# Patient Record
Sex: FEMALE | Race: WHITE | NOT HISPANIC OR LATINO
[De-identification: names, ages, dates, MRNs, and addresses within clinical notes are randomized per-mention and may not be internally consistent; named-entity substitution may affect disease eponyms.]

---

## 2017-04-26 ENCOUNTER — RESULT REVIEW (OUTPATIENT)
Age: 47
End: 2017-04-26

## 2018-05-08 ENCOUNTER — RESULT REVIEW (OUTPATIENT)
Age: 48
End: 2018-05-08

## 2018-05-22 ENCOUNTER — APPOINTMENT (OUTPATIENT)
Dept: MAMMOGRAPHY | Facility: IMAGING CENTER | Age: 48
End: 2018-05-22
Payer: COMMERCIAL

## 2018-05-22 ENCOUNTER — OUTPATIENT (OUTPATIENT)
Dept: OUTPATIENT SERVICES | Facility: HOSPITAL | Age: 48
LOS: 1 days | End: 2018-05-22
Payer: COMMERCIAL

## 2018-05-22 DIAGNOSIS — Z00.00 ENCOUNTER FOR GENERAL ADULT MEDICAL EXAMINATION WITHOUT ABNORMAL FINDINGS: ICD-10-CM

## 2018-05-22 DIAGNOSIS — M67.432 GANGLION, LEFT WRIST: Chronic | ICD-10-CM

## 2018-05-22 PROCEDURE — 77067 SCR MAMMO BI INCL CAD: CPT

## 2018-05-22 PROCEDURE — 77063 BREAST TOMOSYNTHESIS BI: CPT

## 2018-05-22 PROCEDURE — 77067 SCR MAMMO BI INCL CAD: CPT | Mod: 26

## 2018-05-22 PROCEDURE — 77063 BREAST TOMOSYNTHESIS BI: CPT | Mod: 26

## 2018-05-30 ENCOUNTER — OUTPATIENT (OUTPATIENT)
Dept: OUTPATIENT SERVICES | Facility: HOSPITAL | Age: 48
LOS: 1 days | End: 2018-05-30
Payer: COMMERCIAL

## 2018-05-30 ENCOUNTER — APPOINTMENT (OUTPATIENT)
Dept: MAMMOGRAPHY | Facility: IMAGING CENTER | Age: 48
End: 2018-05-30
Payer: COMMERCIAL

## 2018-05-30 ENCOUNTER — APPOINTMENT (OUTPATIENT)
Dept: ULTRASOUND IMAGING | Facility: IMAGING CENTER | Age: 48
End: 2018-05-30
Payer: COMMERCIAL

## 2018-05-30 DIAGNOSIS — M67.432 GANGLION, LEFT WRIST: Chronic | ICD-10-CM

## 2018-05-30 DIAGNOSIS — Z00.8 ENCOUNTER FOR OTHER GENERAL EXAMINATION: ICD-10-CM

## 2018-05-30 PROCEDURE — 77065 DX MAMMO INCL CAD UNI: CPT | Mod: 26,RT

## 2018-05-30 PROCEDURE — 76642 ULTRASOUND BREAST LIMITED: CPT | Mod: 26,RT

## 2018-05-30 PROCEDURE — 76642 ULTRASOUND BREAST LIMITED: CPT

## 2018-05-30 PROCEDURE — 77065 DX MAMMO INCL CAD UNI: CPT

## 2018-06-01 ENCOUNTER — APPOINTMENT (OUTPATIENT)
Dept: ULTRASOUND IMAGING | Facility: CLINIC | Age: 48
End: 2018-06-01
Payer: COMMERCIAL

## 2018-06-01 ENCOUNTER — OUTPATIENT (OUTPATIENT)
Dept: OUTPATIENT SERVICES | Facility: HOSPITAL | Age: 48
LOS: 1 days | End: 2018-06-01
Payer: COMMERCIAL

## 2018-06-01 ENCOUNTER — RESULT REVIEW (OUTPATIENT)
Age: 48
End: 2018-06-01

## 2018-06-01 DIAGNOSIS — Z00.8 ENCOUNTER FOR OTHER GENERAL EXAMINATION: ICD-10-CM

## 2018-06-01 DIAGNOSIS — M67.432 GANGLION, LEFT WRIST: Chronic | ICD-10-CM

## 2018-06-01 PROCEDURE — 77065 DX MAMMO INCL CAD UNI: CPT

## 2018-06-01 PROCEDURE — 19083 BX BREAST 1ST LESION US IMAG: CPT

## 2018-06-01 PROCEDURE — 19083 BX BREAST 1ST LESION US IMAG: CPT | Mod: RT

## 2018-06-01 PROCEDURE — A4648: CPT

## 2018-06-01 PROCEDURE — 77065 DX MAMMO INCL CAD UNI: CPT | Mod: 26,RT

## 2018-06-13 ENCOUNTER — APPOINTMENT (OUTPATIENT)
Dept: SURGICAL ONCOLOGY | Facility: CLINIC | Age: 48
End: 2018-06-13
Payer: COMMERCIAL

## 2018-06-13 VITALS
HEART RATE: 71 BPM | DIASTOLIC BLOOD PRESSURE: 71 MMHG | SYSTOLIC BLOOD PRESSURE: 104 MMHG | HEIGHT: 63 IN | WEIGHT: 147 LBS | OXYGEN SATURATION: 98 % | BODY MASS INDEX: 26.05 KG/M2

## 2018-06-13 DIAGNOSIS — Z80.3 FAMILY HISTORY OF MALIGNANT NEOPLASM OF BREAST: ICD-10-CM

## 2018-06-13 DIAGNOSIS — Z97.3 PRESENCE OF SPECTACLES AND CONTACT LENSES: ICD-10-CM

## 2018-06-13 DIAGNOSIS — C50.911 MALIGNANT NEOPLASM OF UNSPECIFIED SITE OF RIGHT FEMALE BREAST: ICD-10-CM

## 2018-06-13 PROCEDURE — 99244 OFF/OP CNSLTJ NEW/EST MOD 40: CPT

## 2018-06-13 RX ORDER — BUPROPION HYDROCHLORIDE 300 MG/1
300 TABLET, EXTENDED RELEASE ORAL
Refills: 0 | Status: ACTIVE | COMMUNITY

## 2018-06-19 ENCOUNTER — FORM ENCOUNTER (OUTPATIENT)
Age: 48
End: 2018-06-19

## 2018-06-20 ENCOUNTER — OUTPATIENT (OUTPATIENT)
Dept: OUTPATIENT SERVICES | Facility: HOSPITAL | Age: 48
LOS: 1 days | End: 2018-06-20
Payer: COMMERCIAL

## 2018-06-20 ENCOUNTER — APPOINTMENT (OUTPATIENT)
Dept: NUCLEAR MEDICINE | Facility: IMAGING CENTER | Age: 48
End: 2018-06-20
Payer: COMMERCIAL

## 2018-06-20 DIAGNOSIS — M67.432 GANGLION, LEFT WRIST: Chronic | ICD-10-CM

## 2018-06-20 DIAGNOSIS — C50.911 MALIGNANT NEOPLASM OF UNSPECIFIED SITE OF RIGHT FEMALE BREAST: ICD-10-CM

## 2018-06-20 PROCEDURE — 78306 BONE IMAGING WHOLE BODY: CPT

## 2018-06-20 PROCEDURE — 78306 BONE IMAGING WHOLE BODY: CPT | Mod: 26

## 2018-06-20 PROCEDURE — A9561: CPT

## 2018-06-24 ENCOUNTER — FORM ENCOUNTER (OUTPATIENT)
Age: 48
End: 2018-06-24

## 2018-06-25 ENCOUNTER — OUTPATIENT (OUTPATIENT)
Dept: OUTPATIENT SERVICES | Facility: HOSPITAL | Age: 48
LOS: 1 days | End: 2018-06-25
Payer: COMMERCIAL

## 2018-06-25 ENCOUNTER — APPOINTMENT (OUTPATIENT)
Dept: ULTRASOUND IMAGING | Facility: CLINIC | Age: 48
End: 2018-06-25
Payer: COMMERCIAL

## 2018-06-25 ENCOUNTER — APPOINTMENT (OUTPATIENT)
Dept: MRI IMAGING | Facility: CLINIC | Age: 48
End: 2018-06-25
Payer: COMMERCIAL

## 2018-06-25 ENCOUNTER — APPOINTMENT (OUTPATIENT)
Dept: RADIOLOGY | Facility: CLINIC | Age: 48
End: 2018-06-25
Payer: COMMERCIAL

## 2018-06-25 DIAGNOSIS — M67.432 GANGLION, LEFT WRIST: Chronic | ICD-10-CM

## 2018-06-25 DIAGNOSIS — Z00.8 ENCOUNTER FOR OTHER GENERAL EXAMINATION: ICD-10-CM

## 2018-06-25 PROCEDURE — 77059 MRI BREAST BILATERAL: CPT | Mod: 26

## 2018-06-25 PROCEDURE — 76856 US EXAM PELVIC COMPLETE: CPT

## 2018-06-25 PROCEDURE — A9585: CPT

## 2018-06-25 PROCEDURE — 71046 X-RAY EXAM CHEST 2 VIEWS: CPT | Mod: 26

## 2018-06-25 PROCEDURE — 76856 US EXAM PELVIC COMPLETE: CPT | Mod: 26

## 2018-06-25 PROCEDURE — 0159T: CPT | Mod: 26

## 2018-06-25 PROCEDURE — 76830 TRANSVAGINAL US NON-OB: CPT | Mod: 26

## 2018-06-25 PROCEDURE — C8937: CPT

## 2018-06-25 PROCEDURE — C8908: CPT

## 2018-06-25 PROCEDURE — 76830 TRANSVAGINAL US NON-OB: CPT

## 2018-06-25 PROCEDURE — 71046 X-RAY EXAM CHEST 2 VIEWS: CPT

## 2018-07-16 ENCOUNTER — FORM ENCOUNTER (OUTPATIENT)
Age: 48
End: 2018-07-16

## 2018-07-17 ENCOUNTER — APPOINTMENT (OUTPATIENT)
Dept: ULTRASOUND IMAGING | Facility: CLINIC | Age: 48
End: 2018-07-17
Payer: COMMERCIAL

## 2018-07-17 ENCOUNTER — OUTPATIENT (OUTPATIENT)
Dept: OUTPATIENT SERVICES | Facility: HOSPITAL | Age: 48
LOS: 1 days | End: 2018-07-17
Payer: COMMERCIAL

## 2018-07-17 DIAGNOSIS — M67.432 GANGLION, LEFT WRIST: Chronic | ICD-10-CM

## 2018-07-17 DIAGNOSIS — Z00.8 ENCOUNTER FOR OTHER GENERAL EXAMINATION: ICD-10-CM

## 2018-07-17 PROCEDURE — 76700 US EXAM ABDOM COMPLETE: CPT | Mod: 26

## 2018-07-17 PROCEDURE — 76700 US EXAM ABDOM COMPLETE: CPT

## 2018-08-01 ENCOUNTER — OUTPATIENT (OUTPATIENT)
Dept: OUTPATIENT SERVICES | Facility: HOSPITAL | Age: 48
LOS: 1 days | End: 2018-08-01
Payer: COMMERCIAL

## 2018-08-01 VITALS
HEIGHT: 64 IN | TEMPERATURE: 98 F | RESPIRATION RATE: 13 BRPM | DIASTOLIC BLOOD PRESSURE: 73 MMHG | SYSTOLIC BLOOD PRESSURE: 111 MMHG | OXYGEN SATURATION: 98 % | WEIGHT: 143.3 LBS | HEART RATE: 69 BPM

## 2018-08-01 VITALS — HEIGHT: 64 IN | WEIGHT: 143.3 LBS

## 2018-08-01 DIAGNOSIS — Z30.430 ENCOUNTER FOR INSERTION OF INTRAUTERINE CONTRACEPTIVE DEVICE: Chronic | ICD-10-CM

## 2018-08-01 DIAGNOSIS — M67.432 GANGLION, LEFT WRIST: Chronic | ICD-10-CM

## 2018-08-01 DIAGNOSIS — C50.911 MALIGNANT NEOPLASM OF UNSPECIFIED SITE OF RIGHT FEMALE BREAST: ICD-10-CM

## 2018-08-01 DIAGNOSIS — Z01.818 ENCOUNTER FOR OTHER PREPROCEDURAL EXAMINATION: ICD-10-CM

## 2018-08-01 LAB
HCT VFR BLD CALC: 42.5 % — SIGNIFICANT CHANGE UP (ref 34.5–45)
HGB BLD-MCNC: 14.3 G/DL — SIGNIFICANT CHANGE UP (ref 11.5–15.5)
MCHC RBC-ENTMCNC: 31.5 PG — SIGNIFICANT CHANGE UP (ref 27–34)
MCHC RBC-ENTMCNC: 33.8 GM/DL — SIGNIFICANT CHANGE UP (ref 32–36)
MCV RBC AUTO: 93.4 FL — SIGNIFICANT CHANGE UP (ref 80–100)
PLATELET # BLD AUTO: 226 K/UL — SIGNIFICANT CHANGE UP (ref 150–400)
RBC # BLD: 4.55 M/UL — SIGNIFICANT CHANGE UP (ref 3.8–5.2)
RBC # FLD: 11.4 % — SIGNIFICANT CHANGE UP (ref 10.3–14.5)
WBC # BLD: 4.9 K/UL — SIGNIFICANT CHANGE UP (ref 3.8–10.5)
WBC # FLD AUTO: 4.9 K/UL — SIGNIFICANT CHANGE UP (ref 3.8–10.5)

## 2018-08-01 NOTE — H&P PST ADULT - HISTORY OF PRESENT ILLNESS
49 y/o female presents to [facility].  She went for a routine mammogram about 3 months ago and abnormal spots were detected on the right breast.  Further testing with Ultrasound and MRI breast confirmed right breast invasive ductal carcinoma. Scheduled for right breast excision with mammographic localization, right axillary sentinel lymph node biopsy on 8/15/18.

## 2018-08-01 NOTE — H&P PST ADULT - PROBLEM SELECTOR PLAN 1
Scheduled for right breast excision with mammographic localization, right axillary sentinel lymph node biopsy on 8/15/18.    Pre-op labs ordered. Obtain medical clearance. Take Pepcid, Levothyroxine with a sip of water. Bring asthma inhaler.

## 2018-08-01 NOTE — H&P PST ADULT - NSANTHOSAYNRD_GEN_A_CORE
No. LANCE screening performed.  STOP BANG Legend: 0-2 = LOW Risk; 3-4 = INTERMEDIATE Risk; 5-8 = HIGH Risk

## 2018-08-01 NOTE — H&P PST ADULT - ATTENDING COMMENTS
48-year-old lady recently diagnosed with an invasive carcinoma of the upper-outer quadrant of her right breast.  Her extent of disease evaluation is unremarkable.  Management options reviewed, the first breast conservation.    She is scheduled for wire localized excision of the right breast cancer, with sentinel node biopsy.    Diagnosis and plan were reviewed with her  in my office, and again the morning of surgery.    All questions answered, consent on chart

## 2018-08-01 NOTE — H&P PST ADULT - PMH
Asthma  last major attack 2016  Bipolar affective    Depression, unspecified depression type    Hypothyroidism, unspecified type    Malignant neoplasm of right female breast, unspecified estrogen receptor status, unspecified site of breast

## 2018-08-02 ENCOUNTER — RESULT REVIEW (OUTPATIENT)
Age: 48
End: 2018-08-02

## 2018-08-02 LAB — SURGICAL PATHOLOGY STUDY: SIGNIFICANT CHANGE UP

## 2018-08-02 PROCEDURE — 36415 COLL VENOUS BLD VENIPUNCTURE: CPT

## 2018-08-02 PROCEDURE — G0463: CPT

## 2018-08-02 PROCEDURE — 85027 COMPLETE CBC AUTOMATED: CPT

## 2018-08-08 PROBLEM — E03.9 HYPOTHYROIDISM, UNSPECIFIED: Chronic | Status: ACTIVE | Noted: 2018-08-01

## 2018-08-08 PROBLEM — F32.9 MAJOR DEPRESSIVE DISORDER, SINGLE EPISODE, UNSPECIFIED: Chronic | Status: ACTIVE | Noted: 2018-08-01

## 2018-08-14 ENCOUNTER — FORM ENCOUNTER (OUTPATIENT)
Age: 48
End: 2018-08-14

## 2018-08-14 ENCOUNTER — TRANSCRIPTION ENCOUNTER (OUTPATIENT)
Age: 48
End: 2018-08-14

## 2018-08-14 NOTE — ASU DISCHARGE PLAN (ADULT/PEDIATRIC). - NOTIFY
Increased Irritability or Sluggishness/Pain not relieved by Medications/Excessive Diarrhea/Inability to Tolerate Liquids or Foods/Persistent Nausea and Vomiting/Bleeding that does not stop/Unable to Urinate/Numbness, tingling/Swelling that continues/Numbness, color, or temperature change to extremity/Fever greater than 101

## 2018-08-14 NOTE — ASU DISCHARGE PLAN (ADULT/PEDIATRIC). - MEDICATION SUMMARY - MEDICATIONS TO TAKE
I will START or STAY ON the medications listed below when I get home from the hospital:    sertraline 100 mg oral tablet  -- 1  by mouth once a day  -- Indication: For home    Symbicort 160 mcg-4.5 mcg/inh inhalation aerosol  --  inhaled , 2 puffs /day  -- Indication: For home    buPROPion 300 mg/24 hours oral tablet, extended release  -- 1 tab(s) by mouth every 24 hours  -- Indication: For home    levothyroxine 75 mcg (0.075 mg) oral tablet  -- 1 tab(s) by mouth once a day  -- Indication: For home

## 2018-08-14 NOTE — ASU DISCHARGE PLAN (ADULT/PEDIATRIC). - ITEMS TO FOLLOWUP WITH YOUR PHYSICIAN'S
Dr. Heller should call with pathology report by August 24  Office followup will be determined after the conversation

## 2018-08-15 ENCOUNTER — APPOINTMENT (OUTPATIENT)
Dept: SURGICAL ONCOLOGY | Facility: HOSPITAL | Age: 48
End: 2018-08-15

## 2018-08-15 ENCOUNTER — OUTPATIENT (OUTPATIENT)
Dept: OUTPATIENT SERVICES | Facility: HOSPITAL | Age: 48
LOS: 1 days | End: 2018-08-15
Payer: COMMERCIAL

## 2018-08-15 ENCOUNTER — RESULT REVIEW (OUTPATIENT)
Age: 48
End: 2018-08-15

## 2018-08-15 VITALS
RESPIRATION RATE: 16 BRPM | TEMPERATURE: 97 F | SYSTOLIC BLOOD PRESSURE: 102 MMHG | OXYGEN SATURATION: 98 % | DIASTOLIC BLOOD PRESSURE: 70 MMHG | HEART RATE: 70 BPM

## 2018-08-15 VITALS
WEIGHT: 143.3 LBS | DIASTOLIC BLOOD PRESSURE: 73 MMHG | HEIGHT: 64 IN | SYSTOLIC BLOOD PRESSURE: 102 MMHG | TEMPERATURE: 98 F | OXYGEN SATURATION: 99 % | HEART RATE: 71 BPM | RESPIRATION RATE: 16 BRPM

## 2018-08-15 DIAGNOSIS — Z30.430 ENCOUNTER FOR INSERTION OF INTRAUTERINE CONTRACEPTIVE DEVICE: Chronic | ICD-10-CM

## 2018-08-15 DIAGNOSIS — M67.432 GANGLION, LEFT WRIST: Chronic | ICD-10-CM

## 2018-08-15 DIAGNOSIS — C50.911 MALIGNANT NEOPLASM OF UNSPECIFIED SITE OF RIGHT FEMALE BREAST: ICD-10-CM

## 2018-08-15 PROCEDURE — 19281 PERQ DEVICE BREAST 1ST IMAG: CPT | Mod: RT

## 2018-08-15 PROCEDURE — 38900 IO MAP OF SENT LYMPH NODE: CPT

## 2018-08-15 PROCEDURE — 38525 BIOPSY/REMOVAL LYMPH NODES: CPT

## 2018-08-15 PROCEDURE — A9541: CPT

## 2018-08-15 PROCEDURE — 76098 X-RAY EXAM SURGICAL SPECIMEN: CPT

## 2018-08-15 PROCEDURE — 88307 TISSUE EXAM BY PATHOLOGIST: CPT

## 2018-08-15 PROCEDURE — 38790 INJECT FOR LYMPHATIC X-RAY: CPT

## 2018-08-15 PROCEDURE — 38525 BIOPSY/REMOVAL LYMPH NODES: CPT | Mod: RT

## 2018-08-15 PROCEDURE — 19301 PARTIAL MASTECTOMY: CPT | Mod: RT

## 2018-08-15 PROCEDURE — 19281 PERQ DEVICE BREAST 1ST IMAG: CPT

## 2018-08-15 PROCEDURE — C1889: CPT

## 2018-08-15 PROCEDURE — 88307 TISSUE EXAM BY PATHOLOGIST: CPT | Mod: 26

## 2018-08-15 PROCEDURE — 19125 EXCISION BREAST LESION: CPT

## 2018-08-15 RX ORDER — HYDROMORPHONE HYDROCHLORIDE 2 MG/ML
0.5 INJECTION INTRAMUSCULAR; INTRAVENOUS; SUBCUTANEOUS
Qty: 0 | Refills: 0 | Status: DISCONTINUED | OUTPATIENT
Start: 2018-08-15 | End: 2018-08-15

## 2018-08-15 RX ORDER — SODIUM CHLORIDE 9 MG/ML
1000 INJECTION, SOLUTION INTRAVENOUS
Qty: 0 | Refills: 0 | Status: DISCONTINUED | OUTPATIENT
Start: 2018-08-15 | End: 2018-08-15

## 2018-08-15 RX ORDER — HEPARIN SODIUM 5000 [USP'U]/ML
5000 INJECTION INTRAVENOUS; SUBCUTANEOUS ONCE
Qty: 0 | Refills: 0 | Status: COMPLETED | OUTPATIENT
Start: 2018-08-15 | End: 2018-08-15

## 2018-08-15 RX ORDER — OXYCODONE AND ACETAMINOPHEN 5; 325 MG/1; MG/1
1 TABLET ORAL EVERY 4 HOURS
Qty: 0 | Refills: 0 | Status: DISCONTINUED | OUTPATIENT
Start: 2018-08-15 | End: 2018-08-15

## 2018-08-15 RX ORDER — ACETAMINOPHEN 500 MG
650 TABLET ORAL EVERY 6 HOURS
Qty: 0 | Refills: 0 | Status: DISCONTINUED | OUTPATIENT
Start: 2018-08-15 | End: 2018-08-30

## 2018-08-15 RX ORDER — MORPHINE SULFATE 50 MG/1
2 CAPSULE, EXTENDED RELEASE ORAL EVERY 4 HOURS
Qty: 0 | Refills: 0 | Status: DISCONTINUED | OUTPATIENT
Start: 2018-08-15 | End: 2018-08-15

## 2018-08-15 RX ORDER — ONDANSETRON 8 MG/1
4 TABLET, FILM COATED ORAL ONCE
Qty: 0 | Refills: 0 | Status: DISCONTINUED | OUTPATIENT
Start: 2018-08-15 | End: 2018-08-15

## 2018-08-15 RX ORDER — OXYCODONE HYDROCHLORIDE 5 MG/1
5 TABLET ORAL ONCE
Qty: 0 | Refills: 0 | Status: DISCONTINUED | OUTPATIENT
Start: 2018-08-15 | End: 2018-08-15

## 2018-08-15 RX ADMIN — OXYCODONE AND ACETAMINOPHEN 1 TABLET(S): 5; 325 TABLET ORAL at 11:47

## 2018-08-15 RX ADMIN — SODIUM CHLORIDE 50 MILLILITER(S): 9 INJECTION, SOLUTION INTRAVENOUS at 06:39

## 2018-08-15 RX ADMIN — OXYCODONE AND ACETAMINOPHEN 1 TABLET(S): 5; 325 TABLET ORAL at 12:26

## 2018-08-15 RX ADMIN — HEPARIN SODIUM 5000 UNIT(S): 5000 INJECTION INTRAVENOUS; SUBCUTANEOUS at 07:54

## 2018-08-15 NOTE — BRIEF OPERATIVE NOTE - PROCEDURE
<<-----Click on this checkbox to enter Procedure Breast lumpectomy with sentinel node biopsy  08/15/2018  right  Active  MBEG

## 2018-08-15 NOTE — BRIEF OPERATIVE NOTE - SPECIMENS
right axSLN(s), tissue UOQ right breast right axSLN(s): blue: 150, background: 0, tissue UOQ right breast

## 2018-08-20 LAB — SURGICAL PATHOLOGY STUDY: SIGNIFICANT CHANGE UP

## 2018-08-27 ENCOUNTER — OUTPATIENT (OUTPATIENT)
Dept: OUTPATIENT SERVICES | Facility: HOSPITAL | Age: 48
LOS: 1 days | Discharge: HOME | End: 2018-08-27
Payer: COMMERCIAL

## 2018-08-27 DIAGNOSIS — Z30.430 ENCOUNTER FOR INSERTION OF INTRAUTERINE CONTRACEPTIVE DEVICE: Chronic | ICD-10-CM

## 2018-08-27 DIAGNOSIS — M67.432 GANGLION, LEFT WRIST: Chronic | ICD-10-CM

## 2018-08-27 DIAGNOSIS — C50.919 MALIGNANT NEOPLASM OF UNSPECIFIED SITE OF UNSPECIFIED FEMALE BREAST: ICD-10-CM

## 2018-08-28 ENCOUNTER — OUTPATIENT (OUTPATIENT)
Dept: OUTPATIENT SERVICES | Facility: HOSPITAL | Age: 48
LOS: 1 days | Discharge: HOME | End: 2018-08-28
Payer: COMMERCIAL

## 2018-08-28 DIAGNOSIS — M67.432 GANGLION, LEFT WRIST: Chronic | ICD-10-CM

## 2018-08-28 DIAGNOSIS — Z30.430 ENCOUNTER FOR INSERTION OF INTRAUTERINE CONTRACEPTIVE DEVICE: Chronic | ICD-10-CM

## 2018-09-05 ENCOUNTER — APPOINTMENT (OUTPATIENT)
Dept: RADIATION ONCOLOGY | Facility: CLINIC | Age: 48
End: 2018-09-05
Payer: COMMERCIAL

## 2018-09-05 VITALS
BODY MASS INDEX: 26.15 KG/M2 | OXYGEN SATURATION: 100 % | SYSTOLIC BLOOD PRESSURE: 110 MMHG | HEART RATE: 68 BPM | HEIGHT: 63 IN | TEMPERATURE: 98.7 F | DIASTOLIC BLOOD PRESSURE: 74 MMHG | RESPIRATION RATE: 15 BRPM | WEIGHT: 147.6 LBS

## 2018-09-05 PROCEDURE — 99244 OFF/OP CNSLTJ NEW/EST MOD 40: CPT | Mod: 25,GC

## 2018-09-11 ENCOUNTER — APPOINTMENT (OUTPATIENT)
Dept: HEMATOLOGY ONCOLOGY | Facility: CLINIC | Age: 48
End: 2018-09-11
Payer: COMMERCIAL

## 2018-09-11 VITALS
OXYGEN SATURATION: 100 % | RESPIRATION RATE: 16 BRPM | BODY MASS INDEX: 24.57 KG/M2 | SYSTOLIC BLOOD PRESSURE: 124 MMHG | WEIGHT: 149.25 LBS | DIASTOLIC BLOOD PRESSURE: 83 MMHG | HEART RATE: 72 BPM | HEIGHT: 65.39 IN | TEMPERATURE: 98.3 F

## 2018-09-11 DIAGNOSIS — Z80.6 FAMILY HISTORY OF LEUKEMIA: ICD-10-CM

## 2018-09-11 DIAGNOSIS — Z80.3 FAMILY HISTORY OF MALIGNANT NEOPLASM OF BREAST: ICD-10-CM

## 2018-09-11 PROCEDURE — 99205 OFFICE O/P NEW HI 60 MIN: CPT

## 2018-09-21 ENCOUNTER — LABORATORY RESULT (OUTPATIENT)
Age: 48
End: 2018-09-21

## 2018-09-21 ENCOUNTER — RESULT REVIEW (OUTPATIENT)
Age: 48
End: 2018-09-21

## 2018-09-21 ENCOUNTER — APPOINTMENT (OUTPATIENT)
Dept: HEMATOLOGY ONCOLOGY | Facility: CLINIC | Age: 48
End: 2018-09-21
Payer: COMMERCIAL

## 2018-09-21 VITALS
DIASTOLIC BLOOD PRESSURE: 71 MMHG | RESPIRATION RATE: 16 BRPM | SYSTOLIC BLOOD PRESSURE: 122 MMHG | WEIGHT: 147.71 LBS | BODY MASS INDEX: 24.29 KG/M2 | TEMPERATURE: 98.4 F | OXYGEN SATURATION: 97 % | HEART RATE: 77 BPM

## 2018-09-21 LAB
ALBUMIN SERPL ELPH-MCNC: 4.7 G/DL
ALP BLD-CCNC: 109 U/L
ALT SERPL-CCNC: 20 U/L
ANION GAP SERPL CALC-SCNC: 13 MMOL/L
APTT BLD: 32 SEC
AST SERPL-CCNC: 21 U/L
BILIRUB SERPL-MCNC: 0.4 MG/DL
BUN SERPL-MCNC: 12 MG/DL
CALCIUM SERPL-MCNC: 9.9 MG/DL
CHLORIDE SERPL-SCNC: 107 MMOL/L
CO2 SERPL-SCNC: 25 MMOL/L
CREAT SERPL-MCNC: 0.82 MG/DL
GLUCOSE SERPL-MCNC: 106 MG/DL
HCT VFR BLD CALC: 39.7 % — SIGNIFICANT CHANGE UP (ref 34.5–45)
HGB BLD-MCNC: 13.6 G/DL — SIGNIFICANT CHANGE UP (ref 11.5–15.5)
INR PPP: 0.97 RATIO
MCHC RBC-ENTMCNC: 32.2 PG — SIGNIFICANT CHANGE UP (ref 27–34)
MCHC RBC-ENTMCNC: 34.2 G/DL — SIGNIFICANT CHANGE UP (ref 32–36)
MCV RBC AUTO: 93.9 FL — SIGNIFICANT CHANGE UP (ref 80–100)
PLATELET # BLD AUTO: 259 K/UL — SIGNIFICANT CHANGE UP (ref 150–400)
POTASSIUM SERPL-SCNC: 5.1 MMOL/L
PROT SERPL-MCNC: 8 G/DL
PT BLD: 11 SEC
RBC # BLD: 4.22 M/UL — SIGNIFICANT CHANGE UP (ref 3.8–5.2)
RBC # FLD: 11.1 % — SIGNIFICANT CHANGE UP (ref 10.3–14.5)
SODIUM SERPL-SCNC: 145 MMOL/L
WBC # BLD: 4.7 K/UL — SIGNIFICANT CHANGE UP (ref 3.8–10.5)
WBC # FLD AUTO: 4.7 K/UL — SIGNIFICANT CHANGE UP (ref 3.8–10.5)

## 2018-09-21 PROCEDURE — 93010 ELECTROCARDIOGRAM REPORT: CPT

## 2018-09-21 PROCEDURE — 99215 OFFICE O/P EST HI 40 MIN: CPT

## 2018-09-22 LAB
BASOPHILS # BLD AUTO: 0.1 K/UL — SIGNIFICANT CHANGE UP (ref 0–0.2)
BASOPHILS NFR BLD AUTO: 1.1 % — SIGNIFICANT CHANGE UP (ref 0–2)
EOSINOPHIL # BLD AUTO: 0.2 K/UL — SIGNIFICANT CHANGE UP (ref 0–0.5)
EOSINOPHIL NFR BLD AUTO: 4.5 % — SIGNIFICANT CHANGE UP (ref 0–6)
LYMPHOCYTES # BLD AUTO: 1.4 K/UL — SIGNIFICANT CHANGE UP (ref 1–3.3)
LYMPHOCYTES # BLD AUTO: 29.4 % — SIGNIFICANT CHANGE UP (ref 13–44)
MONOCYTES # BLD AUTO: 0.3 K/UL — SIGNIFICANT CHANGE UP (ref 0–0.9)
MONOCYTES NFR BLD AUTO: 6.3 % — SIGNIFICANT CHANGE UP (ref 2–14)
NEUTROPHILS # BLD AUTO: 2.8 K/UL — SIGNIFICANT CHANGE UP (ref 1.8–7.4)
NEUTROPHILS NFR BLD AUTO: 58.7 % — SIGNIFICANT CHANGE UP (ref 43–77)

## 2018-09-24 ENCOUNTER — OUTPATIENT (OUTPATIENT)
Dept: OUTPATIENT SERVICES | Facility: HOSPITAL | Age: 48
LOS: 1 days | Discharge: HOME | End: 2018-09-24

## 2018-09-24 DIAGNOSIS — C50.919 MALIGNANT NEOPLASM OF UNSPECIFIED SITE OF UNSPECIFIED FEMALE BREAST: ICD-10-CM

## 2018-09-24 DIAGNOSIS — M67.432 GANGLION, LEFT WRIST: Chronic | ICD-10-CM

## 2018-09-24 DIAGNOSIS — Z30.430 ENCOUNTER FOR INSERTION OF INTRAUTERINE CONTRACEPTIVE DEVICE: Chronic | ICD-10-CM

## 2018-09-24 LAB
HBV CORE IGG+IGM SER QL: NONREACTIVE
HBV CORE IGM SER QL: NONREACTIVE
HBV SURFACE AB SER QL: NONREACTIVE
HBV SURFACE AG SER QL: NORMAL
HCV AB SER QL: NONREACTIVE
HCV S/CO RATIO: 0.42 S/CO
HEPATITIS A IGG ANTIBODY: REACTIVE

## 2018-09-25 ENCOUNTER — APPOINTMENT (OUTPATIENT)
Dept: HEMATOLOGY ONCOLOGY | Facility: CLINIC | Age: 48
End: 2018-09-25

## 2018-09-25 ENCOUNTER — RESULT REVIEW (OUTPATIENT)
Age: 48
End: 2018-09-25

## 2018-09-25 LAB
HCT VFR BLD CALC: 36.5 % — SIGNIFICANT CHANGE UP (ref 34.5–45)
HGB BLD-MCNC: 12.8 G/DL — SIGNIFICANT CHANGE UP (ref 11.5–15.5)
MCHC RBC-ENTMCNC: 33 PG — SIGNIFICANT CHANGE UP (ref 27–34)
MCHC RBC-ENTMCNC: 35.2 G/DL — SIGNIFICANT CHANGE UP (ref 32–36)
MCV RBC AUTO: 93.7 FL — SIGNIFICANT CHANGE UP (ref 80–100)
PLATELET # BLD AUTO: 276 K/UL — SIGNIFICANT CHANGE UP (ref 150–400)
RBC # BLD: 3.89 M/UL — SIGNIFICANT CHANGE UP (ref 3.8–5.2)
RBC # FLD: 11 % — SIGNIFICANT CHANGE UP (ref 10.3–14.5)
WBC # BLD: 4.4 K/UL — SIGNIFICANT CHANGE UP (ref 3.8–10.5)
WBC # FLD AUTO: 4.4 K/UL — SIGNIFICANT CHANGE UP (ref 3.8–10.5)

## 2018-09-26 ENCOUNTER — LABORATORY RESULT (OUTPATIENT)
Age: 48
End: 2018-09-26

## 2018-09-27 LAB
HBV DNA # SERPL NAA+PROBE: NOT DETECTED IU/ML
HEPB DNA PCR LOG: NOT DETECTED LOGIU/ML

## 2018-09-28 ENCOUNTER — OTHER (OUTPATIENT)
Age: 48
End: 2018-09-28

## 2018-10-03 ENCOUNTER — APPOINTMENT (OUTPATIENT)
Dept: HEMATOLOGY ONCOLOGY | Facility: CLINIC | Age: 48
End: 2018-10-03
Payer: COMMERCIAL

## 2018-10-03 ENCOUNTER — RESULT REVIEW (OUTPATIENT)
Age: 48
End: 2018-10-03

## 2018-10-03 ENCOUNTER — APPOINTMENT (OUTPATIENT)
Dept: INFUSION THERAPY | Facility: HOSPITAL | Age: 48
End: 2018-10-03

## 2018-10-03 ENCOUNTER — LABORATORY RESULT (OUTPATIENT)
Age: 48
End: 2018-10-03

## 2018-10-03 ENCOUNTER — OTHER (OUTPATIENT)
Age: 48
End: 2018-10-03

## 2018-10-03 LAB
BASOPHILS # BLD AUTO: 0 K/UL — SIGNIFICANT CHANGE UP (ref 0–0.2)
BASOPHILS NFR BLD AUTO: 0.2 % — SIGNIFICANT CHANGE UP (ref 0–2)
EOSINOPHIL # BLD AUTO: 0 K/UL — SIGNIFICANT CHANGE UP (ref 0–0.5)
EOSINOPHIL NFR BLD AUTO: 0.6 % — SIGNIFICANT CHANGE UP (ref 0–6)
HCT VFR BLD CALC: 38.4 % — SIGNIFICANT CHANGE UP (ref 34.5–45)
HGB BLD-MCNC: 13.6 G/DL — SIGNIFICANT CHANGE UP (ref 11.5–15.5)
LYMPHOCYTES # BLD AUTO: 1.6 K/UL — SIGNIFICANT CHANGE UP (ref 1–3.3)
LYMPHOCYTES # BLD AUTO: 20.6 % — SIGNIFICANT CHANGE UP (ref 13–44)
MCHC RBC-ENTMCNC: 32.8 PG — SIGNIFICANT CHANGE UP (ref 27–34)
MCHC RBC-ENTMCNC: 35.4 G/DL — SIGNIFICANT CHANGE UP (ref 32–36)
MCV RBC AUTO: 92.7 FL — SIGNIFICANT CHANGE UP (ref 80–100)
MONOCYTES # BLD AUTO: 0.5 K/UL — SIGNIFICANT CHANGE UP (ref 0–0.9)
MONOCYTES NFR BLD AUTO: 6.8 % — SIGNIFICANT CHANGE UP (ref 2–14)
NEUTROPHILS # BLD AUTO: 5.5 K/UL — SIGNIFICANT CHANGE UP (ref 1.8–7.4)
NEUTROPHILS NFR BLD AUTO: 71.7 % — SIGNIFICANT CHANGE UP (ref 43–77)
PLATELET # BLD AUTO: 284 K/UL — SIGNIFICANT CHANGE UP (ref 150–400)
RBC # BLD: 4.14 M/UL — SIGNIFICANT CHANGE UP (ref 3.8–5.2)
RBC # FLD: 11.1 % — SIGNIFICANT CHANGE UP (ref 10.3–14.5)
WBC # BLD: 7.7 K/UL — SIGNIFICANT CHANGE UP (ref 3.8–10.5)
WBC # FLD AUTO: 7.7 K/UL — SIGNIFICANT CHANGE UP (ref 3.8–10.5)

## 2018-10-03 PROCEDURE — 99214 OFFICE O/P EST MOD 30 MIN: CPT

## 2018-10-03 RX ORDER — ENTECAVIR 0.5 MG/1
0.5 TABLET, FILM COATED ORAL DAILY
Qty: 30 | Refills: 4 | Status: DISCONTINUED | COMMUNITY
Start: 2018-09-26 | End: 2018-10-03

## 2018-10-04 DIAGNOSIS — E86.0 DEHYDRATION: ICD-10-CM

## 2018-10-04 DIAGNOSIS — Z51.89 ENCOUNTER FOR OTHER SPECIFIED AFTERCARE: ICD-10-CM

## 2018-10-04 DIAGNOSIS — R11.2 NAUSEA WITH VOMITING, UNSPECIFIED: ICD-10-CM

## 2018-10-04 DIAGNOSIS — Z51.11 ENCOUNTER FOR ANTINEOPLASTIC CHEMOTHERAPY: ICD-10-CM

## 2018-10-17 ENCOUNTER — RESULT REVIEW (OUTPATIENT)
Age: 48
End: 2018-10-17

## 2018-10-17 ENCOUNTER — APPOINTMENT (OUTPATIENT)
Dept: HEMATOLOGY ONCOLOGY | Facility: CLINIC | Age: 48
End: 2018-10-17
Payer: COMMERCIAL

## 2018-10-17 VITALS
SYSTOLIC BLOOD PRESSURE: 120 MMHG | OXYGEN SATURATION: 98 % | RESPIRATION RATE: 16 BRPM | TEMPERATURE: 98 F | BODY MASS INDEX: 24.47 KG/M2 | HEART RATE: 88 BPM | DIASTOLIC BLOOD PRESSURE: 74 MMHG | WEIGHT: 148.81 LBS

## 2018-10-17 LAB
BASOPHILS # BLD AUTO: 0.1 K/UL — SIGNIFICANT CHANGE UP (ref 0–0.2)
BASOPHILS NFR BLD AUTO: 1.1 % — SIGNIFICANT CHANGE UP (ref 0–2)
EOSINOPHIL # BLD AUTO: 0 K/UL — SIGNIFICANT CHANGE UP (ref 0–0.5)
EOSINOPHIL NFR BLD AUTO: 0.3 % — SIGNIFICANT CHANGE UP (ref 0–6)
HCT VFR BLD CALC: 37.8 % — SIGNIFICANT CHANGE UP (ref 34.5–45)
HGB BLD-MCNC: 12.9 G/DL — SIGNIFICANT CHANGE UP (ref 11.5–15.5)
LYMPHOCYTES # BLD AUTO: 1.8 K/UL — SIGNIFICANT CHANGE UP (ref 1–3.3)
LYMPHOCYTES # BLD AUTO: 26.8 % — SIGNIFICANT CHANGE UP (ref 13–44)
MCHC RBC-ENTMCNC: 32.2 PG — SIGNIFICANT CHANGE UP (ref 27–34)
MCHC RBC-ENTMCNC: 34.3 G/DL — SIGNIFICANT CHANGE UP (ref 32–36)
MCV RBC AUTO: 93.9 FL — SIGNIFICANT CHANGE UP (ref 80–100)
MONOCYTES # BLD AUTO: 0.3 K/UL — SIGNIFICANT CHANGE UP (ref 0–0.9)
MONOCYTES NFR BLD AUTO: 4.8 % — SIGNIFICANT CHANGE UP (ref 2–14)
NEUTROPHILS # BLD AUTO: 4.6 K/UL — SIGNIFICANT CHANGE UP (ref 1.8–7.4)
NEUTROPHILS NFR BLD AUTO: 66.9 % — SIGNIFICANT CHANGE UP (ref 43–77)
PLATELET # BLD AUTO: 301 K/UL — SIGNIFICANT CHANGE UP (ref 150–400)
RBC # BLD: 4.02 M/UL — SIGNIFICANT CHANGE UP (ref 3.8–5.2)
RBC # FLD: 11.2 % — SIGNIFICANT CHANGE UP (ref 10.3–14.5)
WBC # BLD: 6.8 K/UL — SIGNIFICANT CHANGE UP (ref 3.8–10.5)
WBC # FLD AUTO: 6.8 K/UL — SIGNIFICANT CHANGE UP (ref 3.8–10.5)

## 2018-10-17 PROCEDURE — 99214 OFFICE O/P EST MOD 30 MIN: CPT

## 2018-10-18 LAB
ALBUMIN SERPL ELPH-MCNC: 4.4 G/DL
ALP BLD-CCNC: 126 U/L
ALT SERPL-CCNC: 30 U/L
ANION GAP SERPL CALC-SCNC: 14 MMOL/L
AST SERPL-CCNC: 21 U/L
BILIRUB SERPL-MCNC: 0.2 MG/DL
BUN SERPL-MCNC: 9 MG/DL
CALCIUM SERPL-MCNC: 9.5 MG/DL
CHLORIDE SERPL-SCNC: 103 MMOL/L
CO2 SERPL-SCNC: 24 MMOL/L
CREAT SERPL-MCNC: 0.68 MG/DL
GLUCOSE SERPL-MCNC: 88 MG/DL
HBV CORE IGM SER QL: NONREACTIVE
HCV AB SER QL: NONREACTIVE
HCV S/CO RATIO: 0.31 S/CO
POTASSIUM SERPL-SCNC: 4.1 MMOL/L
PROT SERPL-MCNC: 7.5 G/DL
SODIUM SERPL-SCNC: 141 MMOL/L

## 2018-10-19 LAB
HBV E AB SER QL: NEGATIVE
HBV E AG SER QL: NEGATIVE
HBV SURFACE AB SER QL: NONREACTIVE
HBV SURFACE AG SER QL: NORMAL

## 2018-10-24 ENCOUNTER — INPATIENT (INPATIENT)
Facility: HOSPITAL | Age: 48
LOS: 1 days | Discharge: HOME | DRG: 640 | End: 2018-10-26
Attending: FAMILY MEDICINE | Admitting: FAMILY MEDICINE
Payer: COMMERCIAL

## 2018-10-24 ENCOUNTER — APPOINTMENT (OUTPATIENT)
Dept: INFUSION THERAPY | Facility: HOSPITAL | Age: 48
End: 2018-10-24

## 2018-10-24 ENCOUNTER — RESULT REVIEW (OUTPATIENT)
Age: 48
End: 2018-10-24

## 2018-10-24 VITALS
TEMPERATURE: 96 F | HEIGHT: 65 IN | OXYGEN SATURATION: 78 % | WEIGHT: 145.06 LBS | SYSTOLIC BLOOD PRESSURE: 127 MMHG | RESPIRATION RATE: 40 BRPM | HEART RATE: 106 BPM | DIASTOLIC BLOOD PRESSURE: 82 MMHG

## 2018-10-24 DIAGNOSIS — Z98.890 OTHER SPECIFIED POSTPROCEDURAL STATES: Chronic | ICD-10-CM

## 2018-10-24 DIAGNOSIS — M67.432 GANGLION, LEFT WRIST: Chronic | ICD-10-CM

## 2018-10-24 DIAGNOSIS — Z30.430 ENCOUNTER FOR INSERTION OF INTRAUTERINE CONTRACEPTIVE DEVICE: Chronic | ICD-10-CM

## 2018-10-24 LAB
ALBUMIN SERPL ELPH-MCNC: 3.1 G/DL — LOW (ref 3.3–5)
ALP SERPL-CCNC: 108 U/L — SIGNIFICANT CHANGE UP (ref 40–120)
ALT FLD-CCNC: 64 U/L — SIGNIFICANT CHANGE UP (ref 12–78)
ANION GAP SERPL CALC-SCNC: 17 MMOL/L — SIGNIFICANT CHANGE UP (ref 5–17)
APTT BLD: 24.4 SEC — LOW (ref 27.5–37.4)
AST SERPL-CCNC: 76 U/L — HIGH (ref 15–37)
BASE EXCESS BLDA CALC-SCNC: -5.1 MMOL/L — LOW (ref -2–2)
BASOPHILS # BLD AUTO: 0 K/UL — SIGNIFICANT CHANGE UP (ref 0–0.2)
BASOPHILS NFR BLD AUTO: 0 % — SIGNIFICANT CHANGE UP (ref 0–2)
BILIRUB SERPL-MCNC: 0.3 MG/DL — SIGNIFICANT CHANGE UP (ref 0.2–1.2)
BLOOD GAS COMMENTS ARTERIAL: SIGNIFICANT CHANGE UP
BUN SERPL-MCNC: 6 MG/DL — LOW (ref 7–23)
CALCIUM SERPL-MCNC: 7.2 MG/DL — LOW (ref 8.5–10.1)
CHLORIDE SERPL-SCNC: 87 MMOL/L — LOW (ref 96–108)
CO2 SERPL-SCNC: 15 MMOL/L — LOW (ref 22–31)
CREAT SERPL-MCNC: 0.85 MG/DL — SIGNIFICANT CHANGE UP (ref 0.5–1.3)
EOSINOPHIL # BLD AUTO: 0 K/UL — SIGNIFICANT CHANGE UP (ref 0–0.5)
EOSINOPHIL NFR BLD AUTO: 0 % — SIGNIFICANT CHANGE UP (ref 0–6)
GLUCOSE SERPL-MCNC: 149 MG/DL — HIGH (ref 70–99)
HCO3 BLDA-SCNC: 21 MMOL/L — LOW (ref 23–27)
HCT VFR BLD CALC: 35.9 % — SIGNIFICANT CHANGE UP (ref 34.5–45)
HCT VFR BLD CALC: 38.5 % — SIGNIFICANT CHANGE UP (ref 34.5–45)
HDV AB SER IA-ACNC: NEGATIVE
HGB BLD-MCNC: 12.5 G/DL — SIGNIFICANT CHANGE UP (ref 11.5–15.5)
HGB BLD-MCNC: 13 G/DL — SIGNIFICANT CHANGE UP (ref 11.5–15.5)
HOROWITZ INDEX BLDA+IHG-RTO: 21 — SIGNIFICANT CHANGE UP
INR BLD: 1.03 RATIO — SIGNIFICANT CHANGE UP (ref 0.88–1.16)
LACTATE SERPL-SCNC: 6.4 MMOL/L — CRITICAL HIGH (ref 0.7–2)
LYMPHOCYTES # BLD AUTO: 1.84 K/UL — SIGNIFICANT CHANGE UP (ref 1–3.3)
LYMPHOCYTES # BLD AUTO: 13 % — SIGNIFICANT CHANGE UP (ref 13–44)
MCHC RBC-ENTMCNC: 31.9 PG — SIGNIFICANT CHANGE UP (ref 27–34)
MCHC RBC-ENTMCNC: 32.1 PG — SIGNIFICANT CHANGE UP (ref 27–34)
MCHC RBC-ENTMCNC: 33.9 G/DL — SIGNIFICANT CHANGE UP (ref 32–36)
MCHC RBC-ENTMCNC: 34.8 GM/DL — SIGNIFICANT CHANGE UP (ref 32–36)
MCV RBC AUTO: 92.1 FL — SIGNIFICANT CHANGE UP (ref 80–100)
MCV RBC AUTO: 94.2 FL — SIGNIFICANT CHANGE UP (ref 80–100)
MONOCYTES # BLD AUTO: 0.14 K/UL — SIGNIFICANT CHANGE UP (ref 0–0.9)
MONOCYTES NFR BLD AUTO: 1 % — LOW (ref 2–14)
NEUTROPHILS # BLD AUTO: 11.19 K/UL — HIGH (ref 1.8–7.4)
NEUTROPHILS NFR BLD AUTO: 77 % — SIGNIFICANT CHANGE UP (ref 43–77)
PCO2 BLDA: 29 MMHG — LOW (ref 32–46)
PH BLDA: 7.42 — SIGNIFICANT CHANGE UP (ref 7.35–7.45)
PLATELET # BLD AUTO: 547 K/UL — HIGH (ref 150–400)
PLATELET # BLD AUTO: 595 K/UL — HIGH (ref 150–400)
PO2 BLDA: 63 MMHG — LOW (ref 74–108)
POTASSIUM SERPL-MCNC: 4.2 MMOL/L — SIGNIFICANT CHANGE UP (ref 3.5–5.3)
POTASSIUM SERPL-SCNC: 4.2 MMOL/L — SIGNIFICANT CHANGE UP (ref 3.5–5.3)
PROT SERPL-MCNC: 7.4 G/DL — SIGNIFICANT CHANGE UP (ref 6–8.3)
PROTHROM AB SERPL-ACNC: 11.2 SEC — SIGNIFICANT CHANGE UP (ref 9.8–12.7)
RBC # BLD: 3.9 M/UL — SIGNIFICANT CHANGE UP (ref 3.8–5.2)
RBC # BLD: 4.09 M/UL — SIGNIFICANT CHANGE UP (ref 3.8–5.2)
RBC # FLD: 11.7 % — SIGNIFICANT CHANGE UP (ref 10.3–14.5)
RBC # FLD: 11.8 % — SIGNIFICANT CHANGE UP (ref 10.3–14.5)
SAO2 % BLDA: 91 % — LOW (ref 92–96)
SODIUM SERPL-SCNC: 119 MMOL/L — CRITICAL LOW (ref 135–145)
WBC # BLD: 14.17 K/UL — HIGH (ref 3.8–10.5)
WBC # BLD: 7.5 K/UL — SIGNIFICANT CHANGE UP (ref 3.8–10.5)
WBC # FLD AUTO: 14.17 K/UL — HIGH (ref 3.8–10.5)
WBC # FLD AUTO: 7.5 K/UL — SIGNIFICANT CHANGE UP (ref 3.8–10.5)

## 2018-10-24 PROCEDURE — 99285 EMERGENCY DEPT VISIT HI MDM: CPT

## 2018-10-24 PROCEDURE — 71045 X-RAY EXAM CHEST 1 VIEW: CPT | Mod: 26

## 2018-10-24 PROCEDURE — 93010 ELECTROCARDIOGRAM REPORT: CPT

## 2018-10-24 RX ORDER — CEFTRIAXONE 500 MG/1
1 INJECTION, POWDER, FOR SOLUTION INTRAMUSCULAR; INTRAVENOUS ONCE
Qty: 0 | Refills: 0 | Status: COMPLETED | OUTPATIENT
Start: 2018-10-24 | End: 2018-10-24

## 2018-10-24 RX ORDER — AZITHROMYCIN 500 MG/1
500 TABLET, FILM COATED ORAL ONCE
Qty: 0 | Refills: 0 | Status: COMPLETED | OUTPATIENT
Start: 2018-10-24 | End: 2018-10-24

## 2018-10-24 RX ORDER — SODIUM CHLORIDE 9 MG/ML
2000 INJECTION INTRAMUSCULAR; INTRAVENOUS; SUBCUTANEOUS ONCE
Qty: 0 | Refills: 0 | Status: COMPLETED | OUTPATIENT
Start: 2018-10-24 | End: 2018-10-24

## 2018-10-24 RX ADMIN — SODIUM CHLORIDE 2000 MILLILITER(S): 9 INJECTION INTRAMUSCULAR; INTRAVENOUS; SUBCUTANEOUS at 23:39

## 2018-10-24 RX ADMIN — CEFTRIAXONE 100 GRAM(S): 500 INJECTION, POWDER, FOR SOLUTION INTRAMUSCULAR; INTRAVENOUS at 23:54

## 2018-10-24 NOTE — H&P ADULT - HISTORY OF PRESENT ILLNESS
SHANNAN CHRISTENSEN is a 49 YO W Female brought to ER because of difficulty in breathing after a seizures episode.  According to the  noninvasive ductal CA of right breast few months ago.  She had lumpectomy.  Patient on RT and chemotherapy.   She had chemotherapy dose today and finished 4 PM.  Patient was advised to drink plenty of water after chemotherapy to flush out the toxins.  She brought in by EMS for new onset seizure.  While at home pt became pale and had what he described as a tonic clonic seizure x 20 sec.  Patient was post ictal in the ED with O2 sat 86%.  Patient was placed on BiPAP.   Patient's initial Na was 117 and initially started on NS 75 ML/hr SHANNAN CHRISTENSEN is a 47 YO W Female brought to ER because of difficulty in breathing after a seizures episode.  According to the  noninvasive ductal CA of right breast few months ago.  She had lumpectomy.  Patient on RT and chemotherapy.   She had chemotherapy dose today and finished 4 PM.  Patient was advised to drink plenty of water after chemotherapy to flush out the toxins.  She brought in by EMS for new onset seizure.  While at home pt became pale and had what he described as a tonic clonic seizure x 20 sec.  Patient was post ictal in the ED with O2 sat 86%.  Patient was placed on BiPAP.   Patient's initial Na was 119 and initially started on NS 75 ML/hr

## 2018-10-24 NOTE — H&P ADULT - NSHPLABSRESULTS_GEN_ALL_CORE
10.6   12.69 )-----------( 416      ( 25 Oct 2018 00:43 )             29.7     25 Oct 2018 05:19    123    |  90     |  6      ----------------------------<  99     3.4     |  21     |  0.46     Ca    6.9        25 Oct 2018 05:19    TPro  6.0    /  Alb  2.6    /  TBili  0.3    /  DBili  x      /  AST  51     /  ALT  50     /  AlkPhos  86     25 Oct 2018 00:43    LIVER FUNCTIONS - ( 25 Oct 2018 00:43 )  Alb: 2.6 g/dL / Pro: 6.0 g/dL / ALK PHOS: 86 U/L / ALT: 50 U/L / AST: 51 U/L / GGT: x           PT/INR - ( 24 Oct 2018 22:42 )   PT: 11.2 sec;   INR: 1.03 ratio         PTT - ( 24 Oct 2018 22:42 )  PTT:24.4 sec  CAPILLARY BLOOD GLUCOSE      POCT Blood Glucose.: 177 mg/dL (24 Oct 2018 22:26) 10.6   12.69 )-----------( 416      ( 25 Oct 2018 00:43 )             29.7     25 Oct 2018 05:19    123    |  90     |  6      ----------------------------<  99     3.4     |  21     |  0.46     Ca    6.9        25 Oct 2018 05:19    TPro  6.0    /  Alb  2.6    /  TBili  0.3    /  DBili  x      /  AST  51     /  ALT  50     /  AlkPhos  86     25 Oct 2018 00:43    LIVER FUNCTIONS - ( 25 Oct 2018 00:43 )  Alb: 2.6 g/dL / Pro: 6.0 g/dL / ALK PHOS: 86 U/L / ALT: 50 U/L / AST: 51 U/L / GGT: x           PT/INR - ( 24 Oct 2018 22:42 )   PT: 11.2 sec;   INR: 1.03 ratio         PTT - ( 24 Oct 2018 22:42 )  PTT:24.4 sec  CAPILLARY BLOOD GLUCOSE    < from: CT Angio Chest w/ IV Cont (10.25.18 @ 02:13) >    IMPRESSION:  CTPA: Limited by motion. No central, segmental or proximal subsegmental   pulmonary embolism suggested.  Nonspecific lower lobe patchy airspace   consolidation and diffuse groundglass opacities likely on an infectious   basis. Clinical correlation is recommended.    CT abdomen/pelvis: No infectious source within the abdomen or pelvis. No   acute intra-abdominal or pelvic finding.    < end of copied text >    < from: CT Angio Chest w/ IV Cont (10.25.18 @ 02:13) >    IMPRESSION:  CTPA: Limited by motion. No central, segmental or proximal subsegmental   pulmonary embolism suggested.  Nonspecific lower lobe patchy airspace   consolidation and diffuse groundglass opacities likely on an infectious   basis. Clinical correlation is recommended.    CT abdomen/pelvis: No infectious source within the abdomen or pelvis. No   acute intra-abdominal or pelvic finding.    < end of copied text >        POCT Blood Glucose.: 177 mg/dL (24 Oct 2018 22:26)

## 2018-10-24 NOTE — H&P ADULT - ENMT
SUBJECTIVE: Patient here with several issues:  1. Hyperlipidemia-tolerating Lipitor  2. Diabetes-she is concerned about her Accu-Cheks lately-last night Accu-Chek was 180 after dinner-she had broccoli/chicken tenders/mashed potatoes-she also eats chocolate every now and then-she states that every now and then she gets carried away because she received a lot of candy for [place]-her last glycohemoglobin was 5.6%-she is tolerating the glipizide-she is up-to-date with eye doctor visit  3. CHF/cardiomyopathy/atrial fibrillation/pulmonary hypertension/mitral regurgitation-last echo August showed ejection fraction 52%-sees cardiology regularly-denies chest pain, shortness of breath or palpitations-doing well on Eliquis/digoxin/Coreg  4. She is noticing left hand tremor that gets worse with movement-brother has Parkinson's-she used to have tremor on the right hand but this has resolved-she denies any balance problems-she saw neurology in 2009 and at that time had symptoms of leg tingling and restless leg syndrome  5. She complains that her right middle finger gets locked-she has to physically move it back into place  6. She complains of urinary incontinence-she is on Lasix-she cannot hold her urine-she has not had any pregnancies-she wears pads on occasion-denies dysuria    Current Outpatient Prescriptions   Medication   • glipiZIDE (GLUCOTROL) 5 MG tablet   • furosemide (LASIX) 40 MG tablet   • apixaban (ELIQUIS) 5 MG Tab   • carvedilol (COREG) 25 MG tablet   • digoxin (DIGOX) 0.125 MG tablet   • ONETOUCH DELICA LANCETS 33G Misc   • ONETOUCH VERIO test strip   • Blood Glucose Monitoring Suppl (ONETOUCH VERIO FLEX SYSTEM) W/DEVICE Kit   • atorvastatin (LIPITOR) 10 MG tablet   • Calcium Carb-Cholecalciferol (CALCIUM 600 + D PO)   • Cholecalciferol (VITAMIN D3) 1000 UNITS capsule   • MULTIVITAMINS PO TABS     No current facility-administered medications for this visit.        OBJECTIVE:   Visit Vitals  /76   Pulse 80    Wt 64 kg   LMP 11/08/1985   BMI 25.79 kg/m²     Body mass index is 25.79 kg/m².    GENERAL:  No distress  HEENT:  Moist mucous membranes  CARDIOVASCULAR:  Regular rate and rhythm with holosystolic murmur  LUNG:  Clear to auscultation  Left hand-tremor visible with movement      ASSESSMENT/PLAN:    1. Hyperlipidemia-continue Lipitor  2. Diabetes-we discussed that mashed potatoes and chicken tenders are carbohydrates that it can increase glucose levels-we also discussed the chocolate in a diabetic can raise glucose levels-check [id number]-date with eye doctor visit-continue glipizide-we will make adjustments if we need to after obtaining glycohemoglobin  3. CHF/cardiomyopathy/pulmonary hypertension/mitral regurgitation-continue care with cardiology-continue Lasix/Eliquis/digoxin/Coreg/Lipitor  4. Left hand tremor-referral to neurology  5. Right middle finger trigger finger-referral to hand surgery  6. Urinary incontinence-referral to urogynecology  7. Elevated total bilirubin level-we had sent her to GI but she forgot to schedule this-order total bilirubin-if continues to elevate would send to GI   negative No oral lesions; no gross abnormalities

## 2018-10-24 NOTE — ED ADULT NURSE NOTE - OBJECTIVE STATEMENT
Pt comes by EMS from home. Per  at bedside, pt had chemo infusion at 9am today which finished around 12pm.  states pt got a "blank stare on her face." Pt pale, oxygen saturation low and pt hyperventilating on arrival. Pt placed on bipap on arrival, improvement in oxygen saturation. Seizure pads placed for precautions.

## 2018-10-24 NOTE — ED PROVIDER NOTE - OBJECTIVE STATEMENT
49 y/o F with noninvasive ductal CA brought in by EMS for new onset seizure.  Pt received chemotherapy yesterday as per her  and felt weak today.  While at home pt became pale and had what he described as a tonic clonic seizure x 20 sec.  Pt was post ictal in the ED with O2 sat 86%.  Pt was placed on BIPAP and is now AAOx3.     PCP: Dr. Elian Olivas  Onc: Dr. Horn

## 2018-10-24 NOTE — CONSULT NOTE ADULT - SUBJECTIVE AND OBJECTIVE BOX
48F with PMHx of asthma, non-invasive ductal carcinoma of breast s/p chemo x2, hypothyroid, bipolar disorder. History obtained from Dr. Martin and  at bedside. Pt had second session of chemo Wednesday morning and later in evening had new onset of generalized tonic-clonic seizure lasting approximately 20 seconds. Pt was advised to drink plenty of fluids pre and post chemo yesterday.  states she had about 8- 20 ounce size water bottles before and after her chemo dose. He denies any other symptoms including CP, N/V/D. ER work up revealed sever hyponatremia Na+ 119, hypoxemic respiratory failure requiring bipap support, and AMS. CXR with ? RLL infiltrate with leukocytosis. Pt treated with NS 2 liter bolus, empiric abx. No further seizure episodes so far. Lactate of 6 (likely due to seizure episode). ICU consulted for futher evaluation and management. Pt seen and examined at bedside, mild distress, lethargic but easily arousable to voice, extremely pale.       PAST MEDICAL & SURGICAL HISTORY:  Malignant neoplasm of right female breast, unspecified estrogen receptor status, unspecified site of breast  Depression, unspecified depression type  Hypothyroidism, unspecified type  Asthma: last major attack 2016  Bipolar affective  S/P lumpectomy, right breast: with nodes  Encounter for IUD insertion: 2016  Ganglion cyst of wrist, left: 2013, 2015      Review of Systems:  unable to obtain due to AMS    T(F): 97.7 (10-24-18 @ 23:25), Max: 97.7 (10-24-18 @ 08:35)  HR: 81 (10-24-18 @ 22:50) (81 - 106)  BP: 126/80 (10-24-18 @ 22:50) (126/80 - 130/74)  RR: 20 (10-24-18 @ 22:50) (16 - 40)  SpO2: 98% (10-24-18 @ 22:50) (78% - 98%)  Wt(kg): --        CAPILLARY BLOOD GLUCOSE      POCT Blood Glucose.: 177 mg/dL (24 Oct 2018 22:26)      I&O's Summary      Physical Exam:     Gen: Very pale, ill appearing female  Neuro: Lethargic, arousable  HEENT: NC/AT  Resp: CTA b/l  CVS: nl S1/S2, RRR  Abd: soft, nt, nd, +bs  Ext: no edema, +pulses  Skin: well perfused, cool to touch LE's, Pallor    Meds:    azithromycin  IVPB IV Intermittent  rocephin  NS bolus                            12.5   14.17 )-----------( 547      ( 24 Oct 2018 22:42 )             35.9     Bands 2.0    10-24    119<LL>  |  87<L>  |  6<L>  ----------------------------<  149<H>  4.2   |  15<L>  |  0.85    Ca    7.2<L>      24 Oct 2018 22:42    TPro  7.4  /  Alb  3.1<L>  /  TBili  0.3  /  DBili  x   /  AST  76<H>  /  ALT  64  /  AlkPhos  108  10-24    Lactate 6.4           10-24 @ 22:48      PT/INR - ( 24 Oct 2018 22:42 )   PT: 11.2 sec;   INR: 1.03 ratio         PTT - ( 24 Oct 2018 22:42 )  PTT:24.4 sec      CXR:  Pending official read. Possible RLL infiltrate. No obvious effusions      GLOBAL ISSUE/BEST PRACTICE:  Analgesia: no  Sedation: no  HOB elevation: yes  Stress ulcer prophylaxis: yes  VTE prophylaxis: yes  Glycemic control: yes  Nutrition: npo      CODE STATUS: Full  GOC discussion: Y  Critical care time spent (mins): 65  (Reviewing data, imaging, discussing with multidisciplinary team, non inclusive of procedures, discussing goals of care with patient/family)

## 2018-10-24 NOTE — H&P ADULT - NEGATIVE CARDIOVASCULAR SYMPTOMS
no peripheral edema/no paroxysmal nocturnal dyspnea/no palpitations/no dyspnea on exertion/no orthopnea/no claudication/no chest pain

## 2018-10-24 NOTE — H&P ADULT - ASSESSMENT
This 49 YO F admitted after a seizure episode and rudy serum sodium was 117 mEq as per  but in ER it was 119 mEq and patient on RT & Chemotherapy for non invasive right breast ductal carcinoma.

## 2018-10-24 NOTE — ED ADULT NURSE NOTE - PSH
Encounter for IUD insertion  2016  Ganglion cyst of wrist, left  2013, 2015  S/P lumpectomy, right breast  with nodes

## 2018-10-24 NOTE — H&P ADULT - RS GEN PE MLT RESP DETAILS PC
diminished breath sounds, R/no chest wall tenderness/airway patent/no subcutaneous emphysema/no wheezes/diminished breath sounds, L

## 2018-10-24 NOTE — ED PROVIDER NOTE - ENMT, MLM
Airway patent, Nasal mucosa clear. dry mucous membranes Throat has no vesicles, no oropharyngeal exudates and uvula is midline.

## 2018-10-24 NOTE — ED ADULT NURSE NOTE - NSIMPLEMENTINTERV_GEN_ALL_ED
Implemented All Fall Risk Interventions:  [place] to call system. Call bell, personal items and telephone within reach. Instruct patient to call for assistance. Room bathroom lighting operational. Non-slip footwear when patient is off stretcher. Physically safe environment: no spills, clutter or unnecessary equipment. Stretcher in lowest position, wheels locked, appropriate side rails in place. Provide visual cue, wrist band, yellow gown, etc. Monitor gait and stability. Monitor for mental status changes and reorient to person, place, and time. Review medications for side effects contributing to fall risk. Reinforce activity limits and safety measures with patient and family.

## 2018-10-24 NOTE — CONSULT NOTE ADULT - ASSESSMENT
49F with PMHx as above, admitted to ICU with new onset seizure likely secondary to hyponatremia (?acute vs chronic), hypoxic respiratory failure, pallor    - 49F with PMHx as above, admitted to ICU with new onset seizure likely secondary to hyponatremia (?acute vs chronic), hypoxic respiratory failure, pallor concern for occult bleed    -Obtain Head CT r/o cva/bleed  -Obtain CT abdomen r/o bleed  -Repeat labs now and q 4  -Slow correction of Na+   -Continue Bipap. Wean FiO2 as tolerated. ABG in am  -Empiric abx. Monitor wbc/temp  -Encephalopathy multifactorial; electrolyte imbalance, post ictal, hypoxic  -NPO. PPI  -DVT ppx  -Supportive care

## 2018-10-24 NOTE — H&P ADULT - NEUROLOGICAL DETAILS
alert and oriented x 3/sensation intact/no spontaneous movement/superficial reflexes intact/deep reflexes intact/cranial nerves intact/strength decreased

## 2018-10-25 DIAGNOSIS — J96.21 ACUTE AND CHRONIC RESPIRATORY FAILURE WITH HYPOXIA: ICD-10-CM

## 2018-10-25 DIAGNOSIS — E87.1 HYPO-OSMOLALITY AND HYPONATREMIA: ICD-10-CM

## 2018-10-25 DIAGNOSIS — B19.10 UNSPECIFIED VIRAL HEPATITIS B WITHOUT HEPATIC COMA: ICD-10-CM

## 2018-10-25 DIAGNOSIS — R56.9 UNSPECIFIED CONVULSIONS: ICD-10-CM

## 2018-10-25 DIAGNOSIS — C50.911 MALIGNANT NEOPLASM OF UNSPECIFIED SITE OF RIGHT FEMALE BREAST: ICD-10-CM

## 2018-10-25 DIAGNOSIS — J69.0 PNEUMONITIS DUE TO INHALATION OF FOOD AND VOMIT: ICD-10-CM

## 2018-10-25 DIAGNOSIS — F31.9 BIPOLAR DISORDER, UNSPECIFIED: ICD-10-CM

## 2018-10-25 DIAGNOSIS — R91.8 OTHER NONSPECIFIC ABNORMAL FINDING OF LUNG FIELD: ICD-10-CM

## 2018-10-25 LAB
ALBUMIN SERPL ELPH-MCNC: 2.6 G/DL — LOW (ref 3.3–5)
ALP SERPL-CCNC: 86 U/L — SIGNIFICANT CHANGE UP (ref 40–120)
ALT FLD-CCNC: 50 U/L — SIGNIFICANT CHANGE UP (ref 12–78)
ANION GAP SERPL CALC-SCNC: 10 MMOL/L — SIGNIFICANT CHANGE UP (ref 5–17)
ANION GAP SERPL CALC-SCNC: 11 MMOL/L — SIGNIFICANT CHANGE UP (ref 5–17)
ANION GAP SERPL CALC-SCNC: 12 MMOL/L — SIGNIFICANT CHANGE UP (ref 5–17)
ANION GAP SERPL CALC-SCNC: 9 MMOL/L — SIGNIFICANT CHANGE UP (ref 5–17)
APPEARANCE UR: CLEAR — SIGNIFICANT CHANGE UP
AST SERPL-CCNC: 51 U/L — HIGH (ref 15–37)
BILIRUB SERPL-MCNC: 0.3 MG/DL — SIGNIFICANT CHANGE UP (ref 0.2–1.2)
BILIRUB UR-MCNC: NEGATIVE — SIGNIFICANT CHANGE UP
BUN SERPL-MCNC: 10 MG/DL — SIGNIFICANT CHANGE UP (ref 7–23)
BUN SERPL-MCNC: 11 MG/DL — SIGNIFICANT CHANGE UP (ref 7–23)
BUN SERPL-MCNC: 6 MG/DL — LOW (ref 7–23)
BUN SERPL-MCNC: 6 MG/DL — LOW (ref 7–23)
BUN SERPL-MCNC: 7 MG/DL — SIGNIFICANT CHANGE UP (ref 7–23)
BUN SERPL-MCNC: 9 MG/DL — SIGNIFICANT CHANGE UP (ref 7–23)
CALCIUM SERPL-MCNC: 6.9 MG/DL — LOW (ref 8.5–10.1)
CALCIUM SERPL-MCNC: 6.9 MG/DL — LOW (ref 8.5–10.1)
CALCIUM SERPL-MCNC: 7.4 MG/DL — LOW (ref 8.5–10.1)
CALCIUM SERPL-MCNC: 7.7 MG/DL — LOW (ref 8.5–10.1)
CALCIUM SERPL-MCNC: 7.7 MG/DL — LOW (ref 8.5–10.1)
CALCIUM SERPL-MCNC: 7.9 MG/DL — LOW (ref 8.5–10.1)
CHLORIDE SERPL-SCNC: 102 MMOL/L — SIGNIFICANT CHANGE UP (ref 96–108)
CHLORIDE SERPL-SCNC: 107 MMOL/L — SIGNIFICANT CHANGE UP (ref 96–108)
CHLORIDE SERPL-SCNC: 108 MMOL/L — SIGNIFICANT CHANGE UP (ref 96–108)
CHLORIDE SERPL-SCNC: 88 MMOL/L — LOW (ref 96–108)
CHLORIDE SERPL-SCNC: 90 MMOL/L — LOW (ref 96–108)
CHLORIDE SERPL-SCNC: 99 MMOL/L — SIGNIFICANT CHANGE UP (ref 96–108)
CO2 SERPL-SCNC: 21 MMOL/L — LOW (ref 22–31)
CO2 SERPL-SCNC: 23 MMOL/L — SIGNIFICANT CHANGE UP (ref 22–31)
CO2 SERPL-SCNC: 23 MMOL/L — SIGNIFICANT CHANGE UP (ref 22–31)
CO2 SERPL-SCNC: 25 MMOL/L — SIGNIFICANT CHANGE UP (ref 22–31)
COLOR SPEC: SIGNIFICANT CHANGE UP
CREAT SERPL-MCNC: 0.46 MG/DL — LOW (ref 0.5–1.3)
CREAT SERPL-MCNC: 0.52 MG/DL — SIGNIFICANT CHANGE UP (ref 0.5–1.3)
CREAT SERPL-MCNC: 0.56 MG/DL — SIGNIFICANT CHANGE UP (ref 0.5–1.3)
CREAT SERPL-MCNC: 0.71 MG/DL — SIGNIFICANT CHANGE UP (ref 0.5–1.3)
CREAT SERPL-MCNC: 0.81 MG/DL — SIGNIFICANT CHANGE UP (ref 0.5–1.3)
CREAT SERPL-MCNC: 0.92 MG/DL — SIGNIFICANT CHANGE UP (ref 0.5–1.3)
DIFF PNL FLD: NEGATIVE — SIGNIFICANT CHANGE UP
GLUCOSE SERPL-MCNC: 107 MG/DL — HIGH (ref 70–99)
GLUCOSE SERPL-MCNC: 112 MG/DL — HIGH (ref 70–99)
GLUCOSE SERPL-MCNC: 116 MG/DL — HIGH (ref 70–99)
GLUCOSE SERPL-MCNC: 140 MG/DL — HIGH (ref 70–99)
GLUCOSE SERPL-MCNC: 97 MG/DL — SIGNIFICANT CHANGE UP (ref 70–99)
GLUCOSE SERPL-MCNC: 99 MG/DL — SIGNIFICANT CHANGE UP (ref 70–99)
GLUCOSE UR QL: NEGATIVE — SIGNIFICANT CHANGE UP
HCG SERPL-ACNC: 3 MIU/ML — SIGNIFICANT CHANGE UP
HCT VFR BLD CALC: 29.7 % — LOW (ref 34.5–45)
HGB BLD-MCNC: 10.6 G/DL — LOW (ref 11.5–15.5)
KETONES UR-MCNC: NEGATIVE — SIGNIFICANT CHANGE UP
LACTATE SERPL-SCNC: 2.8 MMOL/L — HIGH (ref 0.7–2)
LEUKOCYTE ESTERASE UR-ACNC: NEGATIVE — SIGNIFICANT CHANGE UP
MCHC RBC-ENTMCNC: 31.4 PG — SIGNIFICANT CHANGE UP (ref 27–34)
MCHC RBC-ENTMCNC: 35.7 GM/DL — SIGNIFICANT CHANGE UP (ref 32–36)
MCV RBC AUTO: 87.9 FL — SIGNIFICANT CHANGE UP (ref 80–100)
NITRITE UR-MCNC: NEGATIVE — SIGNIFICANT CHANGE UP
NRBC # BLD: 0 /100 WBCS — SIGNIFICANT CHANGE UP (ref 0–0)
OSMOLALITY SERPL: 258 MOS/KG — LOW (ref 275–300)
OSMOLALITY UR: 216 MOS/KG — SIGNIFICANT CHANGE UP (ref 50–1200)
PH UR: 7 — SIGNIFICANT CHANGE UP (ref 5–8)
PLATELET # BLD AUTO: 416 K/UL — HIGH (ref 150–400)
POTASSIUM SERPL-MCNC: 3.4 MMOL/L — LOW (ref 3.5–5.3)
POTASSIUM SERPL-MCNC: 3.5 MMOL/L — SIGNIFICANT CHANGE UP (ref 3.5–5.3)
POTASSIUM SERPL-MCNC: 3.7 MMOL/L — SIGNIFICANT CHANGE UP (ref 3.5–5.3)
POTASSIUM SERPL-MCNC: 3.8 MMOL/L — SIGNIFICANT CHANGE UP (ref 3.5–5.3)
POTASSIUM SERPL-MCNC: 4.1 MMOL/L — SIGNIFICANT CHANGE UP (ref 3.5–5.3)
POTASSIUM SERPL-MCNC: 4.2 MMOL/L — SIGNIFICANT CHANGE UP (ref 3.5–5.3)
POTASSIUM SERPL-SCNC: 3.4 MMOL/L — LOW (ref 3.5–5.3)
POTASSIUM SERPL-SCNC: 3.5 MMOL/L — SIGNIFICANT CHANGE UP (ref 3.5–5.3)
POTASSIUM SERPL-SCNC: 3.7 MMOL/L — SIGNIFICANT CHANGE UP (ref 3.5–5.3)
POTASSIUM SERPL-SCNC: 3.8 MMOL/L — SIGNIFICANT CHANGE UP (ref 3.5–5.3)
POTASSIUM SERPL-SCNC: 4.1 MMOL/L — SIGNIFICANT CHANGE UP (ref 3.5–5.3)
POTASSIUM SERPL-SCNC: 4.2 MMOL/L — SIGNIFICANT CHANGE UP (ref 3.5–5.3)
PROT SERPL-MCNC: 6 G/DL — SIGNIFICANT CHANGE UP (ref 6–8.3)
PROT UR-MCNC: NEGATIVE — SIGNIFICANT CHANGE UP
RBC # BLD: 3.38 M/UL — LOW (ref 3.8–5.2)
RBC # FLD: 11.5 % — SIGNIFICANT CHANGE UP (ref 10.3–14.5)
SODIUM SERPL-SCNC: 122 MMOL/L — LOW (ref 135–145)
SODIUM SERPL-SCNC: 123 MMOL/L — LOW (ref 135–145)
SODIUM SERPL-SCNC: 130 MMOL/L — LOW (ref 135–145)
SODIUM SERPL-SCNC: 136 MMOL/L — SIGNIFICANT CHANGE UP (ref 135–145)
SODIUM SERPL-SCNC: 138 MMOL/L — SIGNIFICANT CHANGE UP (ref 135–145)
SODIUM SERPL-SCNC: 139 MMOL/L — SIGNIFICANT CHANGE UP (ref 135–145)
SODIUM UR-SCNC: 50 MMOL/L — SIGNIFICANT CHANGE UP
SP GR SPEC: 1 — LOW (ref 1.01–1.02)
URATE SERPL-MCNC: 2.6 MG/DL — SIGNIFICANT CHANGE UP (ref 2.5–7)
UROBILINOGEN FLD QL: NEGATIVE — SIGNIFICANT CHANGE UP
WBC # BLD: 12.69 K/UL — HIGH (ref 3.8–10.5)
WBC # FLD AUTO: 12.69 K/UL — HIGH (ref 3.8–10.5)

## 2018-10-25 PROCEDURE — 70450 CT HEAD/BRAIN W/O DYE: CPT | Mod: 26

## 2018-10-25 PROCEDURE — 71275 CT ANGIOGRAPHY CHEST: CPT | Mod: 26

## 2018-10-25 PROCEDURE — 99233 SBSQ HOSP IP/OBS HIGH 50: CPT | Mod: GC

## 2018-10-25 PROCEDURE — 74177 CT ABD & PELVIS W/CONTRAST: CPT | Mod: 26

## 2018-10-25 RX ORDER — LEVOTHYROXINE SODIUM 125 MCG
37.5 TABLET ORAL AT BEDTIME
Qty: 0 | Refills: 0 | Status: DISCONTINUED | OUTPATIENT
Start: 2018-10-25 | End: 2018-10-25

## 2018-10-25 RX ORDER — BUPROPION HYDROCHLORIDE 150 MG/1
150 TABLET, EXTENDED RELEASE ORAL DAILY
Qty: 0 | Refills: 0 | Status: DISCONTINUED | OUTPATIENT
Start: 2018-10-25 | End: 2018-10-26

## 2018-10-25 RX ORDER — SODIUM CHLORIDE 9 MG/ML
1000 INJECTION INTRAMUSCULAR; INTRAVENOUS; SUBCUTANEOUS
Qty: 0 | Refills: 0 | Status: DISCONTINUED | OUTPATIENT
Start: 2018-10-25 | End: 2018-10-25

## 2018-10-25 RX ORDER — SODIUM CHLORIDE 9 MG/ML
1000 INJECTION, SOLUTION INTRAVENOUS
Qty: 0 | Refills: 0 | Status: DISCONTINUED | OUTPATIENT
Start: 2018-10-25 | End: 2018-10-26

## 2018-10-25 RX ORDER — ENOXAPARIN SODIUM 100 MG/ML
40 INJECTION SUBCUTANEOUS DAILY
Qty: 0 | Refills: 0 | Status: DISCONTINUED | OUTPATIENT
Start: 2018-10-25 | End: 2018-10-26

## 2018-10-25 RX ORDER — PANTOPRAZOLE SODIUM 20 MG/1
40 TABLET, DELAYED RELEASE ORAL DAILY
Qty: 0 | Refills: 0 | Status: DISCONTINUED | OUTPATIENT
Start: 2018-10-25 | End: 2018-10-26

## 2018-10-25 RX ORDER — AZITHROMYCIN 500 MG/1
500 TABLET, FILM COATED ORAL EVERY 24 HOURS
Qty: 0 | Refills: 0 | Status: DISCONTINUED | OUTPATIENT
Start: 2018-10-25 | End: 2018-10-25

## 2018-10-25 RX ORDER — LEVOTHYROXINE SODIUM 125 MCG
75 TABLET ORAL DAILY
Qty: 0 | Refills: 0 | Status: DISCONTINUED | OUTPATIENT
Start: 2018-10-25 | End: 2018-10-26

## 2018-10-25 RX ORDER — METOCLOPRAMIDE HCL 10 MG
10 TABLET ORAL EVERY 6 HOURS
Qty: 0 | Refills: 0 | Status: DISCONTINUED | OUTPATIENT
Start: 2018-10-25 | End: 2018-10-26

## 2018-10-25 RX ORDER — IPRATROPIUM/ALBUTEROL SULFATE 18-103MCG
3 AEROSOL WITH ADAPTER (GRAM) INHALATION EVERY 6 HOURS
Qty: 0 | Refills: 0 | Status: DISCONTINUED | OUTPATIENT
Start: 2018-10-25 | End: 2018-10-25

## 2018-10-25 RX ORDER — BUDESONIDE AND FORMOTEROL FUMARATE DIHYDRATE 160; 4.5 UG/1; UG/1
2 AEROSOL RESPIRATORY (INHALATION)
Qty: 0 | Refills: 0 | Status: DISCONTINUED | OUTPATIENT
Start: 2018-10-25 | End: 2018-10-26

## 2018-10-25 RX ORDER — IPRATROPIUM/ALBUTEROL SULFATE 18-103MCG
3 AEROSOL WITH ADAPTER (GRAM) INHALATION EVERY 6 HOURS
Qty: 0 | Refills: 0 | Status: DISCONTINUED | OUTPATIENT
Start: 2018-10-25 | End: 2018-10-26

## 2018-10-25 RX ORDER — CEFTRIAXONE 500 MG/1
1 INJECTION, POWDER, FOR SOLUTION INTRAMUSCULAR; INTRAVENOUS EVERY 24 HOURS
Qty: 0 | Refills: 0 | Status: DISCONTINUED | OUTPATIENT
Start: 2018-10-25 | End: 2018-10-25

## 2018-10-25 RX ADMIN — Medication 75 MICROGRAM(S): at 12:49

## 2018-10-25 RX ADMIN — PANTOPRAZOLE SODIUM 40 MILLIGRAM(S): 20 TABLET, DELAYED RELEASE ORAL at 12:49

## 2018-10-25 RX ADMIN — SODIUM CHLORIDE 75 MILLILITER(S): 9 INJECTION, SOLUTION INTRAVENOUS at 22:39

## 2018-10-25 RX ADMIN — SODIUM CHLORIDE 75 MILLILITER(S): 9 INJECTION INTRAMUSCULAR; INTRAVENOUS; SUBCUTANEOUS at 03:00

## 2018-10-25 RX ADMIN — SODIUM CHLORIDE 25 MILLILITER(S): 9 INJECTION, SOLUTION INTRAVENOUS at 12:49

## 2018-10-25 RX ADMIN — Medication 40 MILLIGRAM(S): at 15:03

## 2018-10-25 RX ADMIN — Medication 3 MILLILITER(S): at 08:11

## 2018-10-25 RX ADMIN — CEFTRIAXONE 1 GRAM(S): 500 INJECTION, POWDER, FOR SOLUTION INTRAMUSCULAR; INTRAVENOUS at 00:50

## 2018-10-25 RX ADMIN — BUDESONIDE AND FORMOTEROL FUMARATE DIHYDRATE 2 PUFF(S): 160; 4.5 AEROSOL RESPIRATORY (INHALATION) at 19:53

## 2018-10-25 RX ADMIN — ENOXAPARIN SODIUM 40 MILLIGRAM(S): 100 INJECTION SUBCUTANEOUS at 12:49

## 2018-10-25 RX ADMIN — AZITHROMYCIN 255 MILLIGRAM(S): 500 TABLET, FILM COATED ORAL at 00:50

## 2018-10-25 RX ADMIN — Medication 40 MILLIGRAM(S): at 21:48

## 2018-10-25 NOTE — PROGRESS NOTE ADULT - ASSESSMENT
49F with PMHx depression, bipolar, hypothyroidism, asthma and newly diagnosed noninvasive ductal CA of R breast chemo x2, admitted to ICU with new onset seizure likely secondary to hyponatremia (?acute vs chronic), hypoxic respiratory failure, pallor concern for occult bleed    Plan:  Neuro:  --Encephalopathy multifactorial; electrolyte imbalance, post ictal, hypoxic  --CT head (10/25): no acute intracranial hemorrhage or mass effect  --lorazepam 1mg IV prn for seizures  --continue to monitor for seizures    Resp:  --Continue Bipap. Wean FiO2 as tolerated. Follow ABG in am.  --CT Chest (10/25): No PE. Lower lobe consolidation and diffuse ground glass opacities suggesting infectious infiltrates.  --Azithromycin 500mg IV for PNA  --Ceftriaxone 1g IV for PNA  --albuterol/ipratropium 3mL nebulizer q6h  --Continue to monitor WBC/temps    GI:  --CT abd/pelvis (10/25): No intra-abdominal or pelvic findings. No infectious source within abd or pelvis.   --diet: NPO except meds    Renal:  --Hyponatremic possibly 2/2 to excessive free water intake or SIADH due to underlying PNA, CA and nausea  --Slow correction of Na+. Trending up 119-->123 within 6 hours. Do NOT correct Na more than 10-12 mEq in 24 hr period.  --NS IV 60cc/hr for hyponatremia  --continue to monitor Na q4h  --Check urine Na, urine osm, serum, osm, uric acid to assess for SIADH    Endo:  --levothyroxine 37.5mcg IV at bedtime for hypothyroidism    PPx:  --protonix 40mg IV qD for GI ppx  --SCD for DVT ppx  --Supportive care 49F with PMHx depression, bipolar, hypothyroidism, asthma and newly diagnosed noninvasive ductal CA of R breast chemo x2, admitted to ICU with new onset seizure likely secondary to hyponatremia due to primary polydipsia, hypoxic respiratory failure, pallor concern for occult bleed.    Plan:  Neuro:  --Encephalopathy multifactorial; electrolyte imbalance, post ictal, hypoxic  --CT head (10/25): no acute intracranial hemorrhage or mass effect  --FU MR head w/wo IV contrast to r/o mass/mets   --lorazepam 1mg IV prn for seizures  --FU Mg level  --neuro consulted rec's appreciated    Resp:  --Hypoxemia improving. Pt now off bipap.   --Supplemental O2 to maintain O2 sat>92%.   --albuterol/ipratropium 3mL nebulizer PRN  --budesonide 80mcg/formoteril 4.5mcg inhaler 2 puffs BID  --methylprednisolone sodium succinate 40mg IV q8h  --pulm consulted rec's appreciated    Cardio:  --Hemodynamically stable    GI:  --CT abd/pelvis (10/25): No intra-abdominal or pelvic findings. No infectious source within abd or pelvis.   --Regular diet. 1000mL fluid restriction.  --reglan 10mg PO PRN for nausea    Renal:  --Hyponatremic 2/2 primary polydipsia  --Slow correction of Na+. Trending up 119-->136  --D/C NS IV 60cc/hr for hyponatremia  --D5 75mL/hr IV  --Continue to monitor Na q4h    ID:  --CT Chest (10/25): No PE. Lower lobe consolidation and diffuse ground glass opacities suggesting possible infectious infiltrates.  --ID consulted: Does not believe CT Chest is significant for PNA, suggested to d/c Abx  --d/c Azithromycin 500mg IV   --d/c Ceftriaxone 1g IV   --unclear etiology for previous prescription of entecavir as a home medication  --FU hepatitis panel    Endo:  --levothyroxine 75mcg PO qd for hypothyroidism    Heme:  --Noninvasive ductal CA of R breast   --heme consulted rec's appreciated    PPx:  --protonix 40mg IV qD for GI ppx  --lovenox  40mg subQ qd and SCD for DVT ppx  --Supportive care    Tubes & lines:  --none 49F with PMHx depression, bipolar, hypothyroidism, asthma and newly diagnosed noninvasive ductal CA of R breast chemo x2, admitted to ICU with new onset seizure likely secondary to hyponatremia due to primary polydipsia, hypoxic respiratory failure, pallor concern for occult bleed.    Plan:  Neuro:  --Encephalopathy multifactorial; electrolyte imbalance, post ictal, hypoxic  --CT head (10/25): no acute intracranial hemorrhage or mass effect  --FU MR head w/wo IV contrast to r/o mass/mets   --lorazepam 1mg IV prn for seizures  --Mg level normal  --neuro consulted rec's appreciated    Resp:  --Hypoxemia improving. Pt now off bipap.   --Supplemental O2 to maintain O2 sat>92%.   --albuterol/ipratropium 3mL nebulizer PRN  --budesonide 80mcg/formoteril 4.5mcg inhaler 2 puffs BID  --methylprednisolone sodium succinate 40mg IV q8h  --pulm consulted rec's appreciated    Cardio:  --Hemodynamically stable    GI:  --CT abd/pelvis (10/25): No intra-abdominal or pelvic findings. No infectious source within abd or pelvis.   --Regular diet. 1000mL fluid restriction.  --reglan 10mg PO PRN for nausea    Renal:  --Hyponatremic 2/2 primary polydipsia  --Slow correction of Na+. Trending up 119-->136  --D/C NS IV 60cc/hr for hyponatremia  --D5 75mL/hr IV  --Continue to monitor Na q4h  --normal serum uric acid  --normal urine Na  --FU urine osm, serum osm    ID:  --CT Chest (10/25): No PE. Lower lobe consolidation and diffuse ground glass opacities suggesting possible infectious infiltrates.  --ID consulted: Does not believe CT Chest is significant for PNA, suggested to d/c Abx  --d/c Azithromycin 500mg IV   --d/c Ceftriaxone 1g IV   --FU urine culture, blood culture,   --unclear etiology for previous prescription of entecavir as a home medication  --FU hepatitis panel    Endo:  --levothyroxine 75mcg PO qd for hypothyroidism    Heme:  --Noninvasive ductal CA of R breast   --heme consulted rec's appreciated    PPx:  --protonix 40mg IV qD for GI ppx  --lovenox  40mg subQ qd and SCD for DVT ppx  --Supportive care    Tubes & lines:  --none

## 2018-10-25 NOTE — CONSULT NOTE ADULT - ASSESSMENT
49 YO W Female brought to ER because of difficulty in breathing after a seizures episode.  According to the  noninvasive ductal CA of right breast few months ago.  She had lumpectomy.  Patient on RT and chemotherapy.   She had chemotherapy dose today and finished 4 PM.  Patient was advised to drink plenty of water after chemotherapy to flush out the toxins.  She brought in by EMS for new onset seizure.  While at home pt became pale and had what he described as a tonic clonic seizure x 20 sec.  Patient was post ictal in the ED with O2 sat 86%.  Patient was placed on BiPAP.   Patient's initial Na was 119 and initially started on NS 75 ML/hr (24 Oct 2018 23:01)    Breast cancer on chemotherapy with taxotere and cytoxan. Was drinking water as part of a treatment to help preserve her hair. Developed hyponatremia complicated by a seizure    Recommendations  1.  continue correction of electrolyte abnormalities  2.  discussed role of hair loss prevention therapy with patient and . most literature does not suggest a benefit. discussed appropriate hydration  3.  to followup at [facility] on discharge  please call if additional evaluation required

## 2018-10-25 NOTE — CONSULT NOTE ADULT - ATTENDING COMMENTS
Pt critically ill at hi risk for deterioration  Reviewed all labs and consults  Prognosis guarded.  Discussed with Dr. Sarabia and PA in detail  Reviewed all labs and xrays.

## 2018-10-25 NOTE — PROGRESS NOTE ADULT - ASSESSMENT
This 47 YO F admitted after a seizure episode and rudy serum sodium was 117 mEq as per  but in ER it was 119 mEq and patient on RT & Chemotherapy for non invasive right breast ductal carcinoma.

## 2018-10-25 NOTE — CONSULT NOTE ADULT - SUBJECTIVE AND OBJECTIVE BOX
Patient is a 48y old  Female who presents with a chief complaint of Seizure and respiratory distress (25 Oct 2018 09:57)      HPI:  SHANNAN CHRISTENSEN is a 49 YO W Female brought to ER because of difficulty in breathing after a seizures episode.  According to the  noninvasive ductal CA of right breast few months ago.  She had lumpectomy.  Patient on RT and chemotherapy.   She had chemotherapy dose today and finished 4 PM.  Patient was advised to drink plenty of water after chemotherapy to flush out the toxins.  She brought in by EMS for new onset seizure.  While at home pt became pale and had what he described as a tonic clonic seizure x 20 sec.  Patient was post ictal in the ED with O2 sat 86%.  Patient was placed on BiPAP.   Patient's initial Na was 119 and initially started on NS 75 ML/hr (24 Oct 2018 23:01)       ROS: diagnosed with breast cancer 8/2018 ER/[facility] + HER2 - receiving adjuvant chemotherapy with taxotere and cytoxan at [facility]  Negative except for:    PAST MEDICAL & SURGICAL HISTORY:  Malignant neoplasm of right female breast, unspecified estrogen receptor status, unspecified site of breast  Depression, unspecified depression type  Hypothyroidism, unspecified type  Asthma: last major attack 2016  Bipolar affective  S/P lumpectomy, right breast: with nodes  Encounter for IUD insertion: 2016  Ganglion cyst of wrist, left: 2013, 2015      SOCIAL HISTORY:    FAMILY HISTORY:  Family history of prostate cancer      MEDICATIONS  (STANDING):  azithromycin  IVPB 500 milliGRAM(s) IV Intermittent every 24 hours  buDESOnide  80 MICROgram(s)/formoterol 4.5 MICROgram(s) Inhaler 2 Puff(s) Inhalation two times a day  cefTRIAXone   IVPB 1 Gram(s) IV Intermittent every 24 hours  dextrose 5%. 1000 milliLiter(s) (25 mL/Hr) IV Continuous <Continuous>  enoxaparin Injectable 40 milliGRAM(s) SubCutaneous daily  levothyroxine 75 MICROGram(s) Oral daily  methylPREDNISolone sodium succinate Injectable 40 milliGRAM(s) IV Push every 8 hours  pantoprazole  Injectable 40 milliGRAM(s) IV Push daily    MEDICATIONS  (PRN):  ALBUTerol/ipratropium for Nebulization 3 milliLiter(s) Nebulizer every 6 hours PRN Shortness of Breath and/or Wheezing  LORazepam   Injectable 1 milliGRAM(s) IV Push every 4 hours PRN seizure  metoclopramide 10 milliGRAM(s) Oral every 6 hours PRN Nausea      Allergies    Sulfur (Hives)    Intolerances        Vital Signs Last 24 Hrs  T(C): 37.5 (25 Oct 2018 11:48), Max: 37.5 (25 Oct 2018 11:48)  T(F): 99.5 (25 Oct 2018 11:48), Max: 99.5 (25 Oct 2018 11:48)  HR: 94 (25 Oct 2018 13:00) (66 - 106)  BP: 85/48 (25 Oct 2018 13:00) (85/48 - 127/82)  BP(mean): 64 (25 Oct 2018 13:00) (64 - 91)  RR: 32 (25 Oct 2018 13:00) (19 - 40)  SpO2: 95% (25 Oct 2018 13:00) (78% - 100%)    PHYSICAL EXAM  General: adult in NAD  HEENT: clear oropharynx, anicteric sclera, pink conjunctivae  Neck: supple  CV: normal S1S2 with no murmur rubs or gallops  Lungs: clear to auscultation, no wheezes, no rhales  Abdomen: soft non-tender non-distended, no hepato/splenomegaly  Ext: no clubbing cyanosis or edema  Skin: no rashes and no petichiae  Neuro: alert and oriented X3 no focal deficits      LABS:    CBC Full  -  ( 25 Oct 2018 00:43 )  WBC Count : 12.69 K/uL  Hemoglobin : 10.6 g/dL  Hematocrit : 29.7 %  Platelet Count - Automated : 416 K/uL  Mean Cell Volume : 87.9 fl  Mean Cell Hemoglobin : 31.4 pg  Mean Cell Hemoglobin Concentration : 35.7 gm/dL  Auto Neutrophil # : x  Auto Lymphocyte # : x  Auto Monocyte # : x  Auto Eosinophil # : x  Auto Basophil # : x  Auto Neutrophil % : x  Auto Lymphocyte % : x  Auto Monocyte % : x  Auto Eosinophil % : x  Auto Basophil % : x    10-25    130<L>  |  99  |  7   ----------------------------<  97  3.8   |  21<L>  |  0.56    Ca    7.4<L>      25 Oct 2018 09:33  Mg     2.0     10-25    TPro  6.0  /  Alb  2.6<L>  /  TBili  0.3  /  DBili  x   /  AST  51<H>  /  ALT  50  /  AlkPhos  86  10-25    PT/INR - ( 24 Oct 2018 22:42 )   PT: 11.2 sec;   INR: 1.03 ratio         PTT - ( 24 Oct 2018 22:42 )  PTT:24.4 sec          BLOOD SMEAR INTERPRETATION:    RADIOLOGY & ADDITIONAL STUDIES:

## 2018-10-25 NOTE — PROGRESS NOTE ADULT - ATTENDING COMMENTS
49 yo F with Hx of asthma and noninvasive ductal cancer of R breast on cyclophosphamide and taxotere (2nd cycle yesterday) admitted with new onset sz in setting of severe hyponatremia from primary polydipsia (drank approximately 8pts of water based on cold caps instructions).  Episode also complicated by hypoxemia and respiratory distress requiring BiPAP.    --sz likely from hyponatremia  no AEDs at this time  check MRI to r/o structural cause  --hemodynamically stable  --hypoxemia improved, off BiPAP on 3L NC  ?asthma exacerbation  continue symbicort, solumedrol, duoneb q6 prn  --hyponatremia likely acute based on clinical Hx, also on 10/17 Na was 141  Na correctly rapidly with NS, changed to D5 this am and rate increased based on serial Na  fluid restriction  --advance diet  --doubt pneumonia at this time based on history, though unclear what cause of pulmonary GGO and infiltrates are, ?effect of chemo  ID eval, d/c Abx  --continue neupogen (pt's own pump), to end today  --plan discussed with pt and , also with renal  --stable for floor 49 yo F with Hx of asthma and noninvasive ductal cancer of R breast on cyclophosphamide and taxotere (2nd cycle yesterday) admitted with new onset sz in setting of severe hyponatremia from primary polydipsia (drank approximately 8pts of water based on cold caps instructions).  Episode also complicated by hypoxemia and respiratory distress requiring BiPAP.    --sz likely from hyponatremia  no AEDs at this time  check MRI to r/o structural cause  --hemodynamically stable  --hypoxemia improved, off BiPAP on 3L NC  ?asthma exacerbation  continue symbicort, solumedrol, duoneb q6 prn  --hyponatremia likely acute based on clinical Hx, also on 10/17 Na was 141  Na correctly rapidly with NS, changed to D5 this am and rate increased based on serial Na  fluid restriction  --advance diet  --doubt pneumonia at this time based on history, though unclear what cause of pulmonary GGO and infiltrates are, ?effect of chemo  ID eval, d/c Abx  --continue neupogen (pt's own pump), to end today  per outpt onc continue steroids for prevention of capillary leak syndrome  --plan discussed with pt and , also with renal  --stable for floor

## 2018-10-25 NOTE — CONSULT NOTE ADULT - ASSESSMENT
Hyponatremia  Seizure  PNA  Breast CA    -Hyponatremia can be due to excessive free water intake that the patient admits to; about 4 liters; however, with underlying PNA, nausea, and Cancer, SIADH is also a likely possibility. The patient also appears euvolemic  -Sodium is correcting with NS thus far and went from 119 to 123 in about 6 hours. Recommend to reduce rate of NS to 60cc/hr  -Check urine sodium, urine osm, serum osm, and uric acid to assess for SIADH  -Do not correct hyponatremia more than 10-12meq in a 24 hour period  -Monitor sodium q 4 hours  -No indication for hypertonic saline, tolvaptan, or salt tabs at this time. Will consider if the sodium decreases again or if SIADH is diagnosed.  -Abx  -Management per MICU    Thank you    D/W MICU

## 2018-10-25 NOTE — CONSULT NOTE ADULT - SUBJECTIVE AND OBJECTIVE BOX
PULMONARY/CRITICAL CARE        Patient is a 48y old  Female who presents with a chief complaint of Seizure and respiratory distress (25 Oct 2018 07:29)    BRIEF HOSPITAL COURSE: ***47 YO W Female brought to ER because of difficulty in breathing after a seizures episode.  According to the  noninvasive ductal CA of right breast few months ago.  She had lumpectomy.  Patient on RT and chemotherapy.   She had chemotherapy dose today and finished 4 PM.  Patient was advised to drink plenty of water after chemotherapy to flush out the toxins.  She brought in by EMS for new onset seizure.  While at home pt became pale and had what he described as a tonic clonic seizure x 20 sec.  Patient was post ictal in the ED with O2 sat 86%.  Patient was placed on BiPAP.   Patient's initial Na was 119 and initially started on NS 75 ML/hr (24 Oct 2018 23:01)   Hx renal stones x1 not aware of type, no other renal  problems; elevated creatinine on admission.     Admits to drinking at least 4L of water in the past 1 day and on chemotherapy days. The patient eats well, but admits to nausea on chemo days, last being one day ago. Found to have PNA on imaging and placed on BiPAP requiring MICU admission. Denies chest pain, dyspnea, nausea, vomiting, diarrhea, and urinary complaints.   Pt less sob now on BIpap. Alert an conversant.       Events last 24 hours: ***    PAST MEDICAL & SURGICAL HISTORY:  Malignant neoplasm of right female breast, unspecified estrogen receptor status, unspecified site of breast  Depression, unspecified depression type  Hypothyroidism, unspecified type  Asthma: last major attack 2016  Bipolar affective  S/P lumpectomy, right breast: with nodes  Encounter for IUD insertion: 2016  Ganglion cyst of wrist, left: ,     Allergies    Sulfur (Hives)    Intolerances      FAMILY HISTORY and SOCIAL: no cigs, etoh.  Family history of prostate cancer      Review of Systems:  CONSTITUTIONAL: No fever, chills, or fatigue  EYES: No eye pain, visual disturbances, or discharge  ENMT:  No difficulty hearing, tinnitus, vertigo; No sinus or throat pain  NECK: No pain or stiffness  RESPIRATORY: had mild cough, wheezing, chills or hemoptysis; had shortness of breath  Asthma worse with dust, mold. Never intubated  CARDIOVASCULAR: No chest pain, palpitations, dizziness, or leg swelling  GASTROINTESTINAL: No abdominal or epigastric pain. No nausea, vomiting, or hematemesis; No diarrhea or constipation. No melena or hematochezia.  GENITOURINARY: No dysuria, frequency, hematuria, or incontinence  NEUROLOGICAL: No headaches, memory loss, loss of strength, numbness, Had seizure  SKIN: No itching, burning, rashes, or lesions   MUSCULOSKELETAL: No joint pain or swelling; No muscle, back, or extremity pain  PSYCHIATRIC: No depression, anxiety, mood swings, or difficulty sleeping      Medications:  azithromycin  IVPB 500 milliGRAM(s) IV Intermittent every 24 hours  cefTRIAXone   IVPB 1 Gram(s) IV Intermittent every 24 hours      ALBUTerol/ipratropium for Nebulization 3 milliLiter(s) Nebulizer every 6 hours  buDESOnide  80 MICROgram(s)/formoterol 4.5 MICROgram(s) Inhaler 2 Puff(s) Inhalation two times a day    LORazepam   Injectable 1 milliGRAM(s) IV Push every 4 hours PRN      enoxaparin Injectable 40 milliGRAM(s) SubCutaneous daily    pantoprazole  Injectable 40 milliGRAM(s) IV Push daily      levothyroxine Injectable 37.5 MICROGram(s) IV Push at bedtime  methylPREDNISolone sodium succinate Injectable 40 milliGRAM(s) IV Push every 8 hours    sodium chloride 0.9%. 1000 milliLiter(s) IV Continuous <Continuous>                ICU Vital Signs Last 24 Hrs  T(C): 36.4 (25 Oct 2018 04:01), Max: 36.5 (24 Oct 2018 08:35)  T(F): 97.5 (25 Oct 2018 04:01), Max: 97.7 (24 Oct 2018 08:35)  HR: 85 (25 Oct 2018 08:15) (66 - 106)  BP: 102/61 (25 Oct 2018 07:00) (102/61 - 130/74)  BP(mean): 76 (25 Oct 2018 07:00) (76 - 91)  ABP: --  ABP(mean): --  RR: 22 (25 Oct 2018 07:00) (16 - 40)  SpO2: 100% (25 Oct 2018 08:15) (78% - 100%)    Vital Signs Last 24 Hrs  T(C): 36.4 (25 Oct 2018 04:01), Max: 36.5 (24 Oct 2018 08:35)  T(F): 97.5 (25 Oct 2018 04:01), Max: 97.7 (24 Oct 2018 08:35)  HR: 85 (25 Oct 2018 08:15) (66 - 106)  BP: 102/61 (25 Oct 2018 07:00) (102/61 - 130/74)  BP(mean): 76 (25 Oct 2018 07:00) (76 - 91)  RR: 22 (25 Oct 2018 07:00) (16 - 40)  SpO2: 100% (25 Oct 2018 08:15) (78% - 100%)    ABG - ( 24 Oct 2018 23:10 )  pH, Arterial: 7.42  pH, Blood: x     /  pCO2: 29    /  pO2: 63    / HCO3: 21    / Base Excess: -5.1  /  SaO2: 91                  I&O's Detail    24 Oct 2018 07:01  -  25 Oct 2018 07:00  --------------------------------------------------------  IN:    sodium chloride 0.9%.: 435 mL  Total IN: 435 mL    OUT:    Voided: 1650 mL  Total OUT: 1650 mL    Total NET: -1215 mL            LABS:                        10.6   12.69 )-----------( 416      ( 25 Oct 2018 00:43 )             29.7     10-25    123<L>  |  90<L>  |  6<L>  ----------------------------<  99  3.4<L>   |  21<L>  |  0.46<L>    Ca    6.9<L>      25 Oct 2018 05:19    TPro  6.0  /  Alb  2.6<L>  /  TBili  0.3  /  DBili  x   /  AST  51<H>  /  ALT  50  /  AlkPhos  86  10-25          CAPILLARY BLOOD GLUCOSE      POCT Blood Glucose.: 177 mg/dL (24 Oct 2018 22:26)    PT/INR - ( 24 Oct 2018 22:42 )   PT: 11.2 sec;   INR: 1.03 ratio         PTT - ( 24 Oct 2018 22:42 )  PTT:24.4 sec  Urinalysis Basic - ( 25 Oct 2018 06:43 )    Color: Pale Yellow / Appearance: Clear / S.005 / pH: x  Gluc: x / Ketone: Negative  / Bili: Negative / Urobili: Negative   Blood: x / Protein: Negative / Nitrite: Negative   Leuk Esterase: Negative / RBC: x / WBC x   Sq Epi: x / Non Sq Epi: x / Bacteria: x      CULTURES:      Physical Examination:    General: mild sob on bipap   Wd female.      HEENT: Pupils equal, reactive to light.  Symmetric.    PULM: few bas crackles, few wheeze, good excursion.    CVS: Regular rate and rhythm, no murmurs, rubs, or gallops    ABD: Soft, nondistended, nontender, normoactive bowel sounds, no masses    EXT: No edema, nontender    SKIN: Warm and well perfused, no rashes noted.    NEURO: Alert, oriented, interactive, nonfocal    RADIOLOGY: ***< from: CT Angio Chest w/ IV Cont (10.25.18 @ 02:13) >  EXAM:  CT ABDOMEN AND PELVIS IC                          EXAM:  CT ANGIO CHEST (W)AW IC                            PROCEDURE DATE:  10/25/2018          INTERPRETATION:  HISTORY: New diagnosis of breast cancer on chemotherapy   presenting with acutehypoxia. Seizure and sepsis status post   chemotherapy. History of asthma. Evaluate for pulmonary embolism or   infectious source.    TECHNIQUE:  CT pulmonary angiography was performed of the chest according   to standard institutional protocol. Thisis followed by a routine CT of   the abdomen and pelvis. Coronal, sagittal and transaxial 3-D MIP   reformatted images are provided from the transaxial source data.   95mL   of Omnipaque 350 was administered without complication and 5 mL was   discarded.      COMPARISON: No similar prior study available for comparison.    FINDINGS:     CTPA:  There is motion artifact present which degrades image quality and limits   evaluation of the peripheral vasculature. However, there is good overall   opacification of pulmonary arterial tree. No central, segmental or   proximal subsegmental defect is present to suggest acute pulmonary   embolus.    The thyroid gland is unremarkable.     Evaluation of lung parenchyma demonstrates patchy areas of airspace   consolidation in the lower lobes with adjacent groundglass attenuation as   well as patchy groundglass opacities throughout the upper lobes. There is   mild diffuse interlobular septal thickening. There is no pneumothorax.    There is no pleural effusion. Trachea and main bronchi are clear.    There is mild asymmetric stranding in the right axillary fat with a few   small lymph nodes, the largest measuring 1 cm in transaxial dimension.   There is no significant supraclavicular, mediastinal or hilar adenopathy.    The heart and pericardium are unremarkable. The thoracic aorta is normal   in caliber without dissection.     There are age-indeterminate, likely chronic, moderate compression   fracture deformities of T7, T11 and T12.    CT abdomen/pelvis:  There is motion artifact present which degrades image quality.    The liver, spleen, pancreas and adrenal glands are unremarkable. The   kidneys enhance symmetrically without hydronephrosis.    The small and large bowel are normal in caliber. There is no free air or   intra-abdominal abscess. The appendix is not discretely visualize,   however, there are no secondary signs of acute appendicitis. There is no   abnormal colonic bowel wall thickening or pericolonic inflammatory   change. A mild to moderate amount of retained fecal material seen   throughout the colon.    The abdominal aorta is normal in caliber. There is no adenopathy within   the upper abdomen, retroperitoneum or pelvis. A retroaortic left renal   vein is incidentally noted.    The urinary bladder, uterus and adnexal structures are unremarkable aside   from the presence of an IUD.    The visualized osseous structures within the abdomen and pelvis are   unremarkable.      IMPRESSION:  CTPA: Limited by motion. No central, segmental or proximal subsegmental   pulmonary embolism suggested.  Nonspecific lower lobe patchy airspace   consolidation and diffuse groundglass opacities likely on an infectious   basis. Clinical correlation is recommended.    CT abdomen/pelvis: No infectious source within the abdomen or pelvis. No   acute intra-abdominal or pelvic finding.                ANITRA ALVAREZ M.D., RADIOLOGIST  This document has been electronically signed. Oct 25 2018  2:43AM        < end of copied text >  CT brain ok    CRITICAL CARE TIME SPENT: ***

## 2018-10-25 NOTE — PROGRESS NOTE ADULT - SUBJECTIVE AND OBJECTIVE BOX
Patient is a 48y old  Female who presents with a chief complaint of Seizure and respiratory distress (24 Oct 2018 23:01)    24 hour events:   --new onset tonic-clonic seizure lasting 20sec. post chemo for R. breast CA yesterday. Pt placed on bipap as she was post ictal in ED with O2 sat of 86%. She is now AAOx3. CT head showed no acute intracranial hemorrhage or mass effect.   --Hyponatremic at 119 on presentation. Received 2L NS bolus. Na increased to 123. Receiving continuous on NS at 60mL/hr.  --Lactate of 6.4. Received empiric abx of azithromycin and ceftriaxone. Repeat lactate of 2.8. CT chest showed bibasilar consolidation indicating possible PNA. Continued on azithromycin and ceftriaxone.     REVIEW OF SYSTEMS  Constitutional: No fever, chills, fatigue  Neuro: No headache, numbness, weakness  Resp: No cough, wheezing, shortness of breath  CVS: No chest pain, palpitations, leg swelling  GI: No abdominal pain, nausea, vomiting, diarrhea   : No dysuria, frequency, incontinence  Skin: No itching, burning, rashes, or lesions   Msk: No joint pain or swelling  Psych: No depression, anxiety, mood swings  Heme: No bleeding    T(F): 97.5 (10-25-18 @ 04:01), Max: 97.7 (10-24-18 @ 08:35)  HR: 79 (10-25-18 @ 07:00) (66 - 106)  BP: 102/61 (10-25-18 @ 07:00) (102/61 - 130/74)  RR: 22 (10-25-18 @ 07:00) (16 - 40)  SpO2: 96% (10-25-18 @ 07:00) (78% - 100%)  Wt(kg): --      CAPILLARY BLOOD GLUCOSE      POCT Blood Glucose.: 177 mg/dL (24 Oct 2018 22:26)    I&O's Summary    10-24 @ 07:01  -  10-25 @ 07:00  --------------------------------------------------------  IN: 435 mL / OUT: 1650 mL / NET: -1215 mL      PHYSICAL EXAM  General:   CNS:   HEENT:   Resp:   CVS:   Abd:   Ext:   Skin:     MEDICATIONS  azithromycin  IVPB IV Intermittent  cefTRIAXone   IVPB IV Intermittent  levothyroxine Injectable IV Push  ALBUTerol/ipratropium for Nebulization Nebulizer  LORazepam   Injectable IV Push PRN  pantoprazole  Injectable IV Push  sodium chloride 0.9%. IV Continuous                  10.6   12.69 )-----------( 416      ( 25 Oct 2018 00:43 )             29.7     Bands 2.0    10-25    123<L>  |  90<L>  |  6<L>  ----------------------------<  99  3.4<L>   |  21<L>  |  0.46<L>    Ca    6.9<L>      25 Oct 2018 05:19    TPro  6.0  /  Alb  2.6<L>  /  TBili  0.3  /  DBili  x   /  AST  51<H>  /  ALT  50  /  AlkPhos  86  10-25    Lactate 2.8           10-25 @ 00:44    Lactate 6.4           10-24 @ 22:48    PT/INR - ( 24 Oct 2018 22:42 )   PT: 11.2 sec;   INR: 1.03 ratio      PTT - ( 24 Oct 2018 22:42 )  PTT:24.4 sec  Urinalysis Basic - ( 25 Oct 2018 06:43 )    Color: Pale Yellow / Appearance: Clear / S.005 / pH: x  Gluc: x / Ketone: Negative  / Bili: Negative / Urobili: Negative   Blood: x / Protein: Negative / Nitrite: Negative   Leuk Esterase: Negative / RBC: x / WBC x   Sq Epi: x / Non Sq Epi: x / Bacteria: x    Radiology:   < from: CT Head No Cont (10.25.18 @ 02:05) >  IMPRESSION:  Mildly motion degraded. No gross acute intracranial hemorrhage or mass   effect from vasogenic edema.    < from: CT Angio Chest w/ IV Cont (10.25.18 @ 02:13) >  IMPRESSION:  CTPA: Limited by motion. No central, segmental or proximal subsegmental   pulmonary embolism suggested.  Nonspecific lower lobe patchy airspace   consolidation and diffuse groundglass opacities likely on an infectious   basis. Clinical correlation is recommended.    CT abdomen/pelvis: No infectious source within the abdomen or pelvis. No   acute intra-abdominal or pelvic finding.    < from: CT Abdomen and Pelvis w/ IV Cont (10.25.18 @ 02:13) >  IMPRESSION:  CTPA: Limited by motion. No central, segmental or proximal subsegmental   pulmonary embolism suggested.  Nonspecific lower lobe patchy airspace   consolidation and diffuse groundglass opacities likely on an infectious   basis. Clinical correlation is recommended.    CT abdomen/pelvis: No infectious source within the abdomen or pelvis. No   acute intra-abdominal or pelvic finding.      Bedside lung ultrasound: ***  Bedside ECHO: ***    CENTRAL LINE: Y/N          DATE INSERTED:              REMOVE: Y/N  OWEN: Y/N                        DATE INSERTED:              REMOVE: Y/N  A-LINE: Y/N                       DATE INSERTED:              REMOVE: Y/N    GLOBAL ISSUE/BEST PRACTICE  Analgesia:   Sedation:   CAM-ICU:   HOB elevation: yes  Stress ulcer prophylaxis:   VTE prophylaxis:   Glycemic control:   Nutrition:     CODE STATUS: ***  [facility] discussion: Y Patient is a 48y old  Female who presents with a chief complaint of Seizure and respiratory distress (24 Oct 2018 23:01)    24 hour events:   --new onset tonic-clonic seizure lasting 20sec. post chemo for R. breast CA yesterday. Pt placed on bipap as she was post ictal in ED with O2 sat of 86%. She is now AAOx3. CT head showed no acute intracranial hemorrhage or mass effect.   --Hyponatremic at 119 on presentation. Received 2L NS bolus. Na increased to 123. Receiving continuous on NS at 60mL/hr. Na increased to 130, d/c NS, started D5 25mL/hr IV. Na increased to 136 increased D5 to 75mL/hr IV. Nephro consulted.  --Lactate of 6.4. Received empiric abx of azithromycin and ceftriaxone. Repeat lactate of 2.8. CT chest showed bibasilar consolidation indicating possible PNA. ID consulted, does not believe to be infectious. Discontinued on azithromycin and ceftriaxone. Increased lactate 2/2 to seizure.     REVIEW OF SYSTEMS  Constitutional: No fever, chills, fatigue  Neuro: No headache, numbness, weakness  Resp: No cough, wheezing, shortness of breath  CVS: No chest pain, palpitations  GI: No abdominal pain, nausea, vomiting, diarrhea   : + urinary retention. No dysuria, frequency, incontinence    T(F): 97.5 (10-25-18 @ 04:01), Max: 97.7 (10-24-18 @ 08:35)  HR: 79 (10-25-18 @ 07:00) (66 - 106)  BP: 102/61 (10-25-18 @ 07:00) (102/61 - 130/74)  RR: 22 (10-25-18 @ 07:00) (16 - 40)  SpO2: 96% (10-25-18 @ 07:00) (78% - 100%)  Wt(kg): --      CAPILLARY BLOOD GLUCOSE      POCT Blood Glucose.: 177 mg/dL (24 Oct 2018 22:26)    I&O's Summary    10-24 @ 07:01  -  10-25 @ 07:00  --------------------------------------------------------  IN: 435 mL / OUT: 1650 mL / NET: -1215 mL      PHYSICAL EXAM  General: In no acute distress.  CNS: NCAT. AAOx3.  Neuro: CN 2-12 intact. Muscle strength in UE and LE 5/5 b/l. Sensation in UE intact b/l. Negative babinski and dyson signs b/l.   Resp: lungs CTA b/l. no w/r/r.  CVS: RRR. S1 and S2 noted. no m/r/g.  Abd: soft. NT. ND. +BS.  Ext: No calf tenderness b/l.     MEDICATIONS  azithromycin  IVPB IV Intermittent  cefTRIAXone   IVPB IV Intermittent  levothyroxine Injectable IV Push  ALBUTerol/ipratropium for Nebulization Nebulizer  LORazepam   Injectable IV Push PRN  pantoprazole  Injectable IV Push  sodium chloride 0.9%. IV Continuous                  10.6   12 )-----------( 416      ( 25 Oct 2018 00:43 )             29.7     Bands 2.0    10-25    123<L>  |  90<L>  |  6<L>  ----------------------------<  99  3.4<L>   |  21<L>  |  0.46<L>    Ca    6.9<L>      25 Oct 2018 05:19    TPro  6.0  /  Alb  2.6<L>  /  TBili  0.3  /  DBili  x   /  AST  51<H>  /  ALT  50  /  AlkPhos  86  10-25    Lactate 2.8           10-25 @ 00:44    Lactate 6.4           10-24 @ 22:48    PT/INR - ( 24 Oct 2018 22:42 )   PT: 11.2 sec;   INR: 1.03 ratio      PTT - ( 24 Oct 2018 22:42 )  PTT:24.4 sec  Urinalysis Basic - ( 25 Oct 2018 06:43 )    Color: Pale Yellow / Appearance: Clear / S.005 / pH: x  Gluc: x / Ketone: Negative  / Bili: Negative / Urobili: Negative   Blood: x / Protein: Negative / Nitrite: Negative   Leuk Esterase: Negative / RBC: x / WBC x   Sq Epi: x / Non Sq Epi: x / Bacteria: x    Radiology:   < from: CT Head No Cont (10.25.18 @ 02:05) >  IMPRESSION:  Mildly motion degraded. No gross acute intracranial hemorrhage or mass   effect from vasogenic edema.    < from: CT Angio Chest w/ IV Cont (10.25.18 @ 02:13) >  IMPRESSION:  CTPA: Limited by motion. No central, segmental or proximal subsegmental   pulmonary embolism suggested.  Nonspecific lower lobe patchy airspace   consolidation and diffuse groundglass opacities likely on an infectious   basis. Clinical correlation is recommended.    CT abdomen/pelvis: No infectious source within the abdomen or pelvis. No   acute intra-abdominal or pelvic finding.    < from: CT Abdomen and Pelvis w/ IV Cont (10.25.18 @ 02:13) >  IMPRESSION:  CTPA: Limited by motion. No central, segmental or proximal subsegmental   pulmonary embolism suggested.  Nonspecific lower lobe patchy airspace   consolidation and diffuse groundglass opacities likely on an infectious   basis. Clinical correlation is recommended.    CT abdomen/pelvis: No infectious source within the abdomen or pelvis. No   acute intra-abdominal or pelvic finding.      Bedside lung ultrasound: N/A  Bedside ECHO: N/A    CENTRAL LINE: N            OWEN: N                          A-LINE: N                          GLOBAL ISSUE/BEST PRACTICE  Analgesia:   Sedation:   CAM-ICU:   HOB elevation: yes  Stress ulcer prophylaxis: yes  VTE prophylaxis: yes  Glycemic control:   Nutrition: Regular diet. 1000mL fluid restriction.    CODE STATUS: ***  [facility] discussion: Y Patient is a 48y old  Female who presents with a chief complaint of Seizure and respiratory distress (24 Oct 2018 23:01)    24 hour events:   --new onset tonic-clonic seizure lasting 20sec. post chemo for R. breast CA yesterday. Pt placed on bipap as she was post ictal in ED with O2 sat of 86%. She is now AAOx3. CT head showed no acute intracranial hemorrhage or mass effect.   --Hyponatremic at 119 on presentation. Received 2L NS bolus. Na increased to 123. Receiving continuous on NS at 60mL/hr. Na increased to 130, d/c NS, started D5 25mL/hr IV. Na increased to 136 increased D5 to 75mL/hr IV. Nephro consulted.  --Lactate of 6.4. Received empiric abx of azithromycin and ceftriaxone. Repeat lactate of 2.8. CT chest showed bibasilar consolidation indicating possible PNA. ID consulted, does not believe to be infectious. Discontinued on azithromycin and ceftriaxone. Increased lactate 2/2 to seizure.     REVIEW OF SYSTEMS  Constitutional: No fever, chills, fatigue  Neuro: No headache, numbness, weakness  Resp: No cough, wheezing, shortness of breath  CVS: No chest pain, palpitations  GI: No abdominal pain, nausea, vomiting, diarrhea   : + urinary retention. No dysuria, frequency, incontinence    T(F): 97.5 (10-25-18 @ 04:01), Max: 97.7 (10-24-18 @ 08:35)  HR: 79 (10-25-18 @ 07:00) (66 - 106)  BP: 102/61 (10-25-18 @ 07:00) (102/61 - 130/74)  RR: 22 (10-25-18 @ 07:00) (16 - 40)  SpO2: 96% (10-25-18 @ 07:00) (78% - 100%)  Wt(kg): --      CAPILLARY BLOOD GLUCOSE      POCT Blood Glucose.: 177 mg/dL (24 Oct 2018 22:26)    I&O's Summary    10-24 @ 07:01  -  10-25 @ 07:00  --------------------------------------------------------  IN: 435 mL / OUT: 1650 mL / NET: -1215 mL      PHYSICAL EXAM  General: In no acute distress.  CNS: NCAT. AAOx3.  Neuro: CN 2-12 intact. Muscle strength in UEs and LEs 5/5 b/l. Sensation in UEs and LEs intact b/l. Reflexes 2+Negative babinski, clonus, and dyson signs b/l.   Resp: lungs CTA b/l. no w/r/r.  CVS: RRR. S1 and S2 noted. no m/r/g.  Abd: soft. NT. ND. +BS.  Ext: No calf tenderness b/l.     MEDICATIONS  azithromycin  IVPB IV Intermittent  cefTRIAXone   IVPB IV Intermittent  levothyroxine Injectable IV Push  ALBUTerol/ipratropium for Nebulization Nebulizer  LORazepam   Injectable IV Push PRN  pantoprazole  Injectable IV Push  sodium chloride 0.9%. IV Continuous                  10.6   12.69 )-----------( 416      ( 25 Oct 2018 00:43 )             29.7     Bands 2.0    10-25    123<L>  |  90<L>  |  6<L>  ----------------------------<  99  3.4<L>   |  21<L>  |  0.46<L>    Ca    6.9<L>      25 Oct 2018 05:19    TPro  6.0  /  Alb  2.6<L>  /  TBili  0.3  /  DBili  x   /  AST  51<H>  /  ALT  50  /  AlkPhos  86  10-25    Lactate 2.8           10-25 @ 00:44    Lactate 6.4           10-24 @ 22:48    PT/INR - ( 24 Oct 2018 22:42 )   PT: 11.2 sec;   INR: 1.03 ratio      PTT - ( 24 Oct 2018 22:42 )  PTT:24.4 sec  Urinalysis Basic - ( 25 Oct 2018 06:43 )    Color: Pale Yellow / Appearance: Clear / S.005 / pH: x  Gluc: x / Ketone: Negative  / Bili: Negative / Urobili: Negative   Blood: x / Protein: Negative / Nitrite: Negative   Leuk Esterase: Negative / RBC: x / WBC x   Sq Epi: x / Non Sq Epi: x / Bacteria: x    Radiology:   < from: CT Head No Cont (10.25.18 @ 02:05) >  IMPRESSION:  Mildly motion degraded. No gross acute intracranial hemorrhage or mass   effect from vasogenic edema.    < from: CT Angio Chest w/ IV Cont (10.25.18 @ 02:13) >  IMPRESSION:  CTPA: Limited by motion. No central, segmental or proximal subsegmental   pulmonary embolism suggested.  Nonspecific lower lobe patchy airspace   consolidation and diffuse groundglass opacities likely on an infectious   basis. Clinical correlation is recommended.    CT abdomen/pelvis: No infectious source within the abdomen or pelvis. No   acute intra-abdominal or pelvic finding.    < from: CT Abdomen and Pelvis w/ IV Cont (10.25.18 @ 02:13) >  IMPRESSION:  CTPA: Limited by motion. No central, segmental or proximal subsegmental   pulmonary embolism suggested.  Nonspecific lower lobe patchy airspace   consolidation and diffuse groundglass opacities likely on an infectious   basis. Clinical correlation is recommended.    CT abdomen/pelvis: No infectious source within the abdomen or pelvis. No   acute intra-abdominal or pelvic finding.      Bedside lung ultrasound: N/A  Bedside ECHO: N/A    CENTRAL LINE: N            OWEN: N                          A-LINE: N                          GLOBAL ISSUE/BEST PRACTICE  Analgesia:   Sedation:   CAM-ICU:   HOB elevation: yes  Stress ulcer prophylaxis: yes  VTE prophylaxis: yes  Glycemic control:   Nutrition: Regular diet. 1000mL fluid restriction.    CODE STATUS: ***  [facility] discussion: ADRI Patient is a 48y old  Female who presents with a chief complaint of Seizure and respiratory distress (24 Oct 2018 23:01)    24 hour events:   --new onset tonic-clonic seizure lasting 20sec. post chemo for R. breast CA yesterday. Pt placed on bipap as she was post ictal in ED with O2 sat of 86%. She is now AAOx3. CT head showed no acute intracranial hemorrhage or mass effect.   --Hyponatremic at 119 on presentation. Received 2L NS bolus. Na increased to 123. Receiving continuous on NS at 60mL/hr. Na increased to 130, d/c NS, started D5 25mL/hr IV. Na increased to 136 increased D5 to 75mL/hr IV. Nephro consulted.  --Lactate of 6.4. Received empiric abx of azithromycin and ceftriaxone. Repeat lactate of 2.8. CT chest showed bibasilar consolidation indicating possible PNA. ID consulted, does not believe to be infectious. Discontinued on azithromycin and ceftriaxone. Increased lactate 2/2 to seizure.     REVIEW OF SYSTEMS  Constitutional: No fever, chills, fatigue  Neuro: No headache, numbness, weakness  Resp: No cough, wheezing, shortness of breath  CVS: No chest pain, palpitations  GI: No abdominal pain, nausea, vomiting, diarrhea   : + urinary retention. No dysuria, frequency, incontinence    T(F): 97.5 (10-25-18 @ 04:01), Max: 97.7 (10-24-18 @ 08:35)  HR: 79 (10-25-18 @ 07:00) (66 - 106)  BP: 102/61 (10-25-18 @ 07:00) (102/61 - 130/74)  RR: 22 (10-25-18 @ 07:00) (16 - 40)  SpO2: 96% (10-25-18 @ 07:00) (78% - 100%)  Wt(kg): --      CAPILLARY BLOOD GLUCOSE      POCT Blood Glucose.: 177 mg/dL (24 Oct 2018 22:26)    I&O's Summary    10-24 @ 07:01  -  10-25 @ 07:00  --------------------------------------------------------  IN: 435 mL / OUT: 1650 mL / NET: -1215 mL      PHYSICAL EXAM  General: In no acute distress.  CNS: NCAT. AAOx3.  Neuro: CN 2-12 intact. Muscle strength in UEs and LEs 5/5 b/l. Sensation in UEs and LEs intact b/l. Reflexes 2+Negative babinski, clonus, and dyson signs b/l.   Resp: lungs CTA b/l. no w/r/r.  CVS: RRR. S1 and S2 noted. no m/r/g.  Breast: no palpable mass or deformity, no expressible drainage, no peau d'orange, no palpable axillary lymphadenopathy  Abd: soft. NT. ND. +BS.  Ext: No calf tenderness b/l.     Secondary: No TTP of bony prominences, NT with AROM/PROM of UEs/LEs, NTTP of Head, C-,T-,L-spine    MEDICATIONS  azithromycin  IVPB IV Intermittent  cefTRIAXone   IVPB IV Intermittent  levothyroxine Injectable IV Push  ALBUTerol/ipratropium for Nebulization Nebulizer  LORazepam   Injectable IV Push PRN  pantoprazole  Injectable IV Push  sodium chloride 0.9%. IV Continuous                  10.6   12.69 )-----------( 416      ( 25 Oct 2018 00:43 )             29.7     Bands 2.0    10-25    123<L>  |  90<L>  |  6<L>  ----------------------------<  99  3.4<L>   |  21<L>  |  0.46<L>    Ca    6.9<L>      25 Oct 2018 05:19    TPro  6.0  /  Alb  2.6<L>  /  TBili  0.3  /  DBili  x   /  AST  51<H>  /  ALT  50  /  AlkPhos  86  10-25    Lactate 2.8           10-25 @ 00:44    Lactate 6.4           10-24 @ 22:48    PT/INR - ( 24 Oct 2018 22:42 )   PT: 11.2 sec;   INR: 1.03 ratio      PTT - ( 24 Oct 2018 22:42 )  PTT:24.4 sec  Urinalysis Basic - ( 25 Oct 2018 06:43 )    Color: Pale Yellow / Appearance: Clear / S.005 / pH: x  Gluc: x / Ketone: Negative  / Bili: Negative / Urobili: Negative   Blood: x / Protein: Negative / Nitrite: Negative   Leuk Esterase: Negative / RBC: x / WBC x   Sq Epi: x / Non Sq Epi: x / Bacteria: x    Radiology:   < from: CT Head No Cont (10.25.18 @ 02:05) >  IMPRESSION:  Mildly motion degraded. No gross acute intracranial hemorrhage or mass   effect from vasogenic edema.    < from: CT Angio Chest w/ IV Cont (10.25.18 @ 02:13) >  IMPRESSION:  CTPA: Limited by motion. No central, segmental or proximal subsegmental   pulmonary embolism suggested.  Nonspecific lower lobe patchy airspace   consolidation and diffuse groundglass opacities likely on an infectious   basis. Clinical correlation is recommended.    CT abdomen/pelvis: No infectious source within the abdomen or pelvis. No   acute intra-abdominal or pelvic finding.    < from: CT Abdomen and Pelvis w/ IV Cont (10.25.18 @ 02:13) >  IMPRESSION:  CTPA: Limited by motion. No central, segmental or proximal subsegmental   pulmonary embolism suggested.  Nonspecific lower lobe patchy airspace   consolidation and diffuse groundglass opacities likely on an infectious   basis. Clinical correlation is recommended.    CT abdomen/pelvis: No infectious source within the abdomen or pelvis. No   acute intra-abdominal or pelvic finding.      Bedside lung ultrasound: N/A  Bedside ECHO: N/A    CENTRAL LINE: N            OWEN: N                          A-LINE: N                          GLOBAL ISSUE/BEST PRACTICE  Analgesia:   Sedation:   CAM-ICU:   HOB elevation: yes  Stress ulcer prophylaxis: yes  VTE prophylaxis: yes  Glycemic control:   Nutrition: Regular diet. 1000mL fluid restriction.    CODE STATUS: ***  [facility] discussion: Y Patient is a 48y old  Female who presents with a chief complaint of Seizure and respiratory distress (24 Oct 2018 23:01)    24 hour events:   --new onset tonic-clonic seizure lasting 20sec. post chemo for R. breast CA yesterday. Pt placed on bipap as she was post ictal in ED with O2 sat of 86%. She is now AAOx3. CT head showed no acute intracranial hemorrhage or mass effect.   --Hyponatremic at 119 on presentation. Received 2L NS bolus. Na increased to 123. Receiving continuous on NS at 60mL/hr. Na increased to 130, d/c NS, started D5 25mL/hr IV. Na increased to 136 increased D5 to 75mL/hr IV. Nephro consulted.  --Lactate of 6.4. Received empiric abx of azithromycin and ceftriaxone. Repeat lactate of 2.8. CT chest showed bibasilar consolidation indicating possible PNA. ID consulted, does not believe to be infectious. Discontinued on azithromycin and ceftriaxone. Increased lactate 2/2 to seizure.     REVIEW OF SYSTEMS  Constitutional: No fever, chills, fatigue  Neuro: No headache, numbness, weakness  Resp: No cough, wheezing, shortness of breath  CVS: No chest pain, palpitations  GI: No abdominal pain, nausea, vomiting, diarrhea   : + urinary retention. No dysuria, frequency, incontinence    T(F): 97.5 (10-25-18 @ 04:01), Max: 97.7 (10-24-18 @ 08:35)  HR: 79 (10-25-18 @ 07:00) (66 - 106)  BP: 102/61 (10-25-18 @ 07:00) (102/61 - 130/74)  RR: 22 (10-25-18 @ 07:00) (16 - 40)  SpO2: 96% (10-25-18 @ 07:00) (78% - 100%)  Wt(kg): --      CAPILLARY BLOOD GLUCOSE      POCT Blood Glucose.: 177 mg/dL (24 Oct 2018 22:26)    I&O's Summary    10-24 @ 07:01  -  10-25 @ 07:00  --------------------------------------------------------  IN: 435 mL / OUT: 1650 mL / NET: -1215 mL      PHYSICAL EXAM  General: In no acute distress.  CNS: NCAT. AAOx3.  Neuro: CN 2-12 intact. Muscle strength in UEs and LEs 5/5 b/l. Sensation in UEs and LEs intact b/l. Reflexes 2+Negative babinski, clonus, and dyson signs b/l.   Resp: lungs CTA b/l. no w/r/r.  CVS: RRR. S1 and S2 noted. no m/r/g.  Breast: no palpable mass or deformity, no expressible drainage, no peau d'orange, no palpable axillary lymphadenopathy  Abd: soft. NT. ND. +BS.  Ext: No calf tenderness b/l.     Secondary: No TTP of bony prominences, NT with AROM/PROM of UEs/LEs, NTTP of Head, C-,T-,L-spine    MEDICATIONS  azithromycin  IVPB IV Intermittent  cefTRIAXone   IVPB IV Intermittent  levothyroxine Injectable IV Push  ALBUTerol/ipratropium for Nebulization Nebulizer  LORazepam   Injectable IV Push PRN  pantoprazole  Injectable IV Push  sodium chloride 0.9%. IV Continuous                  10.6   12.69 )-----------( 416      ( 25 Oct 2018 00:43 )             29.7     Bands 2.0    10-25    123<L>  |  90<L>  |  6<L>  ----------------------------<  99  3.4<L>   |  21<L>  |  0.46<L>    Ca    6.9<L>      25 Oct 2018 05:19    TPro  6.0  /  Alb  2.6<L>  /  TBili  0.3  /  DBili  x   /  AST  51<H>  /  ALT  50  /  AlkPhos  86  10-25    Lactate 2.8           10-25 @ 00:44    Lactate 6.4           10-24 @ 22:48    PT/INR - ( 24 Oct 2018 22:42 )   PT: 11.2 sec;   INR: 1.03 ratio      PTT - ( 24 Oct 2018 22:42 )  PTT:24.4 sec  Urinalysis Basic - ( 25 Oct 2018 06:43 )    Color: Pale Yellow / Appearance: Clear / S.005 / pH: x  Gluc: x / Ketone: Negative  / Bili: Negative / Urobili: Negative   Blood: x / Protein: Negative / Nitrite: Negative   Leuk Esterase: Negative / RBC: x / WBC x   Sq Epi: x / Non Sq Epi: x / Bacteria: x    Radiology:   < from: CT Head No Cont (10.25.18 @ 02:05) >  IMPRESSION:  Mildly motion degraded. No gross acute intracranial hemorrhage or mass   effect from vasogenic edema.    < from: CT Angio Chest w/ IV Cont (10.25.18 @ 02:13) >  IMPRESSION:  CTPA: Limited by motion. No central, segmental or proximal subsegmental   pulmonary embolism suggested.  Nonspecific lower lobe patchy airspace   consolidation and diffuse groundglass opacities likely on an infectious   basis. Clinical correlation is recommended.    CT abdomen/pelvis: No infectious source within the abdomen or pelvis. No   acute intra-abdominal or pelvic finding.    < from: CT Abdomen and Pelvis w/ IV Cont (10.25.18 @ 02:13) >  IMPRESSION:  CTPA: Limited by motion. No central, segmental or proximal subsegmental   pulmonary embolism suggested.  Nonspecific lower lobe patchy airspace   consolidation and diffuse groundglass opacities likely on an infectious   basis. Clinical correlation is recommended.    CT abdomen/pelvis: No infectious source within the abdomen or pelvis. No   acute intra-abdominal or pelvic finding.      Bedside lung ultrasound: N/A  Bedside ECHO: N/A    CENTRAL LINE: N            OWEN: N                          A-LINE: N                          GLOBAL ISSUE/BEST PRACTICE  Analgesia: NA  Sedation: NA  CAM-ICU: NEG  HOB elevation: yes  Stress ulcer prophylaxis: yes  VTE prophylaxis: yes  Glycemic control: NA  Nutrition: Regular diet. 1000mL fluid restriction.    CODE STATUS: FULL

## 2018-10-25 NOTE — CONSULT NOTE ADULT - REASON FOR ADMISSION
Seizure and respiratory distress

## 2018-10-25 NOTE — DIETITIAN INITIAL EVALUATION ADULT. - ORAL INTAKE PTA
Pt reports good appetite/intake PTA. Consumes regular diet with no restrictions. Typical intakes: protein shake for breakfast, with regular lunch, dinner, snacks on cheese. No vitamins/minerals taken PTA./good

## 2018-10-25 NOTE — PROGRESS NOTE ADULT - SUBJECTIVE AND OBJECTIVE BOX
neuro cons dict.  new onset seizure likely due to hyponatremia.  no need for antiseizure meds.  would get brain mri with and without contrast,  dw patient and  at bed side, neuro cons dict.  new onset seizure likely due to hyponatremia.  no need for antiseizure meds.  please magnesium level,  would get brain mri with and without contrast,  dw patient and  at bed side,

## 2018-10-25 NOTE — CONSULT NOTE ADULT - PROBLEM SELECTOR PROBLEM 6
R/O Malignant neoplasm of right female breast, unspecified estrogen receptor status, unspecified site of breast

## 2018-10-25 NOTE — CONSULT NOTE ADULT - PROBLEM SELECTOR RECOMMENDATION 4
Symbicort  Steroids  HHN
per ICU.    Thank you for consulting us and involving us in the management of this most interesting and challenging case.     We will follow along in the care of this patient.
monitor

## 2018-10-25 NOTE — CONSULT NOTE ADULT - PROBLEM SELECTOR PROBLEM 1
Abnormal CT scan, lung
Acute on chronic respiratory failure with hypoxia
Aspiration pneumonia of both lungs due to vomit, unspecified part of lung

## 2018-10-25 NOTE — CONSULT NOTE ADULT - SUBJECTIVE AND OBJECTIVE BOX
Patient is a 48y old  Female who presents with a chief complaint of Seizure and respiratory distress (24 Oct 2018 23:01)   Acute  renal failure.    HPI:  SHANNAN CHRISTENSEN is a 47 YO W Female brought to ER because of difficulty in breathing after a seizures episode.  According to the  noninvasive ductal CA of right breast few months ago.  She had lumpectomy.  Patient on RT and chemotherapy.   She had chemotherapy dose today and finished 4 PM.  Patient was advised to drink plenty of water after chemotherapy to flush out the toxins.  She brought in by EMS for new onset seizure.  While at home pt became pale and had what he described as a tonic clonic seizure x 20 sec.  Patient was post ictal in the ED with O2 sat 86%.  Patient was placed on BiPAP.   Patient's initial Na was 119 and initially started on NS 75 ML/hr (24 Oct 2018 23:01)   Hx renal stones x1 not aware of type, no other renal  problems; elevated creatinine on admission.    Renal consulted for hyponatremia. Admits to drinking at least 4L of water in the past 1 day and on chemotherapy days. The patient eats well, but admits to nausea on chemo days, last being one day ago. Found to have PNA on imaging and placed on BiPAP requiring MICU admission. Denies chest pain, dyspnea, nausea, vomiting, diarrhea, and urinary complaints.     PAST MEDICAL & SURGICAL HISTORY:  Malignant neoplasm of right female breast, unspecified estrogen receptor status, unspecified site of breast  Depression, unspecified depression type  Hypothyroidism, unspecified type  Asthma: last major attack 2016  Bipolar affective  S/P lumpectomy, right breast: with nodes  Encounter for IUD insertion: 2016  Ganglion cyst of wrist, left: ,    DM 2, MO, hypothyroidism, prior DVT and IVC filter; Cholecystectomy    FAMILY HISTORY:  Family history of prostate cancer  NC    Social History:Non smoker    MEDICATIONS  (STANDING):  ALBUTerol/ipratropium for Nebulization 3 milliLiter(s) Nebulizer every 6 hours  azithromycin  IVPB 500 milliGRAM(s) IV Intermittent every 24 hours  cefTRIAXone   IVPB 1 Gram(s) IV Intermittent every 24 hours  levothyroxine Injectable 37.5 MICROGram(s) IV Push at bedtime  pantoprazole  Injectable 40 milliGRAM(s) IV Push daily  sodium chloride 0.9%. 1000 milliLiter(s) (60 mL/Hr) IV Continuous <Continuous>    MEDICATIONS  (PRN):  LORazepam   Injectable 1 milliGRAM(s) IV Push every 4 hours PRN seizure   Meds reviewed    Allergies    Sulfur (Hives)    Intolerances         REVIEW OF SYSTEMS:    CONSTITUTIONAL:  negative  EYES: No eye pain, visual disturbances, or discharge  ENMT:  No difficulty hearing, tinnitus, vertigo; No sinus or throat pain  NECK: No pain or stiffness  BREASTS: No pain, masses, or nipple discharge  RESPIRATORY: sob  CARDIOVASCULAR: No chest pain, palpitations, dizziness,   GASTROINTESTINAL: No abdominal or epigastric pain. + nausea, vomiting, or hematemesis; No diarrhea or constipation. No melena   GENITOURINARY: No dysuria, frequency, hematuria, or incontinence  NEUROLOGICAL: No headaches, memory loss, loss of strength, numbness, or tremors  SKIN: No rashes  LYMPH NODES: No enlarged glands  ENDOCRINE: No heat or cold intolerance; No hair loss  MUSCULOSKELETAL: Neg  PSYCHIATRIC: No depression, anxiety, mood swings, or difficulty sleeping  HEME/LYMPH: No easy bruising, or bleeding gums  ALLERGY AND IMMUNOLOGIC: No hives or eczema    Vital Signs Last 24 Hrs  T(C): 36.4 (25 Oct 2018 04:01), Max: 36.5 (24 Oct 2018 08:35)  T(F): 97.5 (25 Oct 2018 04:01), Max: 97.7 (24 Oct 2018 08:35)  HR: 79 (25 Oct 2018 07:00) (66 - 106)  BP: 102/61 (25 Oct 2018 07:00) (102/61 - 130/74)  BP(mean): 76 (25 Oct 2018 07:00) (76 - 91)  RR: 22 (25 Oct 2018 07:00) (16 - 40)  SpO2: 96% (25 Oct 2018 07:00) (78% - 100%)  Daily Height in cm: 165.1 (24 Oct 2018 22:10)    Daily Weight in k.3 (25 Oct 2018 02:17)    PHYSICAL EXAM:    GENERAL: +BiPAP; speaking full sentences  HEAD:  Atraumatic, Normocephalic  EYES: Conjunctiva and sclera clear  ENMT: Moist mucous membranes, Good dentition, No lesions  NECK: Supple  NERVOUS SYSTEM:  Alert & Oriented X3, Good concentration; Motor Strength wnl upper and lower extremities  CHEST/LUNG: Few scattered rhonchi B/L  HEART: Regular rate and rhythm; No murmurs, rubs, or gallops  ABDOMEN: Soft, Nontender, Nondistended; Bowel sounds present  EXTREMITIES: No edema  LYMPH: No lymphadenopathy noted  SKIN: No rashes or lesions, pale    LABS:                        10.6   12.69 )-----------( 416      ( 25 Oct 2018 00:43 )             29.7     10    123<L>  |  90<L>  |  6<L>  ----------------------------<  99  3.4<L>   |  21<L>  |  0.46<L>    Ca    6.9<L>      25 Oct 2018 05:19    TPro  6.0  /  Alb  2.6<L>  /  TBili  0.3  /  DBili  x   /  AST  51<H>  /  ALT  50  /  AlkPhos  86  10    PT/INR - ( 24 Oct 2018 22:42 )   PT: 11.2 sec;   INR: 1.03 ratio         PTT - ( 24 Oct 2018 22:42 )  PTT:24.4 sec  Urinalysis Basic - ( 25 Oct 2018 06:43 )    Color: Pale Yellow / Appearance: Clear / S.005 / pH: x  Gluc: x / Ketone: Negative  / Bili: Negative / Urobili: Negative   Blood: x / Protein: Negative / Nitrite: Negative   Leuk Esterase: Negative / RBC: x / WBC x   Sq Epi: x / Non Sq Epi: x / Bacteria: x        ABG - ( 24 Oct 2018 23:10 )  pH, Arterial: 7.42  pH, Blood: x     /  pCO2: 29    /  pO2: 63    / HCO3: 21    / Base Excess: -5.1  /  SaO2: 91                  RADIOLOGY & ADDITIONAL TESTS:

## 2018-10-25 NOTE — CONSULT NOTE ADULT - ASSESSMENT
47 YO W Female brought to ER because of difficulty in breathing after a seizures episode which was likely precipitated by polydypsia induced hyponatremia

## 2018-10-25 NOTE — PROGRESS NOTE ADULT - SUBJECTIVE AND OBJECTIVE BOX
Patient is a 48y old  Female who presents with a chief complaint of Seizure and respiratory distress (25 Oct 2018 13:46)      INTERVAL /OVERNIGHT EVENTS: feels much better    MEDICATIONS  (STANDING):  buDESOnide  80 MICROgram(s)/formoterol 4.5 MICROgram(s) Inhaler 2 Puff(s) Inhalation two times a day  buPROPion XL . 150 milliGRAM(s) Oral daily  dextrose 5%. 1000 milliLiter(s) (75 mL/Hr) IV Continuous <Continuous>  enoxaparin Injectable 40 milliGRAM(s) SubCutaneous daily  levothyroxine 75 MICROGram(s) Oral daily  methylPREDNISolone sodium succinate Injectable 40 milliGRAM(s) IV Push every 8 hours  pantoprazole  Injectable 40 milliGRAM(s) IV Push daily    MEDICATIONS  (PRN):  ALBUTerol/ipratropium for Nebulization 3 milliLiter(s) Nebulizer every 6 hours PRN Shortness of Breath and/or Wheezing  LORazepam   Injectable 1 milliGRAM(s) IV Push every 4 hours PRN seizure  metoclopramide 10 milliGRAM(s) Oral every 6 hours PRN Nausea      Allergies    Sulfur (Hives)    Intolerances        REVIEW OF SYSTEMS:  CONSTITUTIONAL: No fever, weight loss, or fatigue  EYES: No eye pain, visual disturbances, or discharge  ENMT:  No difficulty hearing, tinnitus, vertigo; No sinus or throat pain  NECK: No pain or stiffness  RESPIRATORY: No cough, wheezing, chills or hemoptysis; No shortness of breath  CARDIOVASCULAR: No chest pain, palpitations, dizziness, or leg swelling  GASTROINTESTINAL: No abdominal or epigastric pain. No nausea, vomiting, or hematemesis; No diarrhea or constipation. No melena or hematochezia.  GENITOURINARY: No dysuria, frequency, hematuria, or incontinence  NEUROLOGICAL: No headaches, memory loss, loss of strength, numbness, or tremors  SKIN: No itching, burning, rashes, or lesions   LYMPH NODES: No enlarged glands  ENDOCRINE: No heat or cold intolerance; No hair loss; No polydipsia or polyuria  MUSCULOSKELETAL: No joint pain or swelling; No muscle, back, or extremity pain  PSYCHIATRIC: No depression, anxiety, mood swings, or difficulty sleeping  HEME/LYMPH: No easy bruising, or bleeding gums  ALLERGY AND IMMUNOLOGIC: No hives or eczema    Vital Signs Last 24 Hrs  T(C): 36.5 (25 Oct 2018 20:30), Max: 37.5 (25 Oct 2018 11:48)  T(F): 97.7 (25 Oct 2018 20:30), Max: 99.5 (25 Oct 2018 11:48)  HR: 82 (25 Oct 2018 20:00) (66 - 106)  BP: 95/53 (25 Oct 2018 20:00) (85/48 - 127/82)  BP(mean): 76 (25 Oct 2018 19:00) (64 - 91)  RR: 20 (25 Oct 2018 20:00) (19 - 44)  SpO2: 96% (25 Oct 2018 20:00) (78% - 100%)    PHYSICAL EXAM:  GENERAL: NAD, well-groomed, well-developed  HEAD:  Atraumatic, Normocephalic  EYES: EOMI, PERRLA, conjunctiva and sclera clear  ENMT: No tonsillar erythema, exudates, or enlargement; Moist mucous membranes, Good dentition, No lesions  NECK: Supple, No JVD, Normal thyroid  NERVOUS SYSTEM:  Alert & Oriented X3, Good concentration; Motor Strength 5/5 B/L upper and lower extremities; DTRs 2+ intact and symmetric  CHEST/LUNG: Clear to auscultation bilaterally; No rales, rhonchi, wheezing, or rubs  HEART: Regular rate and rhythm; No murmurs, rubs, or gallops  ABDOMEN: Soft, Nontender, Nondistended; Bowel sounds present  EXTREMITIES:  2+ Peripheral Pulses, No clubbing, cyanosis, or edema  LYMPH: No lymphadenopathy noted  SKIN: No rashes or lesions    LABS:                        10.6   12.69 )-----------( 416      ( 25 Oct 2018 00:43 )             29.7     25 Oct 2018 18:06    139    |  107    |  10     ----------------------------<  112    3.5     |  23     |  0.81     Ca    7.7        25 Oct 2018 18:06  Mg     2.0       25 Oct 2018 09:33    TPro  6.0    /  Alb  2.6    /  TBili  0.3    /  DBili  x      /  AST  51     /  ALT  50     /  AlkPhos  86     25 Oct 2018 00:43    PT/INR - ( 24 Oct 2018 22:42 )   PT: 11.2 sec;   INR: 1.03 ratio         PTT - ( 24 Oct 2018 22:42 )  PTT:24.4 sec  Urinalysis Basic - ( 25 Oct 2018 06:43 )    Color: Pale Yellow / Appearance: Clear / S.005 / pH: x  Gluc: x / Ketone: Negative  / Bili: Negative / Urobili: Negative   Blood: x / Protein: Negative / Nitrite: Negative   Leuk Esterase: Negative / RBC: x / WBC x   Sq Epi: x / Non Sq Epi: x / Bacteria: x      CAPILLARY BLOOD GLUCOSE      POCT Blood Glucose.: 177 mg/dL (24 Oct 2018 22:26)      RADIOLOGY & ADDITIONAL TESTS:    Notes Reviewed:  [x ] YES  [ ] NO    Care Discussed with Consultants/Other Providers [x ] YES  [ ] NO

## 2018-10-25 NOTE — CONSULT NOTE ADULT - SUBJECTIVE AND OBJECTIVE BOX
HPI:  SHANNAN CHRISTENSEN is a 47 YO W Female brought to ER because of difficulty in breathing after a seizures episode.  According to the  noninvasive ductal CA of right breast few months ago.  She had lumpectomy.  Patient on RT and chemotherapy.   She had chemotherapy dose today and finished 4 PM.  Patient was advised to drink plenty of water after chemotherapy to flush out the toxins.  She brought in by EMS for new onset seizure.  While at home pt became pale and had what he described as a tonic clonic seizure x 20 sec.  Patient was post ictal in the ED with O2 sat 86%.  Patient was placed on BiPAP.   Patient's initial Na was 119 and initially started on NS 75 ML/hr (24 Oct 2018 23:01)    Patient reports no cough, no pulmonary or other localizing symptoms.      PAST MEDICAL & SURGICAL HISTORY:  Malignant neoplasm of right female breast, unspecified estrogen receptor status, unspecified site of breast  Depression, unspecified depression type  Hypothyroidism, unspecified type  Asthma: last major attack 2016  Bipolar affective  S/P lumpectomy, right breast: with nodes  Encounter for IUD insertion: 2016  Ganglion cyst of wrist, left: ,       Antimicrobials  azithromycin  IVPB 500 milliGRAM(s) IV Intermittent every 24 hours  cefTRIAXone   IVPB 1 Gram(s) IV Intermittent every 24 hours      Immunological      Other  ALBUTerol/ipratropium for Nebulization 3 milliLiter(s) Nebulizer every 6 hours PRN  buDESOnide  80 MICROgram(s)/formoterol 4.5 MICROgram(s) Inhaler 2 Puff(s) Inhalation two times a day  dextrose 5%. 1000 milliLiter(s) IV Continuous <Continuous>  enoxaparin Injectable 40 milliGRAM(s) SubCutaneous daily  levothyroxine 75 MICROGram(s) Oral daily  LORazepam   Injectable 1 milliGRAM(s) IV Push every 4 hours PRN  methylPREDNISolone sodium succinate Injectable 40 milliGRAM(s) IV Push every 8 hours  metoclopramide 10 milliGRAM(s) Oral every 6 hours PRN  pantoprazole  Injectable 40 milliGRAM(s) IV Push daily      Allergies    Sulfur (Hives)    Intolerances    SOCIAL HISTORY: no toxic habits    FAMILY HISTORY:  Family history of prostate cancer      ROS:    EYES:  Negative  blurry vision or double vision  GASTROINTESTINAL:  Negative for nausea, vomiting, diarrhea  -otherwise negative except for subjective    Vital Signs Last 24 Hrs  T(C): 37.5 (25 Oct 2018 11:48), Max: 37.5 (25 Oct 2018 11:48)  T(F): 99.5 (25 Oct 2018 11:48), Max: 99.5 (25 Oct 2018 11:48)  HR: 94 (25 Oct 2018 13:00) (66 - 106)  BP: 85/48 (25 Oct 2018 13:00) (85/48 - 127/82)  BP(mean): 64 (25 Oct 2018 13:00) (64 - 91)  RR: 32 (25 Oct 2018 13:00) (19 - 40)  SpO2: 95% (25 Oct 2018 13:00) (78% - 100%)    PE:  WDWN in no distress  HEENT:  NC, PERRL, sclerae anicteric, conjunctivae clear, EOMI.  Sinuses nontender, no nasal exudate.  No buccal or pharyngeal lesions, erythema or exudate  Neck:  Supple, no adenopathy  Lungs:  No accessory muscle use, bilaterally clear to auscultation, some decrease in bases  Cor:  RRR, S1, S2, no murmur appreciated  Abd:  Symmetric, normoactive BS.  Soft, nontender, no masses, guarding or rebound.  Liver and spleen not enlarged  Extrem:  No cyanosis or edema  Skin:  No rashes.  Neuro: grossly intact  Musc: moving all limbs freely, no focal deficits    LABS:                        10.6   12.69 )-----------( 416      ( 25 Oct 2018 00:43 )             29.7       WBC Count: 12.69 K/uL (10-25-18 @ 00:43)  WBC Count: 14.17 K/uL (10-24-18 @ 22:42)  WBC Count: 7.5 K/uL (10-24-18 @ 08:32)      10-25    130<L>  |  99  |  7   ----------------------------<  97  3.8   |  21<L>  |  0.56    Ca    7.4<L>      25 Oct 2018 09:33  Mg     2.0     10-25    TPro  6.0  /  Alb  2.6<L>  /  TBili  0.3  /  DBili  x   /  AST  51<H>  /  ALT  50  /  AlkPhos  86  10-25      Creatinine, Serum: 0.56 mg/dL (10-25-18 @ 09:33)  Creatinine, Serum: 0.46 mg/dL (10-25-18 @ 05:19)  Creatinine, Serum: 0.52 mg/dL (10-25-18 @ 00:43)  Creatinine, Serum: 0.85 mg/dL (10-24-18 @ 22:42)      Urinalysis Basic - ( 25 Oct 2018 06:43 )    Color: Pale Yellow / Appearance: Clear / S.005 / pH: x  Gluc: x / Ketone: Negative  / Bili: Negative / Urobili: Negative   Blood: x / Protein: Negative / Nitrite: Negative   Leuk Esterase: Negative / RBC: x / WBC x   Sq Epi: x / Non Sq Epi: x / Bacteria: x      MICROBIOLOGY:      RADIOLOGY & ADDITIONAL STUDIES:    --< from: CT Angio Chest w/ IV Cont (10.25.18 @ 02:13) >    EXAM:  CT ABDOMEN AND PELVIS IC                          EXAM:  CT ANGIO CHEST (W)AW IC                            PROCEDURE DATE:  10/25/2018          INTERPRETATION:  HISTORY: New diagnosis of breast cancer on chemotherapy   presenting with acutehypoxia. Seizure and sepsis status post   chemotherapy. History of asthma. Evaluate for pulmonary embolism or   infectious source.    TECHNIQUE:  CT pulmonary angiography was performed of the chest according   to standard institutional protocol. Thisis followed by a routine CT of   the abdomen and pelvis. Coronal, sagittal and transaxial 3-D MIP   reformatted images are provided from the transaxial source data.   95mL   of Omnipaque 350 was administered without complication and 5 mL was   discarded.      COMPARISON: No similar prior study available for comparison.    FINDINGS:     CTPA:  There is motion artifact present which degrades image quality and limits   evaluation of the peripheral vasculature. However, there is good overall   opacification of pulmonary arterial tree. No central, segmental or   proximal subsegmental defect is present to suggest acute pulmonary   embolus.    The thyroid gland is unremarkable.     Evaluation of lung parenchyma demonstrates patchy areas of airspace   consolidation in the lower lobes with adjacent groundglass attenuation as   well as patchy groundglass opacities throughout the upper lobes. There is   mild diffuse interlobular septal thickening. There is no pneumothorax.    There is no pleural effusion. Trachea and main bronchi are clear.    There is mild asymmetric stranding in the right axillary fat with a few   small lymph nodes, the largest measuring 1 cm in transaxial dimension.   There is no significant supraclavicular, mediastinal or hilar adenopathy.    The heart and pericardium are unremarkable. The thoracic aorta is normal   in caliber without dissection.     There are age-indeterminate, likely chronic, moderate compression   fracture deformities of T7, T11 and T12.    CT abdomen/pelvis:  There is motion artifact present which degrades image quality.    The liver, spleen, pancreas and adrenal glands are unremarkable. The   kidneys enhance symmetrically without hydronephrosis.    The small and large bowel are normal in caliber. There is no free air or   intra-abdominal abscess. The appendix is not discretely visualize,   however, there are no secondary signs of acute appendicitis. There is no   abnormal colonic bowel wall thickening or pericolonic inflammatory   change. A mild to moderate amount of retained fecal material seen   throughout the colon.    The abdominal aorta is normal in caliber. There is no adenopathy within   the upper abdomen, retroperitoneum or pelvis. A retroaortic left renal   vein is incidentally noted.    The urinary bladder, uterus and adnexal structures are unremarkable aside   from the presence of an IUD.    The visualized osseous structures within the abdomen and pelvis are   unremarkable.      IMPRESSION:  CTPA: Limited by motion. No central, segmental or proximal subsegmental   pulmonary embolism suggested.  Nonspecific lower lobe patchy airspace   consolidation and diffuse groundglass opacities likely on an infectious   basis. Clinical correlation is recommended.    CT abdomen/pelvis: No infectious source within the abdomen or pelvis. No   acute intra-abdominal or pelvic finding.

## 2018-10-25 NOTE — CONSULT NOTE ADULT - PROBLEM SELECTOR RECOMMENDATION 3
Slow correction
could clarify with hepatitis Ag, core ab,Surface Ab and repeat of viral DNA. Not clear that entecavir is required.
slow correction salin infusion  monitor lytes

## 2018-10-25 NOTE — DIETITIAN INITIAL EVALUATION ADULT. - OTHER INFO
Pt seen for nutrition assessment on ICU. Per chart, pt is 49 Y/O F with PMH of breast cancer (on radiation treatment and chemotherapy, last chemo session Wednesday 10/24). Pt reports that PTA she was drinking 1 pint of water per hour from 8:30 am to 5:00pm on days with chemotherapy to prevent hair loss (cooling cap treatment); admitted with hyponatremia (Na 119) which is improving (Na 136 WNL 10/25). Pt reports good appetite/intake in house, consuming >75% of lunch tray once diet was advanced to regular with fluid restriction. Pt reports UBW of 145 pounds with no recent change in wt x 2 years. Denied N/V/diarrhea/constipation, last BM x2 days ago. Denied difficulties chewing/swallowing. NKFA.

## 2018-10-25 NOTE — DIETITIAN INITIAL EVALUATION ADULT. - ENERGY NEEDS
Wt: 145 pounds (stated, usual), Ht: 65 inches, BMI: 24.1 kg/2m, IBW: 125 pounds +/-10%, %IBW: 116%  No edema or pressure injuries noted.

## 2018-10-25 NOTE — CONSULT NOTE ADULT - ASSESSMENT
This 49 YO F admitted after a seizure episode and serum sodium was 117 mEq as per  but in ER it was 119 mEq and patient on RT & Chemotherapy for non invasive right breast ductal carcinoma.  Acute respiratory failure, bilateral pneumonia, possible aspiration. Improved with bipap.  Asthma which has been stable.

## 2018-10-25 NOTE — DIETITIAN INITIAL EVALUATION ADULT. - NS AS NUTRI INTERV MEALS SNACK
Continue current diet regular with 1000 ml fluid restriction. Pt encouraged to consume nutrient dense meals and snacks, especially on days of chemotherapy treatment. Pt also provided with verbal nutrition education regarding changes in taste and weight maintenance during cancer treatment.

## 2018-10-25 NOTE — CONSULT NOTE ADULT - PROBLEM SELECTOR RECOMMENDATION 9
Not clear to me that there is an acute infectious pulmonary process and will discuss with ICU stopping abx
Wean off bipap if improves
pulmonary toilet  aspiration precautions  CTA preliminary negative for PE

## 2018-10-25 NOTE — CONSULT NOTE ADULT - PROBLEM SELECTOR PROBLEM 2
Malignant neoplasm of right female breast, unspecified estrogen receptor status, unspecified site of breast
R/O Aspiration pneumonia of both lungs due to vomit, unspecified part of lung
Acute on chronic respiratory failure with hypoxia

## 2018-10-25 NOTE — ED ADULT NURSE REASSESSMENT NOTE - NS ED NURSE REASSESS COMMENT FT1
On arrival  reported today at chemo, infusion was administered through IV placed in right arm at infusion center. Looking at pt's chart, noted that pt had right lumpectomy with nodes, per  nodes were also removed from right side. IV in right arm removed and new IV established in left arm. Limb restriction band applied to right arm. MD Barbosa aware.

## 2018-10-26 ENCOUNTER — TRANSCRIPTION ENCOUNTER (OUTPATIENT)
Age: 48
End: 2018-10-26

## 2018-10-26 VITALS — SYSTOLIC BLOOD PRESSURE: 98 MMHG | TEMPERATURE: 98 F | HEART RATE: 85 BPM | DIASTOLIC BLOOD PRESSURE: 60 MMHG

## 2018-10-26 DIAGNOSIS — D72.829 ELEVATED WHITE BLOOD CELL COUNT, UNSPECIFIED: ICD-10-CM

## 2018-10-26 LAB
ALBUMIN SERPL ELPH-MCNC: 2.9 G/DL — LOW (ref 3.3–5)
ALP SERPL-CCNC: 101 U/L — SIGNIFICANT CHANGE UP (ref 40–120)
ALT FLD-CCNC: 46 U/L — SIGNIFICANT CHANGE UP (ref 12–78)
ANION GAP SERPL CALC-SCNC: 8 MMOL/L — SIGNIFICANT CHANGE UP (ref 5–17)
ANION GAP SERPL CALC-SCNC: 9 MMOL/L — SIGNIFICANT CHANGE UP (ref 5–17)
AST SERPL-CCNC: 35 U/L — SIGNIFICANT CHANGE UP (ref 15–37)
BASOPHILS # BLD AUTO: 0.05 K/UL — SIGNIFICANT CHANGE UP (ref 0–0.2)
BASOPHILS NFR BLD AUTO: 0.2 % — SIGNIFICANT CHANGE UP (ref 0–2)
BILIRUB SERPL-MCNC: 0.6 MG/DL — SIGNIFICANT CHANGE UP (ref 0.2–1.2)
BUN SERPL-MCNC: 9 MG/DL — SIGNIFICANT CHANGE UP (ref 7–23)
BUN SERPL-MCNC: 9 MG/DL — SIGNIFICANT CHANGE UP (ref 7–23)
CALCIUM SERPL-MCNC: 7.9 MG/DL — LOW (ref 8.5–10.1)
CALCIUM SERPL-MCNC: 8.4 MG/DL — LOW (ref 8.5–10.1)
CHLORIDE SERPL-SCNC: 106 MMOL/L — SIGNIFICANT CHANGE UP (ref 96–108)
CHLORIDE SERPL-SCNC: 107 MMOL/L — SIGNIFICANT CHANGE UP (ref 96–108)
CO2 SERPL-SCNC: 24 MMOL/L — SIGNIFICANT CHANGE UP (ref 22–31)
CO2 SERPL-SCNC: 26 MMOL/L — SIGNIFICANT CHANGE UP (ref 22–31)
CREAT SERPL-MCNC: 0.59 MG/DL — SIGNIFICANT CHANGE UP (ref 0.5–1.3)
CREAT SERPL-MCNC: 0.72 MG/DL — SIGNIFICANT CHANGE UP (ref 0.5–1.3)
CULTURE RESULTS: NO GROWTH — SIGNIFICANT CHANGE UP
EOSINOPHIL # BLD AUTO: 0 K/UL — SIGNIFICANT CHANGE UP (ref 0–0.5)
EOSINOPHIL NFR BLD AUTO: 0 % — SIGNIFICANT CHANGE UP (ref 0–6)
GLUCOSE SERPL-MCNC: 125 MG/DL — HIGH (ref 70–99)
GLUCOSE SERPL-MCNC: 99 MG/DL — SIGNIFICANT CHANGE UP (ref 70–99)
HCT VFR BLD CALC: 33.9 % — LOW (ref 34.5–45)
HGB BLD-MCNC: 11.6 G/DL — SIGNIFICANT CHANGE UP (ref 11.5–15.5)
IMM GRANULOCYTES NFR BLD AUTO: 6.2 % — HIGH (ref 0–1.5)
LACTATE SERPL-SCNC: 2.5 MMOL/L — HIGH (ref 0.7–2)
LYMPHOCYTES # BLD AUTO: 0.37 K/UL — LOW (ref 1–3.3)
LYMPHOCYTES # BLD AUTO: 1.3 % — LOW (ref 13–44)
MAGNESIUM SERPL-MCNC: 2.3 MG/DL — SIGNIFICANT CHANGE UP (ref 1.6–2.6)
MCHC RBC-ENTMCNC: 31.8 PG — SIGNIFICANT CHANGE UP (ref 27–34)
MCHC RBC-ENTMCNC: 34.2 GM/DL — SIGNIFICANT CHANGE UP (ref 32–36)
MCV RBC AUTO: 92.9 FL — SIGNIFICANT CHANGE UP (ref 80–100)
MONOCYTES # BLD AUTO: 0.44 K/UL — SIGNIFICANT CHANGE UP (ref 0–0.9)
MONOCYTES NFR BLD AUTO: 1.6 % — LOW (ref 2–14)
NEUTROPHILS # BLD AUTO: 24.87 K/UL — HIGH (ref 1.8–7.4)
NEUTROPHILS NFR BLD AUTO: 90.7 % — HIGH (ref 43–77)
PLATELET # BLD AUTO: 440 K/UL — HIGH (ref 150–400)
POTASSIUM SERPL-MCNC: 3.7 MMOL/L — SIGNIFICANT CHANGE UP (ref 3.5–5.3)
POTASSIUM SERPL-MCNC: 4 MMOL/L — SIGNIFICANT CHANGE UP (ref 3.5–5.3)
POTASSIUM SERPL-SCNC: 3.7 MMOL/L — SIGNIFICANT CHANGE UP (ref 3.5–5.3)
POTASSIUM SERPL-SCNC: 4 MMOL/L — SIGNIFICANT CHANGE UP (ref 3.5–5.3)
PROT SERPL-MCNC: 7 G/DL — SIGNIFICANT CHANGE UP (ref 6–8.3)
RBC # BLD: 3.65 M/UL — LOW (ref 3.8–5.2)
RBC # FLD: 12.2 % — SIGNIFICANT CHANGE UP (ref 10.3–14.5)
SODIUM SERPL-SCNC: 140 MMOL/L — SIGNIFICANT CHANGE UP (ref 135–145)
SODIUM SERPL-SCNC: 140 MMOL/L — SIGNIFICANT CHANGE UP (ref 135–145)
SPECIMEN SOURCE: SIGNIFICANT CHANGE UP
TSH SERPL-MCNC: 2.29 UIU/ML — SIGNIFICANT CHANGE UP (ref 0.36–3.74)
WBC # BLD: 27.42 K/UL — HIGH (ref 3.8–10.5)
WBC # FLD AUTO: 27.42 K/UL — HIGH (ref 3.8–10.5)

## 2018-10-26 PROCEDURE — 94660 CPAP INITIATION&MGMT: CPT

## 2018-10-26 PROCEDURE — 71046 X-RAY EXAM CHEST 2 VIEWS: CPT | Mod: 26

## 2018-10-26 PROCEDURE — 36415 COLL VENOUS BLD VENIPUNCTURE: CPT

## 2018-10-26 PROCEDURE — 96365 THER/PROPH/DIAG IV INF INIT: CPT

## 2018-10-26 PROCEDURE — 85610 PROTHROMBIN TIME: CPT

## 2018-10-26 PROCEDURE — 80048 BASIC METABOLIC PNL TOTAL CA: CPT

## 2018-10-26 PROCEDURE — 84702 CHORIONIC GONADOTROPIN TEST: CPT

## 2018-10-26 PROCEDURE — 83935 ASSAY OF URINE OSMOLALITY: CPT

## 2018-10-26 PROCEDURE — 83735 ASSAY OF MAGNESIUM: CPT

## 2018-10-26 PROCEDURE — 82803 BLOOD GASES ANY COMBINATION: CPT

## 2018-10-26 PROCEDURE — 83605 ASSAY OF LACTIC ACID: CPT

## 2018-10-26 PROCEDURE — 85027 COMPLETE CBC AUTOMATED: CPT

## 2018-10-26 PROCEDURE — 94760 N-INVAS EAR/PLS OXIMETRY 1: CPT

## 2018-10-26 PROCEDURE — 70553 MRI BRAIN STEM W/O & W/DYE: CPT | Mod: 26

## 2018-10-26 PROCEDURE — 84300 ASSAY OF URINE SODIUM: CPT

## 2018-10-26 PROCEDURE — 83930 ASSAY OF BLOOD OSMOLALITY: CPT

## 2018-10-26 PROCEDURE — 70450 CT HEAD/BRAIN W/O DYE: CPT

## 2018-10-26 PROCEDURE — 71045 X-RAY EXAM CHEST 1 VIEW: CPT

## 2018-10-26 PROCEDURE — 82962 GLUCOSE BLOOD TEST: CPT

## 2018-10-26 PROCEDURE — 93005 ELECTROCARDIOGRAM TRACING: CPT

## 2018-10-26 PROCEDURE — 70553 MRI BRAIN STEM W/O & W/DYE: CPT

## 2018-10-26 PROCEDURE — 80053 COMPREHEN METABOLIC PANEL: CPT

## 2018-10-26 PROCEDURE — 71275 CT ANGIOGRAPHY CHEST: CPT

## 2018-10-26 PROCEDURE — 71046 X-RAY EXAM CHEST 2 VIEWS: CPT

## 2018-10-26 PROCEDURE — 99285 EMERGENCY DEPT VISIT HI MDM: CPT | Mod: 25

## 2018-10-26 PROCEDURE — 87086 URINE CULTURE/COLONY COUNT: CPT

## 2018-10-26 PROCEDURE — 80074 ACUTE HEPATITIS PANEL: CPT

## 2018-10-26 PROCEDURE — 84443 ASSAY THYROID STIM HORMONE: CPT

## 2018-10-26 PROCEDURE — 84550 ASSAY OF BLOOD/URIC ACID: CPT

## 2018-10-26 PROCEDURE — 94640 AIRWAY INHALATION TREATMENT: CPT

## 2018-10-26 PROCEDURE — 81003 URINALYSIS AUTO W/O SCOPE: CPT

## 2018-10-26 PROCEDURE — A9579: CPT

## 2018-10-26 PROCEDURE — 85730 THROMBOPLASTIN TIME PARTIAL: CPT

## 2018-10-26 PROCEDURE — 74177 CT ABD & PELVIS W/CONTRAST: CPT

## 2018-10-26 PROCEDURE — 87040 BLOOD CULTURE FOR BACTERIA: CPT

## 2018-10-26 RX ORDER — ENTECAVIR 0.5 MG/1
0.5 TABLET ORAL DAILY
Qty: 0 | Refills: 0 | Status: DISCONTINUED | OUTPATIENT
Start: 2018-10-26 | End: 2018-10-26

## 2018-10-26 RX ORDER — PANTOPRAZOLE SODIUM 20 MG/1
40 TABLET, DELAYED RELEASE ORAL
Qty: 0 | Refills: 0 | Status: DISCONTINUED | OUTPATIENT
Start: 2018-10-26 | End: 2018-10-26

## 2018-10-26 RX ORDER — PANTOPRAZOLE SODIUM 20 MG/1
1 TABLET, DELAYED RELEASE ORAL
Qty: 30 | Refills: 0
Start: 2018-10-26 | End: 2018-11-24

## 2018-10-26 RX ORDER — SODIUM CHLORIDE 9 MG/ML
250 INJECTION INTRAMUSCULAR; INTRAVENOUS; SUBCUTANEOUS ONCE
Qty: 0 | Refills: 0 | Status: COMPLETED | OUTPATIENT
Start: 2018-10-26 | End: 2018-10-26

## 2018-10-26 RX ADMIN — Medication 75 MICROGRAM(S): at 05:27

## 2018-10-26 RX ADMIN — BUDESONIDE AND FORMOTEROL FUMARATE DIHYDRATE 2 PUFF(S): 160; 4.5 AEROSOL RESPIRATORY (INHALATION) at 06:39

## 2018-10-26 RX ADMIN — SODIUM CHLORIDE 250 MILLILITER(S): 9 INJECTION INTRAMUSCULAR; INTRAVENOUS; SUBCUTANEOUS at 12:27

## 2018-10-26 RX ADMIN — Medication 40 MILLIGRAM(S): at 05:27

## 2018-10-26 RX ADMIN — BUPROPION HYDROCHLORIDE 150 MILLIGRAM(S): 150 TABLET, EXTENDED RELEASE ORAL at 12:28

## 2018-10-26 NOTE — PROGRESS NOTE ADULT - ATTENDING COMMENTS
Call if ID input needed over the weekend, Dr. Sherlyn Woodall is on call.    Carlos Eduardo Triplett MD  [phone]

## 2018-10-26 NOTE — DISCHARGE NOTE ADULT - PATIENT PORTAL LINK FT
You can access the [facility] Patient Portal, offered by [facility], by registering with the following website: [email or web address]

## 2018-10-26 NOTE — PROGRESS NOTE ADULT - ASSESSMENT
This 49 YO F admitted after a seizure episode and serum sodium was 117 mEq as per  but in ER it was 119 mEq and patient on RT & Chemotherapy for non invasive right breast ductal carcinoma.  Acute respiratory failure, bilateral pneumonia, possible aspiration. Improved with bipap.  Asthma which has been stable.  Pt recovering well, respiratory status much improved.

## 2018-10-26 NOTE — PROGRESS NOTE ADULT - ASSESSMENT
49 YO W Female brought to ER because of difficulty in breathing after a seizures episode which was likely precipitated by polydypsia induced hyponatremia  No clear infection  WBC elevated  No other concern of infection on the basis of history, exam, labs.  S/P steroids  Possible GCSF at oncology with chemo

## 2018-10-26 NOTE — PROGRESS NOTE ADULT - SUBJECTIVE AND OBJECTIVE BOX
Neurology Follow up note    SHANNAN [id number]    HPI:  SHANNAN CHRISTENSEN is a 49 YO W Female brought to ER because of difficulty in breathing after a seizures episode.  According to the  noninvasive ductal CA of right breast few months ago.  She had lumpectomy.  Patient on RT and chemotherapy.   She had chemotherapy dose today and finished 4 PM.  Patient was advised to drink plenty of water after chemotherapy to flush out the toxins.  She brought in by EMS for new onset seizure.  While at home pt became pale and had what he described as a tonic clonic seizure x 20 sec.  Patient was post ictal in the ED with O2 sat 86%.  Patient was placed on BiPAP.   Patient's initial Na was 119 and initially started on NS 75 ML/hr (24 Oct 2018 23:01)      Interval History - doing better.    Patient is seen, chart was reviewed and case was discussed with the treatment team.  Pt is not in any distress.   Lying on bed comfortably.   No events reported overnight.   No clinical seizure was reported.  Sitting on chair bed comfortably.    is at bedside.    Vital Signs Last 24 Hrs  T(C): 36.5 (25 Oct 2018 20:30), Max: 36.5 (25 Oct 2018 20:30)  T(F): 97.7 (25 Oct 2018 20:30), Max: 97.7 (25 Oct 2018 20:30)  HR: 84 (25 Oct 2018 21:00) (80 - 94)  BP: 98/60 (26 Oct 2018 12:32) (85/48 - 110/58)  BP(mean): 66 (25 Oct 2018 21:00) (64 - 76)  RR: 34 (25 Oct 2018 21:00) (20 - 44)  SpO2: 94% (25 Oct 2018 21:00) (94% - 98%)        REVIEW OF SYSTEMS:    Constitutional: No fever, weight loss or fatigue  Eyes: No eye pain, visual disturbances, or discharge  ENT:  No difficulty hearing, tinnitus, vertigo; No sinus or throat pain  Neck: No pain or stiffness  Respiratory: No cough, wheezing, chills or hemoptysis  Cardiovascular: No chest pain, palpitations, shortness of breath, dizziness or leg swelling  Gastrointestinal: No abdominal or epigastric pain. No nausea, vomiting or hematemesis; No diarrhea or constipation. No melena or hematochezia.  Genitourinary: No dysuria, frequency, hematuria or incontinence  Neurological: No headaches, memory loss, loss of strength, numbness or tremors  Psychiatric: No depression, anxiety, mood swings or difficulty sleeping  Musculoskeletal: No joint pain or swelling; No muscle, back or extremity pain  Skin: No itching, burning, rashes or lesions   Lymph Nodes: No enlarged glands  Endocrine: No heat or cold intolerance; No hair loss, No h/o diabetes or thyroid dysfunction  Allergy and Immunologic: No hives or eczema    On Neurological Examination:    Mental Status - Pt is alert, awake, oriented x3. Follows commands well and able to answer questions appropriately  .Mood and affect  normal    Speech -  Normal.     Cranial Nerves - Pupils 3 mm equal and reactive to light, extraocular eye movements intact.  Pt has no facial asymmetry. Facial sensation is intact.Tongue - is in midline.    Muscle tone - is normal     Motor Exam - 5/5 .    Sensory Exam -  Pt withdraws all extremities equally on stimulation. No asymmetry seen. No complaints of tingling, numbness.    .    coordination:    Finger to nose: normal      Deep tendon Reflexes - 2 plus all over.        Romberg - Negative.    Neck Supple -  Yes.     MEDICATIONS    ALBUTerol/ipratropium for Nebulization 3 milliLiter(s) Nebulizer every 6 hours PRN  buDESOnide  80 MICROgram(s)/formoterol 4.5 MICROgram(s) Inhaler 2 Puff(s) Inhalation two times a day  buPROPion XL . 150 milliGRAM(s) Oral daily  enoxaparin Injectable 40 milliGRAM(s) SubCutaneous daily  entecavir 0.5 milliGRAM(s) Oral daily  levothyroxine 75 MICROGram(s) Oral daily  LORazepam   Injectable 1 milliGRAM(s) IV Push every 4 hours PRN  metoclopramide 10 milliGRAM(s) Oral every 6 hours PRN  pantoprazole    Tablet 40 milliGRAM(s) Oral before breakfast  predniSONE   Tablet 20 milliGRAM(s) Oral daily      Allergies    Sulfur (Hives)    Intolerances        LABS:  CBC Full  -  ( 26 Oct 2018 09:14 )  WBC Count : 27.42 K/uL  Hemoglobin : 11.6 g/dL  Hematocrit : 33.9 %  Platelet Count - Automated : 440 K/uL  Mean Cell Volume : 92.9 fl  Mean Cell Hemoglobin : 31.8 pg  Mean Cell Hemoglobin Concentration : 34.2 gm/dL  Auto Neutrophil # : 24.87 K/uL  Auto Lymphocyte # : 0.37 K/uL  Auto Monocyte # : 0.44 K/uL  Auto Eosinophil # : 0.00 K/uL  Auto Basophil # : 0.05 K/uL  Auto Neutrophil % : 90.7 %  Auto Lymphocyte % : 1.3 %  Auto Monocyte % : 1.6 %  Auto Eosinophil % : 0.0 %  Auto Basophil % : 0.2 %    Urinalysis Basic - ( 25 Oct 2018 06:43 )    Color: Pale Yellow / Appearance: Clear / S.005 / pH: x  Gluc: x / Ketone: Negative  / Bili: Negative / Urobili: Negative   Blood: x / Protein: Negative / Nitrite: Negative   Leuk Esterase: Negative / RBC: x / WBC x   Sq Epi: x / Non Sq Epi: x / Bacteria: x      10-26    140  |  107  |  9   ----------------------------<  125<H>  3.7   |  24  |  0.72    Ca    7.9<L>      26 Oct 2018 10:43  Mg     2.3     10-26    TPro  7.0  /  Alb  2.9<L>  /  TBili  0.6  /  DBili  x   /  AST  35  /  ALT  46  /  AlkPhos  101  10-26    Hemoglobin A1C:     Vitamin B12     RADIOLOGY  < from: MR Head w/wo IV Cont (10.26.18 @ 09:27) >    EXAM:  MR BRAIN WAW IC                            PROCEDURE DATE:  10/26/2018          INTERPRETATION:  History: Breast carcinoma, status post chemotherapy,   seizure.    MR brain multisequence without and with IV contrast.    6.5 cc gadolinium injected intravenously.  Images are of diagnostic quality.  Brain morphology, parenchymal volume and intrinsic signal normal for   patient age. There is no hemorrhage edema infarct or mass effect. No   unusual parenchymal or leptomeningeal enhancement. No evidence of a   seizure focus. Parasellar region posterior fossa structures unremarkable.   Mastoids and paranasal sinuses are unremarkable. No calvarial destructive   lesions.    Impression:    Unremarkable head CT. No acute or enhancing pathology.Specifically, no   evidence of intracranial metastases.              EL ZELAYA M.D., ATTENDING RADIOLOGIST  This document has been electronically signed. Oct 26 2018  9:58AM             ASSESSMENT AND PLAN:      new onset generalized tonic clonic seizure most likely due hyponatremia.  brain mri ;no mets.    No need for antiseizure meds as seizure is secondary symptomatic(hyponatremia)  no further neuro work up.  jolene julio,patient and  at bed side.

## 2018-10-26 NOTE — PROGRESS NOTE ADULT - REASON FOR ADMISSION
Seizure and respiratory distress

## 2018-10-26 NOTE — PROGRESS NOTE ADULT - SUBJECTIVE AND OBJECTIVE BOX
NEPHROLOGY INTERVAL HPI/OVERNIGHT EVENTS:  HPI:  SHANNAN CHRISTENSEN is a 47 YO W Female brought to ER because of difficulty in breathing after a seizures episode.  According to the  noninvasive ductal CA of right breast few months ago.  She had lumpectomy.  Patient on RT and chemotherapy.   She had chemotherapy dose today and finished 4 PM.  Patient was advised to drink plenty of water after chemotherapy to flush out the toxins.  She brought in by EMS for new onset seizure.  While at home pt became pale and had what he described as a tonic clonic seizure x 20 sec.  Patient was post ictal in the ED with O2 sat 86%.  Patient was placed on BiPAP.   Patient's initial Na was 119 and initially started on NS 75 ML/hr (24 Oct 2018 23:01)    Renal consulted for hyponatremia. Admits to drinking at least 4L of water in the past 1 day and on chemotherapy days. The patient eats well, but admits to nausea on chemo days, last being one day ago. Found to have PNA on imaging and placed on BiPAP requiring MICU admission. Denies chest pain, dyspnea, nausea, vomiting, diarrhea, and urinary complaints.     Follow up hyponatremia  No c/o   Seen on 1 [place]     PAST MEDICAL & SURGICAL HISTORY:  Malignant neoplasm of right female breast, unspecified estrogen receptor status, unspecified site of breast  Depression, unspecified depression type  Hypothyroidism, unspecified type  Asthma: last major attack 2016  Bipolar affective  S/P lumpectomy, right breast: with nodes  Encounter for IUD insertion: 2016  Ganglion cyst of wrist, left: ,       MEDICATIONS  (STANDING):  buDESOnide  80 MICROgram(s)/formoterol 4.5 MICROgram(s) Inhaler 2 Puff(s) Inhalation two times a day  buPROPion XL . 150 milliGRAM(s) Oral daily  enoxaparin Injectable 40 milliGRAM(s) SubCutaneous daily  entecavir 0.5 milliGRAM(s) Oral daily  levothyroxine 75 MICROGram(s) Oral daily  pantoprazole    Tablet 40 milliGRAM(s) Oral before breakfast  predniSONE   Tablet 20 milliGRAM(s) Oral daily    MEDICATIONS  (PRN):  ALBUTerol/ipratropium for Nebulization 3 milliLiter(s) Nebulizer every 6 hours PRN Shortness of Breath and/or Wheezing  LORazepam   Injectable 1 milliGRAM(s) IV Push every 4 hours PRN seizure  metoclopramide 10 milliGRAM(s) Oral every 6 hours PRN Nausea      Allergies    Sulfur (Hives)    Intolerances        Vital Signs Last 24 Hrs  T(C): 36.9 (26 Oct 2018 12:32), Max: 36.9 (26 Oct 2018 12:32)  T(F): 98.5 (26 Oct 2018 12:32), Max: 98.5 (26 Oct 2018 12:32)  HR: 85 (26 Oct 2018 12:32) (80 - 92)  BP: 98/60 (26 Oct 2018 12:32) (90/54 - 110/58)  BP(mean): 66 (25 Oct 2018 21:00) (66 - 76)  RR: 34 (25 Oct 2018 21:00) (20 - 44)  SpO2: 94% (25 Oct 2018 21:00) (94% - 98%)  Daily     Daily     PHYSICAL EXAM:  GENERAL: NAD  HEAD:  Atraumatic, Normocephalic  EYES: Conjunctiva and sclera clear  ENMT: Moist mucous membranes, Good dentition, No lesions  NECK: Supple  NERVOUS SYSTEM:  Alert & Oriented X3, Good concentration; Motor Strength wnl upper and lower extremities  CHEST/LUNG: Few scattered rhonchi B/L  HEART: Regular rate and rhythm; No murmurs, rubs, or gallops  ABDOMEN: Soft, Nontender, Nondistended; Bowel sounds present  EXTREMITIES: No edema  LYMPH: No lymphadenopathy noted  SKIN: No rashes or lesions, pale  LABS:                        11.6   27.42 )-----------( 440      ( 26 Oct 2018 09:14 )             33.9     10-26    140  |  107  |  9   ----------------------------<  125<H>  3.7   |  24  |  0.72    Ca    7.9<L>      26 Oct 2018 10:43  Mg     2.3     10-26    TPro  7.0  /  Alb  2.9<L>  /  TBili  0.6  /  DBili  x   /  AST  35  /  ALT  46  /  AlkPhos  101  10-    PT/INR - ( 24 Oct 2018 22:42 )   PT: 11.2 sec;   INR: 1.03 ratio         PTT - ( 24 Oct 2018 22:42 )  PTT:24.4 sec  Urinalysis Basic - ( 25 Oct 2018 06:43 )    Color: Pale Yellow / Appearance: Clear / S.005 / pH: x  Gluc: x / Ketone: Negative  / Bili: Negative / Urobili: Negative   Blood: x / Protein: Negative / Nitrite: Negative   Leuk Esterase: Negative / RBC: x / WBC x   Sq Epi: x / Non Sq Epi: x / Bacteria: x      Magnesium, Serum: 2.3 mg/dL (10-26 @ 09:14)    ABG - ( 24 Oct 2018 23:10 )  pH, Arterial: 7.42  pH, Blood: x     /  pCO2: 29    /  pO2: 63    / HCO3: 21    / Base Excess: -5.1  /  SaO2: 91                    RADIOLOGY & ADDITIONAL TESTS:

## 2018-10-26 NOTE — DISCHARGE NOTE ADULT - CARE PLAN
Principal Discharge DX:	Seizure  Goal:	free from seizures  Assessment and plan of treatment:	follow up with neuro Dr. CORONA  Secondary Diagnosis:	Asthma  Secondary Diagnosis:	Depression, unspecified depression type  Secondary Diagnosis:	Hyponatremia  Secondary Diagnosis:	Hypothyroidism, unspecified type

## 2018-10-26 NOTE — PROGRESS NOTE ADULT - SUBJECTIVE AND OBJECTIVE BOX
[facility], Division of Infectious Diseases  MADHAV [place] A. [place]  [phone]    Name: SHANNAN CHRISTENSEN  Age: 48y  Gender: Female  MRN: [id number]    Interval History--  Notes reviewed. Feels about back to baseline. Did not like the lunch so  brought her [place] food which she tolerated without problems. No fevers, chills, or rigors. No pain. No other complaints.    Past Medical History--  Malignant neoplasm of right female breast, unspecified estrogen receptor status, unspecified site of breast  Depression, unspecified depression type  Hypothyroidism, unspecified type  Asthma  Bipolar affective  S/P lumpectomy, right breast  Encounter for IUD insertion  Ganglion cyst of wrist, left      For details regarding the patient's social history, family history, and other miscellaneous elements, please refer the initial infectious diseases consultation and/or the admitting history and physical examination for this admission.    Allergies    Sulfur (Hives)    Intolerances        Medications--  Antibiotics:  entecavir 0.5 milliGRAM(s) Oral daily    Immunologic:    Other:  ALBUTerol/ipratropium for Nebulization PRN  buDESOnide  80 MICROgram(s)/formoterol 4.5 MICROgram(s) Inhaler  buPROPion XL .  enoxaparin Injectable  levothyroxine  LORazepam   Injectable PRN  metoclopramide PRN  pantoprazole    Tablet  predniSONE   Tablet  sodium chloride 0.9% Bolus      Review of Systems--  A 10-point review of systems was obtained.   Review of systems otherwise negative except as previously noted.    Physical Examination--  Vital Signs: T(F): 97.7 (10-25-18 @ 20:30), Max: 99.5 (10-25-18 @ 11:48)  HR: 84 (10-25-18 @ 21:00)  BP: 95/48 (10-25-18 @ 21:00)  RR: 34 (10-25-18 @ 21:00)  SpO2: 94% (10-25-18 @ 21:00)  Wt(kg): --  General: Nontoxic-appearing Female in no acute distress.  HEENT: AT/NC.  Anicteric. Conjunctiva pink and moist. Oropharynx clear.  Neck: Not rigid. No sense of mass.  Nodes: None palpable.  Lungs: Clear bilaterally without rales, wheezing or rhonchi  Heart: Regular rate and rhythm. No Murmur. No rub. No gallop. No palpable thrill.  Abdomen: Bowel sounds present and normoactive. Soft. Nondistended. Nontender. No sense of mass. No organomegaly.  Extremities: No cyanosis or clubbing. No edema.   Skin: Warm. Dry. Good turgor. No rash. No vasculitic stigmata.  Psychiatric: Appropriate affect and mood for situation.         Laboratory Studies--  CBC                        11.6   27.42 )-----------( 440      ( 26 Oct 2018 09:14 )             33.9     WBC Count: 7.5 K/uL (10.24.18 @ 08:32) (OPD)        Chemistries  10-26    140  |  107  |  9   ----------------------------<  125<H>  3.7   |  24  |  0.72    Ca    7.9<L>      26 Oct 2018 10:43  Mg     2.3     10-26    TPro  7.0  /  Alb  2.9<L>  /  TBili  0.6  /  DBili  x   /  AST  35  /  ALT  46  /  AlkPhos  101  10-26    CXR on my review (no report yet)- no infiltrates    MRI no acute brain pathology    Culture Data    Culture - Urine (collected 25 Oct 2018 11:56)  Source: .Urine Clean Catch (Midstream)  Final Report (26 Oct 2018 08:15):    No growth    Culture - Blood (collected 25 Oct 2018 09:25)  Source: .Blood Blood-Peripheral  Preliminary Report (26 Oct 2018 10:01):    No growth to date.    Culture - Blood (collected 25 Oct 2018 09:25)  Source: .Blood Blood-Peripheral  Preliminary Report (26 Oct 2018 10:01):    No growth to date.

## 2018-10-26 NOTE — DISCHARGE NOTE ADULT - HOSPITAL COURSE
admitted for seizure  found to have hyponatremia  respiratory distress - multifactorial  BIPAP per pulm  stop zoloft  DC after neuro and pulm clearance

## 2018-10-26 NOTE — PROGRESS NOTE ADULT - ASSESSMENT
Hyponatremia  Seizure  PNA  Breast CA    -Hyponatremia can be due to excessive free water intake that the patient admits to; about 4 liters; however, with underlying PNA, nausea, and Cancer, SIADH is also a likely possibility. The patient also appears euvolemic  -Sodium is correcting within range   -No indication for hypertonic saline, tolvaptan, or salt tabs at this time. Will consider if the sodium decreases again or if SIADH is diagnosed.    No renal objection for d/c planning     Thank you

## 2018-10-26 NOTE — DISCHARGE NOTE ADULT - MEDICATION SUMMARY - MEDICATIONS TO STOP TAKING
I will STOP taking the medications listed below when I get home from the hospital:    sertraline 100 mg oral tablet  -- 1  by mouth once a day

## 2018-10-26 NOTE — DISCHARGE NOTE ADULT - CARE PROVIDERS DIRECT ADDRESSES
[email or web address],[email or web address]_Unknown,DirectAddress_Unknown

## 2018-10-26 NOTE — PROGRESS NOTE ADULT - SUBJECTIVE AND OBJECTIVE BOX
PULMONARY/CRITICAL CARE      INTERVAL HPI/OVERNIGHT EVENTS: Markedly improved. Less sob. No cough. Low grade temp. No seizure. Ambulated.    48y FemaleHPI:  SHANNAN CHRISTENSEN is a 49 YO W Female brought to ER because of difficulty in breathing after a seizures episode.  According to the  noninvasive ductal CA of right breast few months ago.  She had lumpectomy.  Patient on RT and chemotherapy.   She had chemotherapy dose today and finished 4 PM.  Patient was advised to drink plenty of water after chemotherapy to flush out the toxins.  She brought in by EMS for new onset seizure.  While at home pt became pale and had what he described as a tonic clonic seizure x 20 sec.  Patient was post ictal in the ED with O2 sat 86%.  Patient was placed on BiPAP.   Patient's initial Na was 119 and initially started on NS 75 ML/hr (24 Oct 2018 23:01)        PAST MEDICAL & SURGICAL HISTORY:  Malignant neoplasm of right female breast, unspecified estrogen receptor status, unspecified site of breast  Depression, unspecified depression type  Hypothyroidism, unspecified type  Asthma: last major attack 2016  Bipolar affective  S/P lumpectomy, right breast: with nodes  Encounter for IUD insertion: 2016  Ganglion cyst of wrist, left: 2013,         ICU Vital Signs Last 24 Hrs  T(C): 36.5 (25 Oct 2018 20:30), Max: 37.5 (25 Oct 2018 11:48)  T(F): 97.7 (25 Oct 2018 20:30), Max: 99.5 (25 Oct 2018 11:48)  HR: 84 (25 Oct 2018 21:00) (74 - 94)  BP: 95/48 (25 Oct 2018 21:00) (85/48 - 110/58)  BP(mean): 66 (25 Oct 2018 21:00) (64 - 76)  ABP: --  ABP(mean): --  RR: 34 (25 Oct 2018 21:00) (19 - 44)  SpO2: 94% (25 Oct 2018 21:00) (91% - 100%)    Qtts:     I&O's Summary    25 Oct 2018 07:01  -  26 Oct 2018 07:00  --------------------------------------------------------  IN: 1090 mL / OUT: 3900 mL / NET: -2810 mL            REVIEW OF SYSTEMS:    CONSTITUTIONAL: No fever, weight loss, or fatigue  EYES: No eye pain, visual disturbances, or discharge  ENMT:  No difficulty hearing, tinnitus, vertigo; No sinus or throat pain  NECK: No pain or stiffness  BREASTS: No pain, masses, or nipple discharge  RESPIRATORY: No cough, wheezing, chills or hemoptysis minima,l shortness of breath  CARDIOVASCULAR: No chest pain, palpitations, dizziness, or leg swelling  GASTROINTESTINAL: No abdominal or epigastric pain. No nausea, vomiting, or hematemesis; No diarrhea or constipation. No melena or hematochezia.  GENITOURINARY: No dysuria, frequency, hematuria, or incontinence  NEUROLOGICAL: No headaches, memory loss, loss of strength, numbness, or tremors  SKIN: No itching, burning, rashes, or lesions   LYMPH NODES: No enlarged glands  ENDOCRINE: No heat or cold intolerance; No hair loss  MUSCULOSKELETAL: No joint pain or swelling; No muscle, back, or extremity pain, no calf tenderness  PSYCHIATRIC: No depression, anxiety, mood swings, or difficulty sleeping  HEME/LYMPH: No easy bruising, or bleeding gums  ALLERGY AND IMMUNOLOGIC: No hives or eczema      PHYSICAL EXAM:    GENERAL: NAD, well-groomed, well-developed, NAD  HEAD:  Atraumatic, Normocephalic  EYES: EOMI, PERRLA, conjunctiva and sclera clear  ENMT: No tonsillar erythema, exudates, or enlargement; Moist mucous membranes, Good dentition, No lesions  NECK: Supple, No JVD, Normal thyroid  NERVOUS SYSTEM:  Alert & Oriented X3, Good concentration; Motor Strength 5/5 B/L upper and lower extremities  CHEST/LUNG: bibas crackles, good excursion,, no wheeze  HEART: Regular rate and rhythm; No murmurs, rubs, or gallops  ABDOMEN: Soft, Nontender, Nondistended; Bowel sounds present  EXTREMITIES:  2+ Peripheral Pulses, No clubbing, cyanosis, or edema  LYMPH: No lymphadenopathy noted  SKIN: No rashes or lesions        LABS:                        10.6   12.69 )-----------( 416      ( 25 Oct 2018 00:43 )             29.7     10-    138  |  108  |  11  ----------------------------<  116<H>  4.1   |  21<L>  |  0.92    Ca    7.7<L>      25 Oct 2018 21:18  Mg     2.0     10-25    TPro  6.0  /  Alb  2.6<L>  /  TBili  0.3  /  DBili  x   /  AST  51<H>  /  ALT  50  /  AlkPhos  86  10    PT/INR - ( 24 Oct 2018 22:42 )   PT: 11.2 sec;   INR: 1.03 ratio         PTT - ( 24 Oct 2018 22:42 )  PTT:24.4 sec  Urinalysis Basic - ( 25 Oct 2018 06:43 )    Color: Pale Yellow / Appearance: Clear / S.005 / pH: x  Gluc: x / Ketone: Negative  / Bili: Negative / Urobili: Negative   Blood: x / Protein: Negative / Nitrite: Negative   Leuk Esterase: Negative / RBC: x / WBC x   Sq Epi: x / Non Sq Epi: x / Bacteria: x      ABG - ( 24 Oct 2018 23:10 )  pH, Arterial: 7.42  pH, Blood: x     /  pCO2: 29    /  pO2: 63    / HCO3: 21    / Base Excess: -5.1  /  SaO2: 91                vanco through     RADIOLOGY & ADDITIONAL STUDIES:  < from: CT Angio Chest w/ IV Cont (10.25.18 @ 02:13) >  EXAM:  CT ABDOMEN AND PELVIS IC                          EXAM:  CT ANGIO CHEST (W)AW IC                            PROCEDURE DATE:  10/25/2018          INTERPRETATION:  HISTORY: New diagnosis of breast cancer on chemotherapy   presenting with acutehypoxia. Seizure and sepsis status post   chemotherapy. History of asthma. Evaluate for pulmonary embolism or   infectious source.    TECHNIQUE:  CT pulmonary angiography was performed of the chest according   to standard institutional protocol. Thisis followed by a routine CT of   the abdomen and pelvis. Coronal, sagittal and transaxial 3-D MIP   reformatted images are provided from the transaxial source data.   95mL   of Omnipaque 350 was administered without complication and 5 mL was   discarded.      COMPARISON: No similar prior study available for comparison.    FINDINGS:     CTPA:  There is motion artifact present which degrades image quality and limits   evaluation of the peripheral vasculature. However, there is good overall   opacification of pulmonary arterial tree. No central, segmental or   proximal subsegmental defect is present to suggest acute pulmonary   embolus.    The thyroid gland is unremarkable.     Evaluation of lung parenchyma demonstrates patchy areas of airspace   consolidation in the lower lobes with adjacent groundglass attenuation as   well as patchy groundglass opacities throughout the upper lobes. There is   mild diffuse interlobular septal thickening. There is no pneumothorax.    There is no pleural effusion. Trachea and main bronchi are clear.    There is mild asymmetric stranding in the right axillary fat with a few   small lymph nodes, the largest measuring 1 cm in transaxial dimension.   There is no significant supraclavicular, mediastinal or hilar adenopathy.    The heart and pericardium are unremarkable. The thoracic aorta is normal   in caliber without dissection.     There are age-indeterminate, likely chronic, moderate compression   fracture deformities of T7, T11 and T12.    CT abdomen/pelvis:  There is motion artifact present which degrades image quality.    The liver, spleen, pancreas and adrenal glands are unremarkable. The   kidneys enhance symmetrically without hydronephrosis.    The small and large bowel are normal in caliber. There is no free air or   intra-abdominal abscess. The appendix is not discretely visualize,   however, there are no secondary signs of acute appendicitis. There is no   abnormal colonic bowel wall thickening or pericolonic inflammatory   change. A mild to moderate amount of retained fecal material seen   throughout the colon.    The abdominal aorta is normal in caliber. There is no adenopathy within   the upper abdomen, retroperitoneum or pelvis. A retroaortic left renal   vein is incidentally noted.    The urinary bladder, uterus and adnexal structures are unremarkable aside   from the presence of an IUD.    The visualized osseous structures within the abdomen and pelvis are   unremarkable.      IMPRESSION:  CTPA: Limited by motion. No central, segmental or proximal subsegmental   pulmonary embolism suggested.  Nonspecific lower lobe patchy airspace   consolidation and diffuse groundglass opacities likely on an infectious   basis. Clinical correlation is recommended.    CT abdomen/pelvis: No infectious source within the abdomen or pelvis. No   acute intra-abdominal or pelvic finding.                ANITRA ALVAREZ M.D., RADIOLOGIST  This document has been electronically signed. Oct 25 2018  2:43AM        < end of copied text >      CRITICAL CARE TIME SPENT:

## 2018-10-26 NOTE — PROGRESS NOTE ADULT - PROBLEM SELECTOR PROBLEM 2
Acute on chronic respiratory failure with hypoxia
Hyponatremia
Malignant neoplasm of right female breast, unspecified estrogen receptor status, unspecified site of breast
R/O Aspiration pneumonia of both lungs due to vomit, unspecified part of lung

## 2018-10-26 NOTE — DISCHARGE NOTE ADULT - CARE PROVIDER_API CALL
Elian Giang (), Family Medicine  [address]  [address]  [place] [address]  Phone: [phone]  Fax: [phone]    Patt Nielsen), Neurology  [address] [place] [address]  Phone: [phone]  Fax: [phone]    Ian Reveles), Nephrology  [address] [place] [address]  [address] [place] [address]  Phone: [phone]  Fax: [phone]

## 2018-10-27 LAB
HAV IGM SER-ACNC: SIGNIFICANT CHANGE UP
HBV CORE IGM SER-ACNC: SIGNIFICANT CHANGE UP
HBV SURFACE AG SER-ACNC: SIGNIFICANT CHANGE UP
HCV AB S/CO SERPL IA: 0.24 S/CO — SIGNIFICANT CHANGE UP
HCV AB SERPL-IMP: SIGNIFICANT CHANGE UP

## 2018-10-28 LAB — HEPATITIS D VIRUS IGM AB: NORMAL

## 2018-10-29 ENCOUNTER — APPOINTMENT (OUTPATIENT)
Dept: HEMATOLOGY ONCOLOGY | Facility: CLINIC | Age: 48
End: 2018-10-29
Payer: COMMERCIAL

## 2018-10-29 ENCOUNTER — RESULT REVIEW (OUTPATIENT)
Age: 48
End: 2018-10-29

## 2018-10-29 ENCOUNTER — LABORATORY RESULT (OUTPATIENT)
Age: 48
End: 2018-10-29

## 2018-10-29 ENCOUNTER — OUTPATIENT (OUTPATIENT)
Dept: OUTPATIENT SERVICES | Facility: HOSPITAL | Age: 48
LOS: 1 days | Discharge: HOME | End: 2018-10-29

## 2018-10-29 VITALS
RESPIRATION RATE: 16 BRPM | OXYGEN SATURATION: 98 % | WEIGHT: 146.59 LBS | BODY MASS INDEX: 24.1 KG/M2 | SYSTOLIC BLOOD PRESSURE: 109 MMHG | DIASTOLIC BLOOD PRESSURE: 71 MMHG | TEMPERATURE: 99 F | HEART RATE: 104 BPM

## 2018-10-29 DIAGNOSIS — M67.432 GANGLION, LEFT WRIST: Chronic | ICD-10-CM

## 2018-10-29 DIAGNOSIS — C50.919 MALIGNANT NEOPLASM OF UNSPECIFIED SITE OF UNSPECIFIED FEMALE BREAST: ICD-10-CM

## 2018-10-29 DIAGNOSIS — Z30.430 ENCOUNTER FOR INSERTION OF INTRAUTERINE CONTRACEPTIVE DEVICE: Chronic | ICD-10-CM

## 2018-10-29 DIAGNOSIS — Z98.890 OTHER SPECIFIED POSTPROCEDURAL STATES: Chronic | ICD-10-CM

## 2018-10-29 DIAGNOSIS — M43.9 DEFORMING DORSOPATHY, UNSPECIFIED: ICD-10-CM

## 2018-10-29 LAB
HCT VFR BLD CALC: 37.3 % — SIGNIFICANT CHANGE UP (ref 34.5–45)
HGB BLD-MCNC: 12.8 G/DL — SIGNIFICANT CHANGE UP (ref 11.5–15.5)
MCHC RBC-ENTMCNC: 32.1 PG — SIGNIFICANT CHANGE UP (ref 27–34)
MCHC RBC-ENTMCNC: 34.2 G/DL — SIGNIFICANT CHANGE UP (ref 32–36)
MCV RBC AUTO: 93.8 FL — SIGNIFICANT CHANGE UP (ref 80–100)
PLATELET # BLD AUTO: 253 K/UL — SIGNIFICANT CHANGE UP (ref 150–400)
RBC # BLD: 3.97 M/UL — SIGNIFICANT CHANGE UP (ref 3.8–5.2)
RBC # FLD: 11.3 % — SIGNIFICANT CHANGE UP (ref 10.3–14.5)
WBC # BLD: 5.2 K/UL — SIGNIFICANT CHANGE UP (ref 3.8–10.5)
WBC # FLD AUTO: 5.2 K/UL — SIGNIFICANT CHANGE UP (ref 3.8–10.5)

## 2018-10-29 PROCEDURE — 99215 OFFICE O/P EST HI 40 MIN: CPT

## 2018-10-30 ENCOUNTER — LABORATORY RESULT (OUTPATIENT)
Age: 48
End: 2018-10-30

## 2018-10-30 ENCOUNTER — TRANSCRIPTION ENCOUNTER (OUTPATIENT)
Age: 48
End: 2018-10-30

## 2018-10-30 LAB
ALBUMIN SERPL ELPH-MCNC: 4.1 G/DL
ALP BLD-CCNC: 123 U/L
ALT SERPL-CCNC: 108 U/L
ANION GAP SERPL CALC-SCNC: 16 MMOL/L
AST SERPL-CCNC: 135 U/L
BASOPHILS # BLD AUTO: 0 K/UL — SIGNIFICANT CHANGE UP (ref 0–0.2)
BASOPHILS NFR BLD AUTO: 0.8 % — SIGNIFICANT CHANGE UP (ref 0–2)
BILIRUB SERPL-MCNC: 0.4 MG/DL
BUN SERPL-MCNC: 7 MG/DL
CALCIUM SERPL-MCNC: 10.2 MG/DL
CHLORIDE SERPL-SCNC: 102 MMOL/L
CO2 SERPL-SCNC: 26 MMOL/L
CREAT SERPL-MCNC: 0.67 MG/DL
CULTURE RESULTS: SIGNIFICANT CHANGE UP
CULTURE RESULTS: SIGNIFICANT CHANGE UP
EOSINOPHIL # BLD AUTO: 0.3 K/UL — SIGNIFICANT CHANGE UP (ref 0–0.5)
EOSINOPHIL NFR BLD AUTO: 6.1 % — HIGH (ref 0–6)
GLUCOSE SERPL-MCNC: 130 MG/DL
LYMPHOCYTES # BLD AUTO: 1.1 K/UL — SIGNIFICANT CHANGE UP (ref 1–3.3)
LYMPHOCYTES # BLD AUTO: 21.2 % — SIGNIFICANT CHANGE UP (ref 13–44)
MAGNESIUM SERPL-MCNC: 2 MG/DL
MONOCYTES # BLD AUTO: 0.3 K/UL — SIGNIFICANT CHANGE UP (ref 0–0.9)
MONOCYTES NFR BLD AUTO: 6.4 % — SIGNIFICANT CHANGE UP (ref 2–14)
NEUTROPHILS # BLD AUTO: 3.4 K/UL — SIGNIFICANT CHANGE UP (ref 1.8–7.4)
NEUTROPHILS NFR BLD AUTO: 65.4 % — SIGNIFICANT CHANGE UP (ref 43–77)
PHOSPHATE SERPL-MCNC: 4.8 MG/DL
POTASSIUM SERPL-SCNC: 5.4 MMOL/L
PROT SERPL-MCNC: 7.3 G/DL
SODIUM SERPL-SCNC: 144 MMOL/L
SPECIMEN SOURCE: SIGNIFICANT CHANGE UP
SPECIMEN SOURCE: SIGNIFICANT CHANGE UP
TSH SERPL-ACNC: 4.65 UIU/ML

## 2018-10-31 ENCOUNTER — LABORATORY RESULT (OUTPATIENT)
Age: 48
End: 2018-10-31

## 2018-11-01 LAB
ALBUMIN SERPL ELPH-MCNC: 4.1 G/DL
ALP BLD-CCNC: 123 U/L
ALT SERPL-CCNC: 104 U/L
ANION GAP SERPL CALC-SCNC: 14 MMOL/L
AST SERPL-CCNC: 99 U/L
BASOPHILS # BLD AUTO: 0.09 K/UL
BASOPHILS NFR BLD AUTO: 1.7 %
BILIRUB SERPL-MCNC: 0.2 MG/DL
BUN SERPL-MCNC: 6 MG/DL
CALCIUM SERPL-MCNC: 9.6 MG/DL
CHLORIDE SERPL-SCNC: 104 MMOL/L
CK SERPL-CCNC: 1856 U/L
CO2 SERPL-SCNC: 25 MMOL/L
CREAT SERPL-MCNC: 0.69 MG/DL
EOSINOPHIL # BLD AUTO: 0.28 K/UL
EOSINOPHIL NFR BLD AUTO: 5.2 %
GLUCOSE SERPL-MCNC: 148 MG/DL
HBV DNA # SERPL NAA+PROBE: NOT DETECTED IU/ML
HCT VFR BLD CALC: 36.6 %
HEPB DNA PCR LOG: NOT DETECTED LOGIU/ML
HGB BLD-MCNC: 12.6 G/DL
LYMPHOCYTES # BLD AUTO: 1.37 K/UL
LYMPHOCYTES NFR BLD AUTO: 25.2 %
MAGNESIUM SERPL-MCNC: 2 MG/DL
MAN DIFF?: NORMAL
MCHC RBC-ENTMCNC: 32.1 PG
MCHC RBC-ENTMCNC: 34.4 GM/DL
MCV RBC AUTO: 93.4 FL
MONOCYTES # BLD AUTO: 0.9 K/UL
MONOCYTES NFR BLD AUTO: 16.5 %
NEUTROPHILS # BLD AUTO: 2.66 K/UL
NEUTROPHILS NFR BLD AUTO: 48.8 %
PHOSPHATE SERPL-MCNC: 3.5 MG/DL
PLATELET # BLD AUTO: 256 K/UL
POTASSIUM SERPL-SCNC: 5.2 MMOL/L
PROT SERPL-MCNC: 7.5 G/DL
RBC # BLD: 3.92 M/UL
RBC # FLD: 12.7 %
SODIUM SERPL-SCNC: 143 MMOL/L
WBC # FLD AUTO: 5.45 K/UL

## 2018-11-02 ENCOUNTER — APPOINTMENT (OUTPATIENT)
Dept: HEPATOLOGY | Facility: CLINIC | Age: 48
End: 2018-11-02
Payer: COMMERCIAL

## 2018-11-02 VITALS
SYSTOLIC BLOOD PRESSURE: 121 MMHG | WEIGHT: 143 LBS | HEART RATE: 90 BPM | DIASTOLIC BLOOD PRESSURE: 86 MMHG | BODY MASS INDEX: 25.34 KG/M2 | RESPIRATION RATE: 16 BRPM | TEMPERATURE: 98.2 F | HEIGHT: 63 IN

## 2018-11-02 DIAGNOSIS — Z81.1 FAMILY HISTORY OF ALCOHOL ABUSE AND DEPENDENCE: ICD-10-CM

## 2018-11-02 DIAGNOSIS — Z87.898 PERSONAL HISTORY OF OTHER SPECIFIED CONDITIONS: ICD-10-CM

## 2018-11-02 DIAGNOSIS — Z87.09 PERSONAL HISTORY OF OTHER DISEASES OF THE RESPIRATORY SYSTEM: ICD-10-CM

## 2018-11-02 PROCEDURE — 99204 OFFICE O/P NEW MOD 45 MIN: CPT

## 2018-11-07 ENCOUNTER — APPOINTMENT (OUTPATIENT)
Dept: HEMATOLOGY ONCOLOGY | Facility: CLINIC | Age: 48
End: 2018-11-07
Payer: COMMERCIAL

## 2018-11-07 ENCOUNTER — RESULT REVIEW (OUTPATIENT)
Age: 48
End: 2018-11-07

## 2018-11-07 VITALS
RESPIRATION RATE: 16 BRPM | BODY MASS INDEX: 26.67 KG/M2 | DIASTOLIC BLOOD PRESSURE: 75 MMHG | HEART RATE: 78 BPM | OXYGEN SATURATION: 100 % | SYSTOLIC BLOOD PRESSURE: 115 MMHG | TEMPERATURE: 98.1 F | WEIGHT: 150.57 LBS

## 2018-11-07 LAB
ALBUMIN SERPL ELPH-MCNC: 4.3 G/DL
ALBUMIN SERPL ELPH-MCNC: 4.3 G/DL
ALP BLD-CCNC: 131 U/L
ALP BLD-CCNC: 131 U/L
ALT SERPL-CCNC: 43 U/L
ALT SERPL-CCNC: 44 U/L
ANION GAP SERPL CALC-SCNC: 12 MMOL/L
ANION GAP SERPL CALC-SCNC: 12 MMOL/L
AST SERPL-CCNC: 31 U/L
AST SERPL-CCNC: 32 U/L
BASOPHILS # BLD AUTO: 0.1 K/UL — SIGNIFICANT CHANGE UP (ref 0–0.2)
BASOPHILS NFR BLD AUTO: 1 % — SIGNIFICANT CHANGE UP (ref 0–2)
BILIRUB SERPL-MCNC: 0.3 MG/DL
BILIRUB SERPL-MCNC: 0.3 MG/DL
BUN SERPL-MCNC: 10 MG/DL
BUN SERPL-MCNC: 9 MG/DL
CALCIUM SERPL-MCNC: 9.4 MG/DL
CALCIUM SERPL-MCNC: 9.5 MG/DL
CHLORIDE SERPL-SCNC: 105 MMOL/L
CHLORIDE SERPL-SCNC: 105 MMOL/L
CK SERPL-CCNC: 88 U/L
CO2 SERPL-SCNC: 25 MMOL/L
CO2 SERPL-SCNC: 26 MMOL/L
CREAT SERPL-MCNC: 0.78 MG/DL
CREAT SERPL-MCNC: 0.81 MG/DL
EOSINOPHIL # BLD AUTO: 0.1 K/UL — SIGNIFICANT CHANGE UP (ref 0–0.5)
EOSINOPHIL NFR BLD AUTO: 1.3 % — SIGNIFICANT CHANGE UP (ref 0–6)
GLUCOSE SERPL-MCNC: 95 MG/DL
GLUCOSE SERPL-MCNC: 96 MG/DL
HCT VFR BLD CALC: 38 % — SIGNIFICANT CHANGE UP (ref 34.5–45)
HGB BLD-MCNC: 12.9 G/DL — SIGNIFICANT CHANGE UP (ref 11.5–15.5)
LYMPHOCYTES # BLD AUTO: 1.3 K/UL — SIGNIFICANT CHANGE UP (ref 1–3.3)
LYMPHOCYTES # BLD AUTO: 25.7 % — SIGNIFICANT CHANGE UP (ref 13–44)
MAGNESIUM SERPL-MCNC: 2.1 MG/DL
MAGNESIUM SERPL-MCNC: 2.2 MG/DL
MCHC RBC-ENTMCNC: 31.6 PG — SIGNIFICANT CHANGE UP (ref 27–34)
MCHC RBC-ENTMCNC: 33.9 G/DL — SIGNIFICANT CHANGE UP (ref 32–36)
MCV RBC AUTO: 93.3 FL — SIGNIFICANT CHANGE UP (ref 80–100)
MONOCYTES # BLD AUTO: 0.4 K/UL — SIGNIFICANT CHANGE UP (ref 0–0.9)
MONOCYTES NFR BLD AUTO: 7.6 % — SIGNIFICANT CHANGE UP (ref 2–14)
NEUTROPHILS # BLD AUTO: 3.3 K/UL — SIGNIFICANT CHANGE UP (ref 1.8–7.4)
NEUTROPHILS NFR BLD AUTO: 64.4 % — SIGNIFICANT CHANGE UP (ref 43–77)
PHOSPHATE SERPL-MCNC: 3.7 MG/DL
PHOSPHATE SERPL-MCNC: 3.7 MG/DL
PLATELET # BLD AUTO: 168 K/UL — SIGNIFICANT CHANGE UP (ref 150–400)
POTASSIUM SERPL-SCNC: 4.8 MMOL/L
POTASSIUM SERPL-SCNC: 5.1 MMOL/L
PROT SERPL-MCNC: 7.1 G/DL
PROT SERPL-MCNC: 7.1 G/DL
RBC # BLD: 4.08 M/UL — SIGNIFICANT CHANGE UP (ref 3.8–5.2)
RBC # FLD: 12.3 % — SIGNIFICANT CHANGE UP (ref 10.3–14.5)
SODIUM SERPL-SCNC: 142 MMOL/L
SODIUM SERPL-SCNC: 142 MMOL/L
WBC # BLD: 5.1 K/UL — SIGNIFICANT CHANGE UP (ref 3.8–10.5)
WBC # FLD AUTO: 5.1 K/UL — SIGNIFICANT CHANGE UP (ref 3.8–10.5)

## 2018-11-07 PROCEDURE — 99214 OFFICE O/P EST MOD 30 MIN: CPT

## 2018-11-07 NOTE — RESULTS/DATA
[FreeTextEntry1] : 0/26/2018  \par \par \par \par INTERPRETATION:  History: Breast carcinoma, status post chemotherapy, \par seizure.\par \par MR brain multisequence without and with IV contrast.\par \par 6.5 cc gadolinium injected intravenously.\par Images are of diagnostic quality.\par Brain morphology, parenchymal volume and intrinsic signal normal for \par patient age. There is no hemorrhage edema infarct or mass effect. No \par unusual parenchymal or leptomeningeal enhancement. No evidence of a \par seizure focus. Parasellar region posterior fossa structures unremarkable. \par Mastoids and paranasal sinuses are unremarkable. No calvarial destructive \par lesions.\par \par Impression:\par \par Unremarkable head CT. No acute or enhancing pathology. Specifically, no \par evidence of intracranial metastases.\par \par \par \par \par EXAM:  XR CHEST PA LAT 2V                      \par \par \par PROCEDURE DATE:  10/26/2018  \par \par \par \par INTERPRETATION:  Pneumonia follow-up.\par \par PA lateral. Prior 10/24/2018.\par \par Normal heart mediastinum. Interval resolution of bilateral infiltrates. \par Mild fibrotic atelectatic change left base. No pleural effusion or other \par new abnormality.\par \par Impression: Interval resolution of bilateral infiltrates.\par \par \par \par EXAM:  CT ABDOMEN AND PELVIS IC                      \par \par EXAM:  CT ANGIO CHEST (W)AW IC                      \par \par \par PROCEDURE DATE:  10/25/2018  \par \par \par \par INTERPRETATION:  HISTORY: New diagnosis of breast cancer on chemotherapy \par presenting with acute hypoxia. Seizure and sepsis status post \par chemotherapy. History of asthma. Evaluate for pulmonary embolism or \par infectious source.\par \par TECHNIQUE:  CT pulmonary angiography was performed of the chest according \par to standard institutional protocol. This is followed by a routine CT of \par the abdomen and pelvis. Coronal, sagittal and transaxial 3-D MIP \par reformatted images are provided from the transaxial source data.   95mL \par of Omnipaque 350 was administered without complication and 5 mL was \par discarded.  \par \par COMPARISON: No similar prior study available for comparison.\par \par FINDINGS: \par \par CTPA:\par There is motion artifact present which degrades image quality and limits \par evaluation of the peripheral vasculature. However, there is good overall \par opacification of pulmonary arterial tree. No central, segmental or \par proximal subsegmental defect is present to suggest acute pulmonary \par embolus.\par \par The thyroid gland is unremarkable. \par \par Evaluation of lung parenchyma demonstrates patchy areas of airspace \par consolidation in the lower lobes with adjacent groundglass attenuation as \par well as patchy groundglass opacities throughout the upper lobes. There is \par mild diffuse interlobular septal thickening. There is no pneumothorax.  \par There is no pleural effusion. Trachea and main bronchi are clear.\par \par There is mild asymmetric stranding in the right axillary fat with a few \par small lymph nodes, the largest measuring 1 cm in transaxial dimension. \par There is no significant supraclavicular, mediastinal or hilar adenopathy.\par \par The heart and pericardium are unremarkable. The thoracic aorta is normal \par in caliber without dissection. \par \par There are age-indeterminate, likely chronic, moderate compression \par fracture deformities of T7, T11 and T12.\par \par CT abdomen/pelvis:\par There is motion artifact present which degrades image quality.\par \par The liver, spleen, pancreas and adrenal glands are unremarkable. The \par kidneys enhance symmetrically without hydronephrosis.\par \par The small and large bowel are normal in caliber. There is no free air or \par intra-abdominal abscess. The appendix is not discretely visualize, \par however, there are no secondary signs of acute appendicitis. There is no \par abnormal colonic bowel wall thickening or pericolonic inflammatory \par change. A mild to moderate amount of retained fecal material seen \par throughout the colon.\par \par The abdominal aorta is normal in caliber. There is no adenopathy within \par the upper abdomen, retroperitoneum or pelvis. A retroaortic left renal \par vein is incidentally noted.\par \par The urinary bladder, uterus and adnexal structures are unremarkable aside \par from the presence of an IUD.\par \par The visualized osseous structures within the abdomen and pelvis are \par unremarkable.\par \par \par IMPRESSION:\par CTPA: Limited by motion. No central, segmental or proximal subsegmental \par pulmonary embolism suggested.  Nonspecific lower lobe patchy airspace \par consolidation and diffuse groundglass opacities likely on an infectious \par basis. Clinical correlation is recommended.\par \par CT abdomen/pelvis: No infectious source within the abdomen or pelvis. No \par acute intra-abdominal or pelvic finding.\par \par \par EXAM:  CT BRAIN                      \par \par \par PROCEDURE DATE:  10/25/2018  \par \par \par \par INTERPRETATION:  CLINICAL HISTORY:  New onset seizure.\par \par TECHNIQUE:  CT of the head without contrast.\par Contiguous transaxial images of the head were acquired from the skull \par base to the vertex without the administration of iodinated contrast. \par Coronal and sagittal reformatted images are provided. \par \par COMPARISON:  None available.\par \par FINDINGS:\par There is mild motion artifact present which degrades image quality.\par \par There is no acute intracranial hemorrhage, mass effect from vasogenic \par edema or evidence of acute territorial infarct.  \par \par The ventricles, sulci and cisternal spaces are unremarkable in size and \par configuration.  There is no midline shift or abnormal extra-axial fluid \par collection.\par \par The calvarium is intact.  There are no osteoblastic or lytic calvarial or \par skull base lesions.  The paranasal sinuses and mastoid air cells are \par clear.\par \par IMPRESSION:\par Mildly motion degraded. No gross acute intracranial hemorrhage or mass \par effect from vasogenic edema.\par \par

## 2018-11-07 NOTE — ASSESSMENT
[FreeTextEntry1] : 48-year-old woman with history of asthma, hypothyroidism, depression/anxiety with recently diagnosed R invasive breast cancer s/p lumpectomy and SLNB with 1.1cm IDC, ER+ 95% strong, DE+ 3-5% strong, HER2 IHC 2+ and negative by CISH. Lyn[place] grade 7-8/9, with DCIS present and close surgical margins and 0/2 SLN involved. Oncotype score 29. She presents today for follow up after Cycle 2 of  “TC” chemotherapy (Taxotere 75mg/m2 with cytoxan 600mg/m3 every 3 weeks x 4 cycles). On workup also noted to have evidence of chronic HBV infection (Hep B surface Ag reactive, HBV viral load undetectable, HBV core Ab negative), now on entecavir during therapy. Admitted 10/24-10/26 after grand mal seizure in the setting of severe hyponatremia (euvolemic, polydipsia) and aspiration event - now improving, sodium corrected, sertraline held, and without symptoms.\par \par -CBC and CMP today with lytes - follow up with me in 1 week 11/7 visit and labs\par -C3 TC x 4 via PIV due 11/13, will plan to proceed with this - plan to check stat BMP pre treatment, immediately post, 2 hours post, and 5 hours post-treatment completion for close monitoring of serum sodium. I also recommended admission to [facility] after treatment for 24h observation with serial BMP/CMP and neuro exam monitoring. Patient indicated she would like to consider this v. close outpatient lab monitoring and will call me in the next 2 days to discuss further\par -dexamethasone 8mg PO BID day before, day of, and day after chemotherapy each cycle to continue with next/all cycles, patient reiterated understanding of this\par -continue entecavir daily; Hepatology recommendations appreciated, labs ordered today, plan for Fibroscan and OP follow up with Dr. Beltran\johnathan -Reglan at home PRN for nausea (has not needed)\par -Follow up Dr. Heller as scheduled, radiation oncology follow up with Cycle 4\par -Follow up with her psychiatrist as planned soon, given discontinuation of sertraline (and consider resuming after next cycle of therapy)\par -patient instructed to call with any new symptoms concerns\par -plan was for DEXA, GNRH agonist/AI post-chemotherapy; given compression deformity T7, T11, T12 seen on CTA (reviewed extensively by phone with radiologist by me - no concerning signs or findings, no evidence of metastatic disease or actual fracture,  nuclear bone scan 6/2018 re reviewed and without any significant findings). Will obtain DEXA and reassess, consider MRI with contrast thoracic spine - patient prefers to defer this for now until treatment completion\par

## 2018-11-07 NOTE — PHYSICAL EXAM
[Fully active, able to carry on all pre-disease performance without restriction] : Status 0 - Fully active, able to carry on all pre-disease performance without restriction [Normal] : affect appropriate [de-identified] : R breast lumpectomy and SLNB scars well healing, no rashes, no masses or adenopathy palpated b/l [de-identified] : some varicose veins on LEs, no edema

## 2018-11-07 NOTE — HISTORY OF PRESENT ILLNESS
[Disease: _____________________] : Disease: [unfilled] [T: ___] : T[unfilled] [N: ___] : N[unfilled] [M: ___] : M[unfilled] [AJCC Stage: ____] : AJCC Stage: [unfilled] [de-identified] : 48-year-old lady with history of asthma, hypothyroidism, depression/anxiety with recently diagnosed invasive breast cancer s/p lumpectomy and SLNB here for initial medical oncology consultation.\par \par Patient had normal annual routine mammograms since age 42, last 8/11/16. 5/22/18 mammo with 1.5cm R breast mass; diagnosis mammo and US confirmed this mass RUOQ 1.6cm at 10:00, hypoechoic. US guided core biopsy of the mass was performed 6/1/18 which revealed 0.5 cm invasive ductal carcinoma, [place] score 7/9, with nuclear grade 2 cribriform type DCIS with central necrosis and calcifications, estrogen receptor 95% strong, progesterone receptor 3-5% strong, HER-2 IHC 2+ negative by CIS. My Risk panel on 6/13/2018 was negative. MRI of the bilateral breasts on 06/25/2018 showed a 1.7 X 1.6 X 1.8 cm focal area of non-mass enhancement with washout kinetics and biopsy marker at the upper outer posterior 10:00 position of the right breast. There were no other suspicious lesions in the right breast and no suspicious lesions in the left breast. There were no axillary or internal mammary lymphadenopathy. She saw Dr. Heller who performed CXR, nuclear bone scan, pelvic and abdominal sonograms which were negative. On 8/15/18 she underwent a lumpectomy with SLNB - pathology with 1.1 cm invasive ductal carcinoma, [place] 7-8/9 and no LVI. 0/2LN involved. There was also a 1.5 cm nuclear grade 2 - 3 ductal carcinoma in situ, cribriform and solid type with comedonecrosis and calcifications. Regarding the margins, the invasive tumor was 0.15 mm from the deep margin. Ductal carcinoma in situ was noted within microns of the deep margin and less than 0.1 mm from the anterior and superior margins. Two sentinel nodes were examined and negative. She saw radiation oncology in consultation and was recommended for adjuvant radiation pending medical oncology recommendations. Oncotype score 29 (20% risk of distant recurrence with tamoxifen alone). TC x 4 recommended. Patient has elected to utilize cold cap.\par \par HBV surface antigen reactive, HBV DNA not detected, c/w chronic hepatitis B infection (discussed with hepatology). She was started on entecavir daily ~1 week prior to chemotherapy initiation and continues this daily. US Abdomen completed prior by Dr. Heller prior to surgery - noted normal. She saw hepatology Dr. Beltran in consultation 11/2; repeat serologies and Fibroscan planned. Plan to continue entecavir until at least 6 months post-chemotherapy, with hepatology follow up also planned.\par \par 10/24-10/26 admitted to [facility].\par ~9pm just after driving home from getting food,  witness LOC with seizure activity x 20 seconds followed by lethargy - EMS called.\par She was taken to [facility] ER and admitted to ICU; sodium 119 with evidence of aspiration on CXR/CTA chest, O2 sat 86% briefly requiring BIPAP support quickly weaned. Neurology, Pulmonary, Renal consults following. Sodium corrected over the next 24h with normal saline infusion, antibiotics discontinued as no further evidence of infection. Neulasta onpro administered as scheduled 10/25 afternoon. She had a CTH and MRI Brain w/wo contrast which showed no acute findings. Zoloft was held, and she was discharged home. Incidental note of possible compression deformity of thoracic spine on imaging while inpatient. She admits to drinking ~4L water on day of chemotherapy as advised by cold cap literature. Her  recalls in retrospect that she seemed overly sleepy and slightly confused (forgot she had chinese food for lunch, eyes closing while sitting up) on the afternoon/evening after treatment prior to seizure. CK and LFTs elevated post discharge, continued to downtrend over the next few days.\par \par Hypothyroidism stable on synthroid. Depression/anxiety stable since 2006 on zoloft and wellbutrin, she sees Dr. Manuel Ga for this. She is UTD on RHM. She lives on  with her  Eric. Their 17 year old son just started college in [employer] and their 13 year old daughter lives at home with them. Patient works part time at [employer]. FHx significant for father with prostate cancer in 60s, paternal cousin with leukemia age 13 and breast cancer age 45. [de-identified] : T1cN0 [de-identified] : ER+SC-HER2- [de-identified] : Oncotype 29 [Treatment Protocol] : Treatment Protocol [Therapy: ___] : Therapy: [unfilled] [FreeTextEntry1] : x 4 cycles, with Penguin cold cap [de-identified] : Patient presents after cycle 2 of of TC chemotherapy (planned for 4 cycles) today. She is utilizing scalp cooling with Penguin cold cap during therapy. She is doing well and without complaints - she has gone back to work. She is drinking "normal" amounts of water/fluids, gatorade. She continues to hold sertraline as told. She is taking entecavir daily with no issues, and plans to follow up with Fibroscan and Hepatology. Denies HA, dizziness, f/c/s, n/v, CP, SOB, cough, abd pain, diarrhea, edema, rashes, bleeding, muscle aches or pains, new neuro symptoms or confusion.

## 2018-11-08 LAB
HBV CORE IGG+IGM SER QL: NONREACTIVE
HBV CORE IGM SER QL: NONREACTIVE
HBV E AB SER QL: NEGATIVE
HBV E AG SER QL: NEGATIVE
HBV SURFACE AB SER QL: NONREACTIVE
HBV SURFACE AG SER QL: NORMAL

## 2018-11-13 ENCOUNTER — RESULT REVIEW (OUTPATIENT)
Age: 48
End: 2018-11-13

## 2018-11-13 ENCOUNTER — INPATIENT (INPATIENT)
Facility: HOSPITAL | Age: 48
LOS: 0 days | Discharge: HOME | End: 2018-11-14
Attending: HOSPITALIST | Admitting: HOSPITALIST
Payer: COMMERCIAL

## 2018-11-13 ENCOUNTER — APPOINTMENT (OUTPATIENT)
Dept: INFUSION THERAPY | Facility: HOSPITAL | Age: 48
End: 2018-11-13

## 2018-11-13 ENCOUNTER — LABORATORY RESULT (OUTPATIENT)
Age: 48
End: 2018-11-13

## 2018-11-13 VITALS
TEMPERATURE: 98 F | DIASTOLIC BLOOD PRESSURE: 79 MMHG | HEART RATE: 71 BPM | SYSTOLIC BLOOD PRESSURE: 125 MMHG | RESPIRATION RATE: 18 BRPM | OXYGEN SATURATION: 98 %

## 2018-11-13 DIAGNOSIS — J45.909 UNSPECIFIED ASTHMA, UNCOMPLICATED: ICD-10-CM

## 2018-11-13 DIAGNOSIS — R56.9 UNSPECIFIED CONVULSIONS: ICD-10-CM

## 2018-11-13 DIAGNOSIS — Z30.430 ENCOUNTER FOR INSERTION OF INTRAUTERINE CONTRACEPTIVE DEVICE: Chronic | ICD-10-CM

## 2018-11-13 DIAGNOSIS — F31.9 BIPOLAR DISORDER, UNSPECIFIED: ICD-10-CM

## 2018-11-13 DIAGNOSIS — E03.9 HYPOTHYROIDISM, UNSPECIFIED: ICD-10-CM

## 2018-11-13 DIAGNOSIS — F32.9 MAJOR DEPRESSIVE DISORDER, SINGLE EPISODE, UNSPECIFIED: ICD-10-CM

## 2018-11-13 DIAGNOSIS — Z98.890 OTHER SPECIFIED POSTPROCEDURAL STATES: Chronic | ICD-10-CM

## 2018-11-13 DIAGNOSIS — M67.432 GANGLION, LEFT WRIST: Chronic | ICD-10-CM

## 2018-11-13 DIAGNOSIS — Z29.9 ENCOUNTER FOR PROPHYLACTIC MEASURES, UNSPECIFIED: ICD-10-CM

## 2018-11-13 DIAGNOSIS — C50.911 MALIGNANT NEOPLASM OF UNSPECIFIED SITE OF RIGHT FEMALE BREAST: ICD-10-CM

## 2018-11-13 LAB
BASOPHILS # BLD AUTO: 0 K/UL — SIGNIFICANT CHANGE UP (ref 0–0.2)
BASOPHILS NFR BLD AUTO: 0.5 % — SIGNIFICANT CHANGE UP (ref 0–2)
BUN SERPL-MCNC: 10 MG/DL — SIGNIFICANT CHANGE UP (ref 7–23)
BUN SERPL-MCNC: 10 MG/DL — SIGNIFICANT CHANGE UP (ref 7–23)
BUN SERPL-MCNC: 12 MG/DL — SIGNIFICANT CHANGE UP (ref 7–23)
CA-I BLDA-SCNC: 1.16 MMOL/L — SIGNIFICANT CHANGE UP (ref 1.12–1.3)
CA-I BLDA-SCNC: 1.32 MMOL/L — HIGH (ref 1.12–1.3)
CALCIUM SERPL-MCNC: 8.7 MG/DL — SIGNIFICANT CHANGE UP (ref 8.4–10.5)
CHLORIDE SERPL-SCNC: 102 MMOL/L — SIGNIFICANT CHANGE UP (ref 96–108)
CHLORIDE SERPL-SCNC: 105 MMOL/L — SIGNIFICANT CHANGE UP (ref 98–107)
CHLORIDE SERPL-SCNC: 106 MMOL/L — SIGNIFICANT CHANGE UP (ref 96–108)
CO2 SERPL-SCNC: 22 MMOL/L — SIGNIFICANT CHANGE UP (ref 22–31)
CO2 SERPL-SCNC: 24 MMOL/L — SIGNIFICANT CHANGE UP (ref 22–31)
CO2 SERPL-SCNC: 25 MMOL/L — SIGNIFICANT CHANGE UP (ref 22–31)
CREAT SERPL-MCNC: 0.5 MG/DL — SIGNIFICANT CHANGE UP (ref 0.5–1.3)
CREAT SERPL-MCNC: 0.6 MG/DL — SIGNIFICANT CHANGE UP (ref 0.5–1.3)
CREAT SERPL-MCNC: 0.72 MG/DL — SIGNIFICANT CHANGE UP (ref 0.5–1.3)
EOSINOPHIL # BLD AUTO: 0.2 K/UL — SIGNIFICANT CHANGE UP (ref 0–0.5)
EOSINOPHIL NFR BLD AUTO: 2 % — SIGNIFICANT CHANGE UP (ref 0–6)
GLUCOSE SERPL-MCNC: 125 MG/DL — HIGH (ref 70–99)
GLUCOSE SERPL-MCNC: 138 MG/DL — HIGH (ref 70–99)
GLUCOSE SERPL-MCNC: 161 MG/DL — HIGH (ref 70–99)
HCT VFR BLD CALC: 35.9 % — SIGNIFICANT CHANGE UP (ref 34.5–45)
HGB BLD-MCNC: 12.3 G/DL — SIGNIFICANT CHANGE UP (ref 11.5–15.5)
LYMPHOCYTES # BLD AUTO: 1 K/UL — SIGNIFICANT CHANGE UP (ref 1–3.3)
LYMPHOCYTES # BLD AUTO: 13.7 % — SIGNIFICANT CHANGE UP (ref 13–44)
MAGNESIUM SERPL-MCNC: 1.9 MG/DL — SIGNIFICANT CHANGE UP (ref 1.6–2.6)
MCHC RBC-ENTMCNC: 32.1 PG — SIGNIFICANT CHANGE UP (ref 27–34)
MCHC RBC-ENTMCNC: 34.3 G/DL — SIGNIFICANT CHANGE UP (ref 32–36)
MCV RBC AUTO: 93.4 FL — SIGNIFICANT CHANGE UP (ref 80–100)
MONOCYTES # BLD AUTO: 0.4 K/UL — SIGNIFICANT CHANGE UP (ref 0–0.9)
MONOCYTES NFR BLD AUTO: 5.4 % — SIGNIFICANT CHANGE UP (ref 2–14)
NEUTROPHILS # BLD AUTO: 6 K/UL — SIGNIFICANT CHANGE UP (ref 1.8–7.4)
NEUTROPHILS NFR BLD AUTO: 78.3 % — HIGH (ref 43–77)
PHOSPHATE SERPL-MCNC: 3 MG/DL — SIGNIFICANT CHANGE UP (ref 2.5–4.5)
PLATELET # BLD AUTO: 311 K/UL — SIGNIFICANT CHANGE UP (ref 150–400)
POTASSIUM SERPL-MCNC: 4 MMOL/L — SIGNIFICANT CHANGE UP (ref 3.5–5.3)
POTASSIUM SERPL-MCNC: 4.1 MMOL/L — SIGNIFICANT CHANGE UP (ref 3.5–5.3)
POTASSIUM SERPL-MCNC: 5.3 MMOL/L — SIGNIFICANT CHANGE UP (ref 3.5–5.3)
POTASSIUM SERPL-SCNC: 4 MMOL/L — SIGNIFICANT CHANGE UP (ref 3.5–5.3)
POTASSIUM SERPL-SCNC: 4.1 MMOL/L — SIGNIFICANT CHANGE UP (ref 3.5–5.3)
POTASSIUM SERPL-SCNC: 5.3 MMOL/L — SIGNIFICANT CHANGE UP (ref 3.5–5.3)
RBC # BLD: 3.85 M/UL — SIGNIFICANT CHANGE UP (ref 3.8–5.2)
RBC # FLD: 13 % — SIGNIFICANT CHANGE UP (ref 10.3–14.5)
SODIUM SERPL-SCNC: 137 MMOL/L — SIGNIFICANT CHANGE UP (ref 135–145)
SODIUM SERPL-SCNC: 137 MMOL/L — SIGNIFICANT CHANGE UP (ref 135–145)
SODIUM SERPL-SCNC: 144 MMOL/L — SIGNIFICANT CHANGE UP (ref 135–145)
WBC # BLD: 7.7 K/UL — SIGNIFICANT CHANGE UP (ref 3.8–10.5)
WBC # FLD AUTO: 7.7 K/UL — SIGNIFICANT CHANGE UP (ref 3.8–10.5)

## 2018-11-13 PROCEDURE — 99223 1ST HOSP IP/OBS HIGH 75: CPT | Mod: AI

## 2018-11-13 PROCEDURE — 99223 1ST HOSP IP/OBS HIGH 75: CPT | Mod: GC

## 2018-11-13 RX ORDER — DOCUSATE SODIUM 100 MG
100 CAPSULE ORAL THREE TIMES A DAY
Qty: 0 | Refills: 0 | Status: DISCONTINUED | OUTPATIENT
Start: 2018-11-13 | End: 2018-11-14

## 2018-11-13 RX ORDER — ACETAMINOPHEN 500 MG
650 TABLET ORAL EVERY 6 HOURS
Qty: 0 | Refills: 0 | Status: DISCONTINUED | OUTPATIENT
Start: 2018-11-13 | End: 2018-11-14

## 2018-11-13 RX ORDER — LEVOTHYROXINE SODIUM 125 MCG
75 TABLET ORAL DAILY
Qty: 0 | Refills: 0 | Status: DISCONTINUED | OUTPATIENT
Start: 2018-11-13 | End: 2018-11-14

## 2018-11-13 RX ORDER — ALPRAZOLAM 0.25 MG
0.25 TABLET ORAL
Qty: 0 | Refills: 0 | Status: DISCONTINUED | OUTPATIENT
Start: 2018-11-13 | End: 2018-11-14

## 2018-11-13 RX ORDER — METOCLOPRAMIDE HCL 10 MG
10 TABLET ORAL
Qty: 0 | Refills: 0 | Status: DISCONTINUED | OUTPATIENT
Start: 2018-11-13 | End: 2018-11-14

## 2018-11-13 RX ORDER — ENTECAVIR 0.5 MG/1
0.5 TABLET ORAL DAILY
Qty: 0 | Refills: 0 | Status: DISCONTINUED | OUTPATIENT
Start: 2018-11-13 | End: 2018-11-14

## 2018-11-13 RX ORDER — SENNA PLUS 8.6 MG/1
2 TABLET ORAL AT BEDTIME
Qty: 0 | Refills: 0 | Status: DISCONTINUED | OUTPATIENT
Start: 2018-11-13 | End: 2018-11-14

## 2018-11-13 RX ORDER — BUDESONIDE AND FORMOTEROL FUMARATE DIHYDRATE 160; 4.5 UG/1; UG/1
2 AEROSOL RESPIRATORY (INHALATION)
Qty: 0 | Refills: 0 | Status: DISCONTINUED | OUTPATIENT
Start: 2018-11-13 | End: 2018-11-14

## 2018-11-13 RX ORDER — DEXAMETHASONE 0.5 MG/5ML
8 ELIXIR ORAL
Qty: 0 | Refills: 0 | Status: DISCONTINUED | OUTPATIENT
Start: 2018-11-13 | End: 2018-11-14

## 2018-11-13 RX ORDER — BUPROPION HYDROCHLORIDE 150 MG/1
300 TABLET, EXTENDED RELEASE ORAL DAILY
Qty: 0 | Refills: 0 | Status: DISCONTINUED | OUTPATIENT
Start: 2018-11-13 | End: 2018-11-14

## 2018-11-13 RX ORDER — ONDANSETRON 8 MG/1
4 TABLET, FILM COATED ORAL EVERY 6 HOURS
Qty: 0 | Refills: 0 | Status: DISCONTINUED | OUTPATIENT
Start: 2018-11-13 | End: 2018-11-14

## 2018-11-13 RX ORDER — PANTOPRAZOLE SODIUM 20 MG/1
40 TABLET, DELAYED RELEASE ORAL
Qty: 0 | Refills: 0 | Status: DISCONTINUED | OUTPATIENT
Start: 2018-11-13 | End: 2018-11-14

## 2018-11-13 RX ORDER — ENOXAPARIN SODIUM 100 MG/ML
40 INJECTION SUBCUTANEOUS EVERY 24 HOURS
Qty: 0 | Refills: 0 | Status: DISCONTINUED | OUTPATIENT
Start: 2018-11-13 | End: 2018-11-14

## 2018-11-13 RX ADMIN — Medication 0.25 MILLIGRAM(S): at 20:01

## 2018-11-13 RX ADMIN — Medication 8 MILLIGRAM(S): at 20:01

## 2018-11-13 NOTE — H&P ADULT - PROBLEM SELECTOR PLAN 1
S/P Chemo.  -Monitor BMP Q6H  -Free water restriction 800cc/day  -Continue Dexamethasone 8mg 2X/day X 3 more doses.  -F/U with Onc consult S/P Chemo, Cytoxan and Taxotere.  -Monitor BMP Q6H  -Free water restriction 800cc/day  -Continue Dexamethasone 8mg 2X/day X 3 more doses.  -F/U with Onc consult

## 2018-11-13 NOTE — CONSULT NOTE ADULT - ATTENDING COMMENTS
Pt seen and examined. 47 yo female w/ pmhx asthma, hypothyroidism, chronic Hep B, bipolar depression/anxiety, T1CN0 invasive breast cancer ER 95%, [place] 3-5%, HER2 negative (s/p lumpectomy and SLNB) with high oncotype DX score risk of recurrence on adjuvant chemotherapy with Taxotere and Cytoxan who presents from [facility] for close monitoring after chemotherapy given recent course given prior seizure episode after last cycle in the setting of unexplained hyponatremia. Continue monitoring. A/w above

## 2018-11-13 NOTE — H&P ADULT - NSHPPHYSICALEXAM_GEN_ALL_CORE
Vital Signs Last 24 Hrs  T(C): 36.8 (13 Nov 2018 17:01), Max: 36.8 (13 Nov 2018 17:01)  T(F): 98.3 (13 Nov 2018 17:01), Max: 98.3 (13 Nov 2018 17:01)  HR: 71 (13 Nov 2018 17:01) (62 - 71)  BP: 125/79 (13 Nov 2018 17:01) (113/73 - 125/79)  BP(mean): --  RR: 18 (13 Nov 2018 17:01) (16 - 18)  SpO2: 98% (13 Nov 2018 17:01) (98% - 98%)

## 2018-11-13 NOTE — CONSULT NOTE ADULT - SUBJECTIVE AND OBJECTIVE BOX
HPI:   49 yo female w/ pmhx asthma, hypothyroidism, chronic Hep B, bipolar depression/anxiety, T1CN0 invasive breast cancer ER 95%, [place] 3-5%, HER2 negative (s/p lumpectomy and SLNB) with high Oncotype DX score risk of recurrence on adjuvant chemotherapy with Taxotere and Cytoxan who presents from [facility] for close monitoring after chemotherapy. After prior chemotherapy cycle, pt had a generalized tonic clonic seizure witnessed by her  with prolonged unresponsiveness. She presented to [facility] and found to have Na of 119. CT and MRI brain without findings. She had no further episodes. Of note, she drank >4L of water on day of chemotherapy. Profound hyponatremia is thought to be multifactorial (SIADH due to Zoloft as well as polydipsia)  as well as possible SIADH from cytoxan. At present, pt notes tolerating cycle 3 well today without any complications. Has no complaints at the moment.     PAST MEDICAL & SURGICAL HISTORY:  Malignant neoplasm of right female breast, unspecified estrogen receptor status, unspecified site of breast  Depression, unspecified depression type  Hypothyroidism, unspecified type  Asthma: last major attack 2016  Bipolar affective  S/P lumpectomy, right breast: with nodes  Encounter for IUD insertion: 2016  Ganglion cyst of wrist, left: 2013, 2015      Allergies    Sulfur (Hives)    Intolerances        MEDICATIONS  (STANDING):    MEDICATIONS  (PRN):      FAMILY HISTORY:  Family history of prostate cancer (Father)      SOCIAL HISTORY: No EtOH, no tobacco    Height (cm): 160.02 (11-13 @ 17:18)  Weight (kg): 70.5 (11-13 @ 17:18)  BMI (kg/m2): 27.5 (11-13 @ 17:18)  BSA (m2): 1.74 (11-13 @ 17:18)    VITALS:   T(F): 98.3 (11-13-18 @ 17:01), Max: 98.3 (11-13-18 @ 17:01)  HR: 71 (11-13-18 @ 17:01)  BP: 125/79 (11-13-18 @ 17:01)  RR: 18 (11-13-18 @ 17:01)  SpO2: 98% (11-13-18 @ 17:01)  Wt(kg): --    PHYSICAL EXAM    GENERAL: NAD, well-developed  HEAD:  Atraumatic, Normocephalic  EYES: EOMI, PERRLA, conjunctiva and sclera clear  NECK: Supple, No JVD  CHEST/LUNG: Clear to auscultation bilaterally; No wheeze  HEART: Regular rate and rhythm; No murmurs, rubs, or gallops  ABDOMEN: Soft, Nontender, Nondistended; Bowel sounds present  EXTREMITIES:  2+ Peripheral Pulses, No clubbing, cyanosis, or edema  NEUROLOGY: non-focal  SKIN: No rashes or lesions    LABS:                         12.3   7.7   )-----------( 311      ( 13 Nov 2018 08:11 )             35.9     11-13    137  |  102  |  10  ----------------------------<  161<H>  4.1   |  24  |  0.50            .Urine Clean Catch (Midstream)  10-25 @ 11:56   No growth  --  --      .Blood Blood-Peripheral  10-25 @ 09:25   No growth at 5 days.  --  --          IMAGING:

## 2018-11-13 NOTE — CONSULT NOTE ADULT - ASSESSMENT
49 yo female w/ pmhx asthma, hypothyroidism, chronic Hep B, bipolar depression/anxiety, T1CN0 invasive breast cancer ER 95%, [place] 3-5%, HER2 negative (s/p lumpectomy and SLNB) with high Oncotype DX score risk of recurrence on adjuvant chemotherapy with Taxotere and Cytoxan who presents from [facility] for close monitoring after chemotherapy given recent course.    1) Breast Ca- s/p third cycle of cytoxan and taxotere 11/13  - onpro to be deployed 11/14  - will need dexamethasone 8 mg tonight and tomorrow for supportive care with chemo   - given profound hyponatremia and subsequent seizure, will monitor BMP q4h  - frequent neuro checks  - possibly SIADH from cytoxan so will need water restriction to <1 L; salt tabs if needed  - continue to hold zoloft   - potential renal consult pending trend of electrolytes    Discussed with attending,  Len Garcia, PGY-4  Hematology-Oncology Fellow  [phone] ([place]) [phone] ([facility])

## 2018-11-13 NOTE — H&P ADULT - HISTORY OF PRESENT ILLNESS
49 yo female w/ pmhx asthma, hypothyroidism, chronic Hep B, bipolar depression/anxiety, T1CN0 invasive breast cancer ER 95%, [place] 3-5%, HER2 negative (s/p lumpectomy and SLNB) with high Oncotype DX score risk of recurrence on adjuvant chemotherapy with Taxotere and Cytoxan who presents from [facility] for close monitoring after chemotherapy. After prior chemotherapy cycle, pt had a generalized tonic clonic seizure witnessed by her  with prolonged unresponsiveness. She presented to [facility] and found to have Na of 119. CT and MRI brain without findings. She had no further episodes. Of note, she drank >4L of water on day of chemotherapy. Profound hyponatremia is thought to be multifactorial (SIADH due to Zoloft as well as polydipsia)  as well as possible SIADH from cytoxan. At present, pt notes tolerating cycle 3 well today without any complications. Has no complaints at the moment.

## 2018-11-13 NOTE — H&P ADULT - GASTROINTESTINAL DETAILS
no rigidity/no organomegaly/no guarding/soft/nontender/no distention/no masses palpable/bowel sounds normal

## 2018-11-13 NOTE — H&P ADULT - RS GEN PE MLT RESP DETAILS PC
airway patent/no rales/breath sounds equal/good air movement/no wheezes/respirations non-labored/clear to auscultation bilaterally/no rhonchi

## 2018-11-14 ENCOUNTER — TRANSCRIPTION ENCOUNTER (OUTPATIENT)
Age: 48
End: 2018-11-14

## 2018-11-14 VITALS
HEART RATE: 78 BPM | DIASTOLIC BLOOD PRESSURE: 80 MMHG | TEMPERATURE: 98 F | SYSTOLIC BLOOD PRESSURE: 143 MMHG | RESPIRATION RATE: 20 BRPM | OXYGEN SATURATION: 99 %

## 2018-11-14 DIAGNOSIS — Z51.89 ENCOUNTER FOR OTHER SPECIFIED AFTERCARE: ICD-10-CM

## 2018-11-14 DIAGNOSIS — R11.2 NAUSEA WITH VOMITING, UNSPECIFIED: ICD-10-CM

## 2018-11-14 DIAGNOSIS — Z51.11 ENCOUNTER FOR ANTINEOPLASTIC CHEMOTHERAPY: ICD-10-CM

## 2018-11-14 LAB
ALBUMIN SERPL ELPH-MCNC: 4.2 G/DL — SIGNIFICANT CHANGE UP (ref 3.3–5)
ALP SERPL-CCNC: 92 U/L — SIGNIFICANT CHANGE UP (ref 40–120)
ALT FLD-CCNC: 37 U/L — HIGH (ref 4–33)
AST SERPL-CCNC: 26 U/L — SIGNIFICANT CHANGE UP (ref 4–32)
BASOPHILS # BLD AUTO: 0 K/UL — SIGNIFICANT CHANGE UP (ref 0–0.2)
BASOPHILS NFR BLD AUTO: 0 % — SIGNIFICANT CHANGE UP (ref 0–2)
BASOPHILS NFR SPEC: 0 % — SIGNIFICANT CHANGE UP (ref 0–2)
BILIRUB SERPL-MCNC: 0.3 MG/DL — SIGNIFICANT CHANGE UP (ref 0.2–1.2)
BLASTS # FLD: 0 % — SIGNIFICANT CHANGE UP (ref 0–0)
BUN SERPL-MCNC: 11 MG/DL — SIGNIFICANT CHANGE UP (ref 7–23)
BUN SERPL-MCNC: 13 MG/DL — SIGNIFICANT CHANGE UP (ref 7–23)
CALCIUM SERPL-MCNC: 8.5 MG/DL — SIGNIFICANT CHANGE UP (ref 8.4–10.5)
CALCIUM SERPL-MCNC: 9.1 MG/DL — SIGNIFICANT CHANGE UP (ref 8.4–10.5)
CHLORIDE SERPL-SCNC: 107 MMOL/L — SIGNIFICANT CHANGE UP (ref 98–107)
CHLORIDE SERPL-SCNC: 110 MMOL/L — HIGH (ref 98–107)
CK SERPL-CCNC: 57 U/L — SIGNIFICANT CHANGE UP (ref 25–170)
CO2 SERPL-SCNC: 20 MMOL/L — LOW (ref 22–31)
CO2 SERPL-SCNC: 22 MMOL/L — SIGNIFICANT CHANGE UP (ref 22–31)
CREAT SERPL-MCNC: 0.56 MG/DL — SIGNIFICANT CHANGE UP (ref 0.5–1.3)
CREAT SERPL-MCNC: 0.63 MG/DL — SIGNIFICANT CHANGE UP (ref 0.5–1.3)
EOSINOPHIL # BLD AUTO: 0 K/UL — SIGNIFICANT CHANGE UP (ref 0–0.5)
EOSINOPHIL NFR BLD AUTO: 0 % — SIGNIFICANT CHANGE UP (ref 0–6)
EOSINOPHIL NFR FLD: 0 % — SIGNIFICANT CHANGE UP (ref 0–6)
GLUCOSE SERPL-MCNC: 111 MG/DL — HIGH (ref 70–99)
GLUCOSE SERPL-MCNC: 125 MG/DL — HIGH (ref 70–99)
HCT VFR BLD CALC: 34 % — LOW (ref 34.5–45)
HGB BLD-MCNC: 11.4 G/DL — LOW (ref 11.5–15.5)
IMM GRANULOCYTES # BLD AUTO: 0.04 # — SIGNIFICANT CHANGE UP
IMM GRANULOCYTES NFR BLD AUTO: 1 % — SIGNIFICANT CHANGE UP (ref 0–1.5)
LYMPHOCYTES # BLD AUTO: 0.37 K/UL — LOW (ref 1–3.3)
LYMPHOCYTES # BLD AUTO: 9.2 % — LOW (ref 13–44)
LYMPHOCYTES NFR SPEC AUTO: 4.4 % — LOW (ref 13–44)
MAGNESIUM SERPL-MCNC: 2.1 MG/DL — SIGNIFICANT CHANGE UP (ref 1.6–2.6)
MCHC RBC-ENTMCNC: 31.6 PG — SIGNIFICANT CHANGE UP (ref 27–34)
MCHC RBC-ENTMCNC: 33.5 % — SIGNIFICANT CHANGE UP (ref 32–36)
MCV RBC AUTO: 94.2 FL — SIGNIFICANT CHANGE UP (ref 80–100)
METAMYELOCYTES # FLD: 0 % — SIGNIFICANT CHANGE UP (ref 0–1)
MONOCYTES # BLD AUTO: 0.12 K/UL — SIGNIFICANT CHANGE UP (ref 0–0.9)
MONOCYTES NFR BLD AUTO: 3 % — SIGNIFICANT CHANGE UP (ref 2–14)
MONOCYTES NFR BLD: 2.7 % — SIGNIFICANT CHANGE UP (ref 2–9)
MYELOCYTES NFR BLD: 0 % — SIGNIFICANT CHANGE UP (ref 0–0)
NEUTROPHIL AB SER-ACNC: 85.8 % — HIGH (ref 43–77)
NEUTROPHILS # BLD AUTO: 3.5 K/UL — SIGNIFICANT CHANGE UP (ref 1.8–7.4)
NEUTROPHILS NFR BLD AUTO: 86.8 % — HIGH (ref 43–77)
NEUTS BAND # BLD: 1.8 % — SIGNIFICANT CHANGE UP (ref 0–6)
NRBC # FLD: 0 — SIGNIFICANT CHANGE UP
OTHER - HEMATOLOGY %: 0 — SIGNIFICANT CHANGE UP
PHOSPHATE SERPL-MCNC: 2.8 MG/DL — SIGNIFICANT CHANGE UP (ref 2.5–4.5)
PLATELET # BLD AUTO: 282 K/UL — SIGNIFICANT CHANGE UP (ref 150–400)
PLATELET COUNT - ESTIMATE: NORMAL — SIGNIFICANT CHANGE UP
PMV BLD: 9.7 FL — SIGNIFICANT CHANGE UP (ref 7–13)
POTASSIUM SERPL-MCNC: 4.1 MMOL/L — SIGNIFICANT CHANGE UP (ref 3.5–5.3)
POTASSIUM SERPL-MCNC: 4.4 MMOL/L — SIGNIFICANT CHANGE UP (ref 3.5–5.3)
POTASSIUM SERPL-SCNC: 4.1 MMOL/L — SIGNIFICANT CHANGE UP (ref 3.5–5.3)
POTASSIUM SERPL-SCNC: 4.4 MMOL/L — SIGNIFICANT CHANGE UP (ref 3.5–5.3)
PROMYELOCYTES # FLD: 0 % — SIGNIFICANT CHANGE UP (ref 0–0)
PROT SERPL-MCNC: 7.2 G/DL — SIGNIFICANT CHANGE UP (ref 6–8.3)
RBC # BLD: 3.61 M/UL — LOW (ref 3.8–5.2)
RBC # FLD: 13.4 % — SIGNIFICANT CHANGE UP (ref 10.3–14.5)
SODIUM SERPL-SCNC: 139 MMOL/L — SIGNIFICANT CHANGE UP (ref 135–145)
SODIUM SERPL-SCNC: 144 MMOL/L — SIGNIFICANT CHANGE UP (ref 135–145)
VARIANT LYMPHS # BLD: 5.3 % — SIGNIFICANT CHANGE UP
WBC # BLD: 4.03 K/UL — SIGNIFICANT CHANGE UP (ref 3.8–10.5)
WBC # FLD AUTO: 4.03 K/UL — SIGNIFICANT CHANGE UP (ref 3.8–10.5)

## 2018-11-14 PROCEDURE — 99239 HOSP IP/OBS DSCHRG MGMT >30: CPT

## 2018-11-14 RX ORDER — SENNA PLUS 8.6 MG/1
2 TABLET ORAL
Qty: 0 | Refills: 0 | DISCHARGE
Start: 2018-11-14

## 2018-11-14 RX ORDER — DEXAMETHASONE 0.5 MG/5ML
2 ELIXIR ORAL
Qty: 0 | Refills: 0 | COMMUNITY

## 2018-11-14 RX ORDER — DOCUSATE SODIUM 100 MG
1 CAPSULE ORAL
Qty: 0 | Refills: 0 | DISCHARGE
Start: 2018-11-14

## 2018-11-14 RX ADMIN — BUPROPION HYDROCHLORIDE 300 MILLIGRAM(S): 150 TABLET, EXTENDED RELEASE ORAL at 12:25

## 2018-11-14 RX ADMIN — Medication 75 MICROGRAM(S): at 05:51

## 2018-11-14 RX ADMIN — Medication 8 MILLIGRAM(S): at 05:51

## 2018-11-14 RX ADMIN — ENTECAVIR 0.5 MILLIGRAM(S): 0.5 TABLET ORAL at 12:25

## 2018-11-14 NOTE — DISCHARGE NOTE ADULT - CARE PROVIDER_API CALL
Yeny Horn), Internal Medicine  [address] [place] [address]  Phone: [phone]  Fax: [phone]

## 2018-11-14 NOTE — DISCHARGE NOTE ADULT - PATIENT PORTAL LINK FT
You can access the [facility] Patient Portal, offered by [facility], by registering with the following website: [email or web address]

## 2018-11-14 NOTE — DISCHARGE NOTE ADULT - MEDICATION SUMMARY - MEDICATIONS TO TAKE
I will START or STAY ON the medications listed below when I get home from the hospital:    dexamethasone 4 mg oral tablet  -- 2 tab(s) by mouth 2 times a day, As Needed -1 through day 2    last dose PM of 11/14  -- Indication: For Malignant neoplasm of right female breast, unspecified estrogen receptor status, unspecified site of breast    metoclopramide 10 mg oral tablet  -- 1 tab(s) by mouth 4 times a day (before meals and at bedtime), As Needed  -- Indication: For Nausea/Vomitting    entecavir 0.5 mg oral tablet  -- 1 tab(s) by mouth once a day  -- Indication: For Anti-viral    Xanax 0.25 mg oral tablet  -- 1 tab(s) by mouth 4 times a day, As Needed  -- Indication: For Anxiety    Symbicort 160 mcg-4.5 mcg/inh inhalation aerosol  --  inhaled , 2 puffs /day  -- Indication: For Asthma    senna oral tablet  -- 2 tab(s) by mouth once a day (at bedtime), As needed, Constipation  -- Indication: For Constipation     docusate sodium 100 mg oral capsule  -- 1 cap(s) by mouth 3 times a day  -- Indication: For Constipation    pantoprazole 40 mg oral delayed release tablet  -- 1 tab(s) by mouth once a day (before a meal)  -- Indication: For GERD    buPROPion 300 mg/24 hours oral tablet, extended release  -- 1 tab(s) by mouth every 24 hours  -- Indication: For Depression, unspecified depression type    levothyroxine 75 mcg (0.075 mg) oral tablet  -- 1 tab(s) by mouth once a day  -- Indication: For Hypothyroidism, unspecified type

## 2018-11-14 NOTE — DISCHARGE NOTE ADULT - HOSPITAL COURSE
49 yo female w/ pmhx asthma, hypothyroidism, chronic Hep B, bipolar depression/anxiety, T1CN0 invasive breast cancer ER 95%, [place] 3-5%, HER2 negative (s/p lumpectomy and SLNB) with high Oncotype DX score risk of recurrence on adjuvant chemotherapy with Taxotere and Cytoxan who presents from [facility] for close monitoring after chemotherapy. After prior chemotherapy cycle, pt had a generalized tonic clonic seizure witnessed by her  with prolonged unresponsiveness. She presented to [facility] and found to have Na of 119. CT and MRI brain without findings. She had no further episodes. Of note, she drank >4L of water on day of chemotherapy. Profound hyponatremia is thought to be multifactorial (SIADH due to Zoloft as well as polydipsia)  as well as possible SIADH from cytoxan. At present, pt notes tolerating cycle 3 well today without any complications. Has no complaints at the moment.   HOSPITAL COURSE: 47 yo female w/ pmhx asthma, hypothyroidism, chronic Hep B, bipolar depression/anxiety, T1CN0 invasive breast cancer ER 95%, [place] 3-5%, HER2 negative (s/p lumpectomy and SLNB) with high Oncotype DX score risk of recurrence on adjuvant chemotherapy with Taxotere and Cytoxan who presents from [facility] for close monitoring after chemotherapy. After prior chemotherapy cycle, pt had a generalized tonic clonic seizure witnessed by her  with prolonged unresponsiveness. She presented to [facility] and found to have Na of 119. CT and MRI brain without findings. She had no further episodes. Of note, she drank >4L of water on day of chemotherapy. Profound hyponatremia is thought to be multifactorial (SIADH due to Zoloft as well as polydipsia)  as well as possible SIADH from cytoxan. At present, pt notes tolerating cycle 3 well today without any complications. Has no complaints at the moment.   HOSPITAL COURSE:  Patient was monitored for hyponatremia with getting cytoxan chemotherapy.  She was consulted on by Oncology.  Her Sodium was normal on 11/14/18 at. She received decadron 8 mg q12 x 1 more day and was d/c to home to follow up with Dr Horn at [facility].

## 2018-11-14 NOTE — PROGRESS NOTE ADULT - ATTENDING COMMENTS
Patient seen by Oncology- noted did well with chemo and optimized for HOME  Decadon 8 mg bid x 1 day - today  f/u Dr Andrade at [facility]    42 min to coordinate d/c

## 2018-11-14 NOTE — PROGRESS NOTE ADULT - SUBJECTIVE AND OBJECTIVE BOX
Patient is a 48y old  Female who presents with a chief complaint of     SUBJECTIVE / OVERNIGHT EVENTS:    MEDICATIONS  (STANDING):  buDESOnide 160 MICROgram(s)/formoterol 4.5 MICROgram(s) Inhaler 2 Puff(s) Inhalation two times a day  buPROPion XL . 300 milliGRAM(s) Oral daily  dexamethasone     Tablet 8 milliGRAM(s) Oral two times a day  docusate sodium 100 milliGRAM(s) Oral three times a day  enoxaparin Injectable 40 milliGRAM(s) SubCutaneous every 24 hours  entecavir 0.5 milliGRAM(s) Oral daily  levothyroxine 75 MICROGram(s) Oral daily  pantoprazole    Tablet 40 milliGRAM(s) Oral before breakfast    MEDICATIONS  (PRN):  acetaminophen   Tablet .. 650 milliGRAM(s) Oral every 6 hours PRN Temp greater or equal to 38C (100.4F), Mild Pain (1 - 3)  ALPRAZolam 0.25 milliGRAM(s) Oral four times a day PRN anxiety  metoclopramide 10 milliGRAM(s) Oral Before meals and at bedtime PRN Nausea, vomiting  ondansetron Injectable 4 milliGRAM(s) IV Push every 6 hours PRN Nausea  senna 2 Tablet(s) Oral at bedtime PRN Constipation          PHYSICAL EXAM:  Vital Signs Last 24 Hrs  T(C): 36.4 (14 Nov 2018 05:37), Max: 36.8 (13 Nov 2018 17:01)  T(F): 97.5 (14 Nov 2018 05:37), Max: 98.3 (13 Nov 2018 17:01)  HR: 67 (14 Nov 2018 05:37) (66 - 71)  BP: 114/74 (14 Nov 2018 05:37) (114/74 - 125/79)  BP(mean): --  RR: 18 (14 Nov 2018 05:37) (18 - 18)  SpO2: 99% (14 Nov 2018 05:37) (97% - 99%)  GENERAL: NAD, well-developed  HEAD:  Atraumatic, Normocephalic  EYES: EOMI, PERRLA, conjunctiva and sclera clear  NECK: Supple, No JVD  CHEST/LUNG: Clear to auscultation bilaterally; No wheeze  HEART: Regular rate and rhythm; No murmurs, rubs, or gallops  ABDOMEN: Soft, Nontender, Nondistended; Bowel sounds present  EXTREMITIES:  2+ Peripheral Pulses, No clubbing, cyanosis, or edema  PSYCH: AAOx3  NEUROLOGY: non-focal  SKIN: No rashes or lesions    LABS:                        11.4   4.03  )-----------( 282      ( 14 Nov 2018 09:15 )             34.0     11-14    144  |  110<H>  |  11  ----------------------------<  111<H>  4.1   |  20<L>  |  0.56    Ca    9.1      14 Nov 2018 09:15  Phos  2.8     11-14  Mg     2.1     11-14    TPro  7.2  /  Alb  4.2  /  TBili  0.3  /  DBili  x   /  AST  26  /  ALT  37<H>  /  AlkPhos  92  11-14      CARDIAC MARKERS ( 14 Nov 2018 09:15 )  x     / x     / 57 u/L / x     / x              RADIOLOGY & ADDITIONAL TESTS:    Imaging Personally Reviewed:    Consultant(s) Notes Reviewed:      Care Discussed with Consultants/Other Providers:

## 2018-11-14 NOTE — DISCHARGE NOTE ADULT - PLAN OF CARE
Please c/w Decadon 8 mg po 2x/day x 24 hrs c/w Synthroid C/w Wellbutrin Stable -You received cytoxan while in patient and your sodium levels were monitored. Continue to monitor your labs as OP with your oncologist.   Please c/w Decadron 8 mg po 2x/day x 24 hrs (last dose 11/14 PM); re-start Decadron per oncology recs Continue your thyroid medications as recommended and follow-up with your outpatient provider for continual thyroid function testing and further medication management.   c/w Synthroid Continue your medications as directed and follow up with your PCP and psychiatrist for further evaluation and medical management. If you are ever in need of immediate psychiatric assistance you may reach out to the [facility] [phone].   C/w Wellbutrin

## 2018-11-14 NOTE — DISCHARGE NOTE ADULT - CARE PLAN
Principal Discharge DX:	Malignant neoplasm of right female breast, unspecified estrogen receptor status, unspecified site of breast  Assessment and plan of treatment:	Please c/w Decadon 8 mg po 2x/day x 24 hrs  Secondary Diagnosis:	Hypothyroidism, unspecified type  Assessment and plan of treatment:	c/w Synthroid Principal Discharge DX:	Malignant neoplasm of right female breast, unspecified estrogen receptor status, unspecified site of breast  Assessment and plan of treatment:	Please c/w Decadon 8 mg po 2x/day x 24 hrs  Secondary Diagnosis:	Hypothyroidism, unspecified type  Assessment and plan of treatment:	c/w Synthroid  Secondary Diagnosis:	Depression, unspecified depression type  Assessment and plan of treatment:	C/w Wellbutrin Principal Discharge DX:	Malignant neoplasm of right female breast, unspecified estrogen receptor status, unspecified site of breast  Goal:	Stable  Assessment and plan of treatment:	-You received cytoxan while in patient and your sodium levels were monitored. Continue to monitor your labs as OP with your oncologist.   Please c/w Decadron 8 mg po 2x/day x 24 hrs (last dose 11/14 PM); re-start Decadron per oncology recs  Secondary Diagnosis:	Hypothyroidism, unspecified type  Assessment and plan of treatment:	Continue your thyroid medications as recommended and follow-up with your outpatient provider for continual thyroid function testing and further medication management.   c/w Synthroid  Secondary Diagnosis:	Depression, unspecified depression type  Assessment and plan of treatment:	Continue your medications as directed and follow up with your PCP and psychiatrist for further evaluation and medical management. If you are ever in need of immediate psychiatric assistance you may reach out to the [facility] [phone].   C/w Wellbutrin

## 2018-11-14 NOTE — PROGRESS NOTE ADULT - PROBLEM SELECTOR PLAN 1
S/P Chemo, Cytoxan and Taxotere.  -Monitor BMP Q6H  -Free water restriction 800cc/day  -Continue Dexamethasone 8mg 2X/day X 3 more doses.  -F/U with Onc consult S/P Chemo, Cytoxan and Taxotere.  1) Breast Ca- s/p third cycle of cytoxan and taxotere 11/13  - onpro to be deployed 11/14  - will need dexamethasone 8 mg tonight and tomorrow for supportive care with chemo   -Free water restriction 800cc/day  -Continue Dexamethasone 8mg 2X/day X 2 more doses  -F/U with Onc consult

## 2018-11-16 LAB
ALBUMIN SERPL ELPH-MCNC: 4 G/DL
ALP BLD-CCNC: 98 U/L
ALT SERPL-CCNC: 30 U/L
ANION GAP SERPL CALC-SCNC: 11 MMOL/L
AST SERPL-CCNC: 18 U/L
BILIRUB SERPL-MCNC: 0.4 MG/DL
BUN SERPL-MCNC: 12 MG/DL
CALCIUM SERPL-MCNC: 9.7 MG/DL
CHLORIDE SERPL-SCNC: 100 MMOL/L
CK SERPL-CCNC: 39 U/L
CO2 SERPL-SCNC: 28 MMOL/L
CREAT SERPL-MCNC: 0.69 MG/DL
GLUCOSE SERPL-MCNC: 84 MG/DL
POTASSIUM SERPL-SCNC: 4.5 MMOL/L
PROT SERPL-MCNC: 7 G/DL
SODIUM SERPL-SCNC: 139 MMOL/L

## 2018-11-23 ENCOUNTER — APPOINTMENT (OUTPATIENT)
Dept: HEMATOLOGY ONCOLOGY | Facility: CLINIC | Age: 48
End: 2018-11-23

## 2018-11-26 ENCOUNTER — OUTPATIENT (OUTPATIENT)
Dept: OUTPATIENT SERVICES | Facility: HOSPITAL | Age: 48
LOS: 1 days | Discharge: HOME | End: 2018-11-26

## 2018-11-26 DIAGNOSIS — M67.432 GANGLION, LEFT WRIST: Chronic | ICD-10-CM

## 2018-11-26 DIAGNOSIS — C50.919 MALIGNANT NEOPLASM OF UNSPECIFIED SITE OF UNSPECIFIED FEMALE BREAST: ICD-10-CM

## 2018-11-26 DIAGNOSIS — Z30.430 ENCOUNTER FOR INSERTION OF INTRAUTERINE CONTRACEPTIVE DEVICE: Chronic | ICD-10-CM

## 2018-11-26 DIAGNOSIS — Z98.890 OTHER SPECIFIED POSTPROCEDURAL STATES: Chronic | ICD-10-CM

## 2018-11-27 ENCOUNTER — APPOINTMENT (OUTPATIENT)
Dept: HEPATOLOGY | Facility: CLINIC | Age: 48
End: 2018-11-27

## 2018-12-03 ENCOUNTER — APPOINTMENT (OUTPATIENT)
Dept: HEMATOLOGY ONCOLOGY | Facility: CLINIC | Age: 48
End: 2018-12-03
Payer: COMMERCIAL

## 2018-12-03 ENCOUNTER — RESULT REVIEW (OUTPATIENT)
Age: 48
End: 2018-12-03

## 2018-12-03 VITALS
DIASTOLIC BLOOD PRESSURE: 71 MMHG | WEIGHT: 152.78 LBS | HEART RATE: 83 BPM | BODY MASS INDEX: 27.06 KG/M2 | TEMPERATURE: 98.3 F | SYSTOLIC BLOOD PRESSURE: 116 MMHG | OXYGEN SATURATION: 97 % | RESPIRATION RATE: 16 BRPM

## 2018-12-03 LAB
BASOPHILS # BLD AUTO: 0.1 K/UL — SIGNIFICANT CHANGE UP (ref 0–0.2)
BASOPHILS NFR BLD AUTO: 1.1 % — SIGNIFICANT CHANGE UP (ref 0–2)
EOSINOPHIL # BLD AUTO: 0.1 K/UL — SIGNIFICANT CHANGE UP (ref 0–0.5)
EOSINOPHIL NFR BLD AUTO: 2.8 % — SIGNIFICANT CHANGE UP (ref 0–6)
HCT VFR BLD CALC: 36.6 % — SIGNIFICANT CHANGE UP (ref 34.5–45)
HGB BLD-MCNC: 12.4 G/DL — SIGNIFICANT CHANGE UP (ref 11.5–15.5)
LYMPHOCYTES # BLD AUTO: 1.1 K/UL — SIGNIFICANT CHANGE UP (ref 1–3.3)
LYMPHOCYTES # BLD AUTO: 22.7 % — SIGNIFICANT CHANGE UP (ref 13–44)
MCHC RBC-ENTMCNC: 31.7 PG — SIGNIFICANT CHANGE UP (ref 27–34)
MCHC RBC-ENTMCNC: 33.9 G/DL — SIGNIFICANT CHANGE UP (ref 32–36)
MCV RBC AUTO: 93.6 FL — SIGNIFICANT CHANGE UP (ref 80–100)
MONOCYTES # BLD AUTO: 0.5 K/UL — SIGNIFICANT CHANGE UP (ref 0–0.9)
MONOCYTES NFR BLD AUTO: 11 % — SIGNIFICANT CHANGE UP (ref 2–14)
NEUTROPHILS # BLD AUTO: 3.1 K/UL — SIGNIFICANT CHANGE UP (ref 1.8–7.4)
NEUTROPHILS NFR BLD AUTO: 62.4 % — SIGNIFICANT CHANGE UP (ref 43–77)
PLATELET # BLD AUTO: 306 K/UL — SIGNIFICANT CHANGE UP (ref 150–400)
RBC # BLD: 3.9 M/UL — SIGNIFICANT CHANGE UP (ref 3.8–5.2)
RBC # FLD: 14 % — SIGNIFICANT CHANGE UP (ref 10.3–14.5)
WBC # BLD: 5 K/UL — SIGNIFICANT CHANGE UP (ref 3.8–10.5)
WBC # FLD AUTO: 5 K/UL — SIGNIFICANT CHANGE UP (ref 3.8–10.5)

## 2018-12-03 PROCEDURE — 99214 OFFICE O/P EST MOD 30 MIN: CPT

## 2018-12-03 NOTE — HISTORY OF PRESENT ILLNESS
[Disease: _____________________] : Disease: [unfilled] [T: ___] : T[unfilled] [N: ___] : N[unfilled] [M: ___] : M[unfilled] [AJCC Stage: ____] : AJCC Stage: [unfilled] [Treatment Protocol] : Treatment Protocol [Therapy: ___] : Therapy: [unfilled] [de-identified] : 48-year-old lady with history of asthma, hypothyroidism, depression/anxiety with recently diagnosed invasive breast cancer s/p lumpectomy and SLNB here for initial medical oncology consultation.\par \par Patient had normal annual routine mammograms since age 42, last 8/11/16. 5/22/18 mammo with 1.5cm R breast mass; diagnosis mammo and US confirmed this mass RUOQ 1.6cm at 10:00, hypoechoic. US guided core biopsy of the mass was performed 6/1/18 which revealed 0.5 cm invasive ductal carcinoma, [place] score 7/9, with nuclear grade 2 cribriform type DCIS with central necrosis and calcifications, estrogen receptor 95% strong, progesterone receptor 3-5% strong, HER-2 IHC 2+ negative by CIS. My Risk panel on 6/13/2018 was negative. MRI of the bilateral breasts on 06/25/2018 showed a 1.7 X 1.6 X 1.8 cm focal area of non-mass enhancement with washout kinetics and biopsy marker at the upper outer posterior 10:00 position of the right breast. There were no other suspicious lesions in the right breast and no suspicious lesions in the left breast. There were no axillary or internal mammary lymphadenopathy. She saw Dr. Heller who performed CXR, nuclear bone scan, pelvic and abdominal sonograms which were negative. On 8/15/18 she underwent a lumpectomy with SLNB - pathology with 1.1 cm invasive ductal carcinoma, [place] 7-8/9 and no LVI. 0/2LN involved. There was also a 1.5 cm nuclear grade 2 - 3 ductal carcinoma in situ, cribriform and solid type with comedonecrosis and calcifications. Regarding the margins, the invasive tumor was 0.15 mm from the deep margin. Ductal carcinoma in situ was noted within microns of the deep margin and less than 0.1 mm from the anterior and superior margins. Two sentinel nodes were examined and negative. She saw radiation oncology in consultation and was recommended for adjuvant radiation pending medical oncology recommendations. Oncotype score 29 (20% risk of distant recurrence with tamoxifen alone). TC x 4 recommended. Patient has elected to utilize cold cap.\par \par HBV surface antigen reactive, HBV DNA not detected, c/w chronic hepatitis B infection (discussed with hepatology). She was started on entecavir daily ~1 week prior to chemotherapy initiation and continues this daily. US Abdomen completed prior by Dr. Heller prior to surgery - noted normal. She saw hepatology Dr. Beltran in consultation 11/2; repeat serologies and Fibroscan planned. Plan to continue entecavir until at least 6 months post-chemotherapy, with hepatology follow up also planned.\par \par 10/24-10/26 admitted to [facility].\par ~9pm just after driving home from getting food,  witness LOC with seizure activity x 20 seconds followed by lethargy - EMS called.\par She was taken to [facility] ER and admitted to ICU; sodium 119 with evidence of aspiration on CXR/CTA chest, O2 sat 86% briefly requiring BIPAP support quickly weaned. Neurology, Pulmonary, Renal consults following. Sodium corrected over the next 24h with normal saline infusion, antibiotics discontinued as no further evidence of infection. Neulasta onpro administered as scheduled 10/25 afternoon. She had a CTH and MRI Brain w/wo contrast which showed no acute findings. Zoloft was held, and she was discharged home. Incidental note of possible compression deformity of thoracic spine on imaging while inpatient. She admits to drinking ~4L water on day of chemotherapy as advised by cold cap literature. Her  recalls in retrospect that she seemed overly sleepy and slightly confused (forgot she had chinese food for lunch, eyes closing while sitting up) on the afternoon/evening after treatment prior to seizure. CK and LFTs elevated post discharge, continued to downtrend over the next few days to normal.\par \par She underwent cycle 3 of TC followed by 24 hours of observation at [facility] inpatient with neuro checks and q4h BMP monitoring. Her sodium remained normal and she was asymptomatic.\par \par Hypothyroidism stable on synthroid. Depression/anxiety stable since 2006 on zoloft and wellbutrin, she sees Dr. Manuel Ga for this. She is UTD on RHM. She lives on  with her  Eric. Their 17 year old son just started college in [place] [facility] and their 13 year old daughter lives at home with them. Patient works part time at [employer]. FHx significant for father with prostate cancer in 60s, paternal cousin with leukemia age 13 and breast cancer age 45. [de-identified] : T1cN0 [de-identified] : ER+ID-HER2- [de-identified] : Oncotype 29 [FreeTextEntry1] : x 4 cycles, with Penguin cold cap [de-identified] : Patient presents prior to cycle 4 of of TC chemotherapy (planned for 4 cycles) today. She is utilizing scalp cooling with Penguin cold cap during therapy. She is doing well and without complaints. She is drinking "normal" amounts of water/fluids, gatorade. She continues to hold sertraline as told - mood is good overall and unchanged. She is taking entecavir daily with no issues, and plans to follow up with Fibroscan and Hepatology. B/l axillary rash improved - no longer using electric razor. Denies HA, dizziness, f/c/s, n/v, CP, SOB, cough, abd pain, diarrhea, edema, rashes, bleeding, muscle aches or pains, new neuro symptoms or confusion.

## 2018-12-03 NOTE — PHYSICAL EXAM
[Fully active, able to carry on all pre-disease performance without restriction] : Status 0 - Fully active, able to carry on all pre-disease performance without restriction [Normal] : normal spine exam without palpable tenderness, no kyphosis or scoliosis [de-identified] : R breast lumpectomy and SLNB scars well healing, no rashes, no masses or adenopathy palpated b/l [de-identified] : some varicose veins on LEs, no edema

## 2018-12-03 NOTE — ASSESSMENT
[FreeTextEntry1] : 48-year-old woman with history of asthma, hypothyroidism, depression/anxiety with recently diagnosed R invasive breast cancer s/p lumpectomy and SLNB with 1.1cm IDC, ER+ 95% strong, [place]+ 3-5% strong, HER2 IHC 2+ and negative by CISH. [place] grade 7-8/9, with DCIS present and close surgical margins and 0/2 SLN involved. Oncotype score 29. She presents today for follow up after Cycle 2 of  “TC” chemotherapy (Taxotere 75mg/m2 with cytoxan 600mg/m3 every 3 weeks x 4 cycles). On workup also noted to have evidence of chronic HBV infection (Hep B surface Ag reactive, HBV viral load undetectable, HBV core Ab negative), now on entecavir during therapy. Admitted 10/24-10/26 after grand mal seizure in the setting of severe hyponatremia (euvolemic, polydipsia) and aspiration event - now improving, sodium corrected, sertraline held, and without symptoms. Thought due to polydipsia - sertraline held indefinitely, C3 completed without issue.\par \par -CBC and CMP today with lytes check\par -C4 TC x 4 via PIV due 12/5, will plan to proceed with this - plan to check stat BMP pre treatment and ~1h post treatment (will ask for longer room time to allow for this in conjuction with cold cap)\par -dexamethasone 8mg PO BID day before, day of, and day after chemotherapy each cycle to continue with next, patient again reiterated understanding of this\par -continue entecavir daily; Hepatology recommendations appreciated, plan for Fibroscan and OP follow up with Dr. Beltran, to continue for ~6 months post chemotherapy completion\par -Reglan at home PRN for nausea (has not needed)\par -Follow up Dr. Heller as scheduled, radiation oncology follow up advised now with Dr. Escalante\par -Follow up with her psychiatrist as planned soon, given discontinuation of sertraline (and consider resuming after next/final cycle of therapy)\par -patient instructed to call with any new symptoms concerns\par -plan was for DEXA, GNRH agonist/AI post-chemotherapy; given compression deformity T7, T11, T12 seen on CTA (reviewed extensively by phone with radiologist by me - no concerning signs or findings, no evidence of metastatic disease or actual fracture,  nuclear bone scan 6/2018 re reviewed and without any significant findings). Will obtain DEXA and reassess, consider MRI with contrast thoracic spine - patient prefers to defer this for now until treatment completion - address/order scans at next visit\par

## 2018-12-04 NOTE — PROGRESS NOTE ADULT - ASSESSMENT
48 y.o. F with h/o hypothyroidism, asthma, Chronic hep B, breast cancer on Chemo,  SIADH, admitted for observation for SIADH after Cytoxan today. hx, exam, labs, ct of abd/pelvs Pt is stable for transfer

## 2018-12-05 ENCOUNTER — RESULT REVIEW (OUTPATIENT)
Age: 48
End: 2018-12-05

## 2018-12-05 ENCOUNTER — LABORATORY RESULT (OUTPATIENT)
Age: 48
End: 2018-12-05

## 2018-12-05 ENCOUNTER — APPOINTMENT (OUTPATIENT)
Dept: INFUSION THERAPY | Facility: HOSPITAL | Age: 48
End: 2018-12-05

## 2018-12-05 LAB
ALBUMIN SERPL ELPH-MCNC: 4.2 G/DL
ALP BLD-CCNC: 95 U/L
ALT SERPL-CCNC: 29 U/L
ANION GAP SERPL CALC-SCNC: 8 MMOL/L
AST SERPL-CCNC: 23 U/L
BASOPHILS # BLD AUTO: 0 K/UL — SIGNIFICANT CHANGE UP (ref 0–0.2)
BASOPHILS NFR BLD AUTO: 0.4 % — SIGNIFICANT CHANGE UP (ref 0–2)
BILIRUB SERPL-MCNC: 0.3 MG/DL
BUN SERPL-MCNC: 10 MG/DL — SIGNIFICANT CHANGE UP (ref 7–23)
BUN SERPL-MCNC: 11 MG/DL — SIGNIFICANT CHANGE UP (ref 7–23)
BUN SERPL-MCNC: 9 MG/DL
CA-I BLDA-SCNC: 1.11 MMOL/L — LOW (ref 1.12–1.3)
CA-I BLDA-SCNC: 1.21 MMOL/L — SIGNIFICANT CHANGE UP (ref 1.12–1.3)
CALCIUM SERPL-MCNC: 9.2 MG/DL
CHLORIDE SERPL-SCNC: 104 MMOL/L — SIGNIFICANT CHANGE UP (ref 96–108)
CHLORIDE SERPL-SCNC: 106 MMOL/L — SIGNIFICANT CHANGE UP (ref 96–108)
CHLORIDE SERPL-SCNC: 108 MMOL/L
CO2 SERPL-SCNC: 21 MMOL/L — LOW (ref 22–31)
CO2 SERPL-SCNC: 23 MMOL/L — SIGNIFICANT CHANGE UP (ref 22–31)
CO2 SERPL-SCNC: 25 MMOL/L
CREAT SERPL-MCNC: 0.6 MG/DL — SIGNIFICANT CHANGE UP (ref 0.5–1.3)
CREAT SERPL-MCNC: 0.7 MG/DL — SIGNIFICANT CHANGE UP (ref 0.5–1.3)
CREAT SERPL-MCNC: 0.72 MG/DL
EOSINOPHIL # BLD AUTO: 0.1 K/UL — SIGNIFICANT CHANGE UP (ref 0–0.5)
EOSINOPHIL NFR BLD AUTO: 1.6 % — SIGNIFICANT CHANGE UP (ref 0–6)
GLUCOSE SERPL-MCNC: 103 MG/DL
GLUCOSE SERPL-MCNC: 130 MG/DL — HIGH (ref 70–99)
GLUCOSE SERPL-MCNC: 95 MG/DL — SIGNIFICANT CHANGE UP (ref 70–99)
HCT VFR BLD CALC: 34.5 % — SIGNIFICANT CHANGE UP (ref 34.5–45)
HGB BLD-MCNC: 11.7 G/DL — SIGNIFICANT CHANGE UP (ref 11.5–15.5)
LYMPHOCYTES # BLD AUTO: 1.1 K/UL — SIGNIFICANT CHANGE UP (ref 1–3.3)
LYMPHOCYTES # BLD AUTO: 17.2 % — SIGNIFICANT CHANGE UP (ref 13–44)
MAGNESIUM SERPL-MCNC: 2 MG/DL
MCHC RBC-ENTMCNC: 31.4 PG — SIGNIFICANT CHANGE UP (ref 27–34)
MCHC RBC-ENTMCNC: 34 G/DL — SIGNIFICANT CHANGE UP (ref 32–36)
MCV RBC AUTO: 92.5 FL — SIGNIFICANT CHANGE UP (ref 80–100)
MONOCYTES # BLD AUTO: 0.5 K/UL — SIGNIFICANT CHANGE UP (ref 0–0.9)
MONOCYTES NFR BLD AUTO: 8.1 % — SIGNIFICANT CHANGE UP (ref 2–14)
NEUTROPHILS # BLD AUTO: 4.8 K/UL — SIGNIFICANT CHANGE UP (ref 1.8–7.4)
NEUTROPHILS NFR BLD AUTO: 72.8 % — SIGNIFICANT CHANGE UP (ref 43–77)
PHOSPHATE SERPL-MCNC: 3.9 MG/DL
PLATELET # BLD AUTO: 325 K/UL — SIGNIFICANT CHANGE UP (ref 150–400)
POTASSIUM SERPL-MCNC: 3.8 MMOL/L — SIGNIFICANT CHANGE UP (ref 3.5–5.3)
POTASSIUM SERPL-MCNC: 4.1 MMOL/L — SIGNIFICANT CHANGE UP (ref 3.5–5.3)
POTASSIUM SERPL-SCNC: 3.8 MMOL/L — SIGNIFICANT CHANGE UP (ref 3.5–5.3)
POTASSIUM SERPL-SCNC: 4.1 MMOL/L — SIGNIFICANT CHANGE UP (ref 3.5–5.3)
POTASSIUM SERPL-SCNC: 5.4 MMOL/L
PROT SERPL-MCNC: 7 G/DL
RBC # BLD: 3.73 M/UL — LOW (ref 3.8–5.2)
RBC # FLD: 14 % — SIGNIFICANT CHANGE UP (ref 10.3–14.5)
SODIUM SERPL-SCNC: 141 MMOL/L
SODIUM SERPL-SCNC: 141 MMOL/L — SIGNIFICANT CHANGE UP (ref 135–145)
SODIUM SERPL-SCNC: 143 MMOL/L — SIGNIFICANT CHANGE UP (ref 135–145)
WBC # BLD: 6.5 K/UL — SIGNIFICANT CHANGE UP (ref 3.8–10.5)
WBC # FLD AUTO: 6.5 K/UL — SIGNIFICANT CHANGE UP (ref 3.8–10.5)

## 2018-12-06 DIAGNOSIS — Z51.11 ENCOUNTER FOR ANTINEOPLASTIC CHEMOTHERAPY: ICD-10-CM

## 2018-12-06 DIAGNOSIS — R11.2 NAUSEA WITH VOMITING, UNSPECIFIED: ICD-10-CM

## 2018-12-06 DIAGNOSIS — Z51.89 ENCOUNTER FOR OTHER SPECIFIED AFTERCARE: ICD-10-CM

## 2018-12-11 ENCOUNTER — APPOINTMENT (OUTPATIENT)
Dept: HEPATOLOGY | Facility: CLINIC | Age: 48
End: 2018-12-11
Payer: COMMERCIAL

## 2018-12-11 VITALS
SYSTOLIC BLOOD PRESSURE: 90 MMHG | DIASTOLIC BLOOD PRESSURE: 70 MMHG | TEMPERATURE: 98.4 F | RESPIRATION RATE: 14 BRPM | BODY MASS INDEX: 26.4 KG/M2 | HEIGHT: 63 IN | WEIGHT: 149 LBS | HEART RATE: 86 BPM

## 2018-12-11 DIAGNOSIS — K90.0 CELIAC DISEASE: ICD-10-CM

## 2018-12-11 DIAGNOSIS — E87.1 HYPO-OSMOLALITY AND HYPONATREMIA: ICD-10-CM

## 2018-12-11 PROCEDURE — ZZZZZ: CPT

## 2018-12-11 PROCEDURE — 99214 OFFICE O/P EST MOD 30 MIN: CPT | Mod: 25

## 2018-12-11 PROCEDURE — 91200 LIVER ELASTOGRAPHY: CPT

## 2018-12-12 ENCOUNTER — APPOINTMENT (OUTPATIENT)
Dept: HEMATOLOGY ONCOLOGY | Facility: CLINIC | Age: 48
End: 2018-12-12
Payer: COMMERCIAL

## 2018-12-12 ENCOUNTER — RESULT REVIEW (OUTPATIENT)
Age: 48
End: 2018-12-12

## 2018-12-12 ENCOUNTER — LABORATORY RESULT (OUTPATIENT)
Age: 48
End: 2018-12-12

## 2018-12-12 VITALS
OXYGEN SATURATION: 98 % | WEIGHT: 151.02 LBS | TEMPERATURE: 98.2 F | HEART RATE: 101 BPM | DIASTOLIC BLOOD PRESSURE: 67 MMHG | BODY MASS INDEX: 26.75 KG/M2 | RESPIRATION RATE: 14 BRPM | SYSTOLIC BLOOD PRESSURE: 103 MMHG

## 2018-12-12 LAB
BASOPHILS # BLD AUTO: 0 K/UL — SIGNIFICANT CHANGE UP (ref 0–0.2)
EOSINOPHIL # BLD AUTO: 0.2 K/UL — SIGNIFICANT CHANGE UP (ref 0–0.5)
EOSINOPHIL NFR BLD AUTO: 1 % — SIGNIFICANT CHANGE UP (ref 0–6)
HCT VFR BLD CALC: 35.6 % — SIGNIFICANT CHANGE UP (ref 34.5–45)
HGB BLD-MCNC: 11.6 G/DL — SIGNIFICANT CHANGE UP (ref 11.5–15.5)
LYMPHOCYTES # BLD AUTO: 1.1 K/UL — SIGNIFICANT CHANGE UP (ref 1–3.3)
LYMPHOCYTES # BLD AUTO: 31 % — SIGNIFICANT CHANGE UP (ref 13–44)
MCHC RBC-ENTMCNC: 30.5 PG — SIGNIFICANT CHANGE UP (ref 27–34)
MCHC RBC-ENTMCNC: 32.7 G/DL — SIGNIFICANT CHANGE UP (ref 32–36)
MCV RBC AUTO: 93.2 FL — SIGNIFICANT CHANGE UP (ref 80–100)
MONOCYTES # BLD AUTO: 1 K/UL — HIGH (ref 0–0.9)
MONOCYTES NFR BLD AUTO: 23 % — HIGH (ref 2–14)
NEUTROPHILS # BLD AUTO: 3.5 K/UL — SIGNIFICANT CHANGE UP (ref 1.8–7.4)
NEUTROPHILS NFR BLD AUTO: 41 % — LOW (ref 43–77)
NEUTS BAND # BLD: 4 % — SIGNIFICANT CHANGE UP (ref 0–8)
NRBC # BLD: 1 /100 — HIGH (ref 0–0)
PLAT MORPH BLD: NORMAL — SIGNIFICANT CHANGE UP
PLATELET # BLD AUTO: 144 K/UL — LOW (ref 150–400)
RBC # BLD: 3.82 M/UL — SIGNIFICANT CHANGE UP (ref 3.8–5.2)
RBC # FLD: 13.6 % — SIGNIFICANT CHANGE UP (ref 10.3–14.5)
RBC BLD AUTO: SIGNIFICANT CHANGE UP
WBC # BLD: 5.9 K/UL — SIGNIFICANT CHANGE UP (ref 3.8–10.5)
WBC # FLD AUTO: 5.9 K/UL — SIGNIFICANT CHANGE UP (ref 3.8–10.5)

## 2018-12-12 PROCEDURE — 99214 OFFICE O/P EST MOD 30 MIN: CPT

## 2018-12-12 RX ORDER — METOCLOPRAMIDE 10 MG/1
10 TABLET ORAL EVERY 8 HOURS
Qty: 30 | Refills: 0 | Status: DISCONTINUED | COMMUNITY
Start: 2018-09-21 | End: 2018-12-12

## 2018-12-12 RX ORDER — DEXAMETHASONE 4 MG/1
4 TABLET ORAL TWICE DAILY
Qty: 60 | Refills: 0 | Status: DISCONTINUED | COMMUNITY
Start: 2018-09-28 | End: 2018-12-12

## 2018-12-12 NOTE — ASSESSMENT
[FreeTextEntry1] : 48-year-old woman with history of asthma, hypothyroidism, depression/anxiety with R invasive breast cancer s/p lumpectomy and SLNB August 2018 with 1.1cm IDC, ER+ 95% strong, [place]+ 3-5% strong, HER2 IHC 2+ and negative by CISH. [place] grade 7-8/9, with DCIS present and close surgical margins and 0/2 SLN involved. Oncotype score 29. She presents today for follow up after Cycle 4 of  “TC” chemotherapy (Taxotere 75mg/m2 with cytoxan 600mg/m3 every 3 weeks x 4 cycles). On workup also noted to have evidence of chronic HBV infection (Hep B surface Ag reactive, HBV viral load undetectable, HBV core Ab negative), now on entecavir during therapy. Admitted 10/24-10/26 after grand mal seizure in the setting of severe hyponatremia (euvolemic, polydipsia) and aspiration event - now improving, sodium corrected, sertraline held, and without symptoms. Thought due to polydipsia - sertraline held indefinitely, C3 and 4 completed without issue.\par \par -CBC and CMP today with lytes check\par -continue entecavir daily; Hepatology recommendations appreciated from 12/11, plan for continued follow up and HCC screening with Dr. Beltran, to continue for ~6 months post chemotherapy completion. Check hepatitis delta assay today as per Dr. Beltran's recommendations\par -Follow up Dr. Heller as scheduled, radiation oncology follow up advised now with Dr. Escalante\par -Follow up with her psychiatrist as planned soon, given discontinuation of sertraline (and consider resuming after next/final cycle of therapy)\par -patient instructed to call with any new symptoms concerns\par -plan for DEXA, GNRH agonist/AI post-chemotherapy; given compression deformity T7, T11, T12 seen on CTA (reviewed extensively by phone with radiologist by me - no concerning signs or findings, no evidence of metastatic disease or actual fracture,  nuclear bone scan 6/2018 re reviewed and without any significant findings). Will obtain DEXA and reassess, consider MRI with contrast thoracic spine - discussed today again. will order scans at next visit 1/2/19\par

## 2018-12-12 NOTE — PHYSICAL EXAM
[Fully active, able to carry on all pre-disease performance without restriction] : Status 0 - Fully active, able to carry on all pre-disease performance without restriction [Normal] : affect appropriate [de-identified] : RUOQ breast lumpectomy and SLNB scars well healing, no rashes, no masses or adenopathy palpated b/l [de-identified] : some varicose veins on LEs, no edema

## 2018-12-12 NOTE — HISTORY OF PRESENT ILLNESS
[Disease: _____________________] : Disease: [unfilled] [T: ___] : T[unfilled] [N: ___] : N[unfilled] [M: ___] : M[unfilled] [AJCC Stage: ____] : AJCC Stage: [unfilled] [de-identified] : 48-year-old lady with history of asthma, hypothyroidism, depression/anxiety with recently diagnosed invasive breast cancer s/p lumpectomy and SLNB here for initial medical oncology consultation.\par \par Patient had normal annual routine mammograms since age 42, last 8/11/16. 5/22/18 mammo with 1.5cm R breast mass; diagnosis mammo and US confirmed this mass RUOQ 1.6cm at 10:00, hypoechoic. US guided core biopsy of the mass was performed 6/1/18 which revealed 0.5 cm invasive ductal carcinoma, [place] score 7/9, with nuclear grade 2 cribriform type DCIS with central necrosis and calcifications, estrogen receptor 95% strong, progesterone receptor 3-5% strong, HER-2 IHC 2+ negative by CIS. My Risk panel on 6/13/2018 was negative. MRI of the bilateral breasts on 06/25/2018 showed a 1.7 X 1.6 X 1.8 cm focal area of non-mass enhancement with washout kinetics and biopsy marker at the upper outer posterior 10:00 position of the right breast. There were no other suspicious lesions in the right breast and no suspicious lesions in the left breast. There were no axillary or internal mammary lymphadenopathy. She saw Dr. Heller who performed CXR, nuclear bone scan, pelvic and abdominal sonograms which were negative. On 8/15/18 she underwent a lumpectomy with SLNB - pathology with 1.1 cm invasive ductal carcinoma, [place] 7-8/9 and no LVI. 0/2LN involved. There was also a 1.5 cm nuclear grade 2 - 3 ductal carcinoma in situ, cribriform and solid type with comedonecrosis and calcifications. Regarding the margins, the invasive tumor was 0.15 mm from the deep margin. Ductal carcinoma in situ was noted within microns of the deep margin and less than 0.1 mm from the anterior and superior margins. Two sentinel nodes were examined and negative. She saw radiation oncology in consultation and was recommended for adjuvant radiation pending medical oncology recommendations. Oncotype score 29 (20% risk of distant recurrence with tamoxifen alone). TC x 4 recommended. Patient has elected to utilize cold cap.\par \par HBV surface antigen reactive, HBV DNA not detected, c/w chronic hepatitis B infection (discussed with hepatology). She was started on entecavir daily ~1 week prior to chemotherapy initiation and continues this daily. US Abdomen completed prior by Dr. Heller prior to surgery - noted normal. She saw hepatology Dr. Beltran in consultation 11/2; repeat serologies stable and Fibroscan relatively normal. Plan to continue entecavir until at least 6 months post-chemotherapy (June 2019), with hepatology follow up also planned and [id number] HCC screening with RUQUS lifelong.\par \par 10/24-10/26 admitted to [facility].\par ~9pm just after driving home from getting food,  witness LOC with seizure activity x 20 seconds followed by lethargy - EMS called.\par She was taken to [facility] ER and admitted to ICU; sodium 119 with evidence of aspiration on CXR/CTA chest, O2 sat 86% briefly requiring BIPAP support quickly weaned. Neurology, Pulmonary, Renal consults following. Sodium corrected over the next 24h with normal saline infusion, antibiotics discontinued as no further evidence of infection. Neulasta onpro administered as scheduled 10/25 afternoon. She had a CTH and MRI Brain w/wo contrast which showed no acute findings. Zoloft was held, and she was discharged home. Incidental note of possible compression deformity of thoracic spine on imaging while inpatient. She admits to drinking ~4L water on day of chemotherapy as advised by cold cap literature. Her  recalls in retrospect that she seemed overly sleepy and slightly confused (forgot she had chinese food for lunch, eyes closing while sitting up) on the afternoon/evening after treatment prior to seizure. CK and LFTs elevated post discharge, continued to downtrend over the next few days to normal.\par \par She underwent cycle 3 of TC followed by 24 hours of observation at [facility] inpatient with neuro checks and q4h BMP monitoring. Her sodium remained normal and she was asymptomatic.\par \par She completed her final cycle of TC on 12/5/18 without event.\par Hypothyroidism stable on synthroid. Depression/anxiety stable since 2006 on zoloft and wellbutrin, she sees Dr. Manuel Ga for this. She is UTD on RHM. She lives on  with her  Eric. Their 17 year old son just started college in [employer] and their 13 year old daughter lives at home with them. Patient works part time at [employer]. FHx significant for father with prostate cancer in 60s, paternal cousin with leukemia age 13 and breast cancer age 45. [de-identified] : T1cN0 [de-identified] : ER+ID-HER2- [de-identified] : Oncotype 29 [Treatment Protocol] : Treatment Protocol [Therapy: ___] : Therapy: [unfilled] [FreeTextEntry1] : x 4 cycles, with Penguin cold cap [de-identified] : Patient presents 1 week after cycle 4 of of TC chemotherapy today. She has utilized scalp cooling with Penguin cold cap during therapy and has had only minimal hair thinning during this time. She felt a little tearful and emotional over the weekend without depression, but this has improved. She is going to see her psychiatrist soon and resume sertaline now that therapy is complete, in addition to wellbutrin which she continues. She is otherwise doing well and without complaints. Denies HA, dizziness, f/c/s, n/v, CP, SOB, cough, abd pain, diarrhea, edema, rashes, bleeding, muscle aches or pains, new neuro symptoms or confusion.

## 2018-12-14 ENCOUNTER — LABORATORY RESULT (OUTPATIENT)
Age: 48
End: 2018-12-14

## 2018-12-15 LAB
ALBUMIN SERPL ELPH-MCNC: 4.1 G/DL
ALP BLD-CCNC: 173 U/L
ALT SERPL-CCNC: 53 U/L
ANION GAP SERPL CALC-SCNC: 14 MMOL/L
AST SERPL-CCNC: 23 U/L
BASOPHILS # BLD AUTO: 0 K/UL
BASOPHILS NFR BLD AUTO: 0 %
BILIRUB SERPL-MCNC: 0.2 MG/DL
BUN SERPL-MCNC: 10 MG/DL
CALCIUM SERPL-MCNC: 8.8 MG/DL
CHLORIDE SERPL-SCNC: 106 MMOL/L
CO2 SERPL-SCNC: 22 MMOL/L
CREAT SERPL-MCNC: 0.78 MG/DL
EOSINOPHIL # BLD AUTO: 0.27 K/UL
EOSINOPHIL NFR BLD AUTO: 1.7 %
GLUCOSE SERPL-MCNC: 117 MG/DL
HCT VFR BLD CALC: 34.9 %
HGB BLD-MCNC: 11.1 G/DL
LYMPHOCYTES # BLD AUTO: 0.81 K/UL
LYMPHOCYTES NFR BLD AUTO: 5.1 %
MAN DIFF?: NORMAL
MCHC RBC-ENTMCNC: 30.5 PG
MCHC RBC-ENTMCNC: 31.8 GM/DL
MCV RBC AUTO: 95.9 FL
MONOCYTES # BLD AUTO: 1.35 K/UL
MONOCYTES NFR BLD AUTO: 8.5 %
NEUTROPHILS # BLD AUTO: 13.31 K/UL
NEUTROPHILS NFR BLD AUTO: 81.4 %
PLATELET # BLD AUTO: 205 K/UL
POTASSIUM SERPL-SCNC: 4.5 MMOL/L
PROT SERPL-MCNC: 6.7 G/DL
RBC # BLD: 3.64 M/UL
RBC # FLD: 15.3 %
SODIUM SERPL-SCNC: 142 MMOL/L
WBC # FLD AUTO: 15.86 K/UL

## 2018-12-18 ENCOUNTER — OUTPATIENT (OUTPATIENT)
Dept: OUTPATIENT SERVICES | Facility: HOSPITAL | Age: 48
LOS: 1 days | Discharge: HOME | End: 2018-12-18

## 2018-12-18 DIAGNOSIS — Z98.890 OTHER SPECIFIED POSTPROCEDURAL STATES: Chronic | ICD-10-CM

## 2018-12-18 DIAGNOSIS — M67.432 GANGLION, LEFT WRIST: Chronic | ICD-10-CM

## 2018-12-18 DIAGNOSIS — C50.919 MALIGNANT NEOPLASM OF UNSPECIFIED SITE OF UNSPECIFIED FEMALE BREAST: ICD-10-CM

## 2018-12-18 DIAGNOSIS — Z30.430 ENCOUNTER FOR INSERTION OF INTRAUTERINE CONTRACEPTIVE DEVICE: Chronic | ICD-10-CM

## 2018-12-19 LAB — HDV AB SER IA-ACNC: NEGATIVE

## 2018-12-28 ENCOUNTER — MEDICATION RENEWAL (OUTPATIENT)
Age: 48
End: 2018-12-28

## 2018-12-28 ENCOUNTER — TRANSCRIPTION ENCOUNTER (OUTPATIENT)
Age: 48
End: 2018-12-28

## 2018-12-31 ENCOUNTER — TRANSCRIPTION ENCOUNTER (OUTPATIENT)
Age: 48
End: 2018-12-31

## 2018-12-31 RX ORDER — BUDESONIDE AND FORMOTEROL FUMARATE DIHYDRATE 160; 4.5 UG/1; UG/1
160-4.5 AEROSOL RESPIRATORY (INHALATION) TWICE DAILY
Qty: 1 | Refills: 0 | Status: ACTIVE | COMMUNITY
Start: 2018-10-29 | End: 1900-01-01

## 2019-01-02 ENCOUNTER — RESULT REVIEW (OUTPATIENT)
Age: 49
End: 2019-01-02

## 2019-01-02 ENCOUNTER — APPOINTMENT (OUTPATIENT)
Dept: HEMATOLOGY ONCOLOGY | Facility: CLINIC | Age: 49
End: 2019-01-02
Payer: COMMERCIAL

## 2019-01-02 VITALS
DIASTOLIC BLOOD PRESSURE: 72 MMHG | OXYGEN SATURATION: 96 % | BODY MASS INDEX: 26.75 KG/M2 | HEART RATE: 92 BPM | TEMPERATURE: 98.6 F | RESPIRATION RATE: 16 BRPM | SYSTOLIC BLOOD PRESSURE: 105 MMHG | WEIGHT: 150.99 LBS

## 2019-01-02 LAB
ALBUMIN SERPL ELPH-MCNC: 4 G/DL
ALP BLD-CCNC: 91 U/L
ALT SERPL-CCNC: 33 U/L
ANION GAP SERPL CALC-SCNC: 10 MMOL/L
AST SERPL-CCNC: 30 U/L
BASOPHILS # BLD AUTO: 0 K/UL — SIGNIFICANT CHANGE UP (ref 0–0.2)
BASOPHILS NFR BLD AUTO: 0.3 % — SIGNIFICANT CHANGE UP (ref 0–2)
BILIRUB SERPL-MCNC: 0.3 MG/DL
BUN SERPL-MCNC: 9 MG/DL
CALCIUM SERPL-MCNC: 9 MG/DL
CHLORIDE SERPL-SCNC: 108 MMOL/L
CO2 SERPL-SCNC: 26 MMOL/L
CREAT SERPL-MCNC: 0.66 MG/DL
EOSINOPHIL # BLD AUTO: 0.7 K/UL — HIGH (ref 0–0.5)
EOSINOPHIL NFR BLD AUTO: 16.7 % — HIGH (ref 0–6)
GLUCOSE SERPL-MCNC: 106 MG/DL
HCT VFR BLD CALC: 34.9 % — SIGNIFICANT CHANGE UP (ref 34.5–45)
HGB BLD-MCNC: 12.3 G/DL — SIGNIFICANT CHANGE UP (ref 11.5–15.5)
LYMPHOCYTES # BLD AUTO: 0.8 K/UL — LOW (ref 1–3.3)
LYMPHOCYTES # BLD AUTO: 19.5 % — SIGNIFICANT CHANGE UP (ref 13–44)
MCHC RBC-ENTMCNC: 32.3 PG — SIGNIFICANT CHANGE UP (ref 27–34)
MCHC RBC-ENTMCNC: 35.3 G/DL — SIGNIFICANT CHANGE UP (ref 32–36)
MCV RBC AUTO: 91.5 FL — SIGNIFICANT CHANGE UP (ref 80–100)
MONOCYTES # BLD AUTO: 0.4 K/UL — SIGNIFICANT CHANGE UP (ref 0–0.9)
MONOCYTES NFR BLD AUTO: 10.5 % — SIGNIFICANT CHANGE UP (ref 2–14)
NEUTROPHILS # BLD AUTO: 2.2 K/UL — SIGNIFICANT CHANGE UP (ref 1.8–7.4)
NEUTROPHILS NFR BLD AUTO: 53 % — SIGNIFICANT CHANGE UP (ref 43–77)
PLATELET # BLD AUTO: 273 K/UL — SIGNIFICANT CHANGE UP (ref 150–400)
POTASSIUM SERPL-SCNC: 4.9 MMOL/L
PROT SERPL-MCNC: 6.6 G/DL
RBC # BLD: 3.81 M/UL — SIGNIFICANT CHANGE UP (ref 3.8–5.2)
RBC # FLD: 14.5 % — SIGNIFICANT CHANGE UP (ref 10.3–14.5)
SODIUM SERPL-SCNC: 144 MMOL/L
WBC # BLD: 4.2 K/UL — SIGNIFICANT CHANGE UP (ref 3.8–10.5)
WBC # FLD AUTO: 4.2 K/UL — SIGNIFICANT CHANGE UP (ref 3.8–10.5)

## 2019-01-02 PROCEDURE — 99214 OFFICE O/P EST MOD 30 MIN: CPT

## 2019-01-03 LAB — 25(OH)D3 SERPL-MCNC: 24.5 NG/ML

## 2019-01-07 ENCOUNTER — APPOINTMENT (OUTPATIENT)
Dept: SURGICAL ONCOLOGY | Facility: CLINIC | Age: 49
End: 2019-01-07
Payer: COMMERCIAL

## 2019-01-07 VITALS
WEIGHT: 150 LBS | SYSTOLIC BLOOD PRESSURE: 105 MMHG | HEART RATE: 81 BPM | HEIGHT: 63 IN | BODY MASS INDEX: 26.58 KG/M2 | OXYGEN SATURATION: 97 % | DIASTOLIC BLOOD PRESSURE: 69 MMHG

## 2019-01-07 PROCEDURE — 99214 OFFICE O/P EST MOD 30 MIN: CPT

## 2019-01-16 ENCOUNTER — FORM ENCOUNTER (OUTPATIENT)
Age: 49
End: 2019-01-16

## 2019-01-17 ENCOUNTER — OUTPATIENT (OUTPATIENT)
Dept: OUTPATIENT SERVICES | Facility: HOSPITAL | Age: 49
LOS: 1 days | End: 2019-01-17
Payer: COMMERCIAL

## 2019-01-17 ENCOUNTER — APPOINTMENT (OUTPATIENT)
Dept: MRI IMAGING | Facility: CLINIC | Age: 49
End: 2019-01-17
Payer: COMMERCIAL

## 2019-01-17 ENCOUNTER — APPOINTMENT (OUTPATIENT)
Dept: RADIOLOGY | Facility: CLINIC | Age: 49
End: 2019-01-17
Payer: COMMERCIAL

## 2019-01-17 DIAGNOSIS — M67.432 GANGLION, LEFT WRIST: Chronic | ICD-10-CM

## 2019-01-17 DIAGNOSIS — Z98.890 OTHER SPECIFIED POSTPROCEDURAL STATES: Chronic | ICD-10-CM

## 2019-01-17 DIAGNOSIS — Z30.430 ENCOUNTER FOR INSERTION OF INTRAUTERINE CONTRACEPTIVE DEVICE: Chronic | ICD-10-CM

## 2019-01-17 DIAGNOSIS — C50.911 MALIGNANT NEOPLASM OF UNSPECIFIED SITE OF RIGHT FEMALE BREAST: ICD-10-CM

## 2019-01-17 DIAGNOSIS — M43.9 DEFORMING DORSOPATHY, UNSPECIFIED: ICD-10-CM

## 2019-01-17 PROCEDURE — 77080 DXA BONE DENSITY AXIAL: CPT | Mod: 26

## 2019-01-17 PROCEDURE — 77080 DXA BONE DENSITY AXIAL: CPT

## 2019-01-17 PROCEDURE — 72157 MRI CHEST SPINE W/O & W/DYE: CPT | Mod: 26

## 2019-01-17 PROCEDURE — 72157 MRI CHEST SPINE W/O & W/DYE: CPT

## 2019-01-17 PROCEDURE — A9585: CPT

## 2019-01-22 ENCOUNTER — APPOINTMENT (OUTPATIENT)
Dept: RADIATION ONCOLOGY | Facility: CLINIC | Age: 49
End: 2019-01-22
Payer: COMMERCIAL

## 2019-01-22 VITALS
DIASTOLIC BLOOD PRESSURE: 74 MMHG | WEIGHT: 151.79 LBS | BODY MASS INDEX: 26.89 KG/M2 | TEMPERATURE: 98.24 F | RESPIRATION RATE: 16 BRPM | HEART RATE: 80 BPM | OXYGEN SATURATION: 98 % | HEIGHT: 63 IN | SYSTOLIC BLOOD PRESSURE: 113 MMHG

## 2019-01-22 PROCEDURE — 99214 OFFICE O/P EST MOD 30 MIN: CPT | Mod: 25

## 2019-01-22 PROCEDURE — 77262 THER RADIOLOGY TX PLNG INTRM: CPT

## 2019-01-24 PROCEDURE — 77333 RADIATION TREATMENT AID(S): CPT | Mod: 26

## 2019-01-24 PROCEDURE — 77290 THER RAD SIMULAJ FIELD CPLX: CPT | Mod: 26

## 2019-01-24 NOTE — PHYSICAL EXAM
[Sclera] : the sclera and conjunctiva were normal [Outer Ear] : the ears and nose were normal in appearance [Oropharynx] : The oropharynx was normal [Heart Rate And Rhythm] : heart rate and rhythm were normal [Heart Sounds] : normal S1 and S2 [Breast Appearance] : normal in appearance [Abdomen Soft] : soft [Cervical Lymph Nodes Enlarged Posterior Bilaterally] : posterior cervical [Cervical Lymph Nodes Enlarged Anterior Bilaterally] : anterior cervical [Supraclavicular Lymph Nodes Enlarged Bilaterally] : supraclavicular [Axillary Lymph Nodes Enlarged Bilaterally] : axillary [Motor Tone] : muscle strength and tone were normal [Oriented To Time, Place, And Person] : oriented to person, place, and time [Normal] : supple with no thyromegaly or masses appreciated [Auscultation Breath Sounds / Voice Sounds] : lungs were clear to auscultation bilaterally [Murmurs] : no murmurs present [Breast Palpation Mass] : no palpable masses [Breast Abnormal Lactation (Galactorrhea)] : no nipple discharge [Abdomen Tenderness] : non-tender [] : no hepato-splenomegaly [No Focal Deficits] : no focal deficits [de-identified] : healed lumpectomy scar in right breast and right axilla SLN biopsy scar [de-identified] : healed surgical incisions right upper outer breast and axilla

## 2019-01-24 NOTE — HISTORY OF PRESENT ILLNESS
[FreeTextEntry1] : Mrs. Castaneda is a 48-year-old woman with stage IA pT1cN0 M0 intermediate to poorly differentiated invasive ductal carcinoma of the right upper outer quadrant breast, ER/ID positive, HER-2 negative by AZAR.  She is s/p right breast lumpectomy and SLNB 8/15/2108. Her .She was initially seen in consultation on 9/12/2018 with a recommendation for whole breast radiation therapy. She was recommended systemic therapy after medical oncology evaluation.  Oncotype DX recurrence score was 29.\par She received adjuvant chemotherapy under the supervision of Dr. Horn which was complicated by an episode of loss of consciousness and seizure in October 2018 due to hyponatremia after her second cycle of Taxotere/Cytotaxan.  MRI head 10/26/18 revealed no  evidence of intracranial metastases. Cycles 3 and 4 were completed successfully.  Notably, she was found to have a compression deformity T7, T11, T12 seen on CTA 10/25/18. DEXA on 1/17/19 showed osteoporosis and MRI thoracic spine 1/17/19  showed no evidence of osseous metastatic disease or compression fracture. She is under treatment for chronic Hepatitis B infection on workup and was started on entecavir.\par \par She returns today to further discuss radiation treatment.  SHe is recovering well from her chemotherapy.\par \par 1/22/19-Follow up\par Today she states that she feels well and is ready to discuss next steps to start radiation. Denies breast or chest mass, axillary mass, or chest pain.\par Recently she saw Dr. Heller as well.\par

## 2019-01-28 ENCOUNTER — OUTPATIENT (OUTPATIENT)
Dept: OUTPATIENT SERVICES | Facility: HOSPITAL | Age: 49
LOS: 1 days | Discharge: HOME | End: 2019-01-28

## 2019-01-28 DIAGNOSIS — Z30.430 ENCOUNTER FOR INSERTION OF INTRAUTERINE CONTRACEPTIVE DEVICE: Chronic | ICD-10-CM

## 2019-01-28 DIAGNOSIS — Z98.890 OTHER SPECIFIED POSTPROCEDURAL STATES: Chronic | ICD-10-CM

## 2019-01-28 DIAGNOSIS — C50.919 MALIGNANT NEOPLASM OF UNSPECIFIED SITE OF UNSPECIFIED FEMALE BREAST: ICD-10-CM

## 2019-01-28 DIAGNOSIS — M67.432 GANGLION, LEFT WRIST: Chronic | ICD-10-CM

## 2019-01-30 PROCEDURE — 77295 3-D RADIOTHERAPY PLAN: CPT | Mod: 26

## 2019-01-30 PROCEDURE — 77300 RADIATION THERAPY DOSE PLAN: CPT | Mod: 26

## 2019-01-30 PROCEDURE — 77334 RADIATION TREATMENT AID(S): CPT | Mod: 26

## 2019-02-01 PROCEDURE — 77280 THER RAD SIMULAJ FIELD SMPL: CPT | Mod: 26

## 2019-02-06 ENCOUNTER — RESULT REVIEW (OUTPATIENT)
Age: 49
End: 2019-02-06

## 2019-02-06 ENCOUNTER — APPOINTMENT (OUTPATIENT)
Dept: HEMATOLOGY ONCOLOGY | Facility: CLINIC | Age: 49
End: 2019-02-06
Payer: COMMERCIAL

## 2019-02-06 VITALS
WEIGHT: 148.81 LBS | SYSTOLIC BLOOD PRESSURE: 120 MMHG | BODY MASS INDEX: 26.36 KG/M2 | DIASTOLIC BLOOD PRESSURE: 70 MMHG | OXYGEN SATURATION: 98 % | TEMPERATURE: 208.4 F | HEART RATE: 93 BPM | RESPIRATION RATE: 16 BRPM

## 2019-02-06 LAB
BASOPHILS # BLD AUTO: 0.1 K/UL — SIGNIFICANT CHANGE UP (ref 0–0.2)
BASOPHILS NFR BLD AUTO: 1 % — SIGNIFICANT CHANGE UP (ref 0–2)
EOSINOPHIL # BLD AUTO: 0.5 K/UL — SIGNIFICANT CHANGE UP (ref 0–0.5)
EOSINOPHIL NFR BLD AUTO: 7.7 % — HIGH (ref 0–6)
HCT VFR BLD CALC: 37.5 % — SIGNIFICANT CHANGE UP (ref 34.5–45)
HGB BLD-MCNC: 12.5 G/DL — SIGNIFICANT CHANGE UP (ref 11.5–15.5)
LYMPHOCYTES # BLD AUTO: 0.9 K/UL — LOW (ref 1–3.3)
LYMPHOCYTES # BLD AUTO: 14.8 % — SIGNIFICANT CHANGE UP (ref 13–44)
MCHC RBC-ENTMCNC: 29.5 PG — SIGNIFICANT CHANGE UP (ref 27–34)
MCHC RBC-ENTMCNC: 33.3 G/DL — SIGNIFICANT CHANGE UP (ref 32–36)
MCV RBC AUTO: 88.6 FL — SIGNIFICANT CHANGE UP (ref 80–100)
MONOCYTES # BLD AUTO: 0.4 K/UL — SIGNIFICANT CHANGE UP (ref 0–0.9)
MONOCYTES NFR BLD AUTO: 6.4 % — SIGNIFICANT CHANGE UP (ref 2–14)
NEUTROPHILS # BLD AUTO: 4.4 K/UL — SIGNIFICANT CHANGE UP (ref 1.8–7.4)
NEUTROPHILS NFR BLD AUTO: 70 % — SIGNIFICANT CHANGE UP (ref 43–77)
PLATELET # BLD AUTO: 243 K/UL — SIGNIFICANT CHANGE UP (ref 150–400)
RBC # BLD: 4.23 M/UL — SIGNIFICANT CHANGE UP (ref 3.8–5.2)
RBC # FLD: 12.6 % — SIGNIFICANT CHANGE UP (ref 10.3–14.5)
WBC # BLD: 6.2 K/UL — SIGNIFICANT CHANGE UP (ref 3.8–10.5)
WBC # FLD AUTO: 6.2 K/UL — SIGNIFICANT CHANGE UP (ref 3.8–10.5)

## 2019-02-06 PROCEDURE — 99215 OFFICE O/P EST HI 40 MIN: CPT

## 2019-02-06 RX ORDER — SERTRALINE HYDROCHLORIDE 50 MG/1
50 TABLET, FILM COATED ORAL
Refills: 0 | Status: ACTIVE | COMMUNITY

## 2019-02-06 NOTE — PHYSICAL EXAM
[Fully active, able to carry on all pre-disease performance without restriction] : Status 0 - Fully active, able to carry on all pre-disease performance without restriction [Normal] : affect appropriate [de-identified] : RUOQ breast lumpectomy and SLNB scars well healing, minimal scar tissue palpated at surgical site, no rashes, no masses or adenopathy palpated b/l [de-identified] : some varicose veins on LEs, no edema

## 2019-02-06 NOTE — RESULTS/DATA
[FreeTextEntry1] : 10/26/2018  \par \par \par \par INTERPRETATION:  History: Breast carcinoma, status post chemotherapy, \par seizure.\par \par MR brain multisequence without and with IV contrast.\par \par 6.5 cc gadolinium injected intravenously.\par Images are of diagnostic quality.\par Brain morphology, parenchymal volume and intrinsic signal normal for \par patient age. There is no hemorrhage edema infarct or mass effect. No \par unusual parenchymal or leptomeningeal enhancement. No evidence of a \par seizure focus. Parasellar region posterior fossa structures unremarkable. \par Mastoids and paranasal sinuses are unremarkable. No calvarial destructive \par lesions.\par \par Impression:\par \par Unremarkable head CT. No acute or enhancing pathology. Specifically, no \par evidence of intracranial metastases.\par \par \par \par \par EXAM:  XR CHEST PA LAT 2V                      \par \par \par PROCEDURE DATE:  10/26/2018  \par \par \par \par INTERPRETATION:  Pneumonia follow-up.\par \par PA lateral. Prior 10/24/2018.\par \par Normal heart mediastinum. Interval resolution of bilateral infiltrates. \par Mild fibrotic atelectatic change left base. No pleural effusion or other \par new abnormality.\par \par Impression: Interval resolution of bilateral infiltrates.\par \par \par \par EXAM:  CT ABDOMEN AND PELVIS IC                      \par \par EXAM:  CT ANGIO CHEST (W)AW IC                      \par \par \par PROCEDURE DATE:  10/25/2018  \par \par \par \par INTERPRETATION:  HISTORY: New diagnosis of breast cancer on chemotherapy \par presenting with acute hypoxia. Seizure and sepsis status post \par chemotherapy. History of asthma. Evaluate for pulmonary embolism or \par infectious source.\par \par TECHNIQUE:  CT pulmonary angiography was performed of the chest according \par to standard institutional protocol. This is followed by a routine CT of \par the abdomen and pelvis. Coronal, sagittal and transaxial 3-D MIP \par reformatted images are provided from the transaxial source data.   95mL \par of Omnipaque 350 was administered without complication and 5 mL was \par discarded.  \par \par COMPARISON: No similar prior study available for comparison.\par \par FINDINGS: \par \par CTPA:\par There is motion artifact present which degrades image quality and limits \par evaluation of the peripheral vasculature. However, there is good overall \par opacification of pulmonary arterial tree. No central, segmental or \par proximal subsegmental defect is present to suggest acute pulmonary \par embolus.\par \par The thyroid gland is unremarkable. \par \par Evaluation of lung parenchyma demonstrates patchy areas of airspace \par consolidation in the lower lobes with adjacent groundglass attenuation as \par well as patchy groundglass opacities throughout the upper lobes. There is \par mild diffuse interlobular septal thickening. There is no pneumothorax.  \par There is no pleural effusion. Trachea and main bronchi are clear.\par \par There is mild asymmetric stranding in the right axillary fat with a few \par small lymph nodes, the largest measuring 1 cm in transaxial dimension. \par There is no significant supraclavicular, mediastinal or hilar adenopathy.\par \par The heart and pericardium are unremarkable. The thoracic aorta is normal \par in caliber without dissection. \par \par There are age-indeterminate, likely chronic, moderate compression \par fracture deformities of T7, T11 and T12.\par \par CT abdomen/pelvis:\par There is motion artifact present which degrades image quality.\par \par The liver, spleen, pancreas and adrenal glands are unremarkable. The \par kidneys enhance symmetrically without hydronephrosis.\par \par The small and large bowel are normal in caliber. There is no free air or \par intra-abdominal abscess. The appendix is not discretely visualize, \par however, there are no secondary signs of acute appendicitis. There is no \par abnormal colonic bowel wall thickening or pericolonic inflammatory \par change. A mild to moderate amount of retained fecal material seen \par throughout the colon.\par \par The abdominal aorta is normal in caliber. There is no adenopathy within \par the upper abdomen, retroperitoneum or pelvis. A retroaortic left renal \par vein is incidentally noted.\par \par The urinary bladder, uterus and adnexal structures are unremarkable aside \par from the presence of an IUD.\par \par The visualized osseous structures within the abdomen and pelvis are \par unremarkable.\par \par \par IMPRESSION:\par CTPA: Limited by motion. No central, segmental or proximal subsegmental \par pulmonary embolism suggested.  Nonspecific lower lobe patchy airspace \par consolidation and diffuse groundglass opacities likely on an infectious \par basis. Clinical correlation is recommended.\par \par CT abdomen/pelvis: No infectious source within the abdomen or pelvis. No \par acute intra-abdominal or pelvic finding.\par \par \par EXAM:  CT BRAIN                      \par \par \par PROCEDURE DATE:  10/25/2018  \par \par \par \par INTERPRETATION:  CLINICAL HISTORY:  New onset seizure.\par \par TECHNIQUE:  CT of the head without contrast.\par Contiguous transaxial images of the head were acquired from the skull \par base to the vertex without the administration of iodinated contrast. \par Coronal and sagittal reformatted images are provided. \par \par COMPARISON:  None available.\par \par FINDINGS:\par There is mild motion artifact present which degrades image quality.\par \par There is no acute intracranial hemorrhage, mass effect from vasogenic \par edema or evidence of acute territorial infarct.  \par \par The ventricles, sulci and cisternal spaces are unremarkable in size and \par configuration.  There is no midline shift or abnormal extra-axial fluid \par collection.\par \par The calvarium is intact.  There are no osteoblastic or lytic calvarial or \par skull base lesions.  The paranasal sinuses and mastoid air cells are \par clear.\par \par IMPRESSION:\par Mildly motion degraded. No gross acute intracranial hemorrhage or mass \par effect from vasogenic edema.\par \par

## 2019-02-06 NOTE — HISTORY OF PRESENT ILLNESS
[Disease: _____________________] : Disease: [unfilled] [T: ___] : T[unfilled] [N: ___] : N[unfilled] [M: ___] : M[unfilled] [AJCC Stage: ____] : AJCC Stage: [unfilled] [Treatment Protocol] : Treatment Protocol [Therapy: ___] : Therapy: [unfilled] [de-identified] : 48-year-old lady with history of asthma, hypothyroidism, depression/anxiety with recently diagnosed invasive breast cancer s/p lumpectomy and SLNB here for initial medical oncology consultation.\par \par Patient had normal annual routine mammograms since age 42, last 8/11/16. 5/22/18 mammo with 1.5cm R breast mass; diagnosis mammo and US confirmed this mass RUOQ 1.6cm at 10:00, hypoechoic. US guided core biopsy of the mass was performed 6/1/18 which revealed 0.5 cm invasive ductal carcinoma, [place] score 7/9, with nuclear grade 2 cribriform type DCIS with central necrosis and calcifications, estrogen receptor 95% strong, progesterone receptor 3-5% strong, HER-2 IHC 2+ negative by CIS. My Risk panel on 6/13/2018 was negative. MRI of the bilateral breasts on 06/25/2018 showed a 1.7 X 1.6 X 1.8 cm focal area of non-mass enhancement with washout kinetics and biopsy marker at the upper outer posterior 10:00 position of the right breast. There were no other suspicious lesions in the right breast and no suspicious lesions in the left breast. There were no axillary or internal mammary lymphadenopathy. She saw Dr. Heller who performed CXR, nuclear bone scan, pelvic and abdominal sonograms which were negative. On 8/15/18 she underwent a lumpectomy with SLNB - pathology with 1.1 cm invasive ductal carcinoma, [place] 7-8/9 and no LVI. 0/2LN involved. There was also a 1.5 cm nuclear grade 2 - 3 ductal carcinoma in situ, cribriform and solid type with comedonecrosis and calcifications. Regarding the margins, the invasive tumor was 0.15 mm from the deep margin. Ductal carcinoma in situ was noted within microns of the deep margin and less than 0.1 mm from the anterior and superior margins. Two sentinel nodes were examined and negative. She saw radiation oncology in consultation and was recommended for adjuvant radiation pending medical oncology recommendations. Oncotype score 29 (20% risk of distant recurrence with tamoxifen alone). TC x 4 recommended. Patient has elected to utilize cold cap.\par \par HBV surface antigen reactive, HBV DNA not detected, c/w chronic hepatitis B infection (discussed with hepatology). She was started on entecavir daily ~1 week prior to chemotherapy initiation and continues this daily. US Abdomen completed prior by Dr. Heller prior to surgery - noted normal. She saw hepatology Dr. Beltran in consultation 11/2; repeat serologies stable and Fibroscan relatively normal. Plan to continue entecavir until at least 6 months post-chemotherapy (June 2019), with hepatology follow up also planned and [id number] HCC screening with RUQUS lifelong.\par \par 10/24-10/26 admitted to [facility].\par ~9pm just after driving home from getting food,  witness LOC with seizure activity x 20 seconds followed by lethargy - EMS called.\par She was taken to [facility] ER and admitted to ICU; sodium 119 with evidence of aspiration on CXR/CTA chest, O2 sat 86% briefly requiring BIPAP support quickly weaned. Neurology, Pulmonary, Renal consults following. Sodium corrected over the next 24h with normal saline infusion, antibiotics discontinued as no further evidence of infection. Neulasta onpro administered as scheduled 10/25 afternoon. She had a CTH and MRI Brain w/wo contrast which showed no acute findings. Zoloft was held, and she was discharged home. Incidental note of possible compression deformity of thoracic spine on imaging while inpatient. She admits to drinking ~4L water on day of chemotherapy as advised by cold cap literature. Her  recalls in retrospect that she seemed overly sleepy and slightly confused (forgot she had chinese food for lunch, eyes closing while sitting up) on the afternoon/evening after treatment prior to seizure. CK and LFTs elevated post discharge, continued to downtrend over the next few days to normal.\par \par She underwent cycle 3 of TC followed by 24 hours of observation at [facility] inpatient with neuro checks and q4h BMP monitoring. Her sodium remained normal and she was asymptomatic. She completed her final cycle of TC on 12/5/18 without event.\par \par Adjuvant radiation initiated 2/4/2019.\par \par Pertinent History:\par Hypothyroidism stable on synthroid. Depression/anxiety stable since 2006 on zoloft and wellbutrin, she sees Dr. Manuel Ga for this. She is UTD on RHM. She lives on  with her  Eric. Their 17 year old son just started college in [facility] and their 13 year old daughter lives at home with them. Patient works part time at [employer]. FHx significant for father with prostate cancer in 60s, paternal cousin with leukemia age 13 and breast cancer age 45. [de-identified] : T1cN0 [de-identified] : ER+UT-HER2- [de-identified] : Oncotype 29 [FreeTextEntry1] : x 4 cycles, with Penguin cold cap [de-identified] : Patient presents today for follow up. She started adjuvant radiation early this week; plan for 4 weeks of daily treatment M-F. She is otherwise doing well and without complaints, working full time. Denies HA, dizziness, f/c/s, n/v, CP, SOB, cough, abd pain, back pain, diarrhea, edema, rashes, bleeding, muscle aches or pains, new neuro symptoms or confusion. She saw her psychiatrist recently and has resumed sertraline without event - her mood has been good. She saw her endocrinologist and notes her thyroid function was "off" and her synthroid is to be adjusted.

## 2019-02-06 NOTE — ASSESSMENT
[FreeTextEntry1] : 48-year-old woman with history of asthma, hypothyroidism, depression/anxiety, chronic hep B (surface Ag reactive, viral load negative, on entecavir during treatment, follows with hepatology) with R invasive breast cancer s/p lumpectomy and SLNB August 2018 with 1.1cm IDC, ER+ 95% strong, VA+ 3-5% strong, HER2 IHC 2+ and negative by CISH. [place] grade 7-8/9, with DCIS present and close surgical margins and 0/2 SLN involved. Oncotype score 29. She completed TC x 4 adjuvant chemotherapy, with C2 c/b seizure in the setting of severe hyponatremia (euvolemic, polydipsia) and aspiration event thought due to polydipsia - sertraline held on treatment C3 and 4 completed without issue. She continues with adjuvant radiation at this time.\par \par We discussed the use of both GNRH agonist injections (every 28 days, intramuscular) in combination with and aromatase inhibitor (AI) PO daily for those high-risk pre- and janet-menopausal patients who were shown to have decreased risk of recurrence with this therapy per SOFT/TEXT studies. We reviewed side effects of combined GNRH agonist/arimidex treatment and induction of total endocrine deprivation, including but not limited to fatigue, hot flashes, vaginal dryness or discharge, loss of libido, bone loss (which may lead to worsening osteoporosis and fracture - patient is high risk for this given current diagnosis of osteoporosis pre-treatment), potential cardiovascular risks, and joint pains. She will undergo DEXA monitoring periodically while on treatment. She will begin calcium (1200mg PO daily) and vitamin D supplementation (1000mg PO daily) for bone strength. She was counseled on the importance of exercise 30 minutes per day, 5x per week. Treatment is recommended to continue for at least 5 years, as long as side effects remain tolerable. All of the patient's questions were answered and she is in agreement with this plan. We will begin Lupron injections this month, and I will check blood work to confirm menopausal status. Thereafter I will e-prescribe and she will begin taking arimidex. We alternatively discussed treatment with laparoscopic oophorectomy and single agent AI therapy thereafter. \par \par Breast Cancer: s/p adjuvant TC x 4, currently on adjuvant radiation\par -check labs today\par -Follow up Dr. Beg as scheduled (within the next month for 6 month follow up), \par -radiation oncology follow up with Dr. Escalante\par -Start Lupron towards middle of radiation course 2/20; follow up with me and 2nd injection 28 days thereafter, sooner if issues\par -will plan to introduce AI  thereafter when ovarian suppression achieved\par \par Hep B surface antigen reactive: c/w chronic hep B, no risk factors, viral load undetectable on repeat checks\par -continue entecavir daily; Hepatology recommendations appreciated from 12/11, plan for continued follow up and HCC screening with Dr. Beltran, to continue for ~6 months post chemotherapy completion\par \par Depression/Anxiety: symptoms well managed >12 years on current regimen, sertraline resumed after pause on treatment due to prior hyponatremia as above\par -Follow up with her psychiatrist as planned \par -continue wellbutrin, sertraline cautiously\par -patient instructed to call with any new symptoms or concerns\par \par Osteoporosis: DEXA 1/2019 with osteoporosis of spine only -2.6 (MRI spine without fracture or deformity)\par -GNRH agonist/AI post-chemotherapy; \par -reviewed options/recommendations for adjuvant bisphosphonate/prolia with OS/AI, patient to discuss with her endocrinologist in coming weeks as scheduled\par -advised dental evaluation in preparation as well\par \par

## 2019-02-07 LAB
ALBUMIN SERPL ELPH-MCNC: 4.6 G/DL
ALP BLD-CCNC: 90 U/L
ALT SERPL-CCNC: 24 U/L
ANION GAP SERPL CALC-SCNC: 13 MMOL/L
AST SERPL-CCNC: 20 U/L
BILIRUB SERPL-MCNC: 0.3 MG/DL
BUN SERPL-MCNC: 14 MG/DL
CALCIUM SERPL-MCNC: 9.4 MG/DL
CHLORIDE SERPL-SCNC: 105 MMOL/L
CHOLEST SERPL-MCNC: 228 MG/DL
CHOLEST/HDLC SERPL: 2.7 RATIO
CO2 SERPL-SCNC: 23 MMOL/L
CREAT SERPL-MCNC: 0.79 MG/DL
GLUCOSE SERPL-MCNC: 90 MG/DL
HDLC SERPL-MCNC: 85 MG/DL
LDLC SERPL CALC-MCNC: 130 MG/DL
POTASSIUM SERPL-SCNC: 4.1 MMOL/L
PROT SERPL-MCNC: 7.3 G/DL
SODIUM SERPL-SCNC: 141 MMOL/L
TRIGL SERPL-MCNC: 66 MG/DL

## 2019-02-08 PROCEDURE — 77427 RADIATION TX MANAGEMENT X5: CPT

## 2019-02-09 NOTE — REVIEW OF SYSTEMS
[Joint Pain] : joint pain [Negative] : Heme/Lymph [Skin Induration: Grade 0] : Skin Induration: Grade 0 [Dermatitis Radiation: Grade 0] : Dermatitis Radiation: Grade 0 [FreeTextEntry9] : intermittent back pain

## 2019-02-09 NOTE — HISTORY OF PRESENT ILLNESS
[FreeTextEntry1] : Mrs. Castaneda is a 48-year-old woman with stage IA pT1cN0 M0 intermediate to poorly differentiated invasive ductal carcinoma of the right upper outer quadrant breast, ER/IA positive, HER-2 negative by AZAR.  She is s/p right breast lumpectomy and SLNB 8/15/2108.\par She is s/p chemotherapy under the guidance of Dr. Horn.\par \par 2/4/19-OTV - Mrs. Castaneda has completed 265/5240 cGy to the right breast. Today she feels well. She denies pain to the breast. Tolerated her first treatment very well. She was educated on the use of aquaphor nightly.\par \par

## 2019-02-09 NOTE — PHYSICAL EXAM
[Breast Palpation Mass] : no palpable masses [Breast Abnormal Lactation (Galactorrhea)] : no nipple discharge [General Appearance - Alert] : alert [General Appearance - In No Acute Distress] : in no acute distress [de-identified] : healed lumpectomy scar in right breast and right axilla SLN biopsy scar

## 2019-02-09 NOTE — DISEASE MANAGEMENT
[Pathological] : TNM Stage: p [IA] : IA [TTNM] : 1cSLN [NTNM] : 0 [MTNM] : 0 [de-identified] : 345 [de-identified] : 7383 [de-identified] : right breast

## 2019-02-11 NOTE — VITALS
[Least Pain Intensity: 0/10] : 0/10 [90: Able to carry normal activity; minor signs or symptoms of disease.] : 90: Able to carry normal activity; minor signs or symptoms of disease.  [ECOG Performance Status: 1 - Restricted in physically strenuous activity but ambulatory and able to carry out work of a light or sedentary nature] : Performance Status: 1 - Restricted in physically strenuous activity but ambulatory and able to carry out work of a light or sedentary nature, e.g., light house work, office work [Maximal Pain Intensity: 2/10] : 2/10 [Pain Location: ___] : Pain Location: [unfilled]

## 2019-02-11 NOTE — REVIEW OF SYSTEMS
[Skin Induration: Grade 0] : Skin Induration: Grade 0 [Joint Pain] : joint pain [Negative] : Heme/Lymph [FreeTextEntry9] : intermittent back pain [Fatigue: Grade 1 - Fatigue relieved by rest] : Fatigue: Grade 1 - Fatigue relieved by rest [Breast Pain: Grade 1 - Mild pain] : Breast Pain: Grade 1 - Mild pain [Cognitive Disturbance: Grade 0] : Cognitive Disturbance: Grade 0 [Skin Hyperpigmentation: Grade 0] : Skin Hyperpigmentation: Grade 0 [Dermatitis Radiation: Grade 1 - Faint erythema or dry desquamation] : Dermatitis Radiation: Grade 1 - Faint erythema or dry desquamation

## 2019-02-12 NOTE — HISTORY OF PRESENT ILLNESS
[FreeTextEntry1] : Mrs. Castaneda is a 48-year-old woman with stage IA pT1cN0 M0 intermediate to poorly differentiated invasive ductal carcinoma of the right upper outer quadrant breast, ER/[place] positive, HER-2 negative by AZAR.  She is s/p right breast lumpectomy and SLNB 8/15/2108.\par She is s/p chemotherapy under the guidance of Dr. Horn.\par \par 2/11/19-OTV - Mrs. Castaneda has completed 1590/5240 cGy to the right breast.\par Today she notes fatigue relieved with rest and slight ache 2/10 right breast for which advil helps. Using aquaphor.\par \par \par 2/4/19-OTV - Mrs. Castaneda has completed 265/5240 cGy to the right breast. Today she feels well. She denies pain to the breast. Tolerated her first treatment very well. She was educated on the use of aquaphor nightly.\par \par

## 2019-02-12 NOTE — PHYSICAL EXAM
[General Appearance - Alert] : alert [General Appearance - In No Acute Distress] : in no acute distress [Breast Palpation Mass] : no palpable masses [Breast Abnormal Lactation (Galactorrhea)] : no nipple discharge [Normal] : well developed, well nourished, in no acute distress [] : no respiratory distress [Oriented To Time, Place, And Person] : oriented to person, place, and time [de-identified] : healed lumpectomy scar in right breast and right axilla SLN biopsy scar [de-identified] : slight erythema right breast

## 2019-02-12 NOTE — DISEASE MANAGEMENT
[Pathological] : TNM Stage: p [IA] : IA [TTNM] : 1cSLN [NTNM] : 0 [MTNM] : 0 [de-identified] : 1475 [de-identified] : 9907 [de-identified] : right breast

## 2019-02-15 PROCEDURE — 77427 RADIATION TX MANAGEMENT X5: CPT

## 2019-02-20 ENCOUNTER — APPOINTMENT (OUTPATIENT)
Dept: INFUSION THERAPY | Facility: HOSPITAL | Age: 49
End: 2019-02-20

## 2019-02-25 PROCEDURE — 77427 RADIATION TX MANAGEMENT X5: CPT

## 2019-02-25 PROCEDURE — 77280 THER RAD SIMULAJ FIELD SMPL: CPT | Mod: 26

## 2019-02-25 NOTE — VITALS
[Least Pain Intensity: 0/10] : 0/10 [90: Able to carry normal activity; minor signs or symptoms of disease.] : 90: Able to carry normal activity; minor signs or symptoms of disease.  [ECOG Performance Status: 1 - Restricted in physically strenuous activity but ambulatory and able to carry out work of a light or sedentary nature] : Performance Status: 1 - Restricted in physically strenuous activity but ambulatory and able to carry out work of a light or sedentary nature, e.g., light house work, office work [Maximal Pain Intensity: 3/10] : 3/10 [Pain Location: ___] : Pain Location: [unfilled]

## 2019-02-25 NOTE — PHYSICAL EXAM
[Normal] : well developed, well nourished, in no acute distress [] : no respiratory distress [Breast Palpation Mass] : no palpable masses [Breast Abnormal Lactation (Galactorrhea)] : no nipple discharge [Oriented To Time, Place, And Person] : oriented to person, place, and time [de-identified] : healed lumpectomy scar in right breast and right axilla SLN biopsy scar [de-identified] : slight erythema right breast

## 2019-02-25 NOTE — HISTORY OF PRESENT ILLNESS
[FreeTextEntry1] : Mrs. Castaneda is a 48-year-old woman with stage IA pT1cN0 M0 intermediate to poorly differentiated invasive ductal carcinoma of the right upper outer quadrant breast, ER/[place] positive, HER-2 negative by AZAR.  She is s/p right breast lumpectomy and SLNB 8/15/2108.\par She is s/p chemotherapy under the guidance of Dr. Horn.\par \par 2/19/19- OTV 2915 /5240 cGy to the right breast.\par Today she states that she feels generally well with good energy level. Notes occasional fatigue which is relieved with rest. Denies breast or chest pain.Continues to use aquaphor.\par \par \par \par \par 2/11/19-OTV - Mrs. Castaneda has completed 1590/5240 cGy to the right breast.\par Today she notes fatigue relieved with rest and slight ache 2/10 right breast for which advil helps. Using aquaphor.\par \par \par 2/4/19-OTV - Mrs. Castaneda has completed 265/5240 cGy to the right breast. Today she feels well. She denies pain to the breast. Tolerated her first treatment very well. She was educated on the use of aquaphor nightly.\par \par

## 2019-02-25 NOTE — DISEASE MANAGEMENT
[Pathological] : TNM Stage: p [IA] : IA [TTNM] : 1cSLN [NTNM] : 0 [MTNM] : 0 [de-identified] : 4643 [de-identified] : 0834 [de-identified] : right breast

## 2019-02-27 PROCEDURE — 77334 RADIATION TREATMENT AID(S): CPT | Mod: 26

## 2019-02-27 PROCEDURE — 77321 SPECIAL TELETX PORT PLAN: CPT | Mod: 26

## 2019-03-11 ENCOUNTER — OUTPATIENT (OUTPATIENT)
Dept: OUTPATIENT SERVICES | Facility: HOSPITAL | Age: 49
LOS: 1 days | Discharge: HOME | End: 2019-03-11

## 2019-03-11 DIAGNOSIS — C50.919 MALIGNANT NEOPLASM OF UNSPECIFIED SITE OF UNSPECIFIED FEMALE BREAST: ICD-10-CM

## 2019-03-11 DIAGNOSIS — M67.432 GANGLION, LEFT WRIST: Chronic | ICD-10-CM

## 2019-03-11 DIAGNOSIS — Z98.890 OTHER SPECIFIED POSTPROCEDURAL STATES: Chronic | ICD-10-CM

## 2019-03-11 DIAGNOSIS — Z30.430 ENCOUNTER FOR INSERTION OF INTRAUTERINE CONTRACEPTIVE DEVICE: Chronic | ICD-10-CM

## 2019-03-11 NOTE — DISEASE MANAGEMENT
[Pathological] : TNM Stage: p [IA] : IA [TTNM] : 1cSLN [NTNM] : 0 [MTNM] : 0 [de-identified] : right breast

## 2019-03-11 NOTE — HISTORY OF PRESENT ILLNESS
[FreeTextEntry1] : Mrs. Castaneda is a 48-year-old woman with stage IA pT1cN0 M0 intermediate to poorly differentiated invasive ductal carcinoma of the right upper outer quadrant breast, ER/DE positive, HER-2 negative by AZAR.  She is s/p right breast lumpectomy and SLNB 8/15/2108.\par She is s/p chemotherapy under the guidance of Dr. Horn.\par \par 2/25/19-TV 2915 /5240 cGy \par Today she notes fatigue which is relieved with rest. Notes right breast pain/sensitivity, pinkness and itchiness . Using aquaphor to treated area OTC hydrocortisone with minimal relief.\par \par 2/19/19- OTV 2915 /5240 cGy to the right breast.\par Today she states that she feels generally well with good energy level. Notes occasional fatigue which is relieved with rest. Denies breast or chest pain.Continues to use aquaphor\par \par \par \par \par 2/11/19-OTV - Mrs. Castaneda has completed 1590/5240 cGy to the right breast.\par Today she notes fatigue relieved with rest and slight ache 2/10 right breast for which advil helps. Using aquaphor.\par \par \par 2/4/19-OTV - Mrs. Castaneda has completed 265/5240 cGy to the right breast. Today she feels well. She denies pain to the breast. Tolerated her first treatment very well. She was educated on the use of aquaphor nightly.\par \par

## 2019-03-11 NOTE — PHYSICAL EXAM
[Normal] : well developed, well nourished, in no acute distress [Oriented To Time, Place, And Person] : oriented to person, place, and time [Breast Palpation Mass] : no palpable masses [de-identified] : mild erythema and swelling to right breast treated area

## 2019-03-12 ENCOUNTER — TRANSCRIPTION ENCOUNTER (OUTPATIENT)
Age: 49
End: 2019-03-12

## 2019-03-20 ENCOUNTER — LABORATORY RESULT (OUTPATIENT)
Age: 49
End: 2019-03-20

## 2019-03-20 ENCOUNTER — APPOINTMENT (OUTPATIENT)
Dept: HEMATOLOGY ONCOLOGY | Facility: CLINIC | Age: 49
End: 2019-03-20
Payer: COMMERCIAL

## 2019-03-20 ENCOUNTER — APPOINTMENT (OUTPATIENT)
Dept: INFUSION THERAPY | Facility: HOSPITAL | Age: 49
End: 2019-03-20

## 2019-03-20 ENCOUNTER — RESULT REVIEW (OUTPATIENT)
Age: 49
End: 2019-03-20

## 2019-03-20 VITALS
TEMPERATURE: 97.7 F | OXYGEN SATURATION: 100 % | WEIGHT: 148.37 LBS | BODY MASS INDEX: 26.28 KG/M2 | SYSTOLIC BLOOD PRESSURE: 112 MMHG | RESPIRATION RATE: 16 BRPM | HEART RATE: 68 BPM | DIASTOLIC BLOOD PRESSURE: 74 MMHG

## 2019-03-20 LAB
BASOPHILS # BLD AUTO: 0 K/UL — SIGNIFICANT CHANGE UP (ref 0–0.2)
BASOPHILS NFR BLD AUTO: 0.7 % — SIGNIFICANT CHANGE UP (ref 0–2)
EOSINOPHIL # BLD AUTO: 0.3 K/UL — SIGNIFICANT CHANGE UP (ref 0–0.5)
EOSINOPHIL NFR BLD AUTO: 7.5 % — HIGH (ref 0–6)
HCT VFR BLD CALC: 38.4 % — SIGNIFICANT CHANGE UP (ref 34.5–45)
HGB BLD-MCNC: 13.1 G/DL — SIGNIFICANT CHANGE UP (ref 11.5–15.5)
LYMPHOCYTES # BLD AUTO: 0.9 K/UL — LOW (ref 1–3.3)
LYMPHOCYTES # BLD AUTO: 23.9 % — SIGNIFICANT CHANGE UP (ref 13–44)
MCHC RBC-ENTMCNC: 29.7 PG — SIGNIFICANT CHANGE UP (ref 27–34)
MCHC RBC-ENTMCNC: 34 G/DL — SIGNIFICANT CHANGE UP (ref 32–36)
MCV RBC AUTO: 87.3 FL — SIGNIFICANT CHANGE UP (ref 80–100)
MONOCYTES # BLD AUTO: 0.4 K/UL — SIGNIFICANT CHANGE UP (ref 0–0.9)
MONOCYTES NFR BLD AUTO: 9.5 % — SIGNIFICANT CHANGE UP (ref 2–14)
NEUTROPHILS # BLD AUTO: 2.3 K/UL — SIGNIFICANT CHANGE UP (ref 1.8–7.4)
NEUTROPHILS NFR BLD AUTO: 58.4 % — SIGNIFICANT CHANGE UP (ref 43–77)
PLATELET # BLD AUTO: 210 K/UL — SIGNIFICANT CHANGE UP (ref 150–400)
RBC # BLD: 4.39 M/UL — SIGNIFICANT CHANGE UP (ref 3.8–5.2)
RBC # FLD: 13 % — SIGNIFICANT CHANGE UP (ref 10.3–14.5)
WBC # BLD: 4 K/UL — SIGNIFICANT CHANGE UP (ref 3.8–10.5)
WBC # FLD AUTO: 4 K/UL — SIGNIFICANT CHANGE UP (ref 3.8–10.5)

## 2019-03-20 PROCEDURE — 99215 OFFICE O/P EST HI 40 MIN: CPT

## 2019-03-21 LAB
25(OH)D3 SERPL-MCNC: 36.3 NG/ML
ALBUMIN SERPL ELPH-MCNC: 4.5 G/DL
ALP BLD-CCNC: 96 U/L
ALT SERPL-CCNC: 19 U/L
ANION GAP SERPL CALC-SCNC: 12 MMOL/L
AST SERPL-CCNC: 20 U/L
BILIRUB SERPL-MCNC: 0.2 MG/DL
BUN SERPL-MCNC: 13 MG/DL
CALCIUM SERPL-MCNC: 9.7 MG/DL
CHLORIDE SERPL-SCNC: 105 MMOL/L
CO2 SERPL-SCNC: 25 MMOL/L
CREAT SERPL-MCNC: 0.73 MG/DL
ESTRADIOL SERPL-MCNC: <5 PG/ML
FSH SERPL-MCNC: 5.5 IU/L
GLUCOSE SERPL-MCNC: 91 MG/DL
LH SERPL-ACNC: 4.9 IU/L
POTASSIUM SERPL-SCNC: 4.4 MMOL/L
PROT SERPL-MCNC: 7.7 G/DL
SODIUM SERPL-SCNC: 142 MMOL/L

## 2019-03-21 NOTE — ASSESSMENT
[FreeTextEntry1] : 48-year-old woman with history of asthma, hypothyroidism, depression/anxiety, chronic hep B (surface Ag reactive, viral load negative, on entecavir during treatment, follows with hepatology) with R invasive breast cancer s/p lumpectomy and SLNB August 2018 with 1.1cm IDC, ER+ 95% strong, AK+ 3-5% strong, HER2 IHC 2+ and negative by CISH. [place] grade 7-8/9, with DCIS present and close surgical margins and 0/2 SLN involved. Oncotype score 29. She completed TC x 4 adjuvant chemotherapy, with C2 c/b seizure in the setting of severe hyponatremia (euvolemic, polydipsia) and aspiration event thought due to polydipsia - sertraline held on treatment C3 and 4 completed without issue. She completed adjuvant radiation and started Lupron for ovarian suppression 2/2019 as well as alendronate PO for osteoporosis on DEXA.\par \par We again discussed the use of both GNRH agonist injections (every 28 days, intramuscular) in combination with and aromatase inhibitor (AI) PO daily for those high-risk pre- and janet-menopausal patients who were shown to have decreased risk of recurrence with this therapy per SOFT/TEXT studies. We reviewed side effects of combined GNRH agonist/arimidex treatment and induction of total endocrine deprivation, including but not limited to fatigue, hot flashes, vaginal dryness or discharge, loss of libido, bone loss (which may lead to worsening osteoporosis and fracture - patient is high risk for this given current diagnosis of osteoporosis pre-treatment), potential cardiovascular risks, and joint pains. She will undergo DEXA monitoring periodically while on treatment. She will continue calcium (1200mg PO daily) and vitamin D supplementation (1000mg PO daily) for bone strength. She was counseled on the importance of exercise 30 minutes per day, 5x per week. Treatment is recommended to continue for at least 5 years, as long as side effects remain tolerable. All of the patient's questions were answered and she is in agreement with this plan. We will continue Lupron injections this month, and I will check blood work to confirm menopausal status today. I will e-prescribe and she will begin taking arimidex now. We alternatively discussed treatment with laparoscopic oophorectomy and single agent AI therapy thereafter. All of the patient and her 's questions were answered.\par \par Breast Cancer: s/p adjuvant TC x 4, adjuvant radiation\par -check labs today as above\par -Follow up Dr. Heller as scheduled May 2019 with breast imaging\par -radiation oncology follow up with Dr. Escalante\par -Continue Lupron q28 days\par -Arimidex ordered through mail order pharmacy as requested\par \par Hep B surface antigen reactive: c/w chronic hep B, no risk factors, viral load undetectable on repeat checks\par -continue entecavir daily; Hepatology recommendations appreciated from 12/11, plan for continued follow up and HCC screening with Dr. Beltran, to continue for ~6 months post chemotherapy completion, follow up May 2019 scheduled\par \par Depression/Anxiety: symptoms well managed >12 years on current regimen, sertraline resumed after pause on treatment due to prior hyponatremia as above\par -Follow up with her psychiatrist as planned \par -continue wellbutrin, sertraline cautiously\par -patient instructed to call with any new symptoms or concerns\par \par Osteoporosis: DEXA 1/2019 with osteoporosis of spine only -2.6 (MRI spine without fracture or deformity)\par -GNRH agonist/AI post-chemotherapy given high risk OncotypeDX score, premenopausal, adjuvant chemotherapy use\par -continue alendronate, tolerating\par -follow up endocrinology\par -advised dental evaluation as well

## 2019-03-21 NOTE — PHYSICAL EXAM
[Fully active, able to carry on all pre-disease performance without restriction] : Status 0 - Fully active, able to carry on all pre-disease performance without restriction [Normal] : affect appropriate [de-identified] : RUOQ breast lumpectomy and SLNB scars well healing, minimal scar tissue palpated at surgical site, no rashes, no masses or adenopathy palpated b/l. faint erythema over R breast confluent c/w radiation changes, minimal desquamation [de-identified] : some varicose veins on LEs, no edema

## 2019-03-21 NOTE — HISTORY OF PRESENT ILLNESS
[Disease: _____________________] : Disease: [unfilled] [T: ___] : T[unfilled] [N: ___] : N[unfilled] [M: ___] : M[unfilled] [AJCC Stage: ____] : AJCC Stage: [unfilled] [de-identified] : 48-year-old lady with history of asthma, hypothyroidism, depression/anxiety with recently diagnosed invasive breast cancer s/p lumpectomy and SLNB here for initial medical oncology consultation.\par \par Patient had normal annual routine mammograms since age 42, last 8/11/16. 5/22/18 mammo with 1.5cm R breast mass; diagnosis mammo and US confirmed this mass RUOQ 1.6cm at 10:00, hypoechoic. US guided core biopsy of the mass was performed 6/1/18 which revealed 0.5 cm invasive ductal carcinoma, Page score 7/9, with nuclear grade 2 cribriform type DCIS with central necrosis and calcifications, estrogen receptor 95% strong, progesterone receptor 3-5% strong, HER-2 IHC 2+ negative by CIS. My Risk panel on 6/13/2018 was negative. MRI of the bilateral breasts on 06/25/2018 showed a 1.7 X 1.6 X 1.8 cm focal area of non-mass enhancement with washout kinetics and biopsy marker at the upper outer posterior 10:00 position of the right breast. There were no other suspicious lesions in the right breast and no suspicious lesions in the left breast. There were no axillary or internal mammary lymphadenopathy. She saw Dr. Heller who performed CXR, nuclear bone scan, pelvic and abdominal sonograms which were negative. On 8/15/18 she underwent a lumpectomy with SLNB - pathology with 1.1 cm invasive ductal carcinoma, Page 7-8/9 and no LVI. 0/2LN involved. There was also a 1.5 cm nuclear grade 2 - 3 ductal carcinoma in situ, cribriform and solid type with comedonecrosis and calcifications. Regarding the margins, the invasive tumor was 0.15 mm from the deep margin. Ductal carcinoma in situ was noted within microns of the deep margin and less than 0.1 mm from the anterior and superior margins. Two sentinel nodes were examined and negative. She saw radiation oncology in consultation and was recommended for adjuvant radiation pending medical oncology recommendations. Oncotype score 29 (20% risk of distant recurrence with tamoxifen alone). TC x 4 recommended. Patient has elected to utilize cold cap.\par \par HBV surface antigen reactive, HBV DNA not detected, c/w chronic hepatitis B infection (discussed with hepatology). She was started on entecavir daily ~1 week prior to chemotherapy initiation and continues this daily. US Abdomen completed prior by Dr. Heller prior to surgery - noted normal. She saw hepatology Dr. Beltran in consultation 11/2; repeat serologies stable and Fibroscan relatively normal. Plan to continue entecavir until at least 6 months post-chemotherapy (June 2019), with hepatology follow up also planned and [id number] HCC screening with RUQUS lifelong.\par \par 10/24-10/26 admitted to [facility].\par ~9pm just after driving home from getting food,  witness LOC with seizure activity x 20 seconds followed by lethargy - EMS called.\par She was taken to [facility] ER and admitted to ICU; sodium 119 with evidence of aspiration on CXR/CTA chest, O2 sat 86% briefly requiring BIPAP support quickly weaned. Neurology, Pulmonary, Renal consults following. Sodium corrected over the next 24h with normal saline infusion, antibiotics discontinued as no further evidence of infection. Neulasta onpro administered as scheduled 10/25 afternoon. She had a CTH and MRI Brain w/wo contrast which showed no acute findings. Zoloft was held, and she was discharged home. Incidental note of possible compression deformity of thoracic spine on imaging while inpatient. She admits to drinking ~4L water on day of chemotherapy as advised by cold cap literature. Her  recalls in retrospect that she seemed overly sleepy and slightly confused (forgot she had chinese food for lunch, eyes closing while sitting up) on the afternoon/evening after treatment prior to seizure. CK and LFTs elevated post discharge, continued to downtrend over the next few days to normal.\par \par She underwent cycle 3 of TC followed by 24 hours of observation at [facility] inpatient with neuro checks and q4h BMP monitoring. Her sodium remained normal and she was asymptomatic. She completed her final cycle of TC on 12/5/18 without event.\par \par Adjuvant radiation initiated 2/4/2019 and completed 3/2/19.\par Lupron for ovarian suppression initiated 2/2019.\par \par Pertinent History:\par Hypothyroidism stable on synthroid. Depression/anxiety stable since 2006 on zoloft and wellbutrin, she sees Dr. Manuel Ga for this. She is UTD on RHM. She lives on  with her  Eric. Their 17 year old son just started college in [facility] [facility] and their 13 year old daughter lives at home with them. Patient works part time at [employer]. FHx significant for father with prostate cancer in 60s, paternal cousin with leukemia age 13 and breast cancer age 45. [de-identified] : T1cN0 [de-identified] : ER+CO-HER2- [de-identified] : Oncotype 29 [Treatment Protocol] : Treatment Protocol [Therapy: ___] : Therapy: [unfilled] [FreeTextEntry1] : x 4 cycles, with Penguin cold cap [de-identified] : Patient presents today for follow up. She completed adjuvant radiation with Dr. Escalante 2 weeks ago. She notes some hot flashes at night that are tolerable and do not interfere with sleep (not changed since beginning Lupron) and occasional back aches which are chronic. She is otherwise doing well and without complaints, working full time. She saw her endocrinologist who noted thyroid labs were stabilized and no changes in dosing necessary. He prescribed alendronate weekly for osteoporosis, she is tolerating this without new symptoms. Denies HA, dizziness, f/c/s, n/v, CP, SOB, cough, abd pain, back pain, diarrhea, edema, rashes, bleeding, muscle aches or pains, new neuro symptoms or confusion.

## 2019-03-22 LAB — HDV AB SER IA-ACNC: NEGATIVE

## 2019-04-09 ENCOUNTER — OUTPATIENT (OUTPATIENT)
Dept: OUTPATIENT SERVICES | Facility: HOSPITAL | Age: 49
LOS: 1 days | Discharge: HOME | End: 2019-04-09

## 2019-04-09 DIAGNOSIS — C50.919 MALIGNANT NEOPLASM OF UNSPECIFIED SITE OF UNSPECIFIED FEMALE BREAST: ICD-10-CM

## 2019-04-09 DIAGNOSIS — M67.432 GANGLION, LEFT WRIST: Chronic | ICD-10-CM

## 2019-04-09 DIAGNOSIS — Z30.430 ENCOUNTER FOR INSERTION OF INTRAUTERINE CONTRACEPTIVE DEVICE: Chronic | ICD-10-CM

## 2019-04-09 DIAGNOSIS — Z98.890 OTHER SPECIFIED POSTPROCEDURAL STATES: Chronic | ICD-10-CM

## 2019-04-17 ENCOUNTER — APPOINTMENT (OUTPATIENT)
Dept: INFUSION THERAPY | Facility: HOSPITAL | Age: 49
End: 2019-04-17

## 2019-04-17 ENCOUNTER — RESULT REVIEW (OUTPATIENT)
Age: 49
End: 2019-04-17

## 2019-04-17 ENCOUNTER — APPOINTMENT (OUTPATIENT)
Dept: HEMATOLOGY ONCOLOGY | Facility: CLINIC | Age: 49
End: 2019-04-17
Payer: COMMERCIAL

## 2019-04-17 VITALS
OXYGEN SATURATION: 98 % | SYSTOLIC BLOOD PRESSURE: 108 MMHG | WEIGHT: 144.4 LBS | RESPIRATION RATE: 14 BRPM | DIASTOLIC BLOOD PRESSURE: 72 MMHG | TEMPERATURE: 98.5 F | HEART RATE: 76 BPM | BODY MASS INDEX: 25.58 KG/M2

## 2019-04-17 LAB
BASOPHILS # BLD AUTO: 0 K/UL — SIGNIFICANT CHANGE UP (ref 0–0.2)
BASOPHILS NFR BLD AUTO: 0.7 % — SIGNIFICANT CHANGE UP (ref 0–2)
EOSINOPHIL # BLD AUTO: 0.4 K/UL — SIGNIFICANT CHANGE UP (ref 0–0.5)
EOSINOPHIL NFR BLD AUTO: 9.3 % — HIGH (ref 0–6)
HCT VFR BLD CALC: 39.2 % — SIGNIFICANT CHANGE UP (ref 34.5–45)
HGB BLD-MCNC: 13.5 G/DL — SIGNIFICANT CHANGE UP (ref 11.5–15.5)
LYMPHOCYTES # BLD AUTO: 1.1 K/UL — SIGNIFICANT CHANGE UP (ref 1–3.3)
LYMPHOCYTES # BLD AUTO: 26.3 % — SIGNIFICANT CHANGE UP (ref 13–44)
MCHC RBC-ENTMCNC: 30.7 PG — SIGNIFICANT CHANGE UP (ref 27–34)
MCHC RBC-ENTMCNC: 34.4 G/DL — SIGNIFICANT CHANGE UP (ref 32–36)
MCV RBC AUTO: 89.2 FL — SIGNIFICANT CHANGE UP (ref 80–100)
MONOCYTES # BLD AUTO: 0.3 K/UL — SIGNIFICANT CHANGE UP (ref 0–0.9)
MONOCYTES NFR BLD AUTO: 8.1 % — SIGNIFICANT CHANGE UP (ref 2–14)
NEUTROPHILS # BLD AUTO: 2.4 K/UL — SIGNIFICANT CHANGE UP (ref 1.8–7.4)
NEUTROPHILS NFR BLD AUTO: 55.5 % — SIGNIFICANT CHANGE UP (ref 43–77)
PLATELET # BLD AUTO: 209 K/UL — SIGNIFICANT CHANGE UP (ref 150–400)
RBC # BLD: 4.4 M/UL — SIGNIFICANT CHANGE UP (ref 3.8–5.2)
RBC # FLD: 13.9 % — SIGNIFICANT CHANGE UP (ref 10.3–14.5)
WBC # BLD: 4.3 K/UL — SIGNIFICANT CHANGE UP (ref 3.8–10.5)
WBC # FLD AUTO: 4.3 K/UL — SIGNIFICANT CHANGE UP (ref 3.8–10.5)

## 2019-04-17 PROCEDURE — 99214 OFFICE O/P EST MOD 30 MIN: CPT

## 2019-04-18 LAB
ALBUMIN SERPL ELPH-MCNC: 4.4 G/DL
ALP BLD-CCNC: 92 U/L
ALT SERPL-CCNC: 16 U/L
ANION GAP SERPL CALC-SCNC: 14 MMOL/L
AST SERPL-CCNC: 20 U/L
BILIRUB SERPL-MCNC: 0.3 MG/DL
BUN SERPL-MCNC: 13 MG/DL
CALCIUM SERPL-MCNC: 9.7 MG/DL
CHLORIDE SERPL-SCNC: 106 MMOL/L
CO2 SERPL-SCNC: 24 MMOL/L
CREAT SERPL-MCNC: 0.82 MG/DL
ESTRADIOL SERPL-MCNC: <5 PG/ML
FSH SERPL-MCNC: 7 IU/L
GLUCOSE SERPL-MCNC: 76 MG/DL
HBV DNA # SERPL NAA+PROBE: NOT DETECTED
HEPB DNA PCR LOG: NOT DETECTED LOGIU/ML
LH SERPL-ACNC: 0.4 IU/L
POTASSIUM SERPL-SCNC: 4.7 MMOL/L
PROT SERPL-MCNC: 7.6 G/DL
SODIUM SERPL-SCNC: 144 MMOL/L

## 2019-04-19 NOTE — ASSESSMENT
[FreeTextEntry1] : 48-year-old woman with history of asthma, hypothyroidism, depression/anxiety, chronic hep B (surface Ag reactive, viral load negative, on entecavir during treatment, follows with hepatology) with R invasive breast cancer s/p lumpectomy and SLNB August 2018 with 1.1cm IDC, ER+ 95% strong, KS+ 3-5% strong, HER2 IHC 2+ and negative by CISH. [place] grade 7-8/9, with DCIS present and close surgical margins and 0/2 SLN involved. Oncotype score 29. She completed TC x 4 adjuvant chemotherapy, with C2 c/b seizure in the setting of severe hyponatremia (euvolemic, polydipsia) and aspiration event thought due to polydipsia - sertraline held on treatment C3 and 4 completed without issue. She completed adjuvant radiation and started Lupron for ovarian suppression 2/2019 as well as alendronate PO for osteoporosis on DEXA.\par \par Breast Cancer: s/p adjuvant TC x 4, adjuvant radiation\par -check labs today as above, LH, FSH, estradiol\par -Follow up Dr. Heller as scheduled May 2019 with breast imaging\par -radiation oncology follow up with Dr. Escalante\par -Continue Lupron q28 days\par -Arimidex ordered through mail order pharmacy as requested; reordered as not received by pharmacy per patient, advised to begin today\par -address IUD\par -follow up 1 month\par \par Hep B surface antigen reactive: c/w chronic hep B, no risk factors, viral load undetectable on repeat checks\par -continue entecavir daily; Hepatology recommendations appreciated from 12/11, plan for continued follow up and HCC screening with Dr. Beltran, to continue for ~6 months post chemotherapy completion, follow up May 2019 scheduled\par \par Depression/Anxiety: symptoms well managed >12 years on current regimen, sertraline resumed after pause on treatment due to prior hyponatremia as above\par -Follow up with her psychiatrist as planned \par -continue wellbutrin, sertraline cautiously\par -patient instructed to call with any new symptoms or concerns\par \par Osteoporosis: DEXA 1/2019 with osteoporosis of spine only -2.6 (MRI spine without fracture or deformity)\par -GNRH agonist/AI post-chemotherapy given high risk OncotypeDX score, premenopausal, adjuvant chemotherapy use\par -continue alendronate, tolerating\par -follow up endocrinology\par -advised dental evaluation as well

## 2019-04-19 NOTE — PHYSICAL EXAM
[Fully active, able to carry on all pre-disease performance without restriction] : Status 0 - Fully active, able to carry on all pre-disease performance without restriction [Normal] : affect appropriate [de-identified] : RUOQ breast lumpectomy and SLNB scars well healed, minimal scar tissue palpated at surgical site, no rashes, no masses or adenopathy palpated b/l. faint erythema over R breast confluent c/w radiation changes - improved [de-identified] : some varicose veins on LEs, no edema

## 2019-04-19 NOTE — HISTORY OF PRESENT ILLNESS
[Disease: _____________________] : Disease: [unfilled] [N: ___] : N[unfilled] [T: ___] : T[unfilled] [M: ___] : M[unfilled] [AJCC Stage: ____] : AJCC Stage: [unfilled] [Treatment Protocol] : Treatment Protocol [Therapy: ___] : Therapy: [unfilled] [de-identified] : 48-year-old lady with history of asthma, hypothyroidism, depression/anxiety with recently diagnosed invasive breast cancer s/p lumpectomy and SLNB here for initial medical oncology consultation.\par \par Patient had normal annual routine mammograms since age 42, last 8/11/16. 5/22/18 mammo with 1.5cm R breast mass; diagnosis mammo and US confirmed this mass RUOQ 1.6cm at 10:00, hypoechoic. US guided core biopsy of the mass was performed 6/1/18 which revealed 0.5 cm invasive ductal carcinoma, [place] score 7/9, with nuclear grade 2 cribriform type DCIS with central necrosis and calcifications, estrogen receptor 95% strong, progesterone receptor 3-5% strong, HER-2 IHC 2+ negative by CIS. My Risk panel on 6/13/2018 was negative. MRI of the bilateral breasts on 06/25/2018 showed a 1.7 X 1.6 X 1.8 cm focal area of non-mass enhancement with washout kinetics and biopsy marker at the upper outer posterior 10:00 position of the right breast. There were no other suspicious lesions in the right breast and no suspicious lesions in the left breast. There were no axillary or internal mammary lymphadenopathy. She saw Dr. Heller who performed CXR, nuclear bone scan, pelvic and abdominal sonograms which were negative. On 8/15/18 she underwent a lumpectomy with SLNB - pathology with 1.1 cm invasive ductal carcinoma, [place] 7-8/9 and no LVI. 0/2LN involved. There was also a 1.5 cm nuclear grade 2 - 3 ductal carcinoma in situ, cribriform and solid type with comedonecrosis and calcifications. Regarding the margins, the invasive tumor was 0.15 mm from the deep margin. Ductal carcinoma in situ was noted within microns of the deep margin and less than 0.1 mm from the anterior and superior margins. Two sentinel nodes were examined and negative. She saw radiation oncology in consultation and was recommended for adjuvant radiation pending medical oncology recommendations. Oncotype score 29 (20% risk of distant recurrence with tamoxifen alone). TC x 4 recommended. Patient has elected to utilize cold cap.\par \par HBV surface antigen reactive, HBV DNA not detected, c/w chronic hepatitis B infection (discussed with hepatology). She was started on entecavir daily ~1 week prior to chemotherapy initiation and continues this daily. US Abdomen completed prior by Dr. Heller prior to surgery - noted normal. She saw hepatology Dr. Beltran in consultation 11/2; repeat serologies stable and Fibroscan relatively normal. Plan to continue entecavir until at least 6 months post-chemotherapy (June 2019), with hepatology follow up also planned and [id number] HCC screening with RUQUS lifelong.\par \par 10/24-10/26 admitted to [facility].\par ~9pm just after driving home from getting food,  witness LOC with seizure activity x 20 seconds followed by lethargy - EMS called.\par She was taken to [facility] ER and admitted to ICU; sodium 119 with evidence of aspiration on CXR/CTA chest, O2 sat 86% briefly requiring BIPAP support quickly weaned. Neurology, Pulmonary, Renal consults following. Sodium corrected over the next 24h with normal saline infusion, antibiotics discontinued as no further evidence of infection. Neulasta onpro administered as scheduled 10/25 afternoon. She had a CTH and MRI Brain w/wo contrast which showed no acute findings. Zoloft was held, and she was discharged home. Incidental note of possible compression deformity of thoracic spine on imaging while inpatient. She admits to drinking ~4L water on day of chemotherapy as advised by cold cap literature. Her  recalls in retrospect that she seemed overly sleepy and slightly confused (forgot she had chinese food for lunch, eyes closing while sitting up) on the afternoon/evening after treatment prior to seizure. CK and LFTs elevated post discharge, continued to downtrend over the next few days to normal.\par \par She underwent cycle 3 of TC followed by 24 hours of observation at [facility] inpatient with neuro checks and q4h BMP monitoring. Her sodium remained normal and she was asymptomatic. She completed her final cycle of TC on 12/5/18 without event.\par \par Adjuvant radiation initiated 2/4/2019 and completed 3/2/19.\par Lupron for ovarian suppression initiated 2/2019.\par \par Pertinent History:\par Hypothyroidism stable on synthroid. Depression/anxiety stable since 2006 on zoloft and wellbutrin, she sees Dr. Manuel Ga for this. She is UTD on RHM. She lives on  with her  Eric. Their 17 year old son just started college in [employer] and their 13 year old daughter lives at home with them. Patient works part time at [employer]. FHx significant for father with prostate cancer in 60s, paternal cousin with leukemia age 13 and breast cancer age 45. [de-identified] : T1cN0 [de-identified] : ER+WI-HER2- [FreeTextEntry1] : x 4 cycles, with Penguin cold cap [de-identified] : Oncotype 29 [de-identified] : Patient presents today for follow up. She notes some hot flashes at night that are tolerable and do not interfere with sleep (not changed since beginning Lupron) and occasional back aches which are chronic. She is otherwise doing well and without complaints. Denies HA, dizziness, f/c/s, n/v, CP, SOB, cough, abd pain, back pain, diarrhea, edema, rashes, bleeding, muscle aches or pains, new neuro symptoms or confusion.

## 2019-04-25 NOTE — H&P ADULT - EYES
"Requested Prescriptions   Pending Prescriptions Disp Refills     VENTOLIN  (90 Base) MCG/ACT inhaler [Pharmacy Med Name: VENTOLIN HFA 108MCG/ACT AERS] 18 g 9     Sig: INHALE ONE TO TWO PUFFS INTO THE LUNGS EVERY 4 HOURS AS NEEDED FOR SHORTNESS OF BREATH / DYSPNEA   Last Written Prescription Date:  1/9/18  Last Fill Quantity: 18g,  # refills: 9   Last office visit: 3/13/2019 with prescribing provider:     Future Office Visit:        Asthma Maintenance Inhalers - Anticholinergics Passed - 4/24/2019  2:40 PM        Passed - Patient is age 12 years or older        Passed - Recent (12 mo) or future (30 days) visit within the authorizing provider's specialty     Patient had office visit in the last 12 months or has a visit in the next 30 days with authorizing provider or within the authorizing provider's specialty.  See \"Patient Info\" tab in inbasket, or \"Choose Columns\" in Meds & Orders section of the refill encounter.              Passed - Medication is active on med list      Prescription approved per [facility] Refill Protocol.  Olimpia Oakley RN      " EOMI; PERRL; no drainage or redness

## 2019-05-07 ENCOUNTER — APPOINTMENT (OUTPATIENT)
Dept: RADIATION ONCOLOGY | Facility: CLINIC | Age: 49
End: 2019-05-07
Payer: COMMERCIAL

## 2019-05-07 VITALS
WEIGHT: 143.08 LBS | BODY MASS INDEX: 25.35 KG/M2 | HEART RATE: 75 BPM | DIASTOLIC BLOOD PRESSURE: 77 MMHG | HEIGHT: 63 IN | TEMPERATURE: 98.2 F | SYSTOLIC BLOOD PRESSURE: 112 MMHG | RESPIRATION RATE: 15 BRPM | OXYGEN SATURATION: 98 %

## 2019-05-07 PROCEDURE — 99024 POSTOP FOLLOW-UP VISIT: CPT

## 2019-05-07 RX ORDER — ANASTROZOLE TABLETS 1 MG/1
1 TABLET ORAL DAILY
Qty: 90 | Refills: 2 | Status: DISCONTINUED | COMMUNITY
Start: 2019-03-20 | End: 2019-05-07

## 2019-05-07 NOTE — PHYSICAL EXAM
[Normal] : no respiratory distress, lungs were clear to auscultation bilaterally [Heart Rate And Rhythm] : heart rate and rhythm were normal [Heart Sounds] : normal S1 and S2 [Breast Appearance] : normal in appearance [Breast Palpation Mass] : no palpable masses [No UE Edema] : there is no upper extremity edema [Breast Abnormal Lactation (Galactorrhea)] : no nipple discharge [Bowel Sounds] : normal bowel sounds [Abdomen Soft] : soft [Cervical Lymph Nodes Enlarged Posterior Bilaterally] : posterior cervical [Cervical Lymph Nodes Enlarged Anterior Bilaterally] : anterior cervical [Supraclavicular Lymph Nodes Enlarged Bilaterally] : supraclavicular [Axillary Lymph Nodes Enlarged Bilaterally] : axillary [Skin Color & Pigmentation] : normal skin color and pigmentation [Oriented To Time, Place, And Person] : oriented to person, place, and time

## 2019-05-07 NOTE — REVIEW OF SYSTEMS
[Constipation: Grade 0] : Constipation: Grade 0 [Dyspepsia: Grade 0] : Dyspepsia: Grade 0 [Dysphagia: Grade 0] : Dysphagia: Grade 0 [Fatigue: Grade 1 - Fatigue relieved by rest] : Fatigue: Grade 1 - Fatigue relieved by rest [Breast Pain: Grade 0] : Breast Pain: Grade 0 [Skin Hyperpigmentation: Grade 0] : Skin Hyperpigmentation: Grade 0 [Skin Induration: Grade 0] : Skin Induration: Grade 0 [Dermatitis Radiation: Grade 0] : Dermatitis Radiation: Grade 0

## 2019-05-08 ENCOUNTER — TRANSCRIPTION ENCOUNTER (OUTPATIENT)
Age: 49
End: 2019-05-08

## 2019-05-13 NOTE — HISTORY OF PRESENT ILLNESS
[FreeTextEntry1] : Ms. Castaneda is a 49-year-old female with ER+ PgR+ Her2- Stage IA pT1c pN0 M0 Malignant neoplasm of upper-outer quadrant of right female breast.  She was treated to a dose of 5,240 cGy to the right breast from  2/4/2019 - 3/2/2019. She is s/p adjuvant TC x 4  and is currently on lupron treatments under the guidance of Dr. Horn.\par She received She  tolerated her radiation well without significant acute side effects.\par \par She saw Dr. Horn on 4/17/19 and was started on anastrazole.\par \par Today she notes fatigue which is relieved with rest. Denies breast pain. She is taking anastrazole daily. She has an appointment to see Dr. Heller in July.\par \par \par \par \par \par \par

## 2019-05-21 ENCOUNTER — OUTPATIENT (OUTPATIENT)
Dept: OUTPATIENT SERVICES | Facility: HOSPITAL | Age: 49
LOS: 1 days | Discharge: HOME | End: 2019-05-21

## 2019-05-21 DIAGNOSIS — Z30.430 ENCOUNTER FOR INSERTION OF INTRAUTERINE CONTRACEPTIVE DEVICE: Chronic | ICD-10-CM

## 2019-05-21 DIAGNOSIS — M67.432 GANGLION, LEFT WRIST: Chronic | ICD-10-CM

## 2019-05-21 DIAGNOSIS — Z98.890 OTHER SPECIFIED POSTPROCEDURAL STATES: Chronic | ICD-10-CM

## 2019-05-21 DIAGNOSIS — C50.919 MALIGNANT NEOPLASM OF UNSPECIFIED SITE OF UNSPECIFIED FEMALE BREAST: ICD-10-CM

## 2019-05-22 ENCOUNTER — RESULT REVIEW (OUTPATIENT)
Age: 49
End: 2019-05-22

## 2019-05-22 ENCOUNTER — FORM ENCOUNTER (OUTPATIENT)
Age: 49
End: 2019-05-22

## 2019-05-22 ENCOUNTER — APPOINTMENT (OUTPATIENT)
Dept: HEMATOLOGY ONCOLOGY | Facility: CLINIC | Age: 49
End: 2019-05-22
Payer: COMMERCIAL

## 2019-05-22 ENCOUNTER — APPOINTMENT (OUTPATIENT)
Dept: INFUSION THERAPY | Facility: HOSPITAL | Age: 49
End: 2019-05-22

## 2019-05-22 VITALS
HEART RATE: 77 BPM | RESPIRATION RATE: 16 BRPM | DIASTOLIC BLOOD PRESSURE: 78 MMHG | SYSTOLIC BLOOD PRESSURE: 114 MMHG | WEIGHT: 144.4 LBS | TEMPERATURE: 97.7 F | BODY MASS INDEX: 25.58 KG/M2

## 2019-05-22 LAB
BASOPHILS # BLD AUTO: 0 K/UL — SIGNIFICANT CHANGE UP (ref 0–0.2)
BASOPHILS NFR BLD AUTO: 0.4 % — SIGNIFICANT CHANGE UP (ref 0–2)
EOSINOPHIL # BLD AUTO: 0.3 K/UL — SIGNIFICANT CHANGE UP (ref 0–0.5)
EOSINOPHIL NFR BLD AUTO: 7.8 % — HIGH (ref 0–6)
HCT VFR BLD CALC: 36.1 % — SIGNIFICANT CHANGE UP (ref 34.5–45)
HGB BLD-MCNC: 12.8 G/DL — SIGNIFICANT CHANGE UP (ref 11.5–15.5)
LYMPHOCYTES # BLD AUTO: 0.8 K/UL — LOW (ref 1–3.3)
LYMPHOCYTES # BLD AUTO: 20.3 % — SIGNIFICANT CHANGE UP (ref 13–44)
MCHC RBC-ENTMCNC: 31.5 PG — SIGNIFICANT CHANGE UP (ref 27–34)
MCHC RBC-ENTMCNC: 35.4 G/DL — SIGNIFICANT CHANGE UP (ref 32–36)
MCV RBC AUTO: 88.9 FL — SIGNIFICANT CHANGE UP (ref 80–100)
MONOCYTES # BLD AUTO: 0.3 K/UL — SIGNIFICANT CHANGE UP (ref 0–0.9)
MONOCYTES NFR BLD AUTO: 8.3 % — SIGNIFICANT CHANGE UP (ref 2–14)
NEUTROPHILS # BLD AUTO: 2.5 K/UL — SIGNIFICANT CHANGE UP (ref 1.8–7.4)
NEUTROPHILS NFR BLD AUTO: 63.1 % — SIGNIFICANT CHANGE UP (ref 43–77)
PLATELET # BLD AUTO: 219 K/UL — SIGNIFICANT CHANGE UP (ref 150–400)
RBC # BLD: 4.06 M/UL — SIGNIFICANT CHANGE UP (ref 3.8–5.2)
RBC # FLD: 13.7 % — SIGNIFICANT CHANGE UP (ref 10.3–14.5)
WBC # BLD: 4 K/UL — SIGNIFICANT CHANGE UP (ref 3.8–10.5)
WBC # FLD AUTO: 4 K/UL — SIGNIFICANT CHANGE UP (ref 3.8–10.5)

## 2019-05-22 PROCEDURE — 99215 OFFICE O/P EST HI 40 MIN: CPT

## 2019-05-23 ENCOUNTER — APPOINTMENT (OUTPATIENT)
Dept: ULTRASOUND IMAGING | Facility: CLINIC | Age: 49
End: 2019-05-23
Payer: COMMERCIAL

## 2019-05-23 ENCOUNTER — OUTPATIENT (OUTPATIENT)
Dept: OUTPATIENT SERVICES | Facility: HOSPITAL | Age: 49
LOS: 1 days | End: 2019-05-23
Payer: COMMERCIAL

## 2019-05-23 ENCOUNTER — APPOINTMENT (OUTPATIENT)
Dept: MAMMOGRAPHY | Facility: CLINIC | Age: 49
End: 2019-05-23
Payer: COMMERCIAL

## 2019-05-23 DIAGNOSIS — Z30.430 ENCOUNTER FOR INSERTION OF INTRAUTERINE CONTRACEPTIVE DEVICE: Chronic | ICD-10-CM

## 2019-05-23 DIAGNOSIS — M67.432 GANGLION, LEFT WRIST: Chronic | ICD-10-CM

## 2019-05-23 DIAGNOSIS — Z98.890 OTHER SPECIFIED POSTPROCEDURAL STATES: Chronic | ICD-10-CM

## 2019-05-23 DIAGNOSIS — Z00.8 ENCOUNTER FOR OTHER GENERAL EXAMINATION: ICD-10-CM

## 2019-05-23 LAB
ALBUMIN SERPL ELPH-MCNC: 4.5 G/DL
ALP BLD-CCNC: 76 U/L
ALT SERPL-CCNC: 15 U/L
ANION GAP SERPL CALC-SCNC: 11 MMOL/L
AST SERPL-CCNC: 17 U/L
BILIRUB SERPL-MCNC: 0.3 MG/DL
BUN SERPL-MCNC: 15 MG/DL
CALCIUM SERPL-MCNC: 9.8 MG/DL
CHLORIDE SERPL-SCNC: 103 MMOL/L
CO2 SERPL-SCNC: 26 MMOL/L
CREAT SERPL-MCNC: 0.8 MG/DL
ESTRADIOL SERPL-MCNC: <5 PG/ML
FSH SERPL-MCNC: 9 IU/L
GLUCOSE SERPL-MCNC: 86 MG/DL
LH SERPL-ACNC: <0.3 IU/L
POTASSIUM SERPL-SCNC: 5 MMOL/L
PROT SERPL-MCNC: 7.3 G/DL
SODIUM SERPL-SCNC: 140 MMOL/L

## 2019-05-23 PROCEDURE — 77066 DX MAMMO INCL CAD BI: CPT | Mod: 26

## 2019-05-23 PROCEDURE — 76641 ULTRASOUND BREAST COMPLETE: CPT | Mod: 26,50

## 2019-05-23 PROCEDURE — 76700 US EXAM ABDOM COMPLETE: CPT

## 2019-05-23 PROCEDURE — G0279: CPT | Mod: 26

## 2019-05-23 PROCEDURE — 76700 US EXAM ABDOM COMPLETE: CPT | Mod: 26

## 2019-05-23 PROCEDURE — 77066 DX MAMMO INCL CAD BI: CPT

## 2019-05-23 PROCEDURE — G0279: CPT

## 2019-05-23 PROCEDURE — 76641 ULTRASOUND BREAST COMPLETE: CPT

## 2019-05-23 NOTE — HISTORY OF PRESENT ILLNESS
[Disease: _____________________] : Disease: [unfilled] [T: ___] : T[unfilled] [N: ___] : N[unfilled] [M: ___] : M[unfilled] [AJCC Stage: ____] : AJCC Stage: [unfilled] [de-identified] : 49-year-old lady with history of asthma, hypothyroidism, depression/anxiety with recently diagnosed invasive breast cancer s/p lumpectomy and SLNB here for initial medical oncology consultation.\par \par Patient had normal annual routine mammograms since age 42, last 8/11/16. 5/22/18 mammo with 1.5cm R breast mass; diagnosis mammo and US confirmed this mass RUOQ 1.6cm at 10:00, hypoechoic. US guided core biopsy of the mass was performed 6/1/18 which revealed 0.5 cm invasive ductal carcinoma, Walker score 7/9, with nuclear grade 2 cribriform type DCIS with central necrosis and calcifications, estrogen receptor 95% strong, progesterone receptor 3-5% strong, HER-2 IHC 2+ negative by CIS. My Risk panel on 6/13/2018 was negative. MRI of the bilateral breasts on 06/25/2018 showed a 1.7 X 1.6 X 1.8 cm focal area of non-mass enhancement with washout kinetics and biopsy marker at the upper outer posterior 10:00 position of the right breast. There were no other suspicious lesions in the right breast and no suspicious lesions in the left breast. There were no axillary or internal mammary lymphadenopathy. She saw Dr. Heller who performed CXR, nuclear bone scan, pelvic and abdominal sonograms which were negative. On 8/15/18 she underwent a lumpectomy with SLNB - pathology with 1.1 cm invasive ductal carcinoma, Walker 7-8/9 and no LVI. 0/2LN involved. There was also a 1.5 cm nuclear grade 2 - 3 ductal carcinoma in situ, cribriform and solid type with comedonecrosis and calcifications. Regarding the margins, the invasive tumor was 0.15 mm from the deep margin. Ductal carcinoma in situ was noted within microns of the deep margin and less than 0.1 mm from the anterior and superior margins. Two sentinel nodes were examined and negative. She saw radiation oncology in consultation and was recommended for adjuvant radiation pending medical oncology recommendations. Oncotype score 29 (20% risk of distant recurrence with tamoxifen alone). TC x 4 recommended. Patient has elected to utilize cold cap.\par \par HBV surface antigen reactive, HBV DNA not detected, c/w chronic hepatitis B infection (discussed with hepatology). She was started on entecavir daily ~1 week prior to chemotherapy initiation and continues this daily. US Abdomen completed prior by Dr. Heller prior to surgery - noted normal. She saw hepatology Dr. Beltran in consultation 11/2; repeat serologies stable and Fibroscan relatively normal. Plan to continue entecavir until at least 6 months post-chemotherapy (June 2019), with hepatology follow up also planned and [id number] HCC screening with RUQUS lifelong.\par \par 10/24-10/26 admitted to [facility].\par ~9pm just after driving home from getting food,  witness LOC with seizure activity x 20 seconds followed by lethargy - EMS called.\par She was taken to [facility] ER and admitted to ICU; sodium 119 with evidence of aspiration on CXR/CTA chest, O2 sat 86% briefly requiring BIPAP support quickly weaned. Neurology, Pulmonary, Renal consults following. Sodium corrected over the next 24h with normal saline infusion, antibiotics discontinued as no further evidence of infection. Neulasta onpro administered as scheduled 10/25 afternoon. She had a CTH and MRI Brain w/wo contrast which showed no acute findings. Zoloft was held, and she was discharged home. Incidental note of possible compression deformity of thoracic spine on imaging while inpatient. She admits to drinking ~4L water on day of chemotherapy as advised by cold cap literature. Her  recalls in retrospect that she seemed overly sleepy and slightly confused (forgot she had chinese food for lunch, eyes closing while sitting up) on the afternoon/evening after treatment prior to seizure. CK and LFTs elevated post discharge, continued to downtrend over the next few days to normal.\par \par She underwent cycle 3 of TC followed by 24 hours of observation at [facility] inpatient with neuro checks and q4h BMP monitoring. Her sodium remained normal and she was asymptomatic. She completed her final cycle of TC on 12/5/18 without event.\par \par Adjuvant radiation initiated 2/4/2019 and completed 3/2/19.\par Lupron for ovarian suppression initiated 2/2019. Arimidex initiated April 2019.\par \par Pertinent History:\par Hypothyroidism stable on synthroid. On oral bisphophanate since 2/2019 with her endocrinologist.\par Depression/anxiety stable since 2006 on zoloft and wellbutrin, she sees Dr. Manuel Ga for this. \par She is UTD on RHM. \par [place] with her  [place]. Their 17 year old son started college at [place] 2018 and their 13 year old daughter lives at home with them. Patient works part time at [employer]. \par FHx significant for father with prostate cancer in 60s, paternal cousin with leukemia age 13 and breast cancer age 45. [de-identified] : T1cN0 [de-identified] : ER+TN-HER2- [de-identified] : Oncotype 29 [Treatment Protocol] : Treatment Protocol [Therapy: ___] : Therapy: [unfilled] [FreeTextEntry1] : x 4 cycles, with Penguin cold cap [de-identified] : Patient presents today for follow up. She notes some hot flashes at night that are tolerable and do not interfere with sleep (not changed since beginning Lupron) and occasional back aches which are chronic - unchanged overall and no new symptoms since beginning armidex >1 month ago. She is otherwise doing well and without complaints. Denies HA, dizziness, f/c/s, n/v, CP, SOB, cough, abd pain, back pain, diarrhea, edema, rashes, bleeding, muscle aches or pains, new neuro symptoms or confusion.

## 2019-05-23 NOTE — ASSESSMENT
[FreeTextEntry1] : 49-year-old woman with history of asthma, hypothyroidism, depression/anxiety, chronic hep B (surface Ag reactive, viral load negative, on entecavir during treatment, follows with hepatology) with R invasive breast cancer s/p lumpectomy and SLNB August 2018 with 1.1cm IDC, ER+ 95% strong, TX+ 3-5% strong, HER2 IHC 2+ and negative by CISH. [place] grade 7-8/9, with DCIS present and close surgical margins and 0/2 SLN involved. Oncotype score 29. She completed TC x 4 adjuvant chemotherapy, with C2 c/b seizure in the setting of severe hyponatremia (euvolemic, polydipsia) and aspiration event thought due to polydipsia - sertraline held on treatment C3 and 4 completed without issue. She completed adjuvant radiation and started Lupron for ovarian suppression 2/2019 as well as alendronate PO for osteoporosis on DEXA, arimidex 4/2019. She is tolerating all well without sginificant AEs.\par \par Breast Cancer: s/p adjuvant TC x 4, adjuvant radiation\par -check labs today as above, LH, FSH, estradiol again\par -Follow up Dr. Heller as scheduled May 2019 with breast imaging this week\par -radiation oncology follow up with Dr. Escalante as recommended\par -Continue Lupron q28 days\par -Arimidex daily to continue\par -patient has Copper IUD in place\par -follow up 3 months with me, sooner if issues\par \par Hep B surface antigen reactive: c/w chronic hep B, no risk factors, viral load undetectable on repeat checks\par -continue entecavir daily; Hepatology recommendations appreciated from 12/11, plan for continued follow up and HCC screening with Dr. Beltran (follow up 6/2019, abd US scheduled this week)\par \par Depression/Anxiety: symptoms well managed >12 years on current regimen, sertraline resumed after pause on treatment due to prior hyponatremia as above\par -Follow up with her psychiatrist as planned \par -continue wellbutrin, sertraline cautiously\par -patient instructed to call with any new symptoms or concerns\par -discussed Effexor as potential treatment and management of hot flashes - she declines today as symptoms do not warrant, monitor\par \par Osteoporosis: DEXA 1/2019 with osteoporosis of spine only -2.6 (MRI spine without fracture or deformity)\par -GNRH agonist/AI post-chemotherapy given high risk OncotypeDX score, premenopausal, adjuvant chemotherapy use\par -continue alendronate, tolerating\par -follow up endocrinology\par -advised dental evaluation as well

## 2019-05-23 NOTE — PHYSICAL EXAM
[Fully active, able to carry on all pre-disease performance without restriction] : Status 0 - Fully active, able to carry on all pre-disease performance without restriction [Normal] : affect appropriate [de-identified] : RUOQ breast lumpectomy and SLNB scars well healed, minimal scar tissue palpated at surgical site, no rashes, no masses or adenopathy palpated b/l [de-identified] : some varicose veins on LEs, no edema

## 2019-06-11 ENCOUNTER — RESULT REVIEW (OUTPATIENT)
Age: 49
End: 2019-06-11

## 2019-06-19 ENCOUNTER — RESULT REVIEW (OUTPATIENT)
Age: 49
End: 2019-06-19

## 2019-06-19 ENCOUNTER — APPOINTMENT (OUTPATIENT)
Dept: INFUSION THERAPY | Facility: HOSPITAL | Age: 49
End: 2019-06-19

## 2019-06-19 LAB
ALBUMIN SERPL ELPH-MCNC: 4.6 G/DL
ALP BLD-CCNC: 78 U/L
ALT SERPL-CCNC: 14 U/L
ANION GAP SERPL CALC-SCNC: 12 MMOL/L
AST SERPL-CCNC: 17 U/L
BASOPHILS # BLD AUTO: 0 K/UL — SIGNIFICANT CHANGE UP (ref 0–0.2)
BASOPHILS NFR BLD AUTO: 0.8 % — SIGNIFICANT CHANGE UP (ref 0–2)
BILIRUB SERPL-MCNC: 0.4 MG/DL
BUN SERPL-MCNC: 16 MG/DL
CALCIUM SERPL-MCNC: 9.4 MG/DL
CHLORIDE SERPL-SCNC: 104 MMOL/L
CO2 SERPL-SCNC: 26 MMOL/L
CREAT SERPL-MCNC: 0.82 MG/DL
EOSINOPHIL # BLD AUTO: 0.3 K/UL — SIGNIFICANT CHANGE UP (ref 0–0.5)
EOSINOPHIL NFR BLD AUTO: 8.4 % — HIGH (ref 0–6)
ESTRADIOL SERPL-MCNC: <5 PG/ML
FSH SERPL-MCNC: 9.7 IU/L
GLUCOSE SERPL-MCNC: 89 MG/DL
HCT VFR BLD CALC: 37.9 % — SIGNIFICANT CHANGE UP (ref 34.5–45)
HGB BLD-MCNC: 13.5 G/DL — SIGNIFICANT CHANGE UP (ref 11.5–15.5)
LH SERPL-ACNC: <0.3 IU/L
LYMPHOCYTES # BLD AUTO: 1 K/UL — SIGNIFICANT CHANGE UP (ref 1–3.3)
LYMPHOCYTES # BLD AUTO: 26.1 % — SIGNIFICANT CHANGE UP (ref 13–44)
MCHC RBC-ENTMCNC: 32.4 PG — SIGNIFICANT CHANGE UP (ref 27–34)
MCHC RBC-ENTMCNC: 35.6 G/DL — SIGNIFICANT CHANGE UP (ref 32–36)
MCV RBC AUTO: 90.8 FL — SIGNIFICANT CHANGE UP (ref 80–100)
MONOCYTES # BLD AUTO: 0.3 K/UL — SIGNIFICANT CHANGE UP (ref 0–0.9)
MONOCYTES NFR BLD AUTO: 7.8 % — SIGNIFICANT CHANGE UP (ref 2–14)
NEUTROPHILS # BLD AUTO: 2.1 K/UL — SIGNIFICANT CHANGE UP (ref 1.8–7.4)
NEUTROPHILS NFR BLD AUTO: 57 % — SIGNIFICANT CHANGE UP (ref 43–77)
PLATELET # BLD AUTO: 215 K/UL — SIGNIFICANT CHANGE UP (ref 150–400)
POTASSIUM SERPL-SCNC: 5.1 MMOL/L
PROT SERPL-MCNC: 7.4 G/DL
RBC # BLD: 4.17 M/UL — SIGNIFICANT CHANGE UP (ref 3.8–5.2)
RBC # FLD: 12.1 % — SIGNIFICANT CHANGE UP (ref 10.3–14.5)
SODIUM SERPL-SCNC: 142 MMOL/L
WBC # BLD: 3.6 K/UL — LOW (ref 3.8–10.5)
WBC # FLD AUTO: 3.6 K/UL — LOW (ref 3.8–10.5)

## 2019-06-21 ENCOUNTER — APPOINTMENT (OUTPATIENT)
Dept: HEPATOLOGY | Facility: CLINIC | Age: 49
End: 2019-06-21

## 2019-07-07 ENCOUNTER — TRANSCRIPTION ENCOUNTER (OUTPATIENT)
Age: 49
End: 2019-07-07

## 2019-07-12 ENCOUNTER — OUTPATIENT (OUTPATIENT)
Dept: OUTPATIENT SERVICES | Facility: HOSPITAL | Age: 49
LOS: 1 days | Discharge: HOME | End: 2019-07-12

## 2019-07-12 DIAGNOSIS — C50.919 MALIGNANT NEOPLASM OF UNSPECIFIED SITE OF UNSPECIFIED FEMALE BREAST: ICD-10-CM

## 2019-07-12 DIAGNOSIS — Z98.890 OTHER SPECIFIED POSTPROCEDURAL STATES: Chronic | ICD-10-CM

## 2019-07-12 DIAGNOSIS — M67.432 GANGLION, LEFT WRIST: Chronic | ICD-10-CM

## 2019-07-12 DIAGNOSIS — Z30.430 ENCOUNTER FOR INSERTION OF INTRAUTERINE CONTRACEPTIVE DEVICE: Chronic | ICD-10-CM

## 2019-07-17 ENCOUNTER — APPOINTMENT (OUTPATIENT)
Dept: INFUSION THERAPY | Facility: HOSPITAL | Age: 49
End: 2019-07-17

## 2019-07-17 ENCOUNTER — APPOINTMENT (OUTPATIENT)
Dept: HEPATOLOGY | Facility: CLINIC | Age: 49
End: 2019-07-17

## 2019-07-17 ENCOUNTER — APPOINTMENT (OUTPATIENT)
Dept: SURGICAL ONCOLOGY | Facility: CLINIC | Age: 49
End: 2019-07-17
Payer: COMMERCIAL

## 2019-07-17 ENCOUNTER — RESULT REVIEW (OUTPATIENT)
Age: 49
End: 2019-07-17

## 2019-07-17 VITALS
WEIGHT: 145 LBS | SYSTOLIC BLOOD PRESSURE: 114 MMHG | HEART RATE: 77 BPM | BODY MASS INDEX: 25.69 KG/M2 | RESPIRATION RATE: 17 BRPM | HEIGHT: 63 IN | OXYGEN SATURATION: 97 % | DIASTOLIC BLOOD PRESSURE: 76 MMHG | TEMPERATURE: 98.1 F

## 2019-07-17 LAB
BASOPHILS # BLD AUTO: 0 K/UL — SIGNIFICANT CHANGE UP (ref 0–0.2)
BASOPHILS NFR BLD AUTO: 0.6 % — SIGNIFICANT CHANGE UP (ref 0–2)
EOSINOPHIL # BLD AUTO: 0.2 K/UL — SIGNIFICANT CHANGE UP (ref 0–0.5)
EOSINOPHIL NFR BLD AUTO: 5.7 % — SIGNIFICANT CHANGE UP (ref 0–6)
HCT VFR BLD CALC: 39.2 % — SIGNIFICANT CHANGE UP (ref 34.5–45)
HGB BLD-MCNC: 13.7 G/DL — SIGNIFICANT CHANGE UP (ref 11.5–15.5)
LYMPHOCYTES # BLD AUTO: 1 K/UL — SIGNIFICANT CHANGE UP (ref 1–3.3)
LYMPHOCYTES # BLD AUTO: 22.8 % — SIGNIFICANT CHANGE UP (ref 13–44)
MCHC RBC-ENTMCNC: 32.7 PG — SIGNIFICANT CHANGE UP (ref 27–34)
MCHC RBC-ENTMCNC: 34.9 G/DL — SIGNIFICANT CHANGE UP (ref 32–36)
MCV RBC AUTO: 93.6 FL — SIGNIFICANT CHANGE UP (ref 80–100)
MONOCYTES # BLD AUTO: 0.4 K/UL — SIGNIFICANT CHANGE UP (ref 0–0.9)
MONOCYTES NFR BLD AUTO: 8.9 % — SIGNIFICANT CHANGE UP (ref 2–14)
NEUTROPHILS # BLD AUTO: 2.6 K/UL — SIGNIFICANT CHANGE UP (ref 1.8–7.4)
NEUTROPHILS NFR BLD AUTO: 62 % — SIGNIFICANT CHANGE UP (ref 43–77)
PLATELET # BLD AUTO: 233 K/UL — SIGNIFICANT CHANGE UP (ref 150–400)
RBC # BLD: 4.19 M/UL — SIGNIFICANT CHANGE UP (ref 3.8–5.2)
RBC # FLD: 11.5 % — SIGNIFICANT CHANGE UP (ref 10.3–14.5)
WBC # BLD: 4.2 K/UL — SIGNIFICANT CHANGE UP (ref 3.8–10.5)
WBC # FLD AUTO: 4.2 K/UL — SIGNIFICANT CHANGE UP (ref 3.8–10.5)

## 2019-07-17 PROCEDURE — 99214 OFFICE O/P EST MOD 30 MIN: CPT

## 2019-07-18 LAB
ALBUMIN SERPL ELPH-MCNC: 4.8 G/DL
ALP BLD-CCNC: 99 U/L
ALT SERPL-CCNC: 32 U/L
ANION GAP SERPL CALC-SCNC: 16 MMOL/L
AST SERPL-CCNC: 29 U/L
BILIRUB SERPL-MCNC: 0.3 MG/DL
BUN SERPL-MCNC: 17 MG/DL
CALCIUM SERPL-MCNC: 9.5 MG/DL
CHLORIDE SERPL-SCNC: 104 MMOL/L
CO2 SERPL-SCNC: 22 MMOL/L
CREAT SERPL-MCNC: 0.84 MG/DL
ESTRADIOL SERPL-MCNC: <5 PG/ML
FSH SERPL-MCNC: 10 IU/L
GLUCOSE SERPL-MCNC: 100 MG/DL
LH SERPL-ACNC: 0.3 IU/L
POTASSIUM SERPL-SCNC: 4.8 MMOL/L
PROT SERPL-MCNC: 7.7 G/DL
SODIUM SERPL-SCNC: 142 MMOL/L

## 2019-08-02 ENCOUNTER — APPOINTMENT (OUTPATIENT)
Dept: HEPATOLOGY | Facility: CLINIC | Age: 49
End: 2019-08-02
Payer: COMMERCIAL

## 2019-08-02 VITALS
HEART RATE: 82 BPM | TEMPERATURE: 98.1 F | BODY MASS INDEX: 26.4 KG/M2 | WEIGHT: 149 LBS | HEIGHT: 63 IN | DIASTOLIC BLOOD PRESSURE: 80 MMHG | RESPIRATION RATE: 17 BRPM | SYSTOLIC BLOOD PRESSURE: 117 MMHG

## 2019-08-02 DIAGNOSIS — R74.8 ABNORMAL LEVELS OF OTHER SERUM ENZYMES: ICD-10-CM

## 2019-08-02 PROCEDURE — 99213 OFFICE O/P EST LOW 20 MIN: CPT

## 2019-08-02 RX ORDER — ENTECAVIR 0.5 MG/1
0.5 TABLET, FILM COATED ORAL DAILY
Qty: 90 | Refills: 2 | Status: DISCONTINUED | COMMUNITY
Start: 2018-09-27 | End: 2019-06-10

## 2019-08-02 NOTE — ASSESSMENT
[FreeTextEntry1] : 48 yo F with asthma, hypothyroidism, depression/anxiety, and invasive breast cancer diagnosed in 5-6/2018, s/p lumpectomy and SNLB, s/p 4 cycles of TC chemotherapy (last dose on 12/5/18), and s/p adjuvant radiation therapy (2/4/19-3/2/19), currently on Lupron (2/2019- ) and Arimidex (4/2019- ). Prior to starting her chemotherapy, she was found on laboratory testing to have isolated HBsAg positivity (x3 on 9/21/18, 9/26/18, and 10/17/18), though with HBcIgM negative, HBcAb negative, HBeAg negative, HBeAb negative, and undetectable HBV DNA by PCR, as well as normal liver tests.\par \par # Isolated HBsAg positivity:\par - Likely represents chronic HBV infection as has been repeatedly positive, though HBV DNA undetectable.\par - S/p entecavir 0.5 mg po daily, started 1 week prior to chemotherapy and continued for 6 months, ended in 6/2019 as planned.\par - Re-check HBV serologies today.\par - If serologies continue to show HBsAg positivity but with normal liver enzymes and undetectable VL, then can remain off antiviral therapy but should have CMP and HBV DNA monitored q3 months, and also should continue to undergo HCC surveillance lifelong q6 months.\par - Abdominal US ordered for HCC surveillance (due 11/2019).\par - Had no significant hepatic fibrosis on FibroScan done on 12/11/18.\par - Her  and children all completed the HBV vaccination series. I previously advised her to have her other first-degree relatives (parents, siblings) checked for HBV and vaccinated or treated as appropriate, as well.\par - She is aware that she may require antiviral therapy again if her labs worsen or if she ever needed immune-suppressing medications such as chemotherapy again.\par \par She will follow-up with me in 6 months.

## 2019-08-02 NOTE — HISTORY OF PRESENT ILLNESS
[Needlestick Exposure] : no needlestick exposure [Infected Sexual Partner] : no infected sexual partner [IV Drug Use] : no IV drug use [Tattoo] : tattoo(s) [Body Piercing] : body piercing [Hemodialysis] : no hemodialysis [Transfusion before 1992] : no transfusion before 1992 [Incarceration] : no incarceration [Transplant before 1992] : no transplant before 1992 [Alcohol Abuse] : no alcohol abuse [Autoimmune Disorder] : autoimmune disorder [Travel to Endemic Area] : no travel to an endemic area [Household Contact to HBV] : no household contact to HBV [Occupational Exposure] : no occupational exposure [Cocaine Use] : no cocaine use [de-identified] : Ms. Castaneda is a 48 yo F with asthma, hypothyroidism, depression/anxiety, and invasive breast cancer diagnosed in 5-6/2018 (closely followed by medical oncologist Dr. Horn), s/p lumpectomy and SNLB, s/p 4 cycles of TC chemotherapy (last dose on 12/5/18), and s/p adjuvant radiation therapy (2/4/19-3/2/19), currently on Lupron (2/2019- ) and Arimidex (4/2019- ). Prior to starting her chemotherapy, she was found on laboratory testing to have isolated HBsAg positivity (x3 on 9/21/18, 9/26/18, and 10/17/18), though with HBcIgM negative, HBcAb negative, HBeAg negative, HBeAb negative, and undetectable HBV DNA by PCR, as well as normal liver tests. She was started on entecavir 0.5 mg po daily for prophylaxis. Her chemotherapy course was complicated by an episode of hyponatremia secondary to excess water consumption during cold cap utilization, with associated AMS and seizure requiring hospitalization 10/24/18-10/26/18. During that admission, she developed transient elevations in her liver enzymes (as well as CK) without detectable HBV DNA and that self-resolved.\par \par She underwent FibroScan on 12/11/18 that showed E 3.0 kPA (consistent with F0-F1 fibrosis) and CAP score of 226 dB/m (consistent with S1 or mild steatosis).\par \par She was last seen on 12/11/18. She is feeling well today with no complaints. She ran out and discontinued her entecavir in mid-June.\par \par She underwent abdominal US on 5/23/19 that showed mildly prominent liver, mild increased echogenicity of the hepatic parenchyma, and normal echotexture, with no focal hepatic lesions.

## 2019-08-02 NOTE — CONSULT LETTER
[Dear  ___] : Dear  [unfilled], [Courtesy Letter:] : I had the pleasure of seeing your patient, [unfilled], in my office today. [Please see my note below.] : Please see my note below. [Consult Closing:] : Thank you very much for allowing me to participate in the care of this patient.  If you have any questions, please do not hesitate to contact me. [FreeTextEntry2] : Dr. Yeny Horn [FreeTextEntry3] : Sincerely,\par \par Jonatan Beltran M.D., Ph.D.\par [facility]\par [address]\[place] [address]\par Tel: [phone]\par Fax: [phone]\par Cell: [phone]\par E-mail: [email or web address]\par

## 2019-08-06 ENCOUNTER — FORM ENCOUNTER (OUTPATIENT)
Age: 49
End: 2019-08-06

## 2019-08-07 ENCOUNTER — OUTPATIENT (OUTPATIENT)
Dept: OUTPATIENT SERVICES | Facility: HOSPITAL | Age: 49
LOS: 1 days | End: 2019-08-07
Payer: COMMERCIAL

## 2019-08-07 ENCOUNTER — APPOINTMENT (OUTPATIENT)
Dept: MRI IMAGING | Facility: CLINIC | Age: 49
End: 2019-08-07
Payer: COMMERCIAL

## 2019-08-07 DIAGNOSIS — Z98.890 OTHER SPECIFIED POSTPROCEDURAL STATES: Chronic | ICD-10-CM

## 2019-08-07 DIAGNOSIS — Z30.430 ENCOUNTER FOR INSERTION OF INTRAUTERINE CONTRACEPTIVE DEVICE: Chronic | ICD-10-CM

## 2019-08-07 DIAGNOSIS — C50.911 MALIGNANT NEOPLASM OF UNSPECIFIED SITE OF RIGHT FEMALE BREAST: ICD-10-CM

## 2019-08-07 DIAGNOSIS — M67.432 GANGLION, LEFT WRIST: Chronic | ICD-10-CM

## 2019-08-07 PROCEDURE — 77049 MRI BREAST C-+ W/CAD BI: CPT | Mod: 26

## 2019-08-07 PROCEDURE — A9585: CPT

## 2019-08-07 PROCEDURE — C8937: CPT

## 2019-08-07 PROCEDURE — C8908: CPT

## 2019-08-09 ENCOUNTER — OUTPATIENT (OUTPATIENT)
Dept: OUTPATIENT SERVICES | Facility: HOSPITAL | Age: 49
LOS: 1 days | Discharge: HOME | End: 2019-08-09

## 2019-08-09 DIAGNOSIS — Z98.890 OTHER SPECIFIED POSTPROCEDURAL STATES: Chronic | ICD-10-CM

## 2019-08-09 DIAGNOSIS — C50.919 MALIGNANT NEOPLASM OF UNSPECIFIED SITE OF UNSPECIFIED FEMALE BREAST: ICD-10-CM

## 2019-08-09 DIAGNOSIS — M67.432 GANGLION, LEFT WRIST: Chronic | ICD-10-CM

## 2019-08-09 DIAGNOSIS — Z30.430 ENCOUNTER FOR INSERTION OF INTRAUTERINE CONTRACEPTIVE DEVICE: Chronic | ICD-10-CM

## 2019-08-13 ENCOUNTER — LABORATORY RESULT (OUTPATIENT)
Age: 49
End: 2019-08-13

## 2019-08-13 ENCOUNTER — APPOINTMENT (OUTPATIENT)
Dept: INFUSION THERAPY | Facility: HOSPITAL | Age: 49
End: 2019-08-13

## 2019-08-13 ENCOUNTER — RESULT REVIEW (OUTPATIENT)
Age: 49
End: 2019-08-13

## 2019-08-13 ENCOUNTER — APPOINTMENT (OUTPATIENT)
Dept: HEMATOLOGY ONCOLOGY | Facility: CLINIC | Age: 49
End: 2019-08-13
Payer: COMMERCIAL

## 2019-08-13 VITALS
OXYGEN SATURATION: 97 % | SYSTOLIC BLOOD PRESSURE: 112 MMHG | DIASTOLIC BLOOD PRESSURE: 76 MMHG | TEMPERATURE: 98.2 F | BODY MASS INDEX: 26.17 KG/M2 | WEIGHT: 147.71 LBS | RESPIRATION RATE: 17 BRPM | HEART RATE: 77 BPM

## 2019-08-13 LAB
BASOPHILS # BLD AUTO: 0 K/UL — SIGNIFICANT CHANGE UP (ref 0–0.2)
BASOPHILS NFR BLD AUTO: 0.5 % — SIGNIFICANT CHANGE UP (ref 0–2)
EOSINOPHIL # BLD AUTO: 0.3 K/UL — SIGNIFICANT CHANGE UP (ref 0–0.5)
EOSINOPHIL NFR BLD AUTO: 6.8 % — HIGH (ref 0–6)
HCT VFR BLD CALC: 38.4 % — SIGNIFICANT CHANGE UP (ref 34.5–45)
HGB BLD-MCNC: 13.5 G/DL — SIGNIFICANT CHANGE UP (ref 11.5–15.5)
LYMPHOCYTES # BLD AUTO: 0.9 K/UL — LOW (ref 1–3.3)
LYMPHOCYTES # BLD AUTO: 22.7 % — SIGNIFICANT CHANGE UP (ref 13–44)
MCHC RBC-ENTMCNC: 32.8 PG — SIGNIFICANT CHANGE UP (ref 27–34)
MCHC RBC-ENTMCNC: 35.2 G/DL — SIGNIFICANT CHANGE UP (ref 32–36)
MCV RBC AUTO: 93.4 FL — SIGNIFICANT CHANGE UP (ref 80–100)
MONOCYTES # BLD AUTO: 0.3 K/UL — SIGNIFICANT CHANGE UP (ref 0–0.9)
MONOCYTES NFR BLD AUTO: 7.9 % — SIGNIFICANT CHANGE UP (ref 2–14)
NEUTROPHILS # BLD AUTO: 2.5 K/UL — SIGNIFICANT CHANGE UP (ref 1.8–7.4)
NEUTROPHILS NFR BLD AUTO: 62.1 % — SIGNIFICANT CHANGE UP (ref 43–77)
PLATELET # BLD AUTO: 244 K/UL — SIGNIFICANT CHANGE UP (ref 150–400)
RBC # BLD: 4.12 M/UL — SIGNIFICANT CHANGE UP (ref 3.8–5.2)
RBC # FLD: 11.7 % — SIGNIFICANT CHANGE UP (ref 10.3–14.5)
WBC # BLD: 4.1 K/UL — SIGNIFICANT CHANGE UP (ref 3.8–10.5)
WBC # FLD AUTO: 4.1 K/UL — SIGNIFICANT CHANGE UP (ref 3.8–10.5)

## 2019-08-13 PROCEDURE — 99214 OFFICE O/P EST MOD 30 MIN: CPT

## 2019-08-13 NOTE — HISTORY OF PRESENT ILLNESS
[Disease: _____________________] : Disease: [unfilled] [T: ___] : T[unfilled] [N: ___] : N[unfilled] [M: ___] : M[unfilled] [de-identified] : 49-year-old lady with history of asthma, hypothyroidism, depression/anxiety with recently diagnosed invasive breast cancer s/p lumpectomy and SLNB here for initial medical oncology consultation.\par \par Patient had normal annual routine mammograms since age 42, last 8/11/16. 5/22/18 mammo with 1.5cm R breast mass; diagnosis mammo and US confirmed this mass RUOQ 1.6cm at 10:00, hypoechoic. US guided core biopsy of the mass was performed 6/1/18 which revealed 0.5 cm invasive ductal carcinoma, [place] score 7/9, with nuclear grade 2 cribriform type DCIS with central necrosis and calcifications, estrogen receptor 95% strong, progesterone receptor 3-5% strong, HER-2 IHC 2+ negative by CIS. My Risk panel on 6/13/2018 was negative. MRI of the bilateral breasts on 06/25/2018 showed a 1.7 X 1.6 X 1.8 cm focal area of non-mass enhancement with washout kinetics and biopsy marker at the upper outer posterior 10:00 position of the right breast. There were no other suspicious lesions in the right breast and no suspicious lesions in the left breast. There were no axillary or internal mammary lymphadenopathy. She saw Dr. Heller who performed CXR, nuclear bone scan, pelvic and abdominal sonograms which were negative. On 8/15/18 she underwent a lumpectomy with SLNB - pathology with 1.1 cm invasive ductal carcinoma, Vanderbilt 7-8/9 and no LVI. 0/2LN involved. There was also a 1.5 cm nuclear grade 2 - 3 ductal carcinoma in situ, cribriform and solid type with comedonecrosis and calcifications. Regarding the margins, the invasive tumor was 0.15 mm from the deep margin. Ductal carcinoma in situ was noted within microns of the deep margin and less than 0.1 mm from the anterior and superior margins. Two sentinel nodes were examined and negative. She saw radiation oncology in consultation and was recommended for adjuvant radiation pending medical oncology recommendations. Oncotype score 29 (20% risk of distant recurrence with tamoxifen alone). TC x 4 recommended. Patient has elected to utilize cold cap.\par \par HBV surface antigen reactive, HBV DNA not detected, c/w chronic hepatitis B infection (discussed with hepatology). She was started on entecavir daily ~1 week prior to chemotherapy initiation and continues this daily. US Abdomen completed prior by Dr. Heller prior to surgery - noted normal. She saw hepatology Dr. Beltran in consultation 11/2; repeat serologies stable and Fibroscan relatively normal. Plan to continue entecavir until at least 6 months post-chemotherapy (June 2019), with hepatology follow up also planned and [id number] HCC screening with RUQUS lifelong.\par \par 10/24-10/26 admitted to [facility].\par ~9pm just after driving home from getting food,  witness LOC with seizure activity x 20 seconds followed by lethargy - EMS called.\par She was taken to [facility] ER and admitted to ICU; sodium 119 with evidence of aspiration on CXR/CTA chest, O2 sat 86% briefly requiring BIPAP support quickly weaned. Neurology, Pulmonary, Renal consults following. Sodium corrected over the next 24h with normal saline infusion, antibiotics discontinued as no further evidence of infection. Neulasta onpro administered as scheduled 10/25 afternoon. She had a CTH and MRI Brain w/wo contrast which showed no acute findings. Zoloft was held, and she was discharged home. Incidental note of possible compression deformity of thoracic spine on imaging while inpatient. She admits to drinking ~4L water on day of chemotherapy as advised by cold cap literature. Her  recalls in retrospect that she seemed overly sleepy and slightly confused (forgot she had chinese food for lunch, eyes closing while sitting up) on the afternoon/evening after treatment prior to seizure. CK and LFTs elevated post discharge, continued to downtrend over the next few days to normal.\par \par She underwent cycle 3 of TC followed by 24 hours of observation at [facility] inpatient with neuro checks and q4h BMP monitoring. Her sodium remained normal and she was asymptomatic. She completed her final cycle of TC on 12/5/18 without event.\par \par Adjuvant radiation initiated 2/4/2019 and completed 3/2/19.\par Lupron for ovarian suppression initiated 2/2019. Arimidex initiated April 2019.\par \par Pertinent History:\par Hypothyroidism stable on synthroid. On oral bisphophanate since 2/2019 with her endocrinologist.\par Depression/anxiety stable since 2006 on zoloft and wellbutrin, she sees Dr. Manuel Ga for this. \par She is UTD on RHM. \par [place] with her  [place]. Their 17 year old son started college at [employer] 2018 and their 13 year old daughter lives at home with them. Patient works part time at [employer]. \par FHx significant for father with prostate cancer in 60s, paternal cousin with leukemia age 13 and breast cancer age 45. [AJCC Stage: ____] : AJCC Stage: [unfilled] [de-identified] : ER+GA-HER2- [de-identified] : T1cN0 [Treatment Protocol] : Treatment Protocol [de-identified] : Oncotype 29 [Therapy: ___] : Therapy: [unfilled] [FreeTextEntry1] : x 4 cycles, with Penguin cold cap [de-identified] : Patient presents today for follow up. She notes some hot flashes at night that are tolerable and do not interfere with sleep (not changed since beginning Lupron). She is otherwise doing well and without complaints. Denies HA, dizziness, f/c/s, n/v, CP, SOB, cough, abd pain, back pain, diarrhea, edema, rashes, bleeding, muscle aches or pains, new neuro symptoms or confusion. She had some R axillary pain with movements - saw Dr. SALGUERO, MRI Breasts BIRADS2, resolved. She saw hepatology and has stopped entecavir.

## 2019-08-13 NOTE — PHYSICAL EXAM
[Fully active, able to carry on all pre-disease performance without restriction] : Status 0 - Fully active, able to carry on all pre-disease performance without restriction [Normal] : affect appropriate [de-identified] : some varicose veins on LEs, no edema [de-identified] : RUOQ breast lumpectomy and SLNB scars well healed, minimal scar tissue palpated at surgical site, no rashes, no masses or adenopathy palpated b/l

## 2019-08-13 NOTE — ASSESSMENT
[FreeTextEntry1] : 49-year-old woman with history of asthma, hypothyroidism, depression/anxiety, chronic hep B (surface Ag reactive, viral load negative, on entecavir during treatment, follows with hepatology) with R invasive breast cancer s/p lumpectomy and SLNB August 2018 with 1.1cm IDC, ER+ 95% strong, GA+ 3-5% strong, HER2 IHC 2+ and negative by CISH. [place] grade 7-8/9, with DCIS present and close surgical margins and 0/2 SLN involved. Oncotype score 29. She completed TC x 4 adjuvant chemotherapy, with C2 c/b seizure in the setting of severe hyponatremia (euvolemic, polydipsia) and aspiration event thought due to polydipsia - sertraline held on treatment C3 and 4 completed without issue. She completed adjuvant radiation and started Lupron for ovarian suppression 2/2019 as well as alendronate PO for osteoporosis on DEXA, arimidex 4/2019. She is tolerating all well without sginificant AEs.\par \par Breast Cancer: s/p adjuvant TC x 4, adjuvant radiation\par -check labs today CBC CMP, LH, FSH, estradiol again\par -Follow up Dr. Heller  6 months with imaging (Jan 2019)\par -radiation oncology follow up with Dr. Escalante as recommended\par -Continue Lupron q28 days\par -Arimidex daily to continue\par -patient has Copper IUD in place\par -follow up 3 months with me, sooner if issues\par -age-appropriate routine health maintenance and screenings advised\par \par Hep B surface antigen reactive: c/w chronic hep B, no risk factors, viral load undetectable on repeat checks\par - Hepatology recommendations appreciated from Aug 2019, plan for continued follow up and HCC screening with Dr. Beltran (follow up 12/2019, abd US)\par \par Depression/Anxiety: symptoms well managed >12 years on current regimen, sertraline resumed after pause on treatment due to prior hyponatremia as above\par -Follow up with her psychiatrist as planned \par -continue wellbutrin, sertraline\par -patient instructed to call with any new symptoms or concerns\par -discussed Effexor as potential treatment and management of hot flashes - she declines today as symptoms do not warrant, monitor\par \par Osteoporosis: DEXA 1/2019 with osteoporosis of spine only -2.6 (MRI spine without fracture or deformity)\par -GNRH agonist/AI post-chemotherapy given high risk OncotypeDX score, premenopausal, adjuvant chemotherapy use\par -continue alendronate, tolerating\par -follow up endocrinology\par -recent dental evaluation ok, continue to follow up regularly advised

## 2019-08-14 LAB
25(OH)D3 SERPL-MCNC: 35.5 NG/ML
ALBUMIN SERPL ELPH-MCNC: 4.6 G/DL
ALP BLD-CCNC: 89 U/L
ALT SERPL-CCNC: 16 U/L
ANION GAP SERPL CALC-SCNC: 15 MMOL/L
AST SERPL-CCNC: 17 U/L
BILIRUB SERPL-MCNC: 0.3 MG/DL
BUN SERPL-MCNC: 15 MG/DL
CALCIUM SERPL-MCNC: 10 MG/DL
CHLORIDE SERPL-SCNC: 103 MMOL/L
CO2 SERPL-SCNC: 25 MMOL/L
CREAT SERPL-MCNC: 0.81 MG/DL
ESTRADIOL SERPL-MCNC: <5 PG/ML
FSH SERPL-MCNC: 10 IU/L
GLUCOSE SERPL-MCNC: 97 MG/DL
LH SERPL-ACNC: <0.3 IU/L
POTASSIUM SERPL-SCNC: 4.2 MMOL/L
PROT SERPL-MCNC: 7.8 G/DL
SODIUM SERPL-SCNC: 143 MMOL/L

## 2019-08-19 NOTE — H&P ADULT - CARDIOVASCULAR DETAILS
positive S2/positive S1 Rotation Flap Text: The defect edges were debeveled with a #15 scalpel blade.  Given the location of the defect, shape of the defect and the proximity to free margins a rotation flap was deemed most appropriate.  Using a sterile surgical marker, an appropriate rotation flap was drawn incorporating the defect and placing the expected incisions within the relaxed skin tension lines where possible.    The area thus outlined was incised deep to adipose tissue with a #15 scalpel blade.  The skin margins were undermined to an appropriate distance in all directions utilizing iris scissors.

## 2019-09-03 ENCOUNTER — TRANSCRIPTION ENCOUNTER (OUTPATIENT)
Age: 49
End: 2019-09-03

## 2019-09-07 ENCOUNTER — OUTPATIENT (OUTPATIENT)
Dept: OUTPATIENT SERVICES | Facility: HOSPITAL | Age: 49
LOS: 1 days | Discharge: HOME | End: 2019-09-07

## 2019-09-07 DIAGNOSIS — Z30.430 ENCOUNTER FOR INSERTION OF INTRAUTERINE CONTRACEPTIVE DEVICE: Chronic | ICD-10-CM

## 2019-09-07 DIAGNOSIS — Z98.890 OTHER SPECIFIED POSTPROCEDURAL STATES: Chronic | ICD-10-CM

## 2019-09-07 DIAGNOSIS — C50.919 MALIGNANT NEOPLASM OF UNSPECIFIED SITE OF UNSPECIFIED FEMALE BREAST: ICD-10-CM

## 2019-09-07 DIAGNOSIS — M67.432 GANGLION, LEFT WRIST: Chronic | ICD-10-CM

## 2019-09-10 ENCOUNTER — APPOINTMENT (OUTPATIENT)
Dept: INFUSION THERAPY | Facility: HOSPITAL | Age: 49
End: 2019-09-10

## 2019-09-30 ENCOUNTER — RX RENEWAL (OUTPATIENT)
Age: 49
End: 2019-09-30

## 2019-10-04 ENCOUNTER — OUTPATIENT (OUTPATIENT)
Dept: OUTPATIENT SERVICES | Facility: HOSPITAL | Age: 49
LOS: 1 days | Discharge: HOME | End: 2019-10-04

## 2019-10-04 DIAGNOSIS — Z30.430 ENCOUNTER FOR INSERTION OF INTRAUTERINE CONTRACEPTIVE DEVICE: Chronic | ICD-10-CM

## 2019-10-04 DIAGNOSIS — M67.432 GANGLION, LEFT WRIST: Chronic | ICD-10-CM

## 2019-10-04 DIAGNOSIS — C50.919 MALIGNANT NEOPLASM OF UNSPECIFIED SITE OF UNSPECIFIED FEMALE BREAST: ICD-10-CM

## 2019-10-04 DIAGNOSIS — Z98.890 OTHER SPECIFIED POSTPROCEDURAL STATES: Chronic | ICD-10-CM

## 2019-10-04 NOTE — ASU PREOP CHECKLIST - HEART RATE (BEATS/MIN)
LOSARTAN 100 MG Oral Tab 90 tablet 1 9/16/2019     Sig: TAKE 1 TABLET BY MOUTH EVERY DAY    Sent to pharmacy as: Losartan Potassium 100 MG Oral Tablet    E-Prescribing Status: Receipt confirmed by pharmacy (9/16/2019  9:23 AM CDT)      Contacted the pharma 71

## 2019-10-08 ENCOUNTER — APPOINTMENT (OUTPATIENT)
Dept: INFUSION THERAPY | Facility: HOSPITAL | Age: 49
End: 2019-10-08

## 2019-10-23 ENCOUNTER — OUTPATIENT (OUTPATIENT)
Dept: OUTPATIENT SERVICES | Facility: HOSPITAL | Age: 49
LOS: 1 days | Discharge: HOME | End: 2019-10-23

## 2019-10-23 DIAGNOSIS — Z30.430 ENCOUNTER FOR INSERTION OF INTRAUTERINE CONTRACEPTIVE DEVICE: Chronic | ICD-10-CM

## 2019-10-23 DIAGNOSIS — C50.919 MALIGNANT NEOPLASM OF UNSPECIFIED SITE OF UNSPECIFIED FEMALE BREAST: ICD-10-CM

## 2019-10-23 DIAGNOSIS — Z98.890 OTHER SPECIFIED POSTPROCEDURAL STATES: Chronic | ICD-10-CM

## 2019-10-23 DIAGNOSIS — M67.432 GANGLION, LEFT WRIST: Chronic | ICD-10-CM

## 2019-11-05 ENCOUNTER — APPOINTMENT (OUTPATIENT)
Dept: HEMATOLOGY ONCOLOGY | Facility: CLINIC | Age: 49
End: 2019-11-05
Payer: COMMERCIAL

## 2019-11-05 ENCOUNTER — RESULT REVIEW (OUTPATIENT)
Age: 49
End: 2019-11-05

## 2019-11-05 ENCOUNTER — APPOINTMENT (OUTPATIENT)
Dept: INFUSION THERAPY | Facility: HOSPITAL | Age: 49
End: 2019-11-05

## 2019-11-05 VITALS
DIASTOLIC BLOOD PRESSURE: 63 MMHG | TEMPERATURE: 98 F | SYSTOLIC BLOOD PRESSURE: 94 MMHG | OXYGEN SATURATION: 96 % | BODY MASS INDEX: 26.71 KG/M2 | WEIGHT: 150.79 LBS | HEART RATE: 73 BPM | RESPIRATION RATE: 18 BRPM

## 2019-11-05 LAB
ALBUMIN SERPL ELPH-MCNC: 4.6 G/DL
ALP BLD-CCNC: 86 U/L
ALT SERPL-CCNC: 23 U/L
ANION GAP SERPL CALC-SCNC: 15 MMOL/L
AST SERPL-CCNC: 25 U/L
BASOPHILS # BLD AUTO: 0 K/UL — SIGNIFICANT CHANGE UP (ref 0–0.2)
BASOPHILS NFR BLD AUTO: 1.2 % — SIGNIFICANT CHANGE UP (ref 0–2)
BILIRUB SERPL-MCNC: 0.4 MG/DL
BUN SERPL-MCNC: 14 MG/DL
CALCIUM SERPL-MCNC: 9.8 MG/DL
CHLORIDE SERPL-SCNC: 103 MMOL/L
CO2 SERPL-SCNC: 25 MMOL/L
CREAT SERPL-MCNC: 0.86 MG/DL
EOSINOPHIL # BLD AUTO: 0.3 K/UL — SIGNIFICANT CHANGE UP (ref 0–0.5)
EOSINOPHIL NFR BLD AUTO: 6.6 % — HIGH (ref 0–6)
GLUCOSE SERPL-MCNC: 108 MG/DL
HCT VFR BLD CALC: 37.4 % — SIGNIFICANT CHANGE UP (ref 34.5–45)
HGB BLD-MCNC: 13.6 G/DL — SIGNIFICANT CHANGE UP (ref 11.5–15.5)
LYMPHOCYTES # BLD AUTO: 1.1 K/UL — SIGNIFICANT CHANGE UP (ref 1–3.3)
LYMPHOCYTES # BLD AUTO: 26.5 % — SIGNIFICANT CHANGE UP (ref 13–44)
MCHC RBC-ENTMCNC: 35 PG — HIGH (ref 27–34)
MCHC RBC-ENTMCNC: 36.3 G/DL — HIGH (ref 32–36)
MCV RBC AUTO: 96.5 FL — SIGNIFICANT CHANGE UP (ref 80–100)
MONOCYTES # BLD AUTO: 0.4 K/UL — SIGNIFICANT CHANGE UP (ref 0–0.9)
MONOCYTES NFR BLD AUTO: 8.6 % — SIGNIFICANT CHANGE UP (ref 2–14)
NEUTROPHILS # BLD AUTO: 2.4 K/UL — SIGNIFICANT CHANGE UP (ref 1.8–7.4)
NEUTROPHILS NFR BLD AUTO: 57.2 % — SIGNIFICANT CHANGE UP (ref 43–77)
PLATELET # BLD AUTO: 195 K/UL — SIGNIFICANT CHANGE UP (ref 150–400)
POTASSIUM SERPL-SCNC: 4.8 MMOL/L
PROT SERPL-MCNC: 7.3 G/DL
RBC # BLD: 3.88 M/UL — SIGNIFICANT CHANGE UP (ref 3.8–5.2)
RBC # FLD: 11.2 % — SIGNIFICANT CHANGE UP (ref 10.3–14.5)
SODIUM SERPL-SCNC: 143 MMOL/L
WBC # BLD: 4.2 K/UL — SIGNIFICANT CHANGE UP (ref 3.8–10.5)
WBC # FLD AUTO: 4.2 K/UL — SIGNIFICANT CHANGE UP (ref 3.8–10.5)

## 2019-11-05 PROCEDURE — 99214 OFFICE O/P EST MOD 30 MIN: CPT

## 2019-11-05 NOTE — HISTORY OF PRESENT ILLNESS
[Disease: _____________________] : Disease: [unfilled] [T: ___] : T[unfilled] [N: ___] : N[unfilled] [M: ___] : M[unfilled] [AJCC Stage: ____] : AJCC Stage: [unfilled] [Treatment Protocol] : Treatment Protocol [Therapy: ___] : Therapy: [unfilled] [de-identified] : 49-year-old lady with history of asthma, hypothyroidism, depression/anxiety with recently diagnosed invasive breast cancer s/p lumpectomy and SLNB here for initial medical oncology consultation.\par \par Patient had normal annual routine mammograms since age 42, last 8/11/16. 5/22/18 mammo with 1.5cm R breast mass; diagnosis mammo and US confirmed this mass RUOQ 1.6cm at 10:00, hypoechoic. US guided core biopsy of the mass was performed 6/1/18 which revealed 0.5 cm invasive ductal carcinoma, [place] score 7/9, with nuclear grade 2 cribriform type DCIS with central necrosis and calcifications, estrogen receptor 95% strong, progesterone receptor 3-5% strong, HER-2 IHC 2+ negative by CIS. My Risk panel on 6/13/2018 was negative. MRI of the bilateral breasts on 06/25/2018 showed a 1.7 X 1.6 X 1.8 cm focal area of non-mass enhancement with washout kinetics and biopsy marker at the upper outer posterior 10:00 position of the right breast. There were no other suspicious lesions in the right breast and no suspicious lesions in the left breast. There were no axillary or internal mammary lymphadenopathy. She saw Dr. Heller who performed CXR, nuclear bone scan, pelvic and abdominal sonograms which were negative. On 8/15/18 she underwent a lumpectomy with SLNB - pathology with 1.1 cm invasive ductal carcinoma, [place] 7-8/9 and no LVI. 0/2LN involved. There was also a 1.5 cm nuclear grade 2 - 3 ductal carcinoma in situ, cribriform and solid type with comedonecrosis and calcifications. Regarding the margins, the invasive tumor was 0.15 mm from the deep margin. Ductal carcinoma in situ was noted within microns of the deep margin and less than 0.1 mm from the anterior and superior margins. Two sentinel nodes were examined and negative. She saw radiation oncology in consultation and was recommended for adjuvant radiation pending medical oncology recommendations. Oncotype score 29 (20% risk of distant recurrence with tamoxifen alone). TC x 4 recommended. Patient has elected to utilize cold cap.\par \par HBV surface antigen reactive, HBV DNA not detected, c/w chronic hepatitis B infection (discussed with hepatology). She was started on entecavir daily ~1 week prior to chemotherapy initiation and continues this daily. US Abdomen completed prior by Dr. Heller prior to surgery - noted normal. She saw hepatology Dr. Beltran in consultation 11/2; repeat serologies stable and Fibroscan relatively normal. Plan to continue entecavir until at least 6 months post-chemotherapy (June 2019), with hepatology follow up also planned and p2hbicc HCC screening with RUQUS lifelong.\par \par 10/24-10/26 admitted to [facility].\par ~9pm just after driving home from getting food,  witness LOC with seizure activity x 20 seconds followed by lethargy - EMS called.\par She was taken to [facility] ER and admitted to ICU; sodium 119 with evidence of aspiration on CXR/CTA chest, O2 sat 86% briefly requiring BIPAP support quickly weaned. Neurology, Pulmonary, Renal consults following. Sodium corrected over the next 24h with normal saline infusion, antibiotics discontinued as no further evidence of infection. Neulasta onpro administered as scheduled 10/25 afternoon. She had a CTH and MRI Brain w/wo contrast which showed no acute findings. Zoloft was held, and she was discharged home. Incidental note of possible compression deformity of thoracic spine on imaging while inpatient. She admits to drinking ~4L water on day of chemotherapy as advised by cold cap literature. Her  recalls in retrospect that she seemed overly sleepy and slightly confused (forgot she had chinese food for lunch, eyes closing while sitting up) on the afternoon/evening after treatment prior to seizure. CK and LFTs elevated post discharge, continued to downtrend over the next few days to normal.\par \par She underwent cycle 3 of TC followed by 24 hours of observation at [facility] inpatient with neuro checks and q4h BMP monitoring. Her sodium remained normal and she was asymptomatic. She completed her final cycle of TC on 12/5/18 without event.\par \par Adjuvant radiation initiated 2/4/2019 and completed 3/2/19.\par Lupron for ovarian suppression initiated 2/2019. Arimidex initiated April 2019.\par \par Pertinent History:\par Hypothyroidism stable on synthroid. On oral bisphophanate since 2/2019 with her endocrinologist.\par Depression/anxiety stable since 2006 on zoloft and wellbutrin, she sees Dr. Manuel Ga for this. \par She is UTD on RHM. \par [place]  [place]. Their 17 year old son started college at [employer] 2018 and their 13 year old daughter lives at home with them. Patient works part time at [employer]. \par FHx significant for father with prostate cancer in 60s, paternal cousin with leukemia age 13 and breast cancer age 45. [de-identified] : T1cN0 [de-identified] : ER+TN-HER2- [de-identified] : Oncotype 29 [FreeTextEntry1] : x 4 cycles, with Penguin cold cap [de-identified] : Patient presents today for follow up. She notes she is having a root canal next week - on antibiotics, all reviewed with endocrinologist on bony therapy. Mild unchanged general fatigue, an occasional "twinge" of pain in axilla stable. She is otherwise doing well and without complaints.

## 2019-11-05 NOTE — PHYSICAL EXAM
[Fully active, able to carry on all pre-disease performance without restriction] : Status 0 - Fully active, able to carry on all pre-disease performance without restriction [Normal] : affect appropriate [de-identified] : RUOQ breast lumpectomy and SLNB scars well healed, minimal scar tissue palpated at surgical site, no rashes, no masses or adenopathy palpated b/l [de-identified] : some varicose veins on LEs, no edema

## 2019-11-05 NOTE — ASSESSMENT
[FreeTextEntry1] : 49-year-old woman with history of asthma, hypothyroidism, depression/anxiety, chronic hep B (surface Ag reactive, viral load negative, on entecavir during treatment, follows with hepatology) with R invasive breast cancer s/p lumpectomy and SLNB August 2018 with 1.1cm IDC, ER+ 95% strong, AK+ 3-5% strong, HER2 IHC 2+ and negative by CISH. Mclean grade 7-8/9, with DCIS present and close surgical margins and 0/2 SLN involved. Oncotype score 29. She completed TC x 4 adjuvant chemotherapy, with C2 c/b seizure in the setting of severe hyponatremia (euvolemic, polydipsia) and aspiration event thought due to polydipsia - sertraline held on treatment C3 and 4 completed without issue. She completed adjuvant radiation and started Lupron for ovarian suppression 2/2019 as well as alendronate PO for osteoporosis on DEXA, arimidex 4/2019. She is tolerating all well without sginificant AEs.\par \par Breast Cancer: s/p adjuvant TC x 4, adjuvant radiation\par -check labs today CBC CMP, LH, FSH, estradiol again\par -Follow up Dr. Heller  6 months with imaging (Jan 2020)\par -radiation oncology follow up with Dr. Escalante as recommended\par -Continue Lupron q28 days\par -Arimidex daily to continue\par -patient has Copper IUD in place\par -follow up 3 months with me, sooner if issues\par -age-appropriate routine health maintenance and screenings advised, discussed\par \par Hep B surface antigen reactive: c/w chronic hep B, no risk factors, viral load undetectable on repeat checks, off entecavir\par - Hepatology recommendations appreciated from Aug 2019, plan for continued follow up and HCC screening with Dr. Beltran (follow up early 2020, abd US)\par \par Depression/Anxiety: symptoms well managed >12 years on current regimen, sertraline resumed after pause on treatment due to prior hyponatremia as above\par -Follow up with her psychiatrist as planned \par -continue wellbutrin, sertraline\par -patient instructed to call with any new symptoms or concerns\par \par Osteoporosis: DEXA 1/2019 with osteoporosis of spine only -2.6 (MRI spine without fracture or deformity)\par -GNRH agonist/AI post-chemotherapy given high risk OncotypeDX score, premenopausal, adjuvant chemotherapy use\par -continue alendronate, tolerating\par -follow up endocrinology

## 2019-11-07 LAB
HBV DNA # SERPL NAA+PROBE: NOT DETECTED IU/ML
HEPB DNA PCR LOG: NOT DETECTED LOGIU/ML

## 2019-11-27 ENCOUNTER — OUTPATIENT (OUTPATIENT)
Dept: OUTPATIENT SERVICES | Facility: HOSPITAL | Age: 49
LOS: 1 days | Discharge: HOME | End: 2019-11-27

## 2019-11-27 DIAGNOSIS — Z30.430 ENCOUNTER FOR INSERTION OF INTRAUTERINE CONTRACEPTIVE DEVICE: Chronic | ICD-10-CM

## 2019-11-27 DIAGNOSIS — M67.432 GANGLION, LEFT WRIST: Chronic | ICD-10-CM

## 2019-11-27 DIAGNOSIS — C50.919 MALIGNANT NEOPLASM OF UNSPECIFIED SITE OF UNSPECIFIED FEMALE BREAST: ICD-10-CM

## 2019-11-27 DIAGNOSIS — Z98.890 OTHER SPECIFIED POSTPROCEDURAL STATES: Chronic | ICD-10-CM

## 2019-12-03 ENCOUNTER — APPOINTMENT (OUTPATIENT)
Dept: INFUSION THERAPY | Facility: HOSPITAL | Age: 49
End: 2019-12-03

## 2019-12-24 ENCOUNTER — OUTPATIENT (OUTPATIENT)
Dept: OUTPATIENT SERVICES | Facility: HOSPITAL | Age: 49
LOS: 1 days | Discharge: HOME | End: 2019-12-24

## 2019-12-24 DIAGNOSIS — Z30.430 ENCOUNTER FOR INSERTION OF INTRAUTERINE CONTRACEPTIVE DEVICE: Chronic | ICD-10-CM

## 2019-12-24 DIAGNOSIS — C50.919 MALIGNANT NEOPLASM OF UNSPECIFIED SITE OF UNSPECIFIED FEMALE BREAST: ICD-10-CM

## 2019-12-24 DIAGNOSIS — Z98.890 OTHER SPECIFIED POSTPROCEDURAL STATES: Chronic | ICD-10-CM

## 2019-12-24 DIAGNOSIS — M67.432 GANGLION, LEFT WRIST: Chronic | ICD-10-CM

## 2019-12-31 ENCOUNTER — APPOINTMENT (OUTPATIENT)
Dept: INFUSION THERAPY | Facility: HOSPITAL | Age: 49
End: 2019-12-31

## 2020-01-14 ENCOUNTER — OTHER (OUTPATIENT)
Age: 50
End: 2020-01-14

## 2020-01-19 NOTE — REVIEW OF SYSTEMS
[Negative] : Integumentary [de-identified] : Depression [FreeTextEntry1] : Breast cancer [de-identified] : Hypothyroid

## 2020-01-19 NOTE — ASSESSMENT
[FreeTextEntry1] : May 2019 bilateral mammogram and sonogram at 450: BI-RADS 2.\par Studies will be repeated May 2020..........................\par \par June 2018 preoperative breast MRI with direct 6.\par Followup.................\par \par Clinically doing well.\par \par If she has no problems we will see her in approximately 6 months, sooner if needed

## 2020-01-19 NOTE — PHYSICAL EXAM
[Normal] : supple, no neck mass and thyroid not enlarged [Normal Neck Lymph Nodes] : normal neck lymph nodes  [Normal Supraclavicular Lymph Nodes] : normal supraclavicular lymph nodes [Normal Axillary Lymph Nodes] : normal axillary lymph nodes [Normal] : normal appearance, no rash, nodules, vesicles, ulcers, erythema [de-identified] : Below [de-identified] : Groins not examined

## 2020-01-19 NOTE — HISTORY OF PRESENT ILLNESS
[de-identified] : 49 year-old lady who August 15, 2018, had RIGHT BREAST conserving surgery for a [id number]  cancer.\par \par Oncotype score = 29\par \par Adjuvant chemotherapy under the supervision of Dr. Yeny Horn was complicated by an episode of loss of consciousness and seizure in October 2018 due to hyponatremia after her second cycle of TC.\par Cycles 3 and 4 were completed successfully, with in-patient observation.\par \par Radiation (Dr. Charmaine Escalante): Simulation January 22, 2019\par Completed March 2019\par \par +Chronic hepatitis B.\par Hepatology: Dr. Jonatan MATTHEW\par \par \par June 2018 she was referred by her gynecologist Dr. Tommie Cote after the recent diagnosis of invasive ductal right breast carcinoma, 10:00, on core needle biopsy of an asymptomatic, non-palpable abnormality (1.5 cm diameter) on annual breast imaging.\par \par Preoperative imaging consisted of normal chest x-ray, sonogram of the abdomen and pelvis, and bone scan.\par Preoperative breast MRI was BI-RADS 6.\par \par NO deleterious mutation on genetic testing.\par \par No prior personal history of breast disease.\par No previous breast biopsies.\par \par Asymptomatic.\par \par +FH:\par Her paternal grandmother had breast cancer. She is not sure the age.\par She also has a paternal cousin who had breast cancer at age 45.\par That cousin also had childhood leukemia at age 13.\par \par The only other relative with a history of malignancies her father with prostate cancer at age 63.\par \par Her internist is Dr. Dallas GIBBONS.\par She does not have a pacemaker or defibrillator.\par \par She takes Synthroid for hypothyroidism.\par Her endocrinologist is Dr. VAN\par \par She is on Symbicort for asthma. \par She was hospitalized at [facility] in 2017 for exacerbation of her pulmonary disease. \par She does not have a pulmonologist.\par \par She is under treatment for depression with Dr. Manuel Ga.\par Medications are Zoloft and Wellbutrin.\par \par She saw her gynecologist Dr. Tommie COTE in April 2018\par \par Baseline colonoscopy will be at age 52\par

## 2020-01-19 NOTE — REASON FOR VISIT
[Follow-Up Visit] : a follow-up visit for [Other: _____] : [unfilled] [FreeTextEntry2] : Right breast cancer

## 2020-01-20 ENCOUNTER — APPOINTMENT (OUTPATIENT)
Dept: SURGICAL ONCOLOGY | Facility: CLINIC | Age: 50
End: 2020-01-20
Payer: COMMERCIAL

## 2020-01-20 VITALS
BODY MASS INDEX: 26.58 KG/M2 | WEIGHT: 150 LBS | OXYGEN SATURATION: 98 % | SYSTOLIC BLOOD PRESSURE: 115 MMHG | RESPIRATION RATE: 18 BRPM | DIASTOLIC BLOOD PRESSURE: 73 MMHG | HEART RATE: 72 BPM | HEIGHT: 63 IN

## 2020-01-20 PROCEDURE — 99214 OFFICE O/P EST MOD 30 MIN: CPT

## 2020-01-20 NOTE — REVIEW OF SYSTEMS
[Negative] : Neurological [FreeTextEntry6] : Asthma [de-identified] : Depression [de-identified] : Hypothyroid [FreeTextEntry1] : Breast cancer

## 2020-01-20 NOTE — HISTORY OF PRESENT ILLNESS
[de-identified] : 49 year-old lady who August 15, 2018, had RIGHT BREAST conserving surgery for a [id number]  cancer.\par \par Oncotype score = 29\par \par Adjuvant chemotherapy under the supervision of Dr. Yeny Horn was complicated by an episode of loss of consciousness and seizure in October 2018 due to hyponatremia after her second cycle of TC.\par Cycles 3 and 4 were completed successfully, with in-patient observation.\par \par Radiation (Dr. Charmaine Escalante): Simulation January 22, 2019\par Completed March 2019\par \par +Chronic hepatitis B.\par Hepatology: Dr. Jonatan MATTHEW\par \par She reports a feeling of tightness, and limited range of motion related to the right arm and shoulder, ever since the operation and radiation..\par No other specific or constitutional signs or symptoms.\par \par \par \par June 2018 she was referred by her gynecologist Dr. Tommie Cote after the recent diagnosis of invasive ductal right breast carcinoma, 10:00, on core needle biopsy of an asymptomatic, non-palpable abnormality (1.5 cm diameter) on annual breast imaging.\par \par Preoperative imaging consisted of normal chest x-ray, sonogram of the abdomen and pelvis, and bone scan.\par Preoperative breast MRI was BI-RADS 6.\par \par NO deleterious mutation on genetic testing.\par \par No prior personal history of breast disease.\par No previous breast biopsies.\par \par Asymptomatic.\par \par +FH:\par Her paternal grandmother had breast cancer. She is not sure the age.\par She also has a paternal cousin who had breast cancer at age 45.\par \par That cousin also had childhood leukemia at age 13.\par The only other relative with a history of malignancy i her father with prostate cancer at age 63.\par \par Her internist is Dr. Dallas GIBBONS.\par \par She does not have a pacemaker or defibrillator.\par \par She takes Synthroid for hypothyroidism.\par Her endocrinologist is Dr. VAN\par \par She is on Symbicort for asthma. \par She was hospitalized at [facility] in 2017 for exacerbation of her pulmonary disease. \par She does not have a pulmonologist.\par \par She is under treatment for depression with Dr. Manuel Ga.\par Medications are Zoloft and Wellbutrin.\par \par She saw her gynecologist Dr. Tommie COTE in April 2018\par \par Baseline colonoscopy will be at age 52\par

## 2020-01-20 NOTE — PHYSICAL EXAM
[Normal] : supple, no neck mass and thyroid not enlarged [Normal Axillary Lymph Nodes] : normal axillary lymph nodes [Normal Neck Lymph Nodes] : normal neck lymph nodes  [Normal Supraclavicular Lymph Nodes] : normal supraclavicular lymph nodes [Normal] : full range of motion and no deformities appreciated [de-identified] : Below [de-identified] : Groins not examined

## 2020-01-20 NOTE — ASSESSMENT
[FreeTextEntry1] : May 2019:\par Abdominal sonogram at [place]: Hepatic steatosis.\par \par May 2019 bilateral mammogram and sonogram at 450: BI-RADS 2.\par Studies will be repeated May 2020, Prescription entered today\par \par June 2018 preoperative breast MRI, BI-RADS 6.\par August 2019 MRI @[place]: BI-RADS 2.\par Will repeat at appropriate interval....................................\par \par Clinically doing well.\par \par I provided her with a prescription for right upper extremity therapy at "[facility]".\par \par If she has no problems we will see her in approximately 6 months, sooner if needed

## 2020-01-22 ENCOUNTER — OUTPATIENT (OUTPATIENT)
Dept: OUTPATIENT SERVICES | Facility: HOSPITAL | Age: 50
LOS: 1 days | Discharge: HOME | End: 2020-01-22

## 2020-01-22 DIAGNOSIS — Z30.430 ENCOUNTER FOR INSERTION OF INTRAUTERINE CONTRACEPTIVE DEVICE: Chronic | ICD-10-CM

## 2020-01-22 DIAGNOSIS — C50.919 MALIGNANT NEOPLASM OF UNSPECIFIED SITE OF UNSPECIFIED FEMALE BREAST: ICD-10-CM

## 2020-01-22 DIAGNOSIS — M67.432 GANGLION, LEFT WRIST: Chronic | ICD-10-CM

## 2020-01-22 DIAGNOSIS — Z98.890 OTHER SPECIFIED POSTPROCEDURAL STATES: Chronic | ICD-10-CM

## 2020-01-28 ENCOUNTER — APPOINTMENT (OUTPATIENT)
Dept: HEMATOLOGY ONCOLOGY | Facility: CLINIC | Age: 50
End: 2020-01-28
Payer: COMMERCIAL

## 2020-01-28 ENCOUNTER — APPOINTMENT (OUTPATIENT)
Dept: INFUSION THERAPY | Facility: HOSPITAL | Age: 50
End: 2020-01-28

## 2020-01-28 VITALS
HEART RATE: 74 BPM | DIASTOLIC BLOOD PRESSURE: 78 MMHG | TEMPERATURE: 98.7 F | SYSTOLIC BLOOD PRESSURE: 109 MMHG | BODY MASS INDEX: 27.14 KG/M2 | OXYGEN SATURATION: 99 % | WEIGHT: 153.22 LBS | RESPIRATION RATE: 17 BRPM

## 2020-01-28 PROCEDURE — 99214 OFFICE O/P EST MOD 30 MIN: CPT

## 2020-01-31 NOTE — HISTORY OF PRESENT ILLNESS
[Disease: _____________________] : Disease: [unfilled] [N: ___] : N[unfilled] [T: ___] : T[unfilled] [M: ___] : M[unfilled] [AJCC Stage: ____] : AJCC Stage: [unfilled] [Treatment Protocol] : Treatment Protocol [Therapy: ___] : Therapy: [unfilled] [de-identified] : T1cN0 [de-identified] : ER+MN-HER2- [de-identified] : 49-year-old lady with history of asthma, hypothyroidism, depression/anxiety with recently diagnosed invasive breast cancer s/p lumpectomy and SLNB here for breast medical oncology follow up\par \par Patient had normal annual routine mammograms since age 42, last 8/11/16. 5/22/18 mammo with 1.5cm R breast mass; diagnosis mammo and US confirmed this mass RUOQ 1.6cm at 10:00, hypoechoic. US guided core biopsy of the mass was performed 6/1/18 which revealed 0.5 cm invasive ductal carcinoma, Lynette score 7/9, with nuclear grade 2 cribriform type DCIS with central necrosis and calcifications, estrogen receptor 95% strong, progesterone receptor 3-5% strong, HER-2 IHC 2+ negative by CIS. My Risk panel on 6/13/2018 was negative. MRI of the bilateral breasts on 06/25/2018 showed a 1.7 X 1.6 X 1.8 cm focal area of non-mass enhancement with washout kinetics and biopsy marker at the upper outer posterior 10:00 position of the right breast. There were no other suspicious lesions in the right breast and no suspicious lesions in the left breast. There were no axillary or internal mammary lymphadenopathy. She saw Dr. Heller who performed CXR, nuclear bone scan, pelvic and abdominal sonograms which were negative. On 8/15/18 she underwent a lumpectomy with SLNB - pathology with 1.1 cm invasive ductal carcinoma, Lynette 7-8/9 and no LVI. 0/2LN involved. There was also a 1.5 cm nuclear grade 2 - 3 ductal carcinoma in situ, cribriform and solid type with comedonecrosis and calcifications. Regarding the margins, the invasive tumor was 0.15 mm from the deep margin. Ductal carcinoma in situ was noted within microns of the deep margin and less than 0.1 mm from the anterior and superior margins. Two sentinel nodes were examined and negative. She saw radiation oncology in consultation and was recommended for adjuvant radiation pending medical oncology recommendations. Oncotype score 29 (20% risk of distant recurrence with tamoxifen alone). TC x 4 recommended. Patient has elected to utilize cold cap.\par \par HBV surface antigen reactive, HBV DNA not detected, c/w chronic hepatitis B infection (discussed with hepatology). She was started on entecavir daily ~1 week prior to chemotherapy initiation and continues this daily. US Abdomen completed prior by Dr. Heller prior to surgery - noted normal. She saw hepatology Dr. Beltran in consultation 11/2; repeat serologies stable and Fibroscan relatively normal. Plan to continue entecavir until at least 6 months post-chemotherapy (June 2019), with hepatology follow up also planned and [id number] HCC screening with RUQUS lifelong.\par \par 10/24-10/26 admitted to [facility].\par ~9pm just after driving home from getting food,  witness LOC with seizure activity x 20 seconds followed by lethargy - EMS called.\par She was taken to [facility] ER and admitted to ICU; sodium 119 with evidence of aspiration on CXR/CTA chest, O2 sat 86% briefly requiring BIPAP support quickly weaned. Neurology, Pulmonary, Renal consults following. Sodium corrected over the next 24h with normal saline infusion, antibiotics discontinued as no further evidence of infection. Neulasta onpro administered as scheduled 10/25 afternoon. She had a CTH and MRI Brain w/wo contrast which showed no acute findings. Zoloft was held, and she was discharged home. Incidental note of possible compression deformity of thoracic spine on imaging while inpatient. She admits to drinking ~4L water on day of chemotherapy as advised by cold cap literature. Her  recalls in retrospect that she seemed overly sleepy and slightly confused (forgot she had chinese food for lunch, eyes closing while sitting up) on the afternoon/evening after treatment prior to seizure. CK and LFTs elevated post discharge, continued to downtrend over the next few days to normal.\par \par She underwent cycle 3 of TC followed by 24 hours of observation at [facility] inpatient with neuro checks and q4h BMP monitoring. Her sodium remained normal and she was asymptomatic. She completed her final cycle of TC on 12/5/18 without event.\par \par Adjuvant radiation initiated 2/4/2019 and completed 3/2/19.\par Lupron for ovarian suppression initiated 2/2019. Arimidex initiated April 2019.\par \par Pertinent History:\par Hypothyroidism stable on synthroid. On oral bisphophanate since 2/2019 with her endocrinologist.\par Depression/anxiety stable since 2006 on zoloft and wellbutrin, she sees Dr. Manuel Ga for this. \par She is UTD on RHM. \par [place] with her  [place]. Their 17 year old son started college at [employer] 2018 and their 13 year old daughter lives at home with them. Patient works part time at [employer]. \par FHx significant for father with prostate cancer in 60s, paternal cousin with leukemia age 13 and breast cancer age 45. [de-identified] : Oncotype 29 [FreeTextEntry1] : x 4 cycles, with [facility] cold cap [de-identified] : Patient presents today for follow up. Mild unchanged general fatigue, an occasional "twinge" of pain in axilla stable - working full time, some night shifts which she thinks contribute. Doing all activities as usual - more tired at end of the day. Occasional hot flashes in evening, manageable. Having b/l knee pains and hand pains/stiffness x 2 weeks - no deficits, saw PMD, eval for rheum conditions negative, started on meloxicam PRN which helps. She is otherwise doing well and without complaints.

## 2020-01-31 NOTE — PHYSICAL EXAM
[Fully active, able to carry on all pre-disease performance without restriction] : Status 0 - Fully active, able to carry on all pre-disease performance without restriction [Normal] : grossly intact [de-identified] : RUOQ breast lumpectomy and SLNB scars well healed, minimal scar tissue palpated at surgical site, no rashes, no masses or adenopathy palpated b/l [de-identified] : some varicose veins on LEs, no edema

## 2020-01-31 NOTE — ASSESSMENT
[FreeTextEntry1] : 49-year-old woman with history of asthma, hypothyroidism, depression/anxiety, chronic hep B (surface Ag reactive, viral load negative, on entecavir during treatment, follows with hepatology) with R invasive breast cancer s/p lumpectomy and SLNB August 2018 with 1.1cm IDC, ER+ 95% strong, KY+ 3-5% strong, HER2 IHC 2+ and negative by CISH. Etta grade 7-8/9, with DCIS present and close surgical margins and 0/2 SLN involved. Oncotype score 29. She completed TC x 4 adjuvant chemotherapy, with C2 c/b seizure in the setting of severe hyponatremia (euvolemic, polydipsia) and aspiration event thought due to polydipsia - sertraline held on treatment C3 and 4 completed without issue. She completed adjuvant radiation and started Lupron for ovarian suppression 2/2019 as well as alendronate PO for osteoporosis on DEXA, arimidex 4/2019. She is tolerating all well without sginificant AEs.\par \par Breast Cancer: s/p adjuvant TC x 4, adjuvant radiation, on AI with some mild arthralgias recently\par -labs with PMD all reviewed including CBC/CMP, OK 1/15/20\par -Follow up Dr. Heller with imaging (Jan 2020) as advised by him\par -radiation oncology follow up with Dr. Escalante as recommended\par -Continue Lupron q28 days\par -Arimidex daily to continue\par -patient has Copper IUD in place\par -follow up 3-4 months with me, sooner if issues\par -age-appropriate routine health maintenance and screenings advised, discussed, all up to date\par \par Hep B surface antigen reactive: c/w chronic hep B, no risk factors, viral load undetectable on repeat checks, off entecavir\par - Hepatology recommendations appreciated from Aug 2019, plan for continued follow up and HCC screening with Dr. Beltran (follow up early 2020, [facility]) - advised she reschedule this today\par \par Depression/Anxiety: symptoms well managed >12 years on current regimen, sertraline resumed after pause on treatment due to prior hyponatremia as above, no changes and doing well currently\par -Follow up with her psychiatrist as planned \par -continue wellbutrin, sertraline\par -patient instructed to call with any new symptoms or concerns\par \par Osteoporosis: DEXA 1/2019 with osteoporosis of spine only -2.6 (MRI spine without fracture or deformity)\par -GNRH agonist/AI post-chemotherapy given high risk OncotypeDX score, premenopausal, adjuvant chemotherapy use\par -continue alendronate, tolerating\par -follow up endocrinology - has this month, also follows for thyroid monitoring

## 2020-02-07 ENCOUNTER — APPOINTMENT (OUTPATIENT)
Dept: HEPATOLOGY | Facility: CLINIC | Age: 50
End: 2020-02-07

## 2020-02-21 ENCOUNTER — OUTPATIENT (OUTPATIENT)
Dept: OUTPATIENT SERVICES | Facility: HOSPITAL | Age: 50
LOS: 1 days | Discharge: HOME | End: 2020-02-21

## 2020-02-21 DIAGNOSIS — Z98.890 OTHER SPECIFIED POSTPROCEDURAL STATES: Chronic | ICD-10-CM

## 2020-02-21 DIAGNOSIS — Z30.430 ENCOUNTER FOR INSERTION OF INTRAUTERINE CONTRACEPTIVE DEVICE: Chronic | ICD-10-CM

## 2020-02-21 DIAGNOSIS — M67.432 GANGLION, LEFT WRIST: Chronic | ICD-10-CM

## 2020-02-21 DIAGNOSIS — C50.919 MALIGNANT NEOPLASM OF UNSPECIFIED SITE OF UNSPECIFIED FEMALE BREAST: ICD-10-CM

## 2020-02-25 ENCOUNTER — APPOINTMENT (OUTPATIENT)
Dept: INFUSION THERAPY | Facility: HOSPITAL | Age: 50
End: 2020-02-25

## 2020-02-26 ENCOUNTER — APPOINTMENT (OUTPATIENT)
Dept: HEPATOLOGY | Facility: CLINIC | Age: 50
End: 2020-02-26

## 2020-03-06 ENCOUNTER — APPOINTMENT (OUTPATIENT)
Dept: MRI IMAGING | Facility: CLINIC | Age: 50
End: 2020-03-06
Payer: COMMERCIAL

## 2020-03-06 ENCOUNTER — OUTPATIENT (OUTPATIENT)
Dept: OUTPATIENT SERVICES | Facility: HOSPITAL | Age: 50
LOS: 1 days | End: 2020-03-06
Payer: COMMERCIAL

## 2020-03-06 DIAGNOSIS — Z00.8 ENCOUNTER FOR OTHER GENERAL EXAMINATION: ICD-10-CM

## 2020-03-06 DIAGNOSIS — Z30.430 ENCOUNTER FOR INSERTION OF INTRAUTERINE CONTRACEPTIVE DEVICE: Chronic | ICD-10-CM

## 2020-03-06 DIAGNOSIS — Z98.890 OTHER SPECIFIED POSTPROCEDURAL STATES: Chronic | ICD-10-CM

## 2020-03-06 DIAGNOSIS — M67.432 GANGLION, LEFT WRIST: Chronic | ICD-10-CM

## 2020-03-06 PROCEDURE — 73222 MRI JOINT UPR EXTREM W/DYE: CPT | Mod: 26,LT

## 2020-03-06 PROCEDURE — 73222 MRI JOINT UPR EXTREM W/DYE: CPT

## 2020-03-10 ENCOUNTER — RX RENEWAL (OUTPATIENT)
Age: 50
End: 2020-03-10

## 2020-03-18 ENCOUNTER — OUTPATIENT (OUTPATIENT)
Dept: OUTPATIENT SERVICES | Facility: HOSPITAL | Age: 50
LOS: 1 days | Discharge: HOME | End: 2020-03-18

## 2020-03-18 DIAGNOSIS — Z30.430 ENCOUNTER FOR INSERTION OF INTRAUTERINE CONTRACEPTIVE DEVICE: Chronic | ICD-10-CM

## 2020-03-18 DIAGNOSIS — C50.919 MALIGNANT NEOPLASM OF UNSPECIFIED SITE OF UNSPECIFIED FEMALE BREAST: ICD-10-CM

## 2020-03-18 DIAGNOSIS — M67.432 GANGLION, LEFT WRIST: Chronic | ICD-10-CM

## 2020-03-18 DIAGNOSIS — Z98.890 OTHER SPECIFIED POSTPROCEDURAL STATES: Chronic | ICD-10-CM

## 2020-03-24 ENCOUNTER — APPOINTMENT (OUTPATIENT)
Dept: INFUSION THERAPY | Facility: HOSPITAL | Age: 50
End: 2020-03-24

## 2020-04-10 ENCOUNTER — APPOINTMENT (OUTPATIENT)
Dept: HEMATOLOGY ONCOLOGY | Facility: CLINIC | Age: 50
End: 2020-04-10
Payer: COMMERCIAL

## 2020-04-10 PROCEDURE — 99443: CPT

## 2020-04-13 ENCOUNTER — OUTPATIENT (OUTPATIENT)
Dept: OUTPATIENT SERVICES | Facility: HOSPITAL | Age: 50
LOS: 1 days | End: 2020-04-13
Payer: COMMERCIAL

## 2020-04-13 ENCOUNTER — TRANSCRIPTION ENCOUNTER (OUTPATIENT)
Age: 50
End: 2020-04-13

## 2020-04-13 ENCOUNTER — APPOINTMENT (OUTPATIENT)
Dept: MRI IMAGING | Facility: CLINIC | Age: 50
End: 2020-04-13
Payer: COMMERCIAL

## 2020-04-13 DIAGNOSIS — Z00.8 ENCOUNTER FOR OTHER GENERAL EXAMINATION: ICD-10-CM

## 2020-04-13 DIAGNOSIS — M67.432 GANGLION, LEFT WRIST: Chronic | ICD-10-CM

## 2020-04-13 DIAGNOSIS — Z30.430 ENCOUNTER FOR INSERTION OF INTRAUTERINE CONTRACEPTIVE DEVICE: Chronic | ICD-10-CM

## 2020-04-13 DIAGNOSIS — Z98.890 OTHER SPECIFIED POSTPROCEDURAL STATES: Chronic | ICD-10-CM

## 2020-04-13 PROCEDURE — 70553 MRI BRAIN STEM W/O & W/DYE: CPT | Mod: 26

## 2020-04-13 PROCEDURE — A9585: CPT

## 2020-04-13 PROCEDURE — 70553 MRI BRAIN STEM W/O & W/DYE: CPT

## 2020-04-14 ENCOUNTER — OUTPATIENT (OUTPATIENT)
Dept: OUTPATIENT SERVICES | Facility: HOSPITAL | Age: 50
LOS: 1 days | Discharge: HOME | End: 2020-04-14

## 2020-04-14 DIAGNOSIS — Z30.430 ENCOUNTER FOR INSERTION OF INTRAUTERINE CONTRACEPTIVE DEVICE: Chronic | ICD-10-CM

## 2020-04-14 DIAGNOSIS — M67.432 GANGLION, LEFT WRIST: Chronic | ICD-10-CM

## 2020-04-14 DIAGNOSIS — C50.919 MALIGNANT NEOPLASM OF UNSPECIFIED SITE OF UNSPECIFIED FEMALE BREAST: ICD-10-CM

## 2020-04-14 DIAGNOSIS — Z98.890 OTHER SPECIFIED POSTPROCEDURAL STATES: Chronic | ICD-10-CM

## 2020-04-29 ENCOUNTER — LABORATORY RESULT (OUTPATIENT)
Age: 50
End: 2020-04-29

## 2020-05-12 ENCOUNTER — APPOINTMENT (OUTPATIENT)
Dept: INFUSION THERAPY | Facility: HOSPITAL | Age: 50
End: 2020-05-12

## 2020-05-12 DIAGNOSIS — U07.1 COVID-19: ICD-10-CM

## 2020-05-13 ENCOUNTER — APPOINTMENT (OUTPATIENT)
Dept: INFUSION THERAPY | Facility: HOSPITAL | Age: 50
End: 2020-05-13

## 2020-05-15 ENCOUNTER — OUTPATIENT (OUTPATIENT)
Dept: OUTPATIENT SERVICES | Facility: HOSPITAL | Age: 50
LOS: 1 days | Discharge: HOME | End: 2020-05-15

## 2020-05-15 DIAGNOSIS — Z30.430 ENCOUNTER FOR INSERTION OF INTRAUTERINE CONTRACEPTIVE DEVICE: Chronic | ICD-10-CM

## 2020-05-15 DIAGNOSIS — M67.432 GANGLION, LEFT WRIST: Chronic | ICD-10-CM

## 2020-05-15 DIAGNOSIS — Z98.890 OTHER SPECIFIED POSTPROCEDURAL STATES: Chronic | ICD-10-CM

## 2020-05-15 DIAGNOSIS — C50.919 MALIGNANT NEOPLASM OF UNSPECIFIED SITE OF UNSPECIFIED FEMALE BREAST: ICD-10-CM

## 2020-05-19 ENCOUNTER — APPOINTMENT (OUTPATIENT)
Dept: HEMATOLOGY ONCOLOGY | Facility: CLINIC | Age: 50
End: 2020-05-19
Payer: COMMERCIAL

## 2020-05-19 ENCOUNTER — APPOINTMENT (OUTPATIENT)
Dept: INFUSION THERAPY | Facility: HOSPITAL | Age: 50
End: 2020-05-19

## 2020-05-19 PROCEDURE — 99213 OFFICE O/P EST LOW 20 MIN: CPT | Mod: 95

## 2020-05-19 NOTE — PHYSICAL EXAM
[Fully active, able to carry on all pre-disease performance without restriction] : Status 0 - Fully active, able to carry on all pre-disease performance without restriction [Normal] : affect appropriate [de-identified] : anicteric

## 2020-05-19 NOTE — REASON FOR VISIT
[Home] : at home, [unfilled] , at the time of the visit. [Medical Office: ([facility])___] : at the medical office located in  [Follow-Up Visit] : a follow-up [Patient] : the patient [Self] : self [FreeTextEntry2] : Breast Cancer

## 2020-05-19 NOTE — HISTORY OF PRESENT ILLNESS
[Disease: _____________________] : Disease: [unfilled] [T: ___] : T[unfilled] [N: ___] : N[unfilled] [M: ___] : M[unfilled] [AJCC Stage: ____] : AJCC Stage: [unfilled] [Treatment Protocol] : Treatment Protocol [Therapy: ___] : Therapy: [unfilled] [de-identified] : T1cN0 [de-identified] : 49-year-old lady with history of asthma, hypothyroidism, depression/anxiety with recently diagnosed invasive breast cancer s/p right lumpectomy and SLNB here for breast medical oncology follow up\par \par Patient had normal annual routine mammograms since age 42, last 8/11/16. 5/22/18 mammo with 1.5cm R breast mass; diagnosis mammo and US confirmed this mass RUOQ 1.6cm at 10:00, hypoechoic. US guided core biopsy of the mass was performed 6/1/18 which revealed 0.5 cm invasive ductal carcinoma, Lynette score 7/9, with nuclear grade 2 cribriform type DCIS with central necrosis and calcifications, estrogen receptor 95% strong, progesterone receptor 3-5% strong, HER-2 IHC 2+ negative by CIS. My Risk panel on 6/13/2018 was negative. MRI of the bilateral breasts on 06/25/2018 showed a 1.7 X 1.6 X 1.8 cm focal area of non-mass enhancement with washout kinetics and biopsy marker at the upper outer posterior 10:00 position of the right breast. There were no other suspicious lesions in the right breast and no suspicious lesions in the left breast. There were no axillary or internal mammary lymphadenopathy. She saw Dr. Heller who performed CXR, nuclear bone scan, pelvic and abdominal sonograms which were negative. On 8/15/18 she underwent a lumpectomy with SLNB - pathology with 1.1 cm invasive ductal carcinoma, Lynette 7-8/9 and no LVI. 0/2LN involved. There was also a 1.5 cm nuclear grade 2 - 3 ductal carcinoma in situ, cribriform and solid type with comedonecrosis and calcifications. Regarding the margins, the invasive tumor was 0.15 mm from the deep margin. Ductal carcinoma in situ was noted within microns of the deep margin and less than 0.1 mm from the anterior and superior margins. Two sentinel nodes were examined and negative. She saw radiation oncology in consultation and was recommended for adjuvant radiation pending medical oncology recommendations. Oncotype score 29 (20% risk of distant recurrence with tamoxifen alone). TC x 4 recommended. Patient has elected to utilize cold cap.\par \par HBV surface antigen reactive, HBV DNA not detected, c/w chronic hepatitis B infection (discussed with hepatology). She was started on entecavir daily ~1 week prior to chemotherapy initiation and continues this daily. US Abdomen completed prior by Dr. Heller prior to surgery - noted normal. She saw hepatology Dr. Beltran in consultation 11/2; repeat serologies stable and Fibroscan relatively normal. Plan to continue entecavir until at least 6 months post-chemotherapy (June 2019), with hepatology follow up also planned and [id number] HCC screening with RUQUS lifelong.\par \par 10/24-10/26 admitted to [facility].\par ~9pm just after driving home from getting food,  witness LOC with seizure activity x 20 seconds followed by lethargy - EMS called.\par She was taken to [facility] ER and admitted to ICU; sodium 119 with evidence of aspiration on CXR/CTA chest, O2 sat 86% briefly requiring BIPAP support quickly weaned. Neurology, Pulmonary, Renal consults following. Sodium corrected over the next 24h with normal saline infusion, antibiotics discontinued as no further evidence of infection. Neulasta onpro administered as scheduled 10/25 afternoon. She had a CTH and MRI Brain w/wo contrast which showed no acute findings. Zoloft was held, and she was discharged home. Incidental note of possible compression deformity of thoracic spine on imaging while inpatient. She admits to drinking ~4L water on day of chemotherapy as advised by cold cap literature. Her  recalls in retrospect that she seemed overly sleepy and slightly confused (forgot she had chinese food for lunch, eyes closing while sitting up) on the afternoon/evening after treatment prior to seizure. CK and LFTs elevated post discharge, continued to downtrend over the next few days to normal.\par \par She underwent cycle 3 of TC followed by 24 hours of observation at [facility] inpatient with neuro checks and q4h BMP monitoring. Her sodium remained normal and she was asymptomatic. She completed her final cycle of TC on 12/5/18 without event.\par \par DEXA, 1/17/19- Osteoporosis\par \par Adjuvant radiation initiated 2/4/2019 and completed 3/2/19.\par Lupron for ovarian suppression initiated 2/2019. Arimidex initiated April 2019.\par \par James Mammo/Sono, 5/23/19- BIRADS 2\par \par Breast MRI, 8/7/19- BIRADS 2\par \par Brain MRI, 4/13/20- No evidence of seventh eighth nerve pathology or metastasis \par \par COVID-19 testing, 4/15/20- Positive, Re-swab 5/11/20- Negative\par \par Pertinent History:\par Hypothyroidism stable on synthroid. On oral bisphophanate since 2/2019 with her endocrinologist.\par Depression/anxiety stable since 2006 on zoloft and wellbutrin, she sees Dr. Manuel Ga for this. \par She is UTD on RHM. \par [place] with her  [place]. Their 17 year old son started college at [employer] 2018 and their 13 year old daughter lives at home with them. Patient works part time at [employer]. \par FHx significant for father with prostate cancer in 60s, paternal cousin with leukemia age 13 and breast cancer age 45. [de-identified] : ER+NJ-HER2- [de-identified] : Oncotype 29 [FreeTextEntry1] : x 4 cycles, with Penguin cold cap [de-identified] : Patient presents today for follow up via telemedicine. MRI Brain for headaches 4/2020 negative. The patient tested positive for COVID-19 on 4/15/20.  She is asymptomatic and was retested on 5/11/20 and was negative.  She notes feeling well now, no new symptoms, continues asthma medications, back to work. Her  and children Daniel and No also had symptoms and they have full  recovered. She is otherwise doing well and without complaints, tolerating AI without complaints.

## 2020-05-19 NOTE — ASSESSMENT
[FreeTextEntry1] : 50 year-old woman with history of asthma, hypothyroidism, depression/anxiety, chronic hep B (surface Ag reactive, viral load negative, on entecavir during treatment, follows with hepatology) with R invasive breast cancer s/p lumpectomy and SLNB August 2018 with 1.1cm IDC, ER+ 95% strong, IL+ 3-5% strong, HER2 IHC 2+ and negative by CISH. Sullivan grade 7-8/9, with DCIS present and close surgical margins and 0/2 SLN involved. Oncotype score 29. She completed TC x 4 adjuvant chemotherapy, with C2 c/b seizure in the setting of severe hyponatremia (euvolemic, polydipsia) and aspiration event thought due to polydipsia - sertraline held on treatment C3 and 4 completed without issue. She completed adjuvant radiation and started Lupron for ovarian suppression 2/2019 as well as alendronate PO for osteoporosis on DEXA, arimidex 4/2019. She is tolerating all well without sginificant AEs.\par \par Breast Cancer: s/p adjuvant TC x 4, adjuvant radiation, on AI with some mild arthralgias recently stable/improved\par -labs with PMD all reviewed including CBC/CMP, OK 1/15/20 - will check labs now - scheduled lab visit next week, patient amenable\par -Follow up Dr. Heller scheduled next month with mammo/sono 6/2020, follow up\par -radiation oncology follow up with Dr. Escalante as recommended\par -Lupron q28 days changed to q84 days due to COVID-19 pandemic to limit patient and staff exposure. Next injection scheduled Aug. 3, 2020; visit with me then, sooner if issues arise\par -Arimidex daily to continue\par -patient has Copper IUD in place\par -age-appropriate routine health maintenance and screenings advised, discussed, all up to date\par \par Hep B surface antigen reactive: c/w chronic hep B, no risk factors, viral load undetectable on repeat checks, off entecavir\par - Hepatology recommendations appreciated from Aug 2019, plan for continued follow up and HCC screening with Dr. Beltran (follow up early 2020, [facility]) - advised she reschedule this\par \par Depression/Anxiety: symptoms well managed >12 years on current regimen, sertraline resumed after pause on treatment due to prior hyponatremia as above, no changes and doing well currently\par -Follow up with her psychiatrist as planned \par -continue wellbutrin, sertraline\par -patient instructed to call with any new symptoms or concerns\par \par Osteoporosis: DEXA 1/2019 with osteoporosis of spine only -2.6 (MRI spine without fracture or deformity)\par -GNRH agonist/AI post-chemotherapy given high risk OncotypeDX score, premenopausal, adjuvant chemotherapy use\par -continue alendronate, tolerating\par -follow up endocrinology - recent, also follows for thyroid monitoring\par -continue calcium, vitamin D supplementation

## 2020-05-26 ENCOUNTER — APPOINTMENT (OUTPATIENT)
Dept: HEMATOLOGY ONCOLOGY | Facility: CLINIC | Age: 50
End: 2020-05-26

## 2020-05-27 ENCOUNTER — LABORATORY RESULT (OUTPATIENT)
Age: 50
End: 2020-05-27

## 2020-05-28 LAB
SARS-COV-2 IGG SERPL IA-ACNC: 96.6 INDEX
SARS-COV-2 IGG SERPL QL IA: POSITIVE

## 2020-06-03 ENCOUNTER — RESULT REVIEW (OUTPATIENT)
Age: 50
End: 2020-06-03

## 2020-06-03 ENCOUNTER — OUTPATIENT (OUTPATIENT)
Dept: OUTPATIENT SERVICES | Facility: HOSPITAL | Age: 50
LOS: 1 days | End: 2020-06-03
Payer: COMMERCIAL

## 2020-06-03 ENCOUNTER — APPOINTMENT (OUTPATIENT)
Dept: MAMMOGRAPHY | Facility: CLINIC | Age: 50
End: 2020-06-03
Payer: COMMERCIAL

## 2020-06-03 ENCOUNTER — APPOINTMENT (OUTPATIENT)
Dept: ULTRASOUND IMAGING | Facility: CLINIC | Age: 50
End: 2020-06-03
Payer: COMMERCIAL

## 2020-06-03 DIAGNOSIS — Z00.8 ENCOUNTER FOR OTHER GENERAL EXAMINATION: ICD-10-CM

## 2020-06-03 DIAGNOSIS — M67.432 GANGLION, LEFT WRIST: Chronic | ICD-10-CM

## 2020-06-03 DIAGNOSIS — Z30.430 ENCOUNTER FOR INSERTION OF INTRAUTERINE CONTRACEPTIVE DEVICE: Chronic | ICD-10-CM

## 2020-06-03 DIAGNOSIS — Z98.890 OTHER SPECIFIED POSTPROCEDURAL STATES: Chronic | ICD-10-CM

## 2020-06-03 PROCEDURE — G0279: CPT | Mod: 26

## 2020-06-03 PROCEDURE — 76641 ULTRASOUND BREAST COMPLETE: CPT | Mod: 26,50

## 2020-06-03 PROCEDURE — 77066 DX MAMMO INCL CAD BI: CPT | Mod: 26

## 2020-06-03 PROCEDURE — G0279: CPT

## 2020-06-03 PROCEDURE — 76641 ULTRASOUND BREAST COMPLETE: CPT

## 2020-06-03 PROCEDURE — 77066 DX MAMMO INCL CAD BI: CPT

## 2020-07-28 ENCOUNTER — RESULT REVIEW (OUTPATIENT)
Age: 50
End: 2020-07-28

## 2020-07-29 ENCOUNTER — OUTPATIENT (OUTPATIENT)
Dept: OUTPATIENT SERVICES | Facility: HOSPITAL | Age: 50
LOS: 1 days | Discharge: HOME | End: 2020-07-29

## 2020-07-29 DIAGNOSIS — Z30.430 ENCOUNTER FOR INSERTION OF INTRAUTERINE CONTRACEPTIVE DEVICE: Chronic | ICD-10-CM

## 2020-07-29 DIAGNOSIS — M67.432 GANGLION, LEFT WRIST: Chronic | ICD-10-CM

## 2020-07-29 DIAGNOSIS — C50.919 MALIGNANT NEOPLASM OF UNSPECIFIED SITE OF UNSPECIFIED FEMALE BREAST: ICD-10-CM

## 2020-07-29 DIAGNOSIS — Z98.890 OTHER SPECIFIED POSTPROCEDURAL STATES: Chronic | ICD-10-CM

## 2020-08-03 ENCOUNTER — APPOINTMENT (OUTPATIENT)
Dept: SURGICAL ONCOLOGY | Facility: CLINIC | Age: 50
End: 2020-08-03

## 2020-08-03 NOTE — PHYSICAL EXAM
[Normal] : supple, no neck mass and thyroid not enlarged [Normal Neck Lymph Nodes] : normal neck lymph nodes  [Normal Axillary Lymph Nodes] : normal axillary lymph nodes [Normal Supraclavicular Lymph Nodes] : normal supraclavicular lymph nodes [Normal] : normal appearance, no rash, nodules, vesicles, ulcers, erythema [de-identified] : Groins not examined [de-identified] : Below

## 2020-08-03 NOTE — REASON FOR VISIT
[FreeTextEntry2] : 08/03/2020: She did not keep her appointment for follow-up of right breast cancer

## 2020-08-03 NOTE — ASSESSMENT
[FreeTextEntry1] : May 2019:\par Abdominal sonogram at [facility]: Hepatic steatosis.\par \par July 2020 bilateral mammogram and sonogram at [facility]: BI-RADS 2.\par Studies will be repeated July 2021, Prescription entered today\par \par June 2018 preoperative breast MRI, BI-RADS 6.\par August 2019 MRI @[facility]: BI-RADS 2.\par Will repeat at appropriate interval....................................\par \par 08/03/2020: She did not keep her appointment for follow-up of right breast cancer

## 2020-08-03 NOTE — REVIEW OF SYSTEMS
[Negative] : Integumentary [FreeTextEntry8] : Hepatitis [FreeTextEntry6] : Asthma [de-identified] : Depressive episodes [FreeTextEntry1] : Breast cancer [de-identified] : Hypothyroid

## 2020-08-03 NOTE — HISTORY OF PRESENT ILLNESS
[de-identified] : 50 year-old lady who August 15, 2018, had RIGHT BREAST conserving surgery for a T1c SLN0 M0  cancer.\par \par Oncotype score = 29\par \par +Adjuvant chemotherapy: Dr. Yeny YORK\par Complicated by an episode of loss of consciousness and seizure, October 2018, due to hyponatremia after her second cycle of TC.\par Cycles 3 and 4 were completed successfully, with in-patient observation.\par March 2020 follow-up was unremarkable\par \par +Radiation (Dr. Charmaine Escalante)\par Completed March 2019\par \par +Chronic hepatitis B.\par Hepatology: Dr. Jonatan MATTHEW\par \par \par June 2018 she was referred by her gynecologist Dr. Tommie Cote with invasive ductal right breast carcinoma, 10:00, on core needle biopsy of an asymptomatic, non-palpable abnormality (1.5 cm diameter) on annual breast imaging.\par \par Preoperative imaging consisted of normal chest x-ray, sonogram of the abdomen and pelvis, and bone scan.\par Preoperative breast MRI was BI-RADS 6.\par \par NO deleterious mutation on genetic testing.\par \par No prior personal history of breast disease.\par No previous breast biopsies.\par \par Asymptomatic.\par \par +FH:\par Her paternal grandmother had breast cancer. She is not sure the age.\par She also has a paternal cousin who had breast cancer at age 45.\par \par That cousin also had childhood leukemia at age 13.\par The only other relative with a history of malignancy is her father with prostate cancer at age 63.\par \par \par Her internist is Dr. Dallas GIBBONS.\par \par She does not have a pacemaker or defibrillator.\par No anticoagulants\par \par She takes Synthroid for hypothyroidism.\par Her endocrinologist is Dr. VAN\par \par She is on Symbicort for asthma. \par She was hospitalized at [facility] in 2017 for exacerbation of her pulmonary disease. \par She does not have a pulmonologist.\par \par She is under treatment for depression with Dr. Manuel Ga.\par Medications are Zoloft and Wellbutrin.\par \par She saw her gynecologist Dr. Tommie COTE in July 2020\par \par Baseline colonoscopy will be at age 52\par

## 2020-08-04 ENCOUNTER — APPOINTMENT (OUTPATIENT)
Dept: HEMATOLOGY ONCOLOGY | Facility: CLINIC | Age: 50
End: 2020-08-04
Payer: COMMERCIAL

## 2020-08-04 ENCOUNTER — APPOINTMENT (OUTPATIENT)
Dept: INFUSION THERAPY | Facility: HOSPITAL | Age: 50
End: 2020-08-04

## 2020-08-04 VITALS
OXYGEN SATURATION: 98 % | WEIGHT: 154.32 LBS | SYSTOLIC BLOOD PRESSURE: 111 MMHG | HEART RATE: 70 BPM | DIASTOLIC BLOOD PRESSURE: 78 MMHG | TEMPERATURE: 98.2 F | BODY MASS INDEX: 27.34 KG/M2 | RESPIRATION RATE: 18 BRPM

## 2020-08-04 PROCEDURE — 99214 OFFICE O/P EST MOD 30 MIN: CPT

## 2020-08-06 RX ORDER — VALACYCLOVIR 1 G/1
1 TABLET, FILM COATED ORAL
Qty: 14 | Refills: 0 | Status: DISCONTINUED | COMMUNITY
Start: 2020-07-10

## 2020-08-06 RX ORDER — MELOXICAM 15 MG/1
15 TABLET ORAL
Qty: 30 | Refills: 0 | Status: DISCONTINUED | COMMUNITY
Start: 2020-01-27

## 2020-08-17 ENCOUNTER — APPOINTMENT (OUTPATIENT)
Dept: SURGICAL ONCOLOGY | Facility: CLINIC | Age: 50
End: 2020-08-17
Payer: COMMERCIAL

## 2020-08-17 VITALS
DIASTOLIC BLOOD PRESSURE: 77 MMHG | WEIGHT: 150 LBS | BODY MASS INDEX: 26.58 KG/M2 | HEART RATE: 79 BPM | RESPIRATION RATE: 15 BRPM | OXYGEN SATURATION: 97 % | HEIGHT: 63 IN | SYSTOLIC BLOOD PRESSURE: 122 MMHG

## 2020-08-17 PROCEDURE — 99214 OFFICE O/P EST MOD 30 MIN: CPT

## 2020-08-17 NOTE — ASSESSMENT
[FreeTextEntry1] : May 2019:\par Abdominal sonogram at [facility]: Hepatic steatosis.\par \par June 2020 bilateral mammogram and sonogram at [facility]: BI-RADS 2.\par Studies will be repeated July 2021, Prescription entered today\par \par June 2018 preoperative breast MRI, BI-RADS 6.\par August 2019 MRI @[facility]: BI-RADS 2.\par Will repeat at appropriate interval....................................\par \par Clinically doing well.\par If no problems we will see her in another year, sooner if needed (now more than 2 years since her breast cancer operation)

## 2020-08-17 NOTE — HISTORY OF PRESENT ILLNESS
[de-identified] : SHE, HER , THEIR SON AND DAUGHTER, ALL HAD COVID.\par EACH HAD DIFFERENT SYMPTOMS.\par NONE NEEDED HOSP.\par \par \par 50 year-old lady who August 15, 2018, had RIGHT BREAST conserving surgery for a T1c SLN0 M0  cancer.\par \par Oncotype score = 29\par \par +Adjuvant chemotherapy: Dr. Yeny YORK\par Complicated by an episode of loss of consciousness and seizure, October 2018, due to hyponatremia after her second cycle of TC.\par Cycles 3 and 4 were completed successfully, with in-patient observation.\par August 2020 follow-up was unremarkable\par \par +Radiation (Dr. Charmaine JIMENEZ)\par Completed March 2019\par \par +Chronic hepatitis B.\par Hepatology: Dr. Jonatan MATTHEW\par \par \par June 2018 she was referred by her gynecologist Dr. Tommie Cote with invasive ductal right breast carcinoma, 10:00, on core needle biopsy of an asymptomatic, non-palpable abnormality (1.5 cm diameter) on annual breast imaging.\par \par Preoperative imaging consisted of normal chest x-ray, sonogram of the abdomen and pelvis, and bone scan.\par Preoperative breast MRI was BI-RADS 6.\par \par GENETIC TESTING: NO deleterious mutation.\par \par No prior personal history of breast disease.\par No previous breast biopsies.\par \par Asymptomatic.\par \par +FH:\par Her paternal grandmother had breast cancer. She is not sure the age.\par She also has a paternal cousin who had breast cancer at age 45.\par \par That cousin also had childhood leukemia at age 13.\par The only other relative with a history of malignancy is her father with prostate cancer at age 63.\par \par No relatives with ovarian cancer.\par \par No other personal history of malignancy.\par \par Not Ashkenazi\par \par \par Her internist is Dr. Dallas GIBBONS.\par \par She does not have a pacemaker or defibrillator.\par No anticoagulants\par \par She takes Synthroid for hypothyroidism.\par Her endocrinologist is Dr. VAN\par \par She is on Symbicort for asthma. \par She was hospitalized at [facility] in 2017 for exacerbation of her pulmonary disease. \par She does not have a pulmonologist.\par \par She is under treatment for depression with Dr. Manuel Ga.\par Medications are Zoloft and Wellbutrin.\par \par She saw her gynecologist Dr. Tommie COTE in July 2020\par \par Baseline colonoscopy will be at age 52\par

## 2020-08-17 NOTE — REVIEW OF SYSTEMS
[Negative] : Integumentary [FreeTextEntry6] : Asthma [de-identified] : Depression [de-identified] : Hypothyroid [FreeTextEntry1] : Breast cancer

## 2020-08-17 NOTE — PHYSICAL EXAM
[Normal] : supple, no neck mass and thyroid not enlarged [Normal Axillary Lymph Nodes] : normal axillary lymph nodes [Normal Neck Lymph Nodes] : normal neck lymph nodes  [Normal Supraclavicular Lymph Nodes] : normal supraclavicular lymph nodes [Normal] : normal appearance, no rash, nodules, vesicles, ulcers, erythema [de-identified] : Groins not examined [de-identified] : Below

## 2020-10-21 ENCOUNTER — OUTPATIENT (OUTPATIENT)
Dept: OUTPATIENT SERVICES | Facility: HOSPITAL | Age: 50
LOS: 1 days | Discharge: HOME | End: 2020-10-21

## 2020-10-21 DIAGNOSIS — Z30.430 ENCOUNTER FOR INSERTION OF INTRAUTERINE CONTRACEPTIVE DEVICE: Chronic | ICD-10-CM

## 2020-10-21 DIAGNOSIS — Z98.890 OTHER SPECIFIED POSTPROCEDURAL STATES: Chronic | ICD-10-CM

## 2020-10-21 DIAGNOSIS — C50.919 MALIGNANT NEOPLASM OF UNSPECIFIED SITE OF UNSPECIFIED FEMALE BREAST: ICD-10-CM

## 2020-10-21 DIAGNOSIS — M67.432 GANGLION, LEFT WRIST: Chronic | ICD-10-CM

## 2020-10-22 NOTE — H&P PST ADULT - ENDOCRINE
Patient Education        Learning About Sports Physicals for Children  Why does your child need a sports physical?     Before your child starts to play a sport, it's a good idea for the child to get a sports physical exam. Some sports programs may require a sports physical before your child can play. Many school sports programs offer a screening right at the school. The best way is to have your child's doctor do a sports physical exam during a regularly scheduled well-visit. A sports physical can screen for some health problems that could be a problem for your child in some sports. It's not done to keep your child from playing sports. It will give you, the doctor, and your child's coaches facts to help protect your child. What happens during the sports physical?  During a sports physical, your child's height and weight will be measured. Your child's blood pressure will be checked. He or she may also get a vision screening. The doctor will listen to your child's heart and lungs. He or she will look at and feel certain parts of your child's body. Boys may be checked for a hernia or a problem with their testicles. Your child's joints and muscles will be tested to see how strong and flexible they are. The doctor will also ask about your child's past health. The doctor will review your child's vaccine record. Your child may get any needed vaccines to bring the record up to date. The doctor and your child may talk about any gear your child will need to protect from injuries while playing a sport. They may also talk about diet, exercise, and other lifestyle issues. How can you prepare for the sports physical?  Before your child's sports physical, gather any records that your doctor might need. This includes details about:  · Any injuries and health problems. · Other exams by a doctor or dentist.  · Any serious illness in your family. · Vaccines to protect your child from things such as measles or mumps.   You may be asked to complete a questionnaire before you come to the sports physical. This can help the doctor evaluate your child's health. Be sure to tell the doctor about things that may seem minor, like a slight cough or backache. And let the doctor know what sport your child will play. Each sport calls for its own level of fitness. Follow-up care is a key part of your child's treatment and safety. Be sure to make and go to all appointments, and call your doctor if your child is having problems. It's also a good idea to know your child's test results and keep a list of the medicines your child takes. Where can you learn more? Go to https://Pharmlypepiceweb.[email or web address]. org and sign in to your [employer] account. Enter J111 in the Moondo box to learn more about \"Learning About Sports Physicals for Children. \"     If you do not have an account, please click on the \"Sign Up Now\" link. Current as of: May 27, 2020               Content Version: 12.6  © 2006-2020 [employer]. Care instructions adapted under license by [facility] ([facility]). If you have questions about a medical condition or this instruction, always ask your healthcare professional. [place] [address] any warranty or liability for your use of this information. negative

## 2020-10-27 ENCOUNTER — RESULT REVIEW (OUTPATIENT)
Age: 50
End: 2020-10-27

## 2020-10-27 ENCOUNTER — LABORATORY RESULT (OUTPATIENT)
Age: 50
End: 2020-10-27

## 2020-10-27 ENCOUNTER — APPOINTMENT (OUTPATIENT)
Dept: INFUSION THERAPY | Facility: HOSPITAL | Age: 50
End: 2020-10-27

## 2020-10-27 ENCOUNTER — APPOINTMENT (OUTPATIENT)
Dept: HEMATOLOGY ONCOLOGY | Facility: CLINIC | Age: 50
End: 2020-10-27
Payer: COMMERCIAL

## 2020-10-27 VITALS
HEART RATE: 67 BPM | HEIGHT: 63.86 IN | DIASTOLIC BLOOD PRESSURE: 81 MMHG | BODY MASS INDEX: 27.43 KG/M2 | TEMPERATURE: 96.4 F | OXYGEN SATURATION: 98 % | SYSTOLIC BLOOD PRESSURE: 124 MMHG | WEIGHT: 158.73 LBS | RESPIRATION RATE: 17 BRPM

## 2020-10-27 LAB
BASOPHILS # BLD AUTO: 0.02 K/UL — SIGNIFICANT CHANGE UP (ref 0–0.2)
BASOPHILS # BLD AUTO: 0.03 K/UL
BASOPHILS NFR BLD AUTO: 0.5 % — SIGNIFICANT CHANGE UP (ref 0–2)
BASOPHILS NFR BLD AUTO: 0.7 %
EOSINOPHIL # BLD AUTO: 0.3 K/UL — SIGNIFICANT CHANGE UP (ref 0–0.5)
EOSINOPHIL # BLD AUTO: 0.31 K/UL
EOSINOPHIL NFR BLD AUTO: 7.2 %
EOSINOPHIL NFR BLD AUTO: 7.3 % — HIGH (ref 0–6)
HCT VFR BLD CALC: 38.9 % — SIGNIFICANT CHANGE UP (ref 34.5–45)
HCT VFR BLD CALC: 39.9 %
HGB BLD-MCNC: 13.1 G/DL — SIGNIFICANT CHANGE UP (ref 11.5–15.5)
HGB BLD-MCNC: 13.2 G/DL
IMM GRANULOCYTES NFR BLD AUTO: 0.2 %
IMM GRANULOCYTES NFR BLD AUTO: 0.2 % — SIGNIFICANT CHANGE UP (ref 0–1.5)
LYMPHOCYTES # BLD AUTO: 1.06 K/UL — SIGNIFICANT CHANGE UP (ref 1–3.3)
LYMPHOCYTES # BLD AUTO: 1.13 K/UL
LYMPHOCYTES # BLD AUTO: 25.7 % — SIGNIFICANT CHANGE UP (ref 13–44)
LYMPHOCYTES NFR BLD AUTO: 26.2 %
MAN DIFF?: NORMAL
MCHC RBC-ENTMCNC: 31.3 PG
MCHC RBC-ENTMCNC: 31.7 PG — SIGNIFICANT CHANGE UP (ref 27–34)
MCHC RBC-ENTMCNC: 33.1 GM/DL
MCHC RBC-ENTMCNC: 33.7 G/DL — SIGNIFICANT CHANGE UP (ref 32–36)
MCV RBC AUTO: 94.2 FL — SIGNIFICANT CHANGE UP (ref 80–100)
MCV RBC AUTO: 94.5 FL
MONOCYTES # BLD AUTO: 0.3 K/UL — SIGNIFICANT CHANGE UP (ref 0–0.9)
MONOCYTES # BLD AUTO: 0.33 K/UL
MONOCYTES NFR BLD AUTO: 7.3 % — SIGNIFICANT CHANGE UP (ref 2–14)
MONOCYTES NFR BLD AUTO: 7.7 %
NEUTROPHILS # BLD AUTO: 2.43 K/UL — SIGNIFICANT CHANGE UP (ref 1.8–7.4)
NEUTROPHILS # BLD AUTO: 2.5 K/UL
NEUTROPHILS NFR BLD AUTO: 58 %
NEUTROPHILS NFR BLD AUTO: 59 % — SIGNIFICANT CHANGE UP (ref 43–77)
NRBC # BLD: 0 /100 WBCS — SIGNIFICANT CHANGE UP (ref 0–0)
PLATELET # BLD AUTO: 211 K/UL — SIGNIFICANT CHANGE UP (ref 150–400)
PLATELET # BLD AUTO: 232 K/UL
RBC # BLD: 4.13 M/UL — SIGNIFICANT CHANGE UP (ref 3.8–5.2)
RBC # BLD: 4.22 M/UL
RBC # FLD: 12.3 % — SIGNIFICANT CHANGE UP (ref 10.3–14.5)
RBC # FLD: 12.4 %
WBC # BLD: 4.12 K/UL — SIGNIFICANT CHANGE UP (ref 3.8–10.5)
WBC # FLD AUTO: 4.12 K/UL — SIGNIFICANT CHANGE UP (ref 3.8–10.5)
WBC # FLD AUTO: 4.31 K/UL

## 2020-10-27 PROCEDURE — 99215 OFFICE O/P EST HI 40 MIN: CPT

## 2020-10-27 PROCEDURE — 99072 ADDL SUPL MATRL&STAF TM PHE: CPT

## 2020-10-28 LAB
25(OH)D3 SERPL-MCNC: 55.2 NG/ML
CHOLEST SERPL-MCNC: 245 MG/DL
ESTIMATED AVERAGE GLUCOSE: 100 MG/DL
HBA1C MFR BLD HPLC: 5.1 %
HDLC SERPL-MCNC: 84 MG/DL
LDLC SERPL CALC-MCNC: 143 MG/DL
NONHDLC SERPL-MCNC: 161 MG/DL
T3 SERPL-MCNC: 103 NG/DL
T3FREE SERPL-MCNC: 2.95 PG/ML
THYROGLOB AB SERPL-ACNC: 1691 IU/ML
THYROPEROXIDASE AB SERPL IA-ACNC: 877 IU/ML
TRIGL SERPL-MCNC: 90 MG/DL
TSH SERPL-ACNC: 5.86 UIU/ML

## 2020-10-29 LAB
ALBUMIN SERPL ELPH-MCNC: 4.8 G/DL
ALP BLD-CCNC: 114 U/L
ALT SERPL-CCNC: 20 U/L
ANION GAP SERPL CALC-SCNC: 12 MMOL/L
AST SERPL-CCNC: 23 U/L
BILIRUB SERPL-MCNC: 0.4 MG/DL
BUN SERPL-MCNC: 12 MG/DL
CALCIUM SERPL-MCNC: 9.5 MG/DL
CHLORIDE SERPL-SCNC: 104 MMOL/L
CO2 SERPL-SCNC: 24 MMOL/L
CREAT SERPL-MCNC: 0.81 MG/DL
ESTRADIOL SERPL-MCNC: <5 PG/ML
FSH SERPL-MCNC: 14.6 IU/L
GLUCOSE SERPL-MCNC: 99 MG/DL
LH SERPL-ACNC: 0.3 IU/L
POTASSIUM SERPL-SCNC: 4.7 MMOL/L
PROT SERPL-MCNC: 7.6 G/DL
SODIUM SERPL-SCNC: 140 MMOL/L

## 2020-11-08 ENCOUNTER — TRANSCRIPTION ENCOUNTER (OUTPATIENT)
Age: 50
End: 2020-11-08

## 2020-11-13 ENCOUNTER — OUTPATIENT (OUTPATIENT)
Dept: OUTPATIENT SERVICES | Facility: HOSPITAL | Age: 50
LOS: 1 days | End: 2020-11-13
Payer: COMMERCIAL

## 2020-11-13 ENCOUNTER — APPOINTMENT (OUTPATIENT)
Dept: MRI IMAGING | Facility: CLINIC | Age: 50
End: 2020-11-13
Payer: COMMERCIAL

## 2020-11-13 DIAGNOSIS — M67.432 GANGLION, LEFT WRIST: Chronic | ICD-10-CM

## 2020-11-13 DIAGNOSIS — Z98.890 OTHER SPECIFIED POSTPROCEDURAL STATES: Chronic | ICD-10-CM

## 2020-11-13 DIAGNOSIS — Z30.430 ENCOUNTER FOR INSERTION OF INTRAUTERINE CONTRACEPTIVE DEVICE: Chronic | ICD-10-CM

## 2020-11-13 DIAGNOSIS — Z00.8 ENCOUNTER FOR OTHER GENERAL EXAMINATION: ICD-10-CM

## 2020-11-13 PROCEDURE — 70553 MRI BRAIN STEM W/O & W/DYE: CPT | Mod: 26

## 2020-11-13 PROCEDURE — A9585: CPT

## 2020-11-13 PROCEDURE — 70553 MRI BRAIN STEM W/O & W/DYE: CPT

## 2020-11-19 ENCOUNTER — OUTPATIENT (OUTPATIENT)
Dept: OUTPATIENT SERVICES | Facility: HOSPITAL | Age: 50
LOS: 1 days | Discharge: HOME | End: 2020-11-19

## 2020-11-19 DIAGNOSIS — C50.919 MALIGNANT NEOPLASM OF UNSPECIFIED SITE OF UNSPECIFIED FEMALE BREAST: ICD-10-CM

## 2020-11-19 DIAGNOSIS — Z30.430 ENCOUNTER FOR INSERTION OF INTRAUTERINE CONTRACEPTIVE DEVICE: Chronic | ICD-10-CM

## 2020-11-19 DIAGNOSIS — Z98.890 OTHER SPECIFIED POSTPROCEDURAL STATES: Chronic | ICD-10-CM

## 2020-11-19 DIAGNOSIS — M67.432 GANGLION, LEFT WRIST: Chronic | ICD-10-CM

## 2020-11-24 ENCOUNTER — APPOINTMENT (OUTPATIENT)
Dept: INFUSION THERAPY | Facility: HOSPITAL | Age: 50
End: 2020-11-24

## 2020-12-07 NOTE — H&P PST ADULT - NS HIV RISK FACTOR
[facility] Office Visit Note    Subjective   Patient ID: Bebeto Brown is a 9 year old male.    Chief Complaint   Patient presents with   • Foot Pain     Left foot has a bump by ankle bone.     Bebeto has a lump on outer left foot, near the ankle. Pt states it has been there for \"a long time\". Denies trauma, pain, accidents. Mom presents in room, stating it has been there for a few weeks. Overall feels well.       Patient's medications, allergies, past medical, surgical, social and family histories were reviewed and updated as appropriate.    Review of Systems   Constitutional: Negative for activity change, appetite change, chills, fever and irritability.   HENT: Negative for congestion, ear discharge, ear pain, mouth sores, postnasal drip, rhinorrhea, sinus pressure, sinus pain, sore throat and trouble swallowing.    Eyes: Negative for pain, discharge, redness and itching.   Respiratory: Negative for apnea, cough, chest tightness, shortness of breath, wheezing and stridor.    Cardiovascular: Negative for chest pain.   Gastrointestinal: Negative for abdominal distention, abdominal pain, constipation, diarrhea and vomiting.   Endocrine: Negative for polydipsia, polyphagia and polyuria.   Genitourinary: Negative for decreased urine volume, difficulty urinating, dysuria, frequency and hematuria.   Musculoskeletal: Negative for neck pain and neck stiffness.   Skin: Negative for color change, pallor and rash.        Skin lump on left foot   Allergic/Immunologic: Negative for environmental allergies and food allergies.   Neurological: Negative for seizures, speech difficulty, weakness, light-headedness and headaches.   Hematological: Negative for adenopathy. Does not bruise/bleed easily.   Psychiatric/Behavioral: Negative for agitation, behavioral problems and sleep disturbance.     BP 98/60 (BP Location: LUE - Left upper extremity, Patient Position: Sitting)   Pulse 80   Temp 98.3 °F (36.8 °C) (Oral)   Wt  29.5 kg (65 lb)     Objective   Physical Exam   Constitutional: Vital signs are normal. He appears well-developed and well-nourished. He is active. He does not appear ill. No distress.   HENT:   Head: Normocephalic and atraumatic.   Right Ear: External ear and pinna normal.   Left Ear: External ear and pinna normal.   Nose: Nose normal.   Mouth/Throat: Mucous membranes are moist. Dentition is normal.   Eyes: EOM are normal.   Neck: Full passive range of motion without pain.   Cardiovascular: Regular rhythm, S1 normal and S2 normal.   No murmur heard.  Pulses:       Dorsalis pedis pulses are 2+ on the right side and 2+ on the left side.   Pulmonary/Chest: Effort normal and breath sounds normal. There is normal air entry.   Musculoskeletal: Normal range of motion.      Right lower leg: Normal.      Left lower leg: Normal.        Legs:    Neurological: He is alert. He has normal strength and normal reflexes.   Skin: Skin is warm.        1-2 cm palpable mass, non-tender, flesh colored. All surrounding areas WNL.    Psychiatric: He has a normal mood and affect. His behavior is normal.     Problem List Items Addressed This Visit        Other    Ganglion cyst - Primary        Patient Instructions   Watch and wait  Any concerns would be pain in foot and redness  F/u with podiatry if not improved  Medical compliance with plan discussed and risks of non-compliance reviewed.    Patient education completed on disease process, etiology & prognosis.    Patient expresses understanding of the plan.    Refer to orders.    Return to clinic as clinically indicated as discussed with patient who verbalized understanding of & agreement with the plan.     JOES Cisneros              No

## 2020-12-18 ENCOUNTER — OUTPATIENT (OUTPATIENT)
Dept: OUTPATIENT SERVICES | Facility: HOSPITAL | Age: 50
LOS: 1 days | Discharge: HOME | End: 2020-12-18

## 2020-12-18 DIAGNOSIS — M67.432 GANGLION, LEFT WRIST: Chronic | ICD-10-CM

## 2020-12-18 DIAGNOSIS — C50.919 MALIGNANT NEOPLASM OF UNSPECIFIED SITE OF UNSPECIFIED FEMALE BREAST: ICD-10-CM

## 2020-12-18 DIAGNOSIS — Z30.430 ENCOUNTER FOR INSERTION OF INTRAUTERINE CONTRACEPTIVE DEVICE: Chronic | ICD-10-CM

## 2020-12-18 DIAGNOSIS — Z98.890 OTHER SPECIFIED POSTPROCEDURAL STATES: Chronic | ICD-10-CM

## 2020-12-22 ENCOUNTER — NON-APPOINTMENT (OUTPATIENT)
Age: 50
End: 2020-12-22

## 2020-12-24 ENCOUNTER — APPOINTMENT (OUTPATIENT)
Dept: INFUSION THERAPY | Facility: HOSPITAL | Age: 50
End: 2020-12-24

## 2020-12-28 DIAGNOSIS — R11.2 NAUSEA WITH VOMITING, UNSPECIFIED: ICD-10-CM

## 2020-12-28 DIAGNOSIS — Z51.11 ENCOUNTER FOR ANTINEOPLASTIC CHEMOTHERAPY: ICD-10-CM

## 2021-01-13 ENCOUNTER — OUTPATIENT (OUTPATIENT)
Dept: OUTPATIENT SERVICES | Facility: HOSPITAL | Age: 51
LOS: 1 days | Discharge: HOME | End: 2021-01-13

## 2021-01-13 DIAGNOSIS — Z98.890 OTHER SPECIFIED POSTPROCEDURAL STATES: Chronic | ICD-10-CM

## 2021-01-13 DIAGNOSIS — C50.919 MALIGNANT NEOPLASM OF UNSPECIFIED SITE OF UNSPECIFIED FEMALE BREAST: ICD-10-CM

## 2021-01-13 DIAGNOSIS — M67.432 GANGLION, LEFT WRIST: Chronic | ICD-10-CM

## 2021-01-13 DIAGNOSIS — Z30.430 ENCOUNTER FOR INSERTION OF INTRAUTERINE CONTRACEPTIVE DEVICE: Chronic | ICD-10-CM

## 2021-01-19 ENCOUNTER — APPOINTMENT (OUTPATIENT)
Dept: INFUSION THERAPY | Facility: HOSPITAL | Age: 51
End: 2021-01-19

## 2021-01-19 ENCOUNTER — RESULT REVIEW (OUTPATIENT)
Age: 51
End: 2021-01-19

## 2021-01-19 ENCOUNTER — APPOINTMENT (OUTPATIENT)
Dept: HEMATOLOGY ONCOLOGY | Facility: CLINIC | Age: 51
End: 2021-01-19
Payer: COMMERCIAL

## 2021-01-19 LAB
BASOPHILS # BLD AUTO: 0.03 K/UL — SIGNIFICANT CHANGE UP (ref 0–0.2)
BASOPHILS NFR BLD AUTO: 0.6 % — SIGNIFICANT CHANGE UP (ref 0–2)
EOSINOPHIL # BLD AUTO: 0.38 K/UL — SIGNIFICANT CHANGE UP (ref 0–0.5)
EOSINOPHIL NFR BLD AUTO: 7.2 % — HIGH (ref 0–6)
HCT VFR BLD CALC: 36.9 % — SIGNIFICANT CHANGE UP (ref 34.5–45)
HGB BLD-MCNC: 12.9 G/DL — SIGNIFICANT CHANGE UP (ref 11.5–15.5)
IMM GRANULOCYTES NFR BLD AUTO: 0.4 % — SIGNIFICANT CHANGE UP (ref 0–1.5)
LYMPHOCYTES # BLD AUTO: 1.18 K/UL — SIGNIFICANT CHANGE UP (ref 1–3.3)
LYMPHOCYTES # BLD AUTO: 22.2 % — SIGNIFICANT CHANGE UP (ref 13–44)
MCHC RBC-ENTMCNC: 31.9 PG — SIGNIFICANT CHANGE UP (ref 27–34)
MCHC RBC-ENTMCNC: 35 G/DL — SIGNIFICANT CHANGE UP (ref 32–36)
MCV RBC AUTO: 91.3 FL — SIGNIFICANT CHANGE UP (ref 80–100)
MONOCYTES # BLD AUTO: 0.45 K/UL — SIGNIFICANT CHANGE UP (ref 0–0.9)
MONOCYTES NFR BLD AUTO: 8.5 % — SIGNIFICANT CHANGE UP (ref 2–14)
NEUTROPHILS # BLD AUTO: 3.25 K/UL — SIGNIFICANT CHANGE UP (ref 1.8–7.4)
NEUTROPHILS NFR BLD AUTO: 61.1 % — SIGNIFICANT CHANGE UP (ref 43–77)
NRBC # BLD: 0 /100 WBCS — SIGNIFICANT CHANGE UP (ref 0–0)
PLATELET # BLD AUTO: 196 K/UL — SIGNIFICANT CHANGE UP (ref 150–400)
RBC # BLD: 4.04 M/UL — SIGNIFICANT CHANGE UP (ref 3.8–5.2)
RBC # FLD: 12 % — SIGNIFICANT CHANGE UP (ref 10.3–14.5)
WBC # BLD: 5.31 K/UL — SIGNIFICANT CHANGE UP (ref 3.8–10.5)
WBC # FLD AUTO: 5.31 K/UL — SIGNIFICANT CHANGE UP (ref 3.8–10.5)

## 2021-01-19 PROCEDURE — 99214 OFFICE O/P EST MOD 30 MIN: CPT | Mod: 95

## 2021-01-19 RX ORDER — ALBUTEROL SULFATE 90 UG/1
108 (90 BASE) INHALANT RESPIRATORY (INHALATION)
Qty: 8 | Refills: 0 | Status: ACTIVE | COMMUNITY
Start: 2021-01-11

## 2021-01-19 NOTE — PHYSICAL EXAM
[Fully active, able to carry on all pre-disease performance without restriction] : Status 0 - Fully active, able to carry on all pre-disease performance without restriction [Normal] : affect appropriate [de-identified] : anicteric [de-identified] : anicteric [de-identified] : normal respiratory effort

## 2021-01-19 NOTE — ASSESSMENT
[FreeTextEntry1] : 50 year-old woman with history of asthma, hypothyroidism, depression/anxiety, chronic hep B (surface Ag reactive, viral load negative, on entecavir during treatment, follows with hepatology) with R invasive breast cancer s/p lumpectomy and SLNB August 2018 with 1.1cm IDC, ER+ 95% strong, OR+ 3-5% strong, HER2 IHC 2+ and negative by CISH. [place] grade 7-8/9, with DCIS present and close surgical margins and 0/2 SLN involved. Oncotype score 29. She completed TC x 4 adjuvant chemotherapy, with C2 c/b seizure in the setting of severe hyponatremia (euvolemic, polydipsia) and aspiration event thought due to polydipsia - sertraline held on treatment C3 and 4 completed without issue. She completed adjuvant radiation and started Lupron for ovarian suppression 2/2019 as well as alendronate PO for osteoporosis on DEXA, arimidex 4/2019. She is tolerating all well without significant AEs.\par \par Breast Cancer: s/p adjuvant TC x 4, adjuvant radiation, on AI with some mild arthralgias recently stable/improved\par -Follow up Dr. Heller for exam and imaging.  Last mammo/sono 6/3/20 with benign findings, BIRADS 2. The patient states she will schedule apt for Feb 2021\par -radiation oncology follow up as recommended\par -check labs today\par -Lupron today, q84 days changed to q28 day due to nationwide shortage - patient also feels 3 month dosing contributing to fatigue - continue every 28 days, discussed and patient will schedule more visits\par -continue Arimidex daily \par -patient has Copper IUD in place\par -age-appropriate routine health maintenance and screenings advised, discussed, all up to date\par \par Hep B surface antigen reactive: c/w chronic hep B, no risk factors, viral load undetectable on repeat checks, off entecavir\par - Hepatology recommendations appreciated from Aug 2019, plan for continued follow up and HCC screening with Dr. Beltran, she is overdue - advised she reschedule at this time, discussed at length today and she is amenabl\par \par Depression/Anxiety: symptoms well managed >12 years on current regimen, sertraline resumed after pause on treatment due to prior hyponatremia as above, no changes and doing well currently\par -Follow up with her psychiatrist as planned \par -continue wellbutrin, sertraline\par -patient instructed to call with any new symptoms or concerns\par \par Osteoporosis: DEXA 1/2019 with osteoporosis of spine only -2.6 (MRI spine without fracture or deformity)\par -GNRH agonist/AI post-chemotherapy given high risk OncotypeDX score, premenopausal, adjuvant chemotherapy use\par -continue alendronate, tolerating\par -follow up endocrinology - also follows for thyroid monitoring, has appointment next week\par -continue calcium, vitamin D supplementation

## 2021-01-19 NOTE — HISTORY OF PRESENT ILLNESS
[Disease: _____________________] : Disease: [unfilled] [T: ___] : T[unfilled] [N: ___] : N[unfilled] [M: ___] : M[unfilled] [AJCC Stage: ____] : AJCC Stage: [unfilled] [Treatment Protocol] : Treatment Protocol [Therapy: ___] : Therapy: [unfilled] [Other Location: e.g. School (Enter Location, City,State)___] : at [unfilled], at the time of the visit. [Medical Office: ([facility])___] : at the medical office located in  [Verbal consent obtained from patient] : the patient, [unfilled] [de-identified] : 50-year-old lady with history of asthma, hypothyroidism, depression/anxiety with recently diagnosed invasive breast cancer s/p right lumpectomy and SLNB here for breast medical oncology follow up\par \par Patient had normal annual routine mammograms since age 42, last 8/11/16. 5/22/18 mammo with 1.5cm R breast mass; diagnosis mammo and US confirmed this mass RUOQ 1.6cm at 10:00, hypoechoic. US guided core biopsy of the mass was performed 6/1/18 which revealed 0.5 cm invasive ductal carcinoma, Lynette score 7/9, with nuclear grade 2 cribriform type DCIS with central necrosis and calcifications, estrogen receptor 95% strong, progesterone receptor 3-5% strong, HER-2 IHC 2+ negative by CIS. My Risk panel on 6/13/2018 was negative. MRI of the bilateral breasts on 06/25/2018 showed a 1.7 X 1.6 X 1.8 cm focal area of non-mass enhancement with washout kinetics and biopsy marker at the upper outer posterior 10:00 position of the right breast. There were no other suspicious lesions in the right breast and no suspicious lesions in the left breast. There were no axillary or internal mammary lymphadenopathy. She saw Dr. Heller who performed CXR, nuclear bone scan, pelvic and abdominal sonograms which were negative. On 8/15/18 she underwent a lumpectomy with SLNB - pathology with 1.1 cm invasive ductal carcinoma, Lynette 7-8/9 and no LVI. 0/2LN involved. There was also a 1.5 cm nuclear grade 2 - 3 ductal carcinoma in situ, cribriform and solid type with comedonecrosis and calcifications. Regarding the margins, the invasive tumor was 0.15 mm from the deep margin. Ductal carcinoma in situ was noted within microns of the deep margin and less than 0.1 mm from the anterior and superior margins. Two sentinel nodes were examined and negative. She saw radiation oncology in consultation and was recommended for adjuvant radiation pending medical oncology recommendations. Oncotype score 29 (20% risk of distant recurrence with tamoxifen alone). TC x 4 recommended. Patient has elected to utilize cold cap.\par \par HBV surface antigen reactive, HBV DNA not detected, c/w chronic hepatitis B infection (discussed with hepatology). She was started on entecavir daily ~1 week prior to chemotherapy initiation and continues this daily. US Abdomen completed prior by Dr. Heller prior to surgery - noted normal. She saw hepatology Dr. Beltran in consultation 11/2; repeat serologies stable and Fibroscan relatively normal. Plan to continue entecavir until at least 6 months post-chemotherapy (June 2019), with hepatology follow up also planned and [id number] HCC screening with RUQUS lifelong.\par \par 10/24-10/26 admitted to [facility].\par ~9pm just after driving home from getting food,  witness LOC with seizure activity x 20 seconds followed by lethargy - EMS called.\par She was taken to [facility] ER and admitted to ICU; sodium 119 with evidence of aspiration on CXR/CTA chest, O2 sat 86% briefly requiring BIPAP support quickly weaned. Neurology, Pulmonary, Renal consults following. Sodium corrected over the next 24h with normal saline infusion, antibiotics discontinued as no further evidence of infection. Neulasta onpro administered as scheduled 10/25 afternoon. She had a CTH and MRI Brain w/wo contrast which showed no acute findings. Zoloft was held, and she was discharged home. Incidental note of possible compression deformity of thoracic spine on imaging while inpatient. She admits to drinking ~4L water on day of chemotherapy as advised by cold cap literature. Her  recalls in retrospect that she seemed overly sleepy and slightly confused (forgot she had chinese food for lunch, eyes closing while sitting up) on the afternoon/evening after treatment prior to seizure. CK and LFTs elevated post discharge, continued to downtrend over the next few days to normal.\par \par She underwent cycle 3 of TC followed by 24 hours of observation at [facility] inpatient with neuro checks and q4h BMP monitoring. Her sodium remained normal and she was asymptomatic. She completed her final cycle of TC on 12/5/18 without event.\par \par DEXA, 1/17/19- Osteoporosis\par \par Adjuvant radiation initiated 2/4/2019 and completed 3/2/19.\par Lupron for ovarian suppression initiated 2/2019. Arimidex initiated April 2019.\par \par James Mammo/Sono, 5/23/19- BIRADS 2\par \par Breast MRI, 8/7/19- BIRADS 2\par \par Brain MRI, 4/13/20- No evidence of seventh eighth nerve pathology or metastasis \par \par COVID-19 testing, 4/15/20- Positive, Re-swab 5/11/20- Negative\par   \par Pertinent History:\par Hypothyroidism stable on synthroid. On oral bisphophanate since 2/2019 with her endocrinologist.\par Depression/anxiety stable since 2006 on zoloft and wellbutrin, she sees Dr. Manuel Ga for this. \par She is UTD on RHM. \par [place] with her  [place]. Their 17 year old son started college at [employer] 2018 and their 13 year old daughter lives at home with them. Patient works part time at [employer]. \par FHx significant for father with prostate cancer in 60s, paternal cousin with leukemia age 13 and breast cancer age 45. [de-identified] : T1cN0 [de-identified] : ER+DE-HER2- [de-identified] : Oncotype 29 [FreeTextEntry1] : x 4 cycles, with Penguin cold cap [de-identified] : Patient presents today for follow up via telemedicine. She is tolerating AI/Lupron. Fatigue stable since COVID infection as well as visual findings - saw ophtho and MRI Brain negative - she is enrolled in study following post-COVID symptoms currently.

## 2021-01-20 LAB
ALBUMIN SERPL ELPH-MCNC: 4.4 G/DL
ALP BLD-CCNC: 116 U/L
ALT SERPL-CCNC: 15 U/L
ANION GAP SERPL CALC-SCNC: 13 MMOL/L
AST SERPL-CCNC: 20 U/L
BILIRUB SERPL-MCNC: 0.4 MG/DL
BUN SERPL-MCNC: 14 MG/DL
CALCIUM SERPL-MCNC: 9.6 MG/DL
CHLORIDE SERPL-SCNC: 106 MMOL/L
CO2 SERPL-SCNC: 20 MMOL/L
CREAT SERPL-MCNC: 0.91 MG/DL
ESTRADIOL SERPL-MCNC: <5 PG/ML
FERRITIN SERPL-MCNC: 40 NG/ML
GLUCOSE SERPL-MCNC: 92 MG/DL
LH SERPL-ACNC: <0.3 IU/L
POTASSIUM SERPL-SCNC: 4 MMOL/L
PROT SERPL-MCNC: 7.6 G/DL
SODIUM SERPL-SCNC: 139 MMOL/L

## 2021-02-11 ENCOUNTER — OUTPATIENT (OUTPATIENT)
Dept: OUTPATIENT SERVICES | Facility: HOSPITAL | Age: 51
LOS: 1 days | Discharge: HOME | End: 2021-02-11

## 2021-02-11 DIAGNOSIS — Z98.890 OTHER SPECIFIED POSTPROCEDURAL STATES: Chronic | ICD-10-CM

## 2021-02-11 DIAGNOSIS — M67.432 GANGLION, LEFT WRIST: Chronic | ICD-10-CM

## 2021-02-11 DIAGNOSIS — Z30.430 ENCOUNTER FOR INSERTION OF INTRAUTERINE CONTRACEPTIVE DEVICE: Chronic | ICD-10-CM

## 2021-02-11 DIAGNOSIS — C50.919 MALIGNANT NEOPLASM OF UNSPECIFIED SITE OF UNSPECIFIED FEMALE BREAST: ICD-10-CM

## 2021-02-16 ENCOUNTER — APPOINTMENT (OUTPATIENT)
Dept: INFUSION THERAPY | Facility: HOSPITAL | Age: 51
End: 2021-02-16

## 2021-03-14 ENCOUNTER — OUTPATIENT (OUTPATIENT)
Dept: OUTPATIENT SERVICES | Facility: HOSPITAL | Age: 51
LOS: 1 days | Discharge: HOME | End: 2021-03-14

## 2021-03-14 DIAGNOSIS — Z30.430 ENCOUNTER FOR INSERTION OF INTRAUTERINE CONTRACEPTIVE DEVICE: Chronic | ICD-10-CM

## 2021-03-14 DIAGNOSIS — M67.432 GANGLION, LEFT WRIST: Chronic | ICD-10-CM

## 2021-03-14 DIAGNOSIS — Z98.890 OTHER SPECIFIED POSTPROCEDURAL STATES: Chronic | ICD-10-CM

## 2021-03-14 DIAGNOSIS — C50.919 MALIGNANT NEOPLASM OF UNSPECIFIED SITE OF UNSPECIFIED FEMALE BREAST: ICD-10-CM

## 2021-03-18 ENCOUNTER — APPOINTMENT (OUTPATIENT)
Dept: INFUSION THERAPY | Facility: HOSPITAL | Age: 51
End: 2021-03-18

## 2021-04-08 ENCOUNTER — APPOINTMENT (OUTPATIENT)
Dept: INFUSION THERAPY | Facility: HOSPITAL | Age: 51
End: 2021-04-08

## 2021-04-11 ENCOUNTER — OUTPATIENT (OUTPATIENT)
Dept: OUTPATIENT SERVICES | Facility: HOSPITAL | Age: 51
LOS: 1 days | Discharge: HOME | End: 2021-04-11

## 2021-04-11 DIAGNOSIS — M67.432 GANGLION, LEFT WRIST: Chronic | ICD-10-CM

## 2021-04-11 DIAGNOSIS — Z30.430 ENCOUNTER FOR INSERTION OF INTRAUTERINE CONTRACEPTIVE DEVICE: Chronic | ICD-10-CM

## 2021-04-11 DIAGNOSIS — C50.919 MALIGNANT NEOPLASM OF UNSPECIFIED SITE OF UNSPECIFIED FEMALE BREAST: ICD-10-CM

## 2021-04-11 DIAGNOSIS — Z98.890 OTHER SPECIFIED POSTPROCEDURAL STATES: Chronic | ICD-10-CM

## 2021-04-15 ENCOUNTER — APPOINTMENT (OUTPATIENT)
Dept: INFUSION THERAPY | Facility: HOSPITAL | Age: 51
End: 2021-04-15

## 2021-05-06 ENCOUNTER — APPOINTMENT (OUTPATIENT)
Dept: INFUSION THERAPY | Facility: HOSPITAL | Age: 51
End: 2021-05-06

## 2021-05-06 ENCOUNTER — APPOINTMENT (OUTPATIENT)
Dept: HEMATOLOGY ONCOLOGY | Facility: CLINIC | Age: 51
End: 2021-05-06

## 2021-05-12 ENCOUNTER — OUTPATIENT (OUTPATIENT)
Dept: OUTPATIENT SERVICES | Facility: HOSPITAL | Age: 51
LOS: 1 days | Discharge: HOME | End: 2021-05-12

## 2021-05-12 DIAGNOSIS — Z98.890 OTHER SPECIFIED POSTPROCEDURAL STATES: Chronic | ICD-10-CM

## 2021-05-12 DIAGNOSIS — Z30.430 ENCOUNTER FOR INSERTION OF INTRAUTERINE CONTRACEPTIVE DEVICE: Chronic | ICD-10-CM

## 2021-05-12 DIAGNOSIS — M67.432 GANGLION, LEFT WRIST: Chronic | ICD-10-CM

## 2021-05-12 DIAGNOSIS — C50.919 MALIGNANT NEOPLASM OF UNSPECIFIED SITE OF UNSPECIFIED FEMALE BREAST: ICD-10-CM

## 2021-05-13 ENCOUNTER — RESULT REVIEW (OUTPATIENT)
Age: 51
End: 2021-05-13

## 2021-05-13 ENCOUNTER — APPOINTMENT (OUTPATIENT)
Dept: INFUSION THERAPY | Facility: HOSPITAL | Age: 51
End: 2021-05-13

## 2021-05-13 ENCOUNTER — APPOINTMENT (OUTPATIENT)
Dept: HEMATOLOGY ONCOLOGY | Facility: CLINIC | Age: 51
End: 2021-05-13
Payer: COMMERCIAL

## 2021-05-13 VITALS
BODY MASS INDEX: 27.67 KG/M2 | RESPIRATION RATE: 16 BRPM | HEART RATE: 78 BPM | DIASTOLIC BLOOD PRESSURE: 85 MMHG | OXYGEN SATURATION: 97 % | TEMPERATURE: 98 F | SYSTOLIC BLOOD PRESSURE: 124 MMHG | WEIGHT: 160.5 LBS

## 2021-05-13 LAB
BASOPHILS # BLD AUTO: 0.05 K/UL — SIGNIFICANT CHANGE UP (ref 0–0.2)
BASOPHILS NFR BLD AUTO: 0.9 % — SIGNIFICANT CHANGE UP (ref 0–2)
EOSINOPHIL # BLD AUTO: 0.28 K/UL — SIGNIFICANT CHANGE UP (ref 0–0.5)
EOSINOPHIL NFR BLD AUTO: 5.3 % — SIGNIFICANT CHANGE UP (ref 0–6)
HCT VFR BLD CALC: 39.4 % — SIGNIFICANT CHANGE UP (ref 34.5–45)
HGB BLD-MCNC: 13.6 G/DL — SIGNIFICANT CHANGE UP (ref 11.5–15.5)
IMM GRANULOCYTES NFR BLD AUTO: 0.2 % — SIGNIFICANT CHANGE UP (ref 0–1.5)
LYMPHOCYTES # BLD AUTO: 1.47 K/UL — SIGNIFICANT CHANGE UP (ref 1–3.3)
LYMPHOCYTES # BLD AUTO: 27.6 % — SIGNIFICANT CHANGE UP (ref 13–44)
MCHC RBC-ENTMCNC: 32.3 PG — SIGNIFICANT CHANGE UP (ref 27–34)
MCHC RBC-ENTMCNC: 34.5 G/DL — SIGNIFICANT CHANGE UP (ref 32–36)
MCV RBC AUTO: 93.6 FL — SIGNIFICANT CHANGE UP (ref 80–100)
MONOCYTES # BLD AUTO: 0.5 K/UL — SIGNIFICANT CHANGE UP (ref 0–0.9)
MONOCYTES NFR BLD AUTO: 9.4 % — SIGNIFICANT CHANGE UP (ref 2–14)
NEUTROPHILS # BLD AUTO: 3.02 K/UL — SIGNIFICANT CHANGE UP (ref 1.8–7.4)
NEUTROPHILS NFR BLD AUTO: 56.6 % — SIGNIFICANT CHANGE UP (ref 43–77)
NRBC # BLD: 0 /100 WBCS — SIGNIFICANT CHANGE UP (ref 0–0)
PLATELET # BLD AUTO: 226 K/UL — SIGNIFICANT CHANGE UP (ref 150–400)
RBC # BLD: 4.21 M/UL — SIGNIFICANT CHANGE UP (ref 3.8–5.2)
RBC # FLD: 12.3 % — SIGNIFICANT CHANGE UP (ref 10.3–14.5)
WBC # BLD: 5.33 K/UL — SIGNIFICANT CHANGE UP (ref 3.8–10.5)
WBC # FLD AUTO: 5.33 K/UL — SIGNIFICANT CHANGE UP (ref 3.8–10.5)

## 2021-05-13 PROCEDURE — 99214 OFFICE O/P EST MOD 30 MIN: CPT

## 2021-05-13 PROCEDURE — 99072 ADDL SUPL MATRL&STAF TM PHE: CPT

## 2021-05-14 LAB
ALBUMIN SERPL ELPH-MCNC: 4.5 G/DL
ALP BLD-CCNC: 114 U/L
ALT SERPL-CCNC: 20 U/L
ANION GAP SERPL CALC-SCNC: 13 MMOL/L
AST SERPL-CCNC: 23 U/L
BILIRUB SERPL-MCNC: 0.3 MG/DL
BUN SERPL-MCNC: 16 MG/DL
CALCIUM SERPL-MCNC: 9.6 MG/DL
CHLORIDE SERPL-SCNC: 104 MMOL/L
CO2 SERPL-SCNC: 24 MMOL/L
CREAT SERPL-MCNC: 0.93 MG/DL
ESTRADIOL SERPL-MCNC: <5 PG/ML
FSH SERPL-MCNC: 12.7 IU/L
GLUCOSE SERPL-MCNC: 95 MG/DL
LH SERPL-ACNC: 0.4 IU/L
POTASSIUM SERPL-SCNC: 4.7 MMOL/L
PROT SERPL-MCNC: 7.7 G/DL
SODIUM SERPL-SCNC: 140 MMOL/L

## 2021-05-20 ENCOUNTER — RESULT REVIEW (OUTPATIENT)
Age: 51
End: 2021-05-20

## 2021-06-04 ENCOUNTER — APPOINTMENT (OUTPATIENT)
Dept: HEMATOLOGY ONCOLOGY | Facility: CLINIC | Age: 51
End: 2021-06-04
Payer: COMMERCIAL

## 2021-06-04 VITALS
HEART RATE: 54 BPM | RESPIRATION RATE: 17 BRPM | TEMPERATURE: 97.7 F | OXYGEN SATURATION: 97 % | HEIGHT: 63.86 IN | WEIGHT: 163.58 LBS | SYSTOLIC BLOOD PRESSURE: 123 MMHG | DIASTOLIC BLOOD PRESSURE: 84 MMHG | BODY MASS INDEX: 28.27 KG/M2

## 2021-06-04 PROCEDURE — 99072 ADDL SUPL MATRL&STAF TM PHE: CPT

## 2021-06-04 PROCEDURE — 99213 OFFICE O/P EST LOW 20 MIN: CPT

## 2021-06-09 ENCOUNTER — RESULT REVIEW (OUTPATIENT)
Age: 51
End: 2021-06-09

## 2021-06-09 ENCOUNTER — APPOINTMENT (OUTPATIENT)
Dept: ULTRASOUND IMAGING | Facility: CLINIC | Age: 51
End: 2021-06-09
Payer: COMMERCIAL

## 2021-06-09 ENCOUNTER — OUTPATIENT (OUTPATIENT)
Dept: OUTPATIENT SERVICES | Facility: HOSPITAL | Age: 51
LOS: 1 days | End: 2021-06-09
Payer: COMMERCIAL

## 2021-06-09 ENCOUNTER — APPOINTMENT (OUTPATIENT)
Dept: MAMMOGRAPHY | Facility: CLINIC | Age: 51
End: 2021-06-09
Payer: COMMERCIAL

## 2021-06-09 DIAGNOSIS — Z30.430 ENCOUNTER FOR INSERTION OF INTRAUTERINE CONTRACEPTIVE DEVICE: Chronic | ICD-10-CM

## 2021-06-09 DIAGNOSIS — Z00.8 ENCOUNTER FOR OTHER GENERAL EXAMINATION: ICD-10-CM

## 2021-06-09 DIAGNOSIS — Z98.890 OTHER SPECIFIED POSTPROCEDURAL STATES: Chronic | ICD-10-CM

## 2021-06-09 DIAGNOSIS — C50.911 MALIGNANT NEOPLASM OF UNSPECIFIED SITE OF RIGHT FEMALE BREAST: ICD-10-CM

## 2021-06-09 DIAGNOSIS — M67.432 GANGLION, LEFT WRIST: Chronic | ICD-10-CM

## 2021-06-09 PROCEDURE — 77066 DX MAMMO INCL CAD BI: CPT | Mod: 26

## 2021-06-09 PROCEDURE — G0279: CPT | Mod: 26

## 2021-06-09 PROCEDURE — 76641 ULTRASOUND BREAST COMPLETE: CPT

## 2021-06-09 PROCEDURE — 76641 ULTRASOUND BREAST COMPLETE: CPT | Mod: 26,50

## 2021-06-09 PROCEDURE — 77066 DX MAMMO INCL CAD BI: CPT

## 2021-06-09 PROCEDURE — G0279: CPT

## 2021-06-10 ENCOUNTER — APPOINTMENT (OUTPATIENT)
Dept: INFUSION THERAPY | Facility: HOSPITAL | Age: 51
End: 2021-06-10

## 2021-07-06 ENCOUNTER — OUTPATIENT (OUTPATIENT)
Dept: OUTPATIENT SERVICES | Facility: HOSPITAL | Age: 51
LOS: 1 days | Discharge: HOME | End: 2021-07-06

## 2021-07-06 DIAGNOSIS — Z98.890 OTHER SPECIFIED POSTPROCEDURAL STATES: Chronic | ICD-10-CM

## 2021-07-06 DIAGNOSIS — Z30.430 ENCOUNTER FOR INSERTION OF INTRAUTERINE CONTRACEPTIVE DEVICE: Chronic | ICD-10-CM

## 2021-07-06 DIAGNOSIS — C50.919 MALIGNANT NEOPLASM OF UNSPECIFIED SITE OF UNSPECIFIED FEMALE BREAST: ICD-10-CM

## 2021-07-06 DIAGNOSIS — M67.432 GANGLION, LEFT WRIST: Chronic | ICD-10-CM

## 2021-07-08 ENCOUNTER — APPOINTMENT (OUTPATIENT)
Dept: INFUSION THERAPY | Facility: HOSPITAL | Age: 51
End: 2021-07-08

## 2021-07-30 ENCOUNTER — TRANSCRIPTION ENCOUNTER (OUTPATIENT)
Age: 51
End: 2021-07-30

## 2021-08-03 ENCOUNTER — RESULT REVIEW (OUTPATIENT)
Age: 51
End: 2021-08-03

## 2021-08-05 ENCOUNTER — APPOINTMENT (OUTPATIENT)
Dept: INFUSION THERAPY | Facility: HOSPITAL | Age: 51
End: 2021-08-05

## 2021-08-09 ENCOUNTER — OUTPATIENT (OUTPATIENT)
Dept: OUTPATIENT SERVICES | Facility: HOSPITAL | Age: 51
LOS: 1 days | Discharge: HOME | End: 2021-08-09

## 2021-08-09 DIAGNOSIS — Z30.430 ENCOUNTER FOR INSERTION OF INTRAUTERINE CONTRACEPTIVE DEVICE: Chronic | ICD-10-CM

## 2021-08-09 DIAGNOSIS — C50.919 MALIGNANT NEOPLASM OF UNSPECIFIED SITE OF UNSPECIFIED FEMALE BREAST: ICD-10-CM

## 2021-08-09 DIAGNOSIS — Z98.890 OTHER SPECIFIED POSTPROCEDURAL STATES: Chronic | ICD-10-CM

## 2021-08-09 DIAGNOSIS — M67.432 GANGLION, LEFT WRIST: Chronic | ICD-10-CM

## 2021-08-10 ENCOUNTER — APPOINTMENT (OUTPATIENT)
Dept: INFUSION THERAPY | Facility: HOSPITAL | Age: 51
End: 2021-08-10

## 2021-08-10 ENCOUNTER — APPOINTMENT (OUTPATIENT)
Dept: HEPATOLOGY | Facility: CLINIC | Age: 51
End: 2021-08-10
Payer: COMMERCIAL

## 2021-08-10 VITALS
HEIGHT: 64 IN | WEIGHT: 165 LBS | BODY MASS INDEX: 28.17 KG/M2 | TEMPERATURE: 98 F | SYSTOLIC BLOOD PRESSURE: 115 MMHG | DIASTOLIC BLOOD PRESSURE: 78 MMHG | HEART RATE: 101 BPM | RESPIRATION RATE: 17 BRPM

## 2021-08-10 PROCEDURE — 99213 OFFICE O/P EST LOW 20 MIN: CPT

## 2021-08-10 NOTE — ASSESSMENT
[FreeTextEntry1] : # Repeated hepatitis B surface antigen (HBsAg) positivity:\par - She has had HBsAg positivity, repeatedly but without confirmatory testing, and with HBcAb, HBsAb, and HBV DNA all negative.\par - Given concern for risk of HBV reactivation with chemotherapy, she previously received entecavir prophylaxis (completed in 6/2019).\par - I suspect that her HBsAg positive tests were due to a cross-reacting substance unrelated to HBsAg, given that she never had positive confirmatory testing with a blocking antibody specific for HBsAg. Less likely mutant form of HBsAg given that HBcAb has been negative.\par - Will re-check HBsAg at a different lab ([facility] or [email or web address]) that may use different reagents, to see if negative.\par - Given low suspicion for chronic HBV, she does not need routine HCC surveillance.\par \par # Possible nonalcoholic (metabolic) fatty liver disease:\par - Prior US (2019) with concern for borderline hepatomegaly and increased hepatic echogenicity suspicious for hepatic steatosis. FibroScan (2018) also with concern for possible mild hepatic steatosis without significant fibrosis.\par - Repeat US and FibroScan ordered today for re-evaluation.\par \par Next follow-up: 6 months

## 2021-08-10 NOTE — CONSULT LETTER
[Dear  ___] : Dear  [unfilled], [Courtesy Letter:] : I had the pleasure of seeing your patient, [unfilled], in my office today. [Please see my note below.] : Please see my note below. [Referral Closing:] : Thank you very much for seeing this patient.  If you have any questions, please do not hesitate to contact me. [FreeTextEntry2] : Yeny Horn [FreeTextEntry3] : Sincerely,\par \par Jonatan Beltran M.D., Ph.D.\par [facility]\par [address]\[place] [address]\par Tel: [phone]\par Fax: [phone]\par Cell: [phone]\par E-mail: [email or web address]\par

## 2021-08-10 NOTE — HISTORY OF PRESENT ILLNESS
[Tattoo] : tattoo(s) [Body Piercing] : body piercing [Autoimmune Disorder] : autoimmune disorder [Needlestick Exposure] : no needlestick exposure [Infected Sexual Partner] : no infected sexual partner [IV Drug Use] : no IV drug use [Hemodialysis] : no hemodialysis [Transfusion before 1992] : no transfusion before 1992 [Transplant before 1992] : no transplant before 1992 [Incarceration] : no incarceration [Alcohol Abuse] : no alcohol abuse [Household Contact to HBV] : no household contact to HBV [Travel to Endemic Area] : no travel to an endemic area [Occupational Exposure] : no occupational exposure [Cocaine Use] : no cocaine use [de-identified] : Ms. Castaneda is a 52 yo F with asthma, hypothyroidism, depression/anxiety, history of COVID-19 (4/2020), and history of invasive breast cancer (diagnosed in 5-6/2018, s/p lumpectomy and SNLB, s/p 4 cycles of TC adjuvant chemotherapy completed in 12/2018, s/p adjuvant radiation therapy, and currently on Lupron since 2/2019 and Arimidex since 4/2019), also on alendronate for osteoporosis, who is being seen for follow-up of isolated hepatitis B surface antigen (HBsAg) positivity (with HBcAb, HBsAb, and HBV DNA all negative), as well as prior imaging concerning for hepatic steatosis. Due to concern for possible risk of HBV reactivation while undergoing chemotherapy, she received entecavir prophylaxis (completed in 6/2019).\par \par FibroScan (12/11/18): Median liver stiffness 3.0 kPA (consistent with F0-F1 fibrosis) and CAP score of 226 dB/m (consistent with S0-1 steatosis).\par \par Abdominal US (5/23/19): Mildly prominent liver with mildly increased echogenicity.\par \par She was last seen by me 2 years ago, on 8/2/19. She reports feeling well overall, apart from some residual fatigue that she attributes to "long hauler" post-COVID symptoms that are gradually improving. She completed COVID-19 vaccinations. \par \par She continues to work part-time as a  at [employer] and also walks for exercise.

## 2021-08-16 ENCOUNTER — APPOINTMENT (OUTPATIENT)
Dept: SURGICAL ONCOLOGY | Facility: CLINIC | Age: 51
End: 2021-08-16
Payer: COMMERCIAL

## 2021-08-16 ENCOUNTER — APPOINTMENT (OUTPATIENT)
Dept: HEPATOLOGY | Facility: CLINIC | Age: 51
End: 2021-08-16
Payer: COMMERCIAL

## 2021-08-16 VITALS
BODY MASS INDEX: 28.17 KG/M2 | RESPIRATION RATE: 17 BRPM | DIASTOLIC BLOOD PRESSURE: 84 MMHG | OXYGEN SATURATION: 98 % | HEART RATE: 71 BPM | SYSTOLIC BLOOD PRESSURE: 124 MMHG | HEIGHT: 64 IN | WEIGHT: 165 LBS

## 2021-08-16 PROCEDURE — 99214 OFFICE O/P EST MOD 30 MIN: CPT

## 2021-08-16 PROCEDURE — 91200 LIVER ELASTOGRAPHY: CPT

## 2021-09-01 NOTE — PATIENT PROFILE ADULT - NSPROEDAABILITYLEARNOTHER_GEN_A_NUR
[Coronary Artery Disease] : coronary artery disease [Stable] : stable [Moderate Mitral Regurgitation] : moderate mitral regurgitation [Compensated] : compensated [None] : There are no changes in medication management [With Me] : with me [___ Month(s)] : in [unfilled] month(s) [FreeTextEntry1] : reinforced  need for folate 1mg daily (sister will pick it up)\par \par Patient is  living in unsafe situation . Needs to be admitted to  nursing home or like facility  secondary to dementia   and she  lacks capacity to make decisions \par \par echocardiogram reviewed with patient and her sister\par \par Neurology referral provided again , this time to sister who will accompany her on to the visit  none

## 2021-09-02 ENCOUNTER — APPOINTMENT (OUTPATIENT)
Dept: HEMATOLOGY ONCOLOGY | Facility: CLINIC | Age: 51
End: 2021-09-02
Payer: COMMERCIAL

## 2021-09-02 VITALS
TEMPERATURE: 97.9 F | HEART RATE: 70 BPM | OXYGEN SATURATION: 97 % | RESPIRATION RATE: 18 BRPM | SYSTOLIC BLOOD PRESSURE: 105 MMHG | BODY MASS INDEX: 29.05 KG/M2 | HEIGHT: 63.98 IN | WEIGHT: 170.17 LBS | DIASTOLIC BLOOD PRESSURE: 73 MMHG

## 2021-09-02 PROCEDURE — 99214 OFFICE O/P EST MOD 30 MIN: CPT

## 2021-09-07 ENCOUNTER — APPOINTMENT (OUTPATIENT)
Dept: INFUSION THERAPY | Facility: HOSPITAL | Age: 51
End: 2021-09-07

## 2021-09-09 ENCOUNTER — APPOINTMENT (OUTPATIENT)
Dept: NEUROLOGY | Facility: CLINIC | Age: 51
End: 2021-09-09
Payer: COMMERCIAL

## 2021-09-09 VITALS
DIASTOLIC BLOOD PRESSURE: 74 MMHG | HEART RATE: 68 BPM | BODY MASS INDEX: 30.12 KG/M2 | HEIGHT: 63 IN | SYSTOLIC BLOOD PRESSURE: 124 MMHG | WEIGHT: 170 LBS

## 2021-09-09 DIAGNOSIS — R51.9 HEADACHE, UNSPECIFIED: ICD-10-CM

## 2021-09-09 DIAGNOSIS — G25.0 ESSENTIAL TREMOR: ICD-10-CM

## 2021-09-09 PROCEDURE — 99204 OFFICE O/P NEW MOD 45 MIN: CPT

## 2021-09-09 RX ORDER — ALPRAZOLAM 0.25 MG/1
0.25 TABLET ORAL
Qty: 20 | Refills: 0 | Status: DISCONTINUED | COMMUNITY
Start: 2018-07-17 | End: 2021-09-09

## 2021-09-09 RX ORDER — ALENDRONATE SODIUM 70 MG/1
70 TABLET ORAL
Refills: 0 | Status: DISCONTINUED | COMMUNITY
End: 2021-09-09

## 2021-09-09 RX ORDER — PANTOPRAZOLE 40 MG/1
40 TABLET, DELAYED RELEASE ORAL
Qty: 30 | Refills: 0 | Status: DISCONTINUED | COMMUNITY
Start: 2018-10-26 | End: 2021-09-09

## 2021-09-09 NOTE — HISTORY OF PRESENT ILLNESS
[FreeTextEntry1] : 51-year-old woman who is here for initial consultation of headache that started about a couple of months ago.  Patient states it is usually located on the right side of her head.  Patient experiences about 2 jobs that last for 3 seconds and then she would have a throbbing headache however whenever she moves again then she gets the jobs.  The frequency can be from 2-4 times a day and the duration is only a few seconds of jabbing sensation.  It is not associated with nausea or vomiting, no photophobia or phonophobia, no TACs and it does not interrupt her activities of daily living even when she gets the headache.

## 2021-09-09 NOTE — PHYSICAL EXAM
[General Appearance - Alert] : alert [Oriented To Time, Place, And Person] : oriented to person, place, and time [Person] : oriented to person [Place] : oriented to place [Time] : oriented to time [Short Term Intact] : short term memory intact [Fluency] : fluency intact [Current Events] : adequate knowledge of current events [Cranial Nerves Optic (II)] : visual acuity intact bilaterally,  visual fields full to confrontation, pupils equal round and reactive to light [Cranial Nerves Oculomotor (III)] : extraocular motion intact [Cranial Nerves Vestibulocochlear (VIII)] : hearing was intact bilaterally [Cranial Nerves Accessory (XI - Cranial And Spinal)] : head turning and shoulder shrug symmetric [Motor Tone] : muscle tone was normal in all four extremities [Motor Strength] : muscle strength was normal in all four extremities [Sensation Tactile Decrease] : light touch was intact [Abnormal Walk] : normal gait [Coordination - Dysmetria Impaired Finger-to-Nose Bilateral] : not present [1+] : Patella left 1+

## 2021-09-09 NOTE — DISCUSSION/SUMMARY
[FreeTextEntry1] : 51-year-old woman who is here for initial consultation of new onset headache that is located just on the right side of her head. The symptoms suggest LAILA versus tension headache with exacerbation with movement. Indomethacin is contraindicated as it will interact with with sertraline. Patient states that occasional Motrin or Tylenol helps her headache. She will continue and given her history of breast cancer will check MRI of the brain with and without contrast. Patient will follow up with me after the studies are completed.\par \par Of note patient also has tremors which are suggestive of essential tremor.\par \par I spent the time noted on the day of this patient encounter preparing for, providing and documenting the above E/M service and counseling and educate patient on differential, workup, disease course, and treatment/management. Education was provided to the patient during this encounter. All questions and concerns were answered and addressed in detail. The patient verbalized understanding and agreed to plan. Patient was advised to continue to monitor for neurologic symptoms and to notify my office or go to the nearest emergency room if there are any changes. Any orders/referrals and communications were provided as well. \par Side effects of the above medications were discussed in detail including but not limited to applicable black box warning and teratogenicity as appropriate. \par Patient was advised to bring previous records to my office. \par \par \par

## 2021-09-30 ENCOUNTER — OUTPATIENT (OUTPATIENT)
Dept: OUTPATIENT SERVICES | Facility: HOSPITAL | Age: 51
LOS: 1 days | Discharge: HOME | End: 2021-09-30

## 2021-09-30 DIAGNOSIS — M67.432 GANGLION, LEFT WRIST: Chronic | ICD-10-CM

## 2021-09-30 DIAGNOSIS — C50.919 MALIGNANT NEOPLASM OF UNSPECIFIED SITE OF UNSPECIFIED FEMALE BREAST: ICD-10-CM

## 2021-09-30 DIAGNOSIS — Z30.430 ENCOUNTER FOR INSERTION OF INTRAUTERINE CONTRACEPTIVE DEVICE: Chronic | ICD-10-CM

## 2021-09-30 DIAGNOSIS — Z98.890 OTHER SPECIFIED POSTPROCEDURAL STATES: Chronic | ICD-10-CM

## 2021-10-05 ENCOUNTER — APPOINTMENT (OUTPATIENT)
Dept: INFUSION THERAPY | Facility: HOSPITAL | Age: 51
End: 2021-10-05

## 2021-10-12 ENCOUNTER — APPOINTMENT (OUTPATIENT)
Dept: ULTRASOUND IMAGING | Facility: CLINIC | Age: 51
End: 2021-10-12
Payer: COMMERCIAL

## 2021-10-12 ENCOUNTER — APPOINTMENT (OUTPATIENT)
Dept: MRI IMAGING | Facility: CLINIC | Age: 51
End: 2021-10-12
Payer: COMMERCIAL

## 2021-10-12 ENCOUNTER — OUTPATIENT (OUTPATIENT)
Dept: OUTPATIENT SERVICES | Facility: HOSPITAL | Age: 51
LOS: 1 days | End: 2021-10-12
Payer: COMMERCIAL

## 2021-10-12 DIAGNOSIS — Z00.8 ENCOUNTER FOR OTHER GENERAL EXAMINATION: ICD-10-CM

## 2021-10-12 DIAGNOSIS — Z30.430 ENCOUNTER FOR INSERTION OF INTRAUTERINE CONTRACEPTIVE DEVICE: Chronic | ICD-10-CM

## 2021-10-12 DIAGNOSIS — Z98.890 OTHER SPECIFIED POSTPROCEDURAL STATES: Chronic | ICD-10-CM

## 2021-10-12 DIAGNOSIS — R51.9 HEADACHE, UNSPECIFIED: ICD-10-CM

## 2021-10-12 DIAGNOSIS — M67.432 GANGLION, LEFT WRIST: Chronic | ICD-10-CM

## 2021-10-12 PROCEDURE — 76700 US EXAM ABDOM COMPLETE: CPT | Mod: 26

## 2021-10-12 PROCEDURE — 70553 MRI BRAIN STEM W/O & W/DYE: CPT | Mod: 26

## 2021-10-12 PROCEDURE — 76700 US EXAM ABDOM COMPLETE: CPT

## 2021-10-12 PROCEDURE — A9585: CPT

## 2021-10-12 PROCEDURE — 70553 MRI BRAIN STEM W/O & W/DYE: CPT

## 2021-10-30 ENCOUNTER — OUTPATIENT (OUTPATIENT)
Dept: OUTPATIENT SERVICES | Facility: HOSPITAL | Age: 51
LOS: 1 days | Discharge: HOME | End: 2021-10-30

## 2021-10-30 DIAGNOSIS — Z30.430 ENCOUNTER FOR INSERTION OF INTRAUTERINE CONTRACEPTIVE DEVICE: Chronic | ICD-10-CM

## 2021-10-30 DIAGNOSIS — Z98.890 OTHER SPECIFIED POSTPROCEDURAL STATES: Chronic | ICD-10-CM

## 2021-10-30 DIAGNOSIS — C50.919 MALIGNANT NEOPLASM OF UNSPECIFIED SITE OF UNSPECIFIED FEMALE BREAST: ICD-10-CM

## 2021-10-30 DIAGNOSIS — M67.432 GANGLION, LEFT WRIST: Chronic | ICD-10-CM

## 2021-11-02 ENCOUNTER — APPOINTMENT (OUTPATIENT)
Dept: INFUSION THERAPY | Facility: HOSPITAL | Age: 51
End: 2021-11-02

## 2021-12-02 ENCOUNTER — OUTPATIENT (OUTPATIENT)
Dept: OUTPATIENT SERVICES | Facility: HOSPITAL | Age: 51
LOS: 1 days | Discharge: HOME | End: 2021-12-02

## 2021-12-02 ENCOUNTER — APPOINTMENT (OUTPATIENT)
Dept: RADIOLOGY | Facility: CLINIC | Age: 51
End: 2021-12-02
Payer: COMMERCIAL

## 2021-12-02 ENCOUNTER — OUTPATIENT (OUTPATIENT)
Dept: OUTPATIENT SERVICES | Facility: HOSPITAL | Age: 51
LOS: 1 days | End: 2021-12-02
Payer: COMMERCIAL

## 2021-12-02 DIAGNOSIS — Z30.430 ENCOUNTER FOR INSERTION OF INTRAUTERINE CONTRACEPTIVE DEVICE: Chronic | ICD-10-CM

## 2021-12-02 DIAGNOSIS — M67.432 GANGLION, LEFT WRIST: Chronic | ICD-10-CM

## 2021-12-02 DIAGNOSIS — Z98.890 OTHER SPECIFIED POSTPROCEDURAL STATES: Chronic | ICD-10-CM

## 2021-12-02 DIAGNOSIS — C50.919 MALIGNANT NEOPLASM OF UNSPECIFIED SITE OF UNSPECIFIED FEMALE BREAST: ICD-10-CM

## 2021-12-02 DIAGNOSIS — R07.89 OTHER CHEST PAIN: ICD-10-CM

## 2021-12-02 PROCEDURE — 71046 X-RAY EXAM CHEST 2 VIEWS: CPT

## 2021-12-02 PROCEDURE — 71100 X-RAY EXAM RIBS UNI 2 VIEWS: CPT | Mod: 26,LT

## 2021-12-02 PROCEDURE — 71046 X-RAY EXAM CHEST 2 VIEWS: CPT | Mod: 26

## 2021-12-02 PROCEDURE — 71100 X-RAY EXAM RIBS UNI 2 VIEWS: CPT

## 2021-12-03 ENCOUNTER — RESULT REVIEW (OUTPATIENT)
Age: 51
End: 2021-12-03

## 2021-12-03 ENCOUNTER — APPOINTMENT (OUTPATIENT)
Dept: HEMATOLOGY ONCOLOGY | Facility: CLINIC | Age: 51
End: 2021-12-03
Payer: COMMERCIAL

## 2021-12-03 VITALS
RESPIRATION RATE: 16 BRPM | HEART RATE: 83 BPM | SYSTOLIC BLOOD PRESSURE: 122 MMHG | TEMPERATURE: 97.7 F | HEIGHT: 64.17 IN | OXYGEN SATURATION: 98 % | BODY MASS INDEX: 28.98 KG/M2 | WEIGHT: 169.76 LBS | DIASTOLIC BLOOD PRESSURE: 78 MMHG

## 2021-12-03 DIAGNOSIS — M89.8X9 OTHER SPECIFIED DISORDERS OF BONE, UNSPECIFIED SITE: ICD-10-CM

## 2021-12-03 LAB
BASOPHILS # BLD AUTO: 0.04 K/UL — SIGNIFICANT CHANGE UP (ref 0–0.2)
BASOPHILS NFR BLD AUTO: 1 % — SIGNIFICANT CHANGE UP (ref 0–2)
EOSINOPHIL # BLD AUTO: 0.25 K/UL — SIGNIFICANT CHANGE UP (ref 0–0.5)
EOSINOPHIL NFR BLD AUTO: 6 % — SIGNIFICANT CHANGE UP (ref 0–6)
HCT VFR BLD CALC: 38.2 % — SIGNIFICANT CHANGE UP (ref 34.5–45)
HGB BLD-MCNC: 12.8 G/DL — SIGNIFICANT CHANGE UP (ref 11.5–15.5)
IMM GRANULOCYTES NFR BLD AUTO: 0.2 % — SIGNIFICANT CHANGE UP (ref 0–1.5)
LYMPHOCYTES # BLD AUTO: 1.21 K/UL — SIGNIFICANT CHANGE UP (ref 1–3.3)
LYMPHOCYTES # BLD AUTO: 28.8 % — SIGNIFICANT CHANGE UP (ref 13–44)
MCHC RBC-ENTMCNC: 31.8 PG — SIGNIFICANT CHANGE UP (ref 27–34)
MCHC RBC-ENTMCNC: 33.5 G/DL — SIGNIFICANT CHANGE UP (ref 32–36)
MCV RBC AUTO: 95 FL — SIGNIFICANT CHANGE UP (ref 80–100)
MONOCYTES # BLD AUTO: 0.38 K/UL — SIGNIFICANT CHANGE UP (ref 0–0.9)
MONOCYTES NFR BLD AUTO: 9 % — SIGNIFICANT CHANGE UP (ref 2–14)
NEUTROPHILS # BLD AUTO: 2.31 K/UL — SIGNIFICANT CHANGE UP (ref 1.8–7.4)
NEUTROPHILS NFR BLD AUTO: 55 % — SIGNIFICANT CHANGE UP (ref 43–77)
NRBC # BLD: 0 /100 WBCS — SIGNIFICANT CHANGE UP (ref 0–0)
PLATELET # BLD AUTO: 187 K/UL — SIGNIFICANT CHANGE UP (ref 150–400)
RBC # BLD: 4.02 M/UL — SIGNIFICANT CHANGE UP (ref 3.8–5.2)
RBC # FLD: 12.3 % — SIGNIFICANT CHANGE UP (ref 10.3–14.5)
WBC # BLD: 4.2 K/UL — SIGNIFICANT CHANGE UP (ref 3.8–10.5)
WBC # FLD AUTO: 4.2 K/UL — SIGNIFICANT CHANGE UP (ref 3.8–10.5)

## 2021-12-03 PROCEDURE — 99215 OFFICE O/P EST HI 40 MIN: CPT

## 2021-12-03 NOTE — ASSESSMENT
[FreeTextEntry1] : 51 year-old woman with R invasive breast cancer s/p lumpectomy and SLNB August 2018 with 1.1cm IDC, ER+ 95% strong, [place]+ 3-5% strong, HER2 IHC 2+ and negative by CISH. [place] grade 7-8/9, with DCIS present and close surgical margins and 0/2 SLN involved. Oncotype score 29. She completed TC x 4 adjuvant chemotherapy, with C2 c/b seizure in the setting of severe hyponatremia (euvolemic, polydipsia) and aspiration event thought due to polydipsia - sertraline held on treatment C3 and 4 completed without issue. She completed adjuvant radiation and started Lupron for ovarian suppression 2/2019 and AI.\par \par - on Arimidex with some mild arthralgias recently stable/improved\par -left lower rib pain x 2 weeks, CXR done and results pending.  Ordered bone scan for further evaluation.\par -continue Follow up Dr. Heller \par -Last mammo/sono 6/9/21 with benign findings, BIRADS 2. MRI planned 12/2021.\par -radiation oncology follow up as recommended\par -Lupron today, q28 days (patient brings her own drug due to insurance issues)\par -patient has Copper IUD in place\par -follow up in 6 months. \par

## 2021-12-03 NOTE — PHYSICAL EXAM
[de-identified] : anicteric, no conjunctival infection noted. [de-identified] : No LE edema, no calf tenderness. [de-identified] : Right breast with well healed surgical scars.  No discrete palpable masses, no palpable axillary nodes bilaterally.

## 2021-12-03 NOTE — HISTORY OF PRESENT ILLNESS
[de-identified] : 5/22/18 screening mammo with 1.5cm R breast mass; diagnostic mammo and US confirmed this mass RUOQ 1.6cm at 10:00, hypoechoic. \par \par US guided core biopsy of the mass was performed 6/1/18, which revealed 0.5 cm invasive ductal carcinoma, Lynette score 7/9, with nuclear grade 2 cribriform type DCIS with central necrosis and calcifications, estrogen receptor 95% strong, progesterone receptor 3-5% strong, HER-2 IHC 2+ negative by [facility]. \par \par MRI of the bilateral breasts on 06/25/2018 showed a 1.7 X 1.6 X 1.8 cm focal area of non-mass enhancement with washout kinetics and biopsy marker at the upper outer posterior 10:00 position of the right breast. There were no other suspicious lesions in the right breast and no suspicious lesions in the left breast. There were no axillary or internal mammary lymphadenopathy. \par \par She saw Dr. Heller who performed CXR, nuclear bone scan, pelvic and abdominal sonograms, which were negative. \par \par On 8/15/18 she underwent a right lumpectomy with SLNB - pathology with 1.1 cm invasive ductal carcinoma, Lynette 7-8/9 and no LVI. 0/2 LN involved. \par There was also a 1.5 cm nuclear grade 2 - 3 ductal carcinoma in situ, cribriform and solid type with comedonecrosis and calcifications. Regarding the margins, the invasive tumor was 0.15 mm from the deep margin. Ductal carcinoma in situ was noted within microns of the deep margin and less than 0.1 mm from the anterior and superior margins. Two sentinel nodes were examined and negative. \par \par Oncotype score 29 (20% risk of distant recurrence with tamoxifen alone). TC x 4 recommended. Patient elected to utilize cold cap.\par \par She saw radiation oncology in consultation and was recommended for adjuvant radiation pending medical oncology recommendations. \par \par 10/24-10/26 admitted to [facility].\par ~9pm just after driving home from getting food,  witness LOC with seizure activity x 20 seconds followed by lethargy - EMS called.\par She was taken to [facility] ER and admitted to ICU; sodium 119 with evidence of aspiration on CXR/CTA chest, O2 sat 86% briefly requiring BIPAP support quickly weaned. Neurology, Pulmonary, Renal consults following. Sodium corrected over the next 24h with normal saline infusion, antibiotics discontinued as no further evidence of infection. Neulasta onpro administered as scheduled 10/25 afternoon. She had a CTH and MRI Brain w/wo contrast which showed no acute findings. Zoloft was held, and she was discharged home. Incidental note of possible compression deformity of thoracic spine on imaging while inpatient. She admits to drinking ~4L water on day of chemotherapy as advised by cold cap literature. Her  recalls in retrospect that she seemed overly sleepy and slightly confused (forgot she had chinese food for lunch, eyes closing while sitting up) on the afternoon/evening after treatment prior to seizure. CK and LFTs elevated post discharge, continued to downtrend over the next few days to normal.\par \par She underwent cycle 3 of TC followed by 24 hours of observation at [facility] inpatient with neuro checks and q4h BMP monitoring. Her sodium remained normal and she was asymptomatic. She completed her final cycle of TC on 12/5/18 without event.\par \par Adjuvant radiation initiated 2/4/2019 and completed 3/2/19.\par \par Lupron for ovarian suppression initiated 2/2019. Arimidex initiated April 2019.\par \par My Risk panel on 6/13/2018 was negative.  FHx significant for father with prostate cancer in 60s, paternal cousin with leukemia age 13 and breast cancer age 45. [de-identified] : T1cN0 [de-identified] : ER+RI-HER2- [de-identified] : Oncotype 29 [FreeTextEntry1] : x 4 cycles, with Penguin cold cap [de-identified] : \par Transfer of care\par On Arimidex since 2/2019 with some mild arthralgias recently stable/improved\par Left sided rib pain constant x 2 weeks, CXR done by PCP 12/2, no trauma.  taking alleve q12h x 2 days.\par Follow up Dr. Heller 8/16/21, seeing yearly, exam stable.\par Last mammo/sono 6/9/21 with benign findings, BIRADS 2. MRI sched 12/10/2021\par Lupron 12/7/21,  q28 days (patient brings her own drug due to insurance issues).\par Labs today, sending labs to PCP.\par \par HEALTH MAINTENANCE\par Dr. Elian Olivas, following for HLD\par PULM h/o asthma, well controlled\par NEURO ongoing sharp shooting headaches, MRI Brain negative, LAILA versus tension headache with exacerbation with movement., using tylenol prn for now. also with essential tremor.\par BMD alendronate PO for osteoporosis on DEXA, arimidex 4/2019. She is tolerating all well without significant AEs.\par GYN Dr. Duncan, pap smear 6/2021, no vaginal bleeding or spotting. On lupron.\par GI Hep B surface antigen reactive: c/w chronic hep B vs false pos hep B, no risk factors, viral load undetectable on repeat checks, off entecavir now after chemo, follows with Dr. Beltran hepatology, seen 8/2021 with fibroscan, relatively normal. fu q6 month HCC screening with RUQ US lifelong.  Plans to do colonoscopy in 1 year.\par PSYCH Dr. Amanda Rushing Depression/Anxiety: symptoms well managed >12 years on current regimen, sertraline resumed after pause on treatment due to prior hyponatremia\par ENDO Dr. Altamirano - Osteoporosis: DEXA 1/2019 with osteoporosis of spine only -2.6 (MRI spine without fracture or deformity), off alendronate now, tolerating, also follows for thyroid monitoring, rec continue calcium, vitamin D supplementation, appt in 2/2022, will discuss next BMD\par Works as , heavy labor; \par s/p COVID vaccine and booster; h/o COVID infection 2/2020 - atypical sx \par [place] with [place]  [place]. Their 17 year old son started college at [employer] 2018 and their 13 year old daughter lives at home with them. Patient works part time at [employer].

## 2021-12-06 LAB
25(OH)D3 SERPL-MCNC: 34.3 NG/ML
ALBUMIN SERPL ELPH-MCNC: 4.4 G/DL
ALP BLD-CCNC: 105 U/L
ALT SERPL-CCNC: 16 U/L
ANION GAP SERPL CALC-SCNC: 12 MMOL/L
AST SERPL-CCNC: 16 U/L
BILIRUB SERPL-MCNC: 0.2 MG/DL
BUN SERPL-MCNC: 12 MG/DL
CALCIUM SERPL-MCNC: 9.6 MG/DL
CHLORIDE SERPL-SCNC: 106 MMOL/L
CHOLEST SERPL-MCNC: 239 MG/DL
CO2 SERPL-SCNC: 25 MMOL/L
CREAT SERPL-MCNC: 0.9 MG/DL
GLUCOSE SERPL-MCNC: 96 MG/DL
HDLC SERPL-MCNC: 78 MG/DL
LDLC SERPL CALC-MCNC: 141 MG/DL
NONHDLC SERPL-MCNC: 161 MG/DL
POTASSIUM SERPL-SCNC: 5 MMOL/L
PROT SERPL-MCNC: 7.4 G/DL
SODIUM SERPL-SCNC: 142 MMOL/L
TRIGL SERPL-MCNC: 104 MG/DL

## 2021-12-07 ENCOUNTER — APPOINTMENT (OUTPATIENT)
Dept: INFUSION THERAPY | Facility: HOSPITAL | Age: 51
End: 2021-12-07

## 2021-12-13 ENCOUNTER — APPOINTMENT (OUTPATIENT)
Dept: MRI IMAGING | Facility: CLINIC | Age: 51
End: 2021-12-13
Payer: COMMERCIAL

## 2021-12-13 ENCOUNTER — OUTPATIENT (OUTPATIENT)
Dept: OUTPATIENT SERVICES | Facility: HOSPITAL | Age: 51
LOS: 1 days | End: 2021-12-13
Payer: COMMERCIAL

## 2021-12-13 DIAGNOSIS — Z98.890 OTHER SPECIFIED POSTPROCEDURAL STATES: Chronic | ICD-10-CM

## 2021-12-13 DIAGNOSIS — Z30.430 ENCOUNTER FOR INSERTION OF INTRAUTERINE CONTRACEPTIVE DEVICE: Chronic | ICD-10-CM

## 2021-12-13 DIAGNOSIS — M67.432 GANGLION, LEFT WRIST: Chronic | ICD-10-CM

## 2021-12-13 DIAGNOSIS — C50.911 MALIGNANT NEOPLASM OF UNSPECIFIED SITE OF RIGHT FEMALE BREAST: ICD-10-CM

## 2021-12-13 PROCEDURE — 77049 MRI BREAST C-+ W/CAD BI: CPT | Mod: 26

## 2021-12-13 PROCEDURE — C8937: CPT

## 2021-12-13 PROCEDURE — C8908: CPT

## 2021-12-13 PROCEDURE — A9585: CPT

## 2021-12-15 ENCOUNTER — OUTPATIENT (OUTPATIENT)
Dept: OUTPATIENT SERVICES | Facility: HOSPITAL | Age: 51
LOS: 1 days | End: 2021-12-15
Payer: COMMERCIAL

## 2021-12-15 ENCOUNTER — APPOINTMENT (OUTPATIENT)
Dept: CT IMAGING | Facility: CLINIC | Age: 51
End: 2021-12-15

## 2021-12-15 DIAGNOSIS — C50.911 MALIGNANT NEOPLASM OF UNSPECIFIED SITE OF RIGHT FEMALE BREAST: ICD-10-CM

## 2021-12-15 DIAGNOSIS — Z98.890 OTHER SPECIFIED POSTPROCEDURAL STATES: Chronic | ICD-10-CM

## 2021-12-15 DIAGNOSIS — M67.432 GANGLION, LEFT WRIST: Chronic | ICD-10-CM

## 2021-12-15 DIAGNOSIS — Z30.430 ENCOUNTER FOR INSERTION OF INTRAUTERINE CONTRACEPTIVE DEVICE: Chronic | ICD-10-CM

## 2021-12-15 PROCEDURE — 71260 CT THORAX DX C+: CPT

## 2021-12-15 PROCEDURE — 71260 CT THORAX DX C+: CPT | Mod: 26

## 2021-12-15 NOTE — PHYSICAL EXAM
[Normal] : supple, no neck mass and thyroid not enlarged [Normal Neck Lymph Nodes] : normal neck lymph nodes  [Normal Supraclavicular Lymph Nodes] : normal supraclavicular lymph nodes [Normal Axillary Lymph Nodes] : normal axillary lymph nodes [Normal] : normal appearance, no rash, nodules, vesicles, ulcers, erythema [de-identified] : Groins not examined [de-identified] : Below

## 2021-12-15 NOTE — REASON FOR VISIT
[Follow-Up Visit] : a follow-up visit for [Other: _____] : [unfilled] [FreeTextEntry2] : Right breast cancer treated with lumpectomy and adjuvant therapy

## 2021-12-15 NOTE — HISTORY OF PRESENT ILLNESS
[de-identified] : 2020: SHE, HER , THEIR SON AND DAUGHTER, ALL HAD COVID.\par EACH HAD DIFFERENT SYMPTOMS.\par NONE NEEDED HOSP.\par \par \par 51 year-old lady who August 15, 2018, had RIGHT BREAST conserving surgery for a T1c SLN0 M0  cancer.\par \par Oncotype score = 29\par \par +Adjuvant chemotherapy: Dr. Yeny YORK\par Complicated by an episode of loss of consciousness and seizure, October 2018, due to hyponatremia after her second cycle of TC.\par Cycles 3 and 4 were completed successfully, with in-patient observation.\par August 2020 follow-up was unremarkable\par \par +Radiation (Dr. Charmaine JIMENEZ)\par Completed March 2019\par \par +Chronic hepatitis B.\par Hepatology: Dr. Jonatan MATTHEW\par \par \par June 2018 she was referred by her gynecologist Dr. Tommie Cote with invasive ductal right breast carcinoma, 10:00, on core needle biopsy of an asymptomatic, non-palpable abnormality (1.5 cm diameter) on annual breast imaging.\par \par Preoperative imaging consisted of normal chest x-ray, sonogram of the abdomen and pelvis, and bone scan.\par Preoperative breast MRI was BI-RADS 6.\par \par GENETIC TESTING: NO deleterious mutation.\par \par No prior personal history of breast disease.\par No previous breast biopsies.\par \par Asymptomatic.\par \par +FH:\par Her paternal grandmother had breast cancer. She is not sure the age.\par She also has a paternal cousin who had breast cancer at age 45.\par \par That cousin also had childhood leukemia at age 13.\par The only other relative with a history of malignancy is her father with prostate cancer at age 63.\par \par No relatives with ovarian cancer.\par \par No other personal history of malignancy.\par \par Not Ashkenazi\par \par \par Her internist is Dr. Dallas GIBBONS.\par \par She does not have a pacemaker or defibrillator.\par No anticoagulants\par \par She takes Synthroid for hypothyroidism.\par Her endocrinologist is Dr. VAN\par \par She is on Symbicort for asthma. \par She was hospitalized at [facility] in 2017 for exacerbation of her pulmonary disease. \par She does not have a pulmonologist.\par \par She is under treatment for depression with Dr. Manuel Ga.\par Medications are Zoloft and Wellbutrin.\par \par \par She saw her gynecologist Dr. Tommie COTE in August 2021\par \par \par Baseline colonoscopy will be at age 52\par

## 2021-12-15 NOTE — REVIEW OF SYSTEMS
[Negative] : Integumentary [de-identified] : Depression [FreeTextEntry6] : Asthma [de-identified] : Hypothyroid [FreeTextEntry1] : Breast cancer

## 2021-12-15 NOTE — ASSESSMENT
[FreeTextEntry1] : May 2019:\par Abdominal sonogram at [place]: Hepatic steatosis.\par \par June 2021 bilateral mammogram and sonogram at [facility]: BI-RADS 2.\par Studies will be repeated June 2022, Prescription entered today\par \par June 2018 preoperative breast MRI, BI-RADS 6.\par August 2019 MRI @[place]: BI-RADS 2.\par Will repeat at appropriate interval.\par Prescription entered for December 2021\par \par Clinically doing well.\par If no problems we will see her in another year, sooner if needed (now more than 2 years since her breast cancer operation)\par \par \par 12/15/2021:\par Summary note.\par Yesterday she had her breast MRI at [place].\par No parenchymal abnormalities.\par There is enhancement associated with the left lower rib cage.\par \par I spoke to Ms. Castaneda.\par She reports a 3-week history of discomfort in the lower left rib cage.\par She had brought this to the attention of her internist.\par She had rib x-rays which were performed at [place] and reportedly normal.\par \par Due to the enhancement seen on her breast MRI, a CT scan of the chest with contrast has been recommended.\par Ms. Castaneda understands and agrees.\par Prescription entered today.

## 2021-12-30 ENCOUNTER — OUTPATIENT (OUTPATIENT)
Dept: OUTPATIENT SERVICES | Facility: HOSPITAL | Age: 51
LOS: 1 days | Discharge: HOME | End: 2021-12-30

## 2021-12-30 DIAGNOSIS — Z30.430 ENCOUNTER FOR INSERTION OF INTRAUTERINE CONTRACEPTIVE DEVICE: Chronic | ICD-10-CM

## 2021-12-30 DIAGNOSIS — Z98.890 OTHER SPECIFIED POSTPROCEDURAL STATES: Chronic | ICD-10-CM

## 2021-12-30 DIAGNOSIS — C50.919 MALIGNANT NEOPLASM OF UNSPECIFIED SITE OF UNSPECIFIED FEMALE BREAST: ICD-10-CM

## 2021-12-30 DIAGNOSIS — M67.432 GANGLION, LEFT WRIST: Chronic | ICD-10-CM

## 2022-01-01 ENCOUNTER — OUTPATIENT (OUTPATIENT)
Dept: OUTPATIENT SERVICES | Facility: HOSPITAL | Age: 52
LOS: 1 days | Discharge: HOME | End: 2022-01-01

## 2022-01-01 DIAGNOSIS — Z98.890 OTHER SPECIFIED POSTPROCEDURAL STATES: Chronic | ICD-10-CM

## 2022-01-01 DIAGNOSIS — C50.919 MALIGNANT NEOPLASM OF UNSPECIFIED SITE OF UNSPECIFIED FEMALE BREAST: ICD-10-CM

## 2022-01-01 DIAGNOSIS — Z30.430 ENCOUNTER FOR INSERTION OF INTRAUTERINE CONTRACEPTIVE DEVICE: Chronic | ICD-10-CM

## 2022-01-01 DIAGNOSIS — M67.432 GANGLION, LEFT WRIST: Chronic | ICD-10-CM

## 2022-01-04 ENCOUNTER — APPOINTMENT (OUTPATIENT)
Dept: INFUSION THERAPY | Facility: HOSPITAL | Age: 52
End: 2022-01-04

## 2022-01-31 ENCOUNTER — OUTPATIENT (OUTPATIENT)
Dept: OUTPATIENT SERVICES | Facility: HOSPITAL | Age: 52
LOS: 1 days | Discharge: HOME | End: 2022-01-31

## 2022-01-31 DIAGNOSIS — Z98.890 OTHER SPECIFIED POSTPROCEDURAL STATES: Chronic | ICD-10-CM

## 2022-01-31 DIAGNOSIS — Z30.430 ENCOUNTER FOR INSERTION OF INTRAUTERINE CONTRACEPTIVE DEVICE: Chronic | ICD-10-CM

## 2022-01-31 DIAGNOSIS — C50.919 MALIGNANT NEOPLASM OF UNSPECIFIED SITE OF UNSPECIFIED FEMALE BREAST: ICD-10-CM

## 2022-01-31 DIAGNOSIS — M67.432 GANGLION, LEFT WRIST: Chronic | ICD-10-CM

## 2022-02-01 ENCOUNTER — APPOINTMENT (OUTPATIENT)
Dept: INFUSION THERAPY | Facility: HOSPITAL | Age: 52
End: 2022-02-01

## 2022-02-11 ENCOUNTER — APPOINTMENT (OUTPATIENT)
Dept: HEPATOLOGY | Facility: CLINIC | Age: 52
End: 2022-02-11
Payer: COMMERCIAL

## 2022-02-11 VITALS
HEART RATE: 77 BPM | OXYGEN SATURATION: 96 % | WEIGHT: 163 LBS | RESPIRATION RATE: 16 BRPM | BODY MASS INDEX: 27.83 KG/M2 | SYSTOLIC BLOOD PRESSURE: 124 MMHG | HEIGHT: 64.17 IN | TEMPERATURE: 97 F | DIASTOLIC BLOOD PRESSURE: 83 MMHG

## 2022-02-11 DIAGNOSIS — R93.2 ABNORMAL FINDINGS ON DIAGNOSTIC IMAGING OF LIVER AND BILIARY TRACT: ICD-10-CM

## 2022-02-11 DIAGNOSIS — R76.8 OTHER SPECIFIED ABNORMAL IMMUNOLOGICAL FINDINGS IN SERUM: ICD-10-CM

## 2022-02-11 PROCEDURE — 99213 OFFICE O/P EST LOW 20 MIN: CPT

## 2022-02-15 LAB
HBV CORE IGG+IGM SER QL: NONREACTIVE
HBV E AB SER QL: NONREACTIVE
HBV E AG SER QL: NONREACTIVE
HBV SURFACE AB SERPL IA-ACNC: <3 MIU/ML
HBV SURFACE AG SER QL: REACTIVE

## 2022-02-15 NOTE — CONSULT LETTER
[Dear  ___] : Dear  [unfilled], [Courtesy Letter:] : I had the pleasure of seeing your patient, [unfilled], in my office today. [Please see my note below.] : Please see my note below. [Referral Closing:] : Thank you very much for seeing this patient.  If you have any questions, please do not hesitate to contact me. [FreeTextEntry2] : Yeny Horn [FreeTextEntry3] : Sincerely,\par \par Jonatan Beltran M.D., Ph.D.\par [facility]\par [address]\[place] [address]\par Tel: [phone]\par Fax: [phone]\par Cell: [phone]\par E-mail: [email or web address]\par

## 2022-02-15 NOTE — ASSESSMENT
[FreeTextEntry1] : # Repeated hepatitis B surface antigen (HBsAg) positivity:\par - She has had HBsAg positivity, repeatedly but without confirmatory testing, and with HBcAb, HBsAb, and HBV DNA all negative.\par - Given concern for risk of HBV reactivation with chemotherapy, she previously received entecavir prophylaxis (completed in 6/2019).\par - I suspect that her HBsAg positive tests were due to a cross-reacting substance unrelated to HBsAg, given that she never had positive confirmatory testing with a blocking antibody specific for HBsAg. Less likely mutant form of HBsAg given that HBcAb has been negative.\par - She has declined to re-check HBsAg at a different lab ([facility] or [facility]) that may use different reagents, due to cost (co-pay). She is open to re-check serologies and PCR at a [facility] lab today, to confirm no chronic HBV.\par - Unless she has evidence of HBV viremia (consistent with chronic HBV), she does not need routine HCC surveillance.\par \par # Possible nonalcoholic (metabolic) fatty liver disease:\par - Prior US (2019) with borderline hepatomegaly and increased hepatic echogenicity suspicious for hepatic steatosis. More recent US (10/2021) again with mild hepatomegaly though normal echogenicity reported. Most recent FibroScan (8/2021) suggested no significant hepatic fibrosis and minimal if any steatosis.\par - Repeat US ordered for 6 months (4/2022) for follow-up of prior hepatomegaly and to re-assess for steatosis.\par - We have discussed maintaining a healthy weight, Mediterranean style diet, and need for exercise.\par \par Next follow-up: 1 year

## 2022-02-15 NOTE — HISTORY OF PRESENT ILLNESS
[Tattoo] : tattoo(s) [Body Piercing] : body piercing [Autoimmune Disorder] : autoimmune disorder [Needlestick Exposure] : no needlestick exposure [Infected Sexual Partner] : no infected sexual partner [IV Drug Use] : no IV drug use [Hemodialysis] : no hemodialysis [Transfusion before 1992] : no transfusion before 1992 [Transplant before 1992] : no transplant before 1992 [Incarceration] : no incarceration [Alcohol Abuse] : no alcohol abuse [Household Contact to HBV] : no household contact to HBV [Travel to Endemic Area] : no travel to an endemic area [Occupational Exposure] : no occupational exposure [Cocaine Use] : no cocaine use [de-identified] : Ms. Castaneda is a 50 yo F with asthma, hypothyroidism, depression/anxiety, history of COVID-19 (4/2020), and history of invasive breast cancer (diagnosed in 5-6/2018, s/p lumpectomy and SNLB, s/p 4 cycles of TC adjuvant chemotherapy completed in 12/2018, s/p adjuvant radiation therapy, and currently on Lupron since 2/2019 and Arimidex since 4/2019), also on alendronate for osteoporosis, who is being seen for follow-up of isolated hepatitis B surface antigen (HBsAg) positivity (with HBcAb, HBsAb, and HBV DNA all negative), as well as prior imaging concerning for hepatic steatosis. Due to concern for possible risk of HBV reactivation while undergoing chemotherapy, she received entecavir prophylaxis (completed in 6/2019).\par \par FibroScan (12/11/18): Median liver stiffness 3.0 kPA (consistent with F0-F1 fibrosis) and CAP score of 226 dB/m (consistent with S0-1 steatosis).\par \par Abdominal US (5/23/19): Mildly prominent liver with mildly increased echogenicity.\par \par FibroScan (8/16/21): Median liver stiffness 3.3 kPA (normal, consistent with F0-1 fibrosis) and CAP score 264 dB/m (consistent with S0-1 steatosis).\par \par Abdominal US (10/12/21): Mild hepatomegaly (18 cm) with normal echogenicity. No focal lesion. Normal spleen. No ascites.\par \par She was last seen by on 8/10/21 and is here today for routine follow-up. She reports feeling well overall, apart from having a headache today. She says she is prone to cluster headaches from time to time but cannot take prophylactic medication due to a drug-drug interaction with her other medications. She says she did not get the hepatitis B serologies re-checked at an outside lab such as [facility] or [facility] after our last visit due to the cost, as due to her [employer] insurance there would be a significant co-pay that she says she cannot afford.\par \par She continues to work part-time as a  at [employer] and also walks for exercise. She lives with her .

## 2022-02-16 LAB
HBV DNA # SERPL NAA+PROBE: NOT DETECTED IU/ML
HEPB DNA PCR LOG: NOT DETECTED LOG10IU/ML

## 2022-03-02 ENCOUNTER — APPOINTMENT (OUTPATIENT)
Dept: INFUSION THERAPY | Facility: HOSPITAL | Age: 52
End: 2022-03-02

## 2022-03-02 RX ORDER — LEUPROLIDE ACETATE 7.5 MG
7.5 KIT INTRAMUSCULAR
Qty: 1 | Refills: 6 | Status: ACTIVE | COMMUNITY
Start: 2021-08-04 | End: 1900-01-01

## 2022-03-28 ENCOUNTER — OUTPATIENT (OUTPATIENT)
Dept: OUTPATIENT SERVICES | Facility: HOSPITAL | Age: 52
LOS: 1 days | Discharge: HOME | End: 2022-03-28

## 2022-03-28 DIAGNOSIS — Z98.890 OTHER SPECIFIED POSTPROCEDURAL STATES: Chronic | ICD-10-CM

## 2022-03-28 DIAGNOSIS — M67.432 GANGLION, LEFT WRIST: Chronic | ICD-10-CM

## 2022-03-28 DIAGNOSIS — C50.919 MALIGNANT NEOPLASM OF UNSPECIFIED SITE OF UNSPECIFIED FEMALE BREAST: ICD-10-CM

## 2022-03-28 DIAGNOSIS — Z30.430 ENCOUNTER FOR INSERTION OF INTRAUTERINE CONTRACEPTIVE DEVICE: Chronic | ICD-10-CM

## 2022-03-31 ENCOUNTER — APPOINTMENT (OUTPATIENT)
Dept: INFUSION THERAPY | Facility: HOSPITAL | Age: 52
End: 2022-03-31

## 2022-04-01 ENCOUNTER — NON-APPOINTMENT (OUTPATIENT)
Age: 52
End: 2022-04-01

## 2022-04-01 DIAGNOSIS — R11.2 NAUSEA WITH VOMITING, UNSPECIFIED: ICD-10-CM

## 2022-04-01 DIAGNOSIS — Z51.11 ENCOUNTER FOR ANTINEOPLASTIC CHEMOTHERAPY: ICD-10-CM

## 2022-04-14 ENCOUNTER — RESULT REVIEW (OUTPATIENT)
Age: 52
End: 2022-04-14

## 2022-04-20 NOTE — CONSULT NOTE ADULT - PROVIDER SPECIALTY LIST ADULT
Pulmonology no lesions,  no deformities,  no traumatic injuries,  no significant scars are present,  chest wall non-tender,  no masses present, breathing is unlabored without accessory muscle use,normal breath sounds

## 2022-04-22 ENCOUNTER — OUTPATIENT (OUTPATIENT)
Dept: OUTPATIENT SERVICES | Facility: HOSPITAL | Age: 52
LOS: 1 days | Discharge: HOME | End: 2022-04-22

## 2022-04-22 DIAGNOSIS — M67.432 GANGLION, LEFT WRIST: Chronic | ICD-10-CM

## 2022-04-22 DIAGNOSIS — C50.919 MALIGNANT NEOPLASM OF UNSPECIFIED SITE OF UNSPECIFIED FEMALE BREAST: ICD-10-CM

## 2022-04-22 DIAGNOSIS — Z98.890 OTHER SPECIFIED POSTPROCEDURAL STATES: Chronic | ICD-10-CM

## 2022-04-22 DIAGNOSIS — Z30.430 ENCOUNTER FOR INSERTION OF INTRAUTERINE CONTRACEPTIVE DEVICE: Chronic | ICD-10-CM

## 2022-04-28 ENCOUNTER — APPOINTMENT (OUTPATIENT)
Dept: INFUSION THERAPY | Facility: HOSPITAL | Age: 52
End: 2022-04-28

## 2022-05-20 ENCOUNTER — OUTPATIENT (OUTPATIENT)
Dept: OUTPATIENT SERVICES | Facility: HOSPITAL | Age: 52
LOS: 1 days | Discharge: HOME | End: 2022-05-20

## 2022-05-20 DIAGNOSIS — Z98.890 OTHER SPECIFIED POSTPROCEDURAL STATES: Chronic | ICD-10-CM

## 2022-05-20 DIAGNOSIS — M67.432 GANGLION, LEFT WRIST: Chronic | ICD-10-CM

## 2022-05-20 DIAGNOSIS — C50.919 MALIGNANT NEOPLASM OF UNSPECIFIED SITE OF UNSPECIFIED FEMALE BREAST: ICD-10-CM

## 2022-05-20 DIAGNOSIS — Z30.430 ENCOUNTER FOR INSERTION OF INTRAUTERINE CONTRACEPTIVE DEVICE: Chronic | ICD-10-CM

## 2022-05-25 ENCOUNTER — NON-APPOINTMENT (OUTPATIENT)
Age: 52
End: 2022-05-25

## 2022-05-26 ENCOUNTER — APPOINTMENT (OUTPATIENT)
Dept: INFUSION THERAPY | Facility: HOSPITAL | Age: 52
End: 2022-05-26

## 2022-05-27 DIAGNOSIS — Z51.11 ENCOUNTER FOR ANTINEOPLASTIC CHEMOTHERAPY: ICD-10-CM

## 2022-05-27 DIAGNOSIS — R11.2 NAUSEA WITH VOMITING, UNSPECIFIED: ICD-10-CM

## 2022-06-02 NOTE — DISCHARGE NOTE ADULT - MEDICATION SUMMARY - MEDICATIONS TO TAKE
Addended by: RAMIN PONCE on: 6/2/2022 04:48 PM     Modules accepted: Orders     I will START or STAY ON the medications listed below when I get home from the hospital:    dexamethasone 4 mg oral tablet  -- 2 tab(s) by mouth 2 times a day, As Needed -1 through day 2  -- Indication: For Home med    metoclopramide 10 mg oral tablet  -- 1 tab(s) by mouth 4 times a day (before meals and at bedtime), As Needed  -- Indication: For Home med    entecavir 0.5 mg oral tablet  -- 1 tab(s) by mouth once a day  -- Indication: For Home med    Xanax 0.25 mg oral tablet  -- 1 tab(s) by mouth 4 times a day, As Needed  -- Indication: For Anxiety    Symbicort 160 mcg-4.5 mcg/inh inhalation aerosol  --  inhaled , 2 puffs /day  -- Indication: For Asthma    pantoprazole 40 mg oral delayed release tablet  -- 1 tab(s) by mouth once a day (before a meal)  -- Indication: For gerd    buPROPion 300 mg/24 hours oral tablet, extended release  -- 1 tab(s) by mouth every 24 hours  -- Indication: For Home med    levothyroxine 75 mcg (0.075 mg) oral tablet  -- 1 tab(s) by mouth once a day  -- Indication: For Hypothyroid

## 2022-06-03 ENCOUNTER — APPOINTMENT (OUTPATIENT)
Dept: HEMATOLOGY ONCOLOGY | Facility: CLINIC | Age: 52
End: 2022-06-03
Payer: COMMERCIAL

## 2022-06-03 VITALS
HEIGHT: 64.13 IN | DIASTOLIC BLOOD PRESSURE: 79 MMHG | RESPIRATION RATE: 16 BRPM | TEMPERATURE: 97.7 F | WEIGHT: 166.89 LBS | HEART RATE: 88 BPM | SYSTOLIC BLOOD PRESSURE: 118 MMHG | OXYGEN SATURATION: 98 % | BODY MASS INDEX: 28.49 KG/M2

## 2022-06-03 PROCEDURE — 99214 OFFICE O/P EST MOD 30 MIN: CPT

## 2022-06-03 RX ORDER — LEVOTHYROXINE SODIUM 0.09 MG/1
88 TABLET ORAL
Refills: 0 | Status: ACTIVE | COMMUNITY

## 2022-06-03 NOTE — ASSESSMENT
[FreeTextEntry1] : Per Dr. Shipman's last note 12/3/21:\par \par 51 year-old woman with R invasive breast cancer s/p lumpectomy and SLNB August 2018 with 1.1cm IDC, ER+ 95% strong, WA+ 3-5% strong, HER2 IHC 2+ and negative by [facility]. [place] grade 7-8/9, with DCIS present and close surgical margins and 0/2 SLN involved. Oncotype score 29. She completed TC x 4 adjuvant chemotherapy, with C2 c/b seizure in the setting of severe hyponatremia (euvolemic, polydipsia) and aspiration event thought due to polydipsia - sertraline held on treatment C3 and 4 completed without issue. She completed adjuvant radiation and started Lupron for ovarian suppression 2/2019 and AI.\par \par - on Arimidex with some mild arthralgias recently stable/improved\par -left lower rib pain x 2 weeks, CXR done and results pending.  Ordered bone scan for further evaluation.\par -continue Follow up Dr. Heller \par -Last mammo/sono 6/9/21 with benign findings, BIRADS 2. MRI planned 12/2021.\par -radiation oncology follow up as recommended\par -Lupron today, q28 days (patient brings her own drug due to insurance issues)\par -patient has Copper IUD in place\par -follow up in 6 months. \par

## 2022-06-03 NOTE — PHYSICAL EXAM
[Fully active, able to carry on all pre-disease performance without restriction] : Status 0 - Fully active, able to carry on all pre-disease performance without restriction [Normal] : affect appropriate [de-identified] : Right breast with well healed surgical scars.  No discrete palpable masses, no palpable axillary nodes bilaterally.

## 2022-06-03 NOTE — HISTORY OF PRESENT ILLNESS
[Disease: _____________________] : Disease: [unfilled] [T: ___] : T[unfilled] [N: ___] : N[unfilled] [M: ___] : M[unfilled] [AJCC Stage: ____] : AJCC Stage: [unfilled] [Treatment Protocol] : Treatment Protocol [Therapy: ___] : Therapy: [unfilled] [de-identified] : 5/22/18 screening mammo with 1.5cm R breast mass; diagnostic mammo and US confirmed this mass RUOQ 1.6cm at 10:00, hypoechoic. \par \par US guided core biopsy of the mass was performed 6/1/18, which revealed 0.5 cm invasive ductal carcinoma, Lynette score 7/9, with nuclear grade 2 cribriform type DCIS with central necrosis and calcifications, estrogen receptor 95% strong, progesterone receptor 3-5% strong, HER-2 IHC 2+ negative by [facility]. \par \par MRI of the bilateral breasts on 06/25/2018 showed a 1.7 X 1.6 X 1.8 cm focal area of non-mass enhancement with washout kinetics and biopsy marker at the upper outer posterior 10:00 position of the right breast. There were no other suspicious lesions in the right breast and no suspicious lesions in the left breast. There were no axillary or internal mammary lymphadenopathy. \par \par She saw Dr. Heller who performed CXR, nuclear bone scan, pelvic and abdominal sonograms, which were negative. \par \par On 8/15/18 she underwent a right lumpectomy with SLNB - pathology with 1.1 cm invasive ductal carcinoma, Lynette 7-8/9 and no LVI. 0/2 LN involved. \par There was also a 1.5 cm nuclear grade 2 - 3 ductal carcinoma in situ, cribriform and solid type with comedonecrosis and calcifications. Regarding the margins, the invasive tumor was 0.15 mm from the deep margin. Ductal carcinoma in situ was noted within microns of the deep margin and less than 0.1 mm from the anterior and superior margins. Two sentinel nodes were examined and negative. \par \par Oncotype score 29 (20% risk of distant recurrence with tamoxifen alone). TC x 4 recommended. Patient elected to utilize cold cap.\par \par She saw radiation oncology in consultation and was recommended for adjuvant radiation pending medical oncology recommendations. \par \par 10/24-10/26 admitted to [facility].\par ~9pm just after driving home from getting food,  witness LOC with seizure activity x 20 seconds followed by lethargy - EMS called.\par She was taken to [facility] ER and admitted to ICU; sodium 119 with evidence of aspiration on CXR/CTA chest, O2 sat 86% briefly requiring BIPAP support quickly weaned. Neurology, Pulmonary, Renal consults following. Sodium corrected over the next 24h with normal saline infusion, antibiotics discontinued as no further evidence of infection. Neulasta onpro administered as scheduled 10/25 afternoon. She had a CTH and MRI Brain w/wo contrast which showed no acute findings. Zoloft was held, and she was discharged home. Incidental note of possible compression deformity of thoracic spine on imaging while inpatient. She admits to drinking ~4L water on day of chemotherapy as advised by cold cap literature. Her  recalls in retrospect that she seemed overly sleepy and slightly confused (forgot she had chinese food for lunch, eyes closing while sitting up) on the afternoon/evening after treatment prior to seizure. CK and LFTs elevated post discharge, continued to downtrend over the next few days to normal.\par \par She underwent cycle 3 of TC followed by 24 hours of observation at [facility] inpatient with neuro checks and q4h BMP monitoring. Her sodium remained normal and she was asymptomatic. She completed her final cycle of TC on 12/5/18 without event.\par \par Adjuvant radiation initiated 2/4/2019 and completed 3/2/19.\par \par Lupron for ovarian suppression initiated 2/2019. Arimidex initiated April 2019.\par \par My Risk panel on 6/13/2018 was negative.  FHx significant for father with prostate cancer in 60s, paternal cousin with leukemia age 13 and breast cancer age 45. [de-identified] : T1cN0 [de-identified] : ER+DC-HER2- [de-identified] : Oncotype 29 [FreeTextEntry1] : x 4 cycles, with Penguin cold cap [de-identified] : The patient transferred her care from Dr. Shipman.  \par \par The patient presents for follow up.   \par \par She is currently on Lupron and anastrozole.  She has ongoing mild joint pains and hot flashes, which she states are manageable.  \par \par She was recently diagnosed with essential tremors in both hands.  She states tremor worse when she is stressed.  \par \par She has history of Hashimoto thyroiditis followed by endocrinology. \par  \par She is scheduled for left foot surgery in two weeks.  \par \par Breast MRI, 12/13/21- BIRADS 2\par \par Mammo/sono, 6/9/21- BIRADS 2\par \par DEXA, 1/17/19- Osteoporosis \par

## 2022-06-06 ENCOUNTER — OUTPATIENT (OUTPATIENT)
Dept: OUTPATIENT SERVICES | Facility: HOSPITAL | Age: 52
LOS: 1 days | End: 2022-06-06
Payer: COMMERCIAL

## 2022-06-06 VITALS
HEART RATE: 75 BPM | RESPIRATION RATE: 18 BRPM | DIASTOLIC BLOOD PRESSURE: 78 MMHG | TEMPERATURE: 98 F | WEIGHT: 166.67 LBS | OXYGEN SATURATION: 98 % | HEIGHT: 64 IN | SYSTOLIC BLOOD PRESSURE: 119 MMHG

## 2022-06-06 DIAGNOSIS — M67.432 GANGLION, LEFT WRIST: Chronic | ICD-10-CM

## 2022-06-06 DIAGNOSIS — Z30.430 ENCOUNTER FOR INSERTION OF INTRAUTERINE CONTRACEPTIVE DEVICE: Chronic | ICD-10-CM

## 2022-06-06 DIAGNOSIS — M24.575 CONTRACTURE, LEFT FOOT: ICD-10-CM

## 2022-06-06 DIAGNOSIS — E03.9 HYPOTHYROIDISM, UNSPECIFIED: ICD-10-CM

## 2022-06-06 DIAGNOSIS — F41.9 ANXIETY DISORDER, UNSPECIFIED: ICD-10-CM

## 2022-06-06 DIAGNOSIS — Z98.890 OTHER SPECIFIED POSTPROCEDURAL STATES: Chronic | ICD-10-CM

## 2022-06-06 DIAGNOSIS — J45.909 UNSPECIFIED ASTHMA, UNCOMPLICATED: ICD-10-CM

## 2022-06-06 DIAGNOSIS — Z01.818 ENCOUNTER FOR OTHER PREPROCEDURAL EXAMINATION: ICD-10-CM

## 2022-06-06 DIAGNOSIS — M20.5X2 OTHER DEFORMITIES OF TOE(S) (ACQUIRED), LEFT FOOT: ICD-10-CM

## 2022-06-06 DIAGNOSIS — M20.22 HALLUX RIGIDUS, LEFT FOOT: ICD-10-CM

## 2022-06-06 LAB
ANION GAP SERPL CALC-SCNC: 7 MMOL/L — SIGNIFICANT CHANGE UP (ref 5–17)
BUN SERPL-MCNC: 16 MG/DL — SIGNIFICANT CHANGE UP (ref 7–23)
CALCIUM SERPL-MCNC: 9.5 MG/DL — SIGNIFICANT CHANGE UP (ref 8.4–10.5)
CHLORIDE SERPL-SCNC: 107 MMOL/L — SIGNIFICANT CHANGE UP (ref 96–108)
CO2 SERPL-SCNC: 27 MMOL/L — SIGNIFICANT CHANGE UP (ref 22–31)
CREAT SERPL-MCNC: 0.91 MG/DL — SIGNIFICANT CHANGE UP (ref 0.5–1.3)
EGFR: 77 ML/MIN/1.73M2 — SIGNIFICANT CHANGE UP
GLUCOSE SERPL-MCNC: 106 MG/DL — HIGH (ref 70–99)
POTASSIUM SERPL-MCNC: 4.7 MMOL/L — SIGNIFICANT CHANGE UP (ref 3.5–5.3)
POTASSIUM SERPL-SCNC: 4.7 MMOL/L — SIGNIFICANT CHANGE UP (ref 3.5–5.3)
SODIUM SERPL-SCNC: 141 MMOL/L — SIGNIFICANT CHANGE UP (ref 135–145)

## 2022-06-06 PROCEDURE — 93010 ELECTROCARDIOGRAM REPORT: CPT | Mod: NC

## 2022-06-06 PROCEDURE — 93005 ELECTROCARDIOGRAM TRACING: CPT

## 2022-06-06 PROCEDURE — 36415 COLL VENOUS BLD VENIPUNCTURE: CPT

## 2022-06-06 PROCEDURE — 80048 BASIC METABOLIC PNL TOTAL CA: CPT

## 2022-06-06 PROCEDURE — G0463: CPT

## 2022-06-06 RX ORDER — LEVOTHYROXINE SODIUM 125 MCG
1 TABLET ORAL
Qty: 0 | Refills: 0 | DISCHARGE

## 2022-06-06 RX ORDER — DEXAMETHASONE 0.5 MG/5ML
2 ELIXIR ORAL
Qty: 0 | Refills: 0 | DISCHARGE

## 2022-06-06 RX ORDER — ALPRAZOLAM 0.25 MG
1 TABLET ORAL
Qty: 0 | Refills: 0 | DISCHARGE

## 2022-06-06 NOTE — H&P PST ADULT - HISTORY OF PRESENT ILLNESS
51 y/o female with PMH of hypothyroidism, asthma, depression and breast cancer presents for PST.   Scheduled for cheilectomy left foot, la left foot, tenotomy & capsulotomy digits 2,3,4,& 5 left foot with Dr Cheung on 06/17/2022 53 y/o female with PMH of hypothyroidism, asthma, depression anxiety and breast cancer ( s/p chemo and radiation completed 2019) presents for PST.  C/o left foot pain x1 year that is worsening over time.  Feels well at PST today with no c/o cough, fever, or recent illness.    Scheduled for cheilectomy left foot, la left foot, tenotomy & capsulotomy digits 2,3,4,& 5 left foot with Dr Cheung on 06/17/2022

## 2022-06-06 NOTE — H&P PST ADULT - PROBLEM SELECTOR PLAN 1
Scheduled for cheilectomy left foot, la left foot, tenotomy & capsulotomy digits 2,3,4,& 5 left foot with Dr Cheung on 06/17/2022.  COVID-19 testing information provided.  Pre op instructions given and patient verbalized understanding.  CBC, BMP, EKG and medical clearance pending.  NPO after midnight night before procedure.  To stop all ASA, NSAIDs, vitamins and supplements 1 week prior to procedure.  Chlorhexidene wash given with instructions.

## 2022-06-06 NOTE — H&P PST ADULT - BP NONINVASIVE SYSTOLIC (MM HG)
Patient cleared for discharge by MD. Adhikaris with patient. Patient discharge instructions reviewed with patient including when and how to follow up  with healthcare provider and when to seek medical treatment. Peripheral IV removed. 119

## 2022-06-06 NOTE — H&P PST ADULT - FALL HARM RISK - UNIVERSAL INTERVENTIONS
Bed in lowest position, wheels locked, appropriate side rails in place/Call bell, personal items and telephone in reach/Instruct patient to call for assistance before getting out of bed or chair/Non-slip footwear when patient is out of bed/[place] to call system/Physically safe environment - no spills, clutter or unnecessary equipment/Purposeful Proactive Rounding/Room/bathroom lighting operational, light cord in reach

## 2022-06-06 NOTE — H&P PST ADULT - NEUROLOGICAL COMMENTS
hx seizure in 2018 while receiving chemo therapy-  patient was hyponatremic which caused seizure-  no reoccurrence

## 2022-06-06 NOTE — H&P PST ADULT - NSICDXPASTMEDICALHX_GEN_ALL_CORE_FT
PAST MEDICAL HISTORY:  Anxiety and depression     Asthma last major attack 2016, denies hx if intubation    Bipolar affective     Depression, unspecified depression type     H/O Hashimoto thyroiditis     History of 2019 novel coronavirus disease (COVID-19) 4/2020- not hospitalized, no resp. complications    Hypothyroidism, unspecified type     Malignant neoplasm of right female breast, unspecified estrogen receptor status, unspecified site of breast s/p lumpectomy 2018, compelted chemo and radiation 3/2019

## 2022-06-06 NOTE — H&P PST ADULT - NSICDXPASTSURGICALHX_GEN_ALL_CORE_FT
PAST SURGICAL HISTORY:  Encounter for IUD insertion 2021 last place -copper    Ganglion cyst of wrist, left 2013, 2015    S/P lumpectomy, right breast with nodes 2018

## 2022-06-06 NOTE — H&P PST ADULT - NSANTHOSAYNRD_GEN_A_CORE
neck 16 inches/No. LANCE screening performed.  STOP BANG Legend: 0-2 = LOW Risk; 3-4 = INTERMEDIATE Risk; 5-8 = HIGH Risk

## 2022-06-13 ENCOUNTER — NON-APPOINTMENT (OUTPATIENT)
Age: 52
End: 2022-06-13

## 2022-06-13 ENCOUNTER — RESULT REVIEW (OUTPATIENT)
Age: 52
End: 2022-06-13

## 2022-06-13 ENCOUNTER — APPOINTMENT (OUTPATIENT)
Dept: ULTRASOUND IMAGING | Facility: CLINIC | Age: 52
End: 2022-06-13
Payer: COMMERCIAL

## 2022-06-13 ENCOUNTER — OUTPATIENT (OUTPATIENT)
Dept: OUTPATIENT SERVICES | Facility: HOSPITAL | Age: 52
LOS: 1 days | End: 2022-06-13
Payer: COMMERCIAL

## 2022-06-13 ENCOUNTER — APPOINTMENT (OUTPATIENT)
Dept: MAMMOGRAPHY | Facility: CLINIC | Age: 52
End: 2022-06-13
Payer: COMMERCIAL

## 2022-06-13 DIAGNOSIS — Z00.8 ENCOUNTER FOR OTHER GENERAL EXAMINATION: ICD-10-CM

## 2022-06-13 DIAGNOSIS — Z30.430 ENCOUNTER FOR INSERTION OF INTRAUTERINE CONTRACEPTIVE DEVICE: Chronic | ICD-10-CM

## 2022-06-13 DIAGNOSIS — Z98.890 OTHER SPECIFIED POSTPROCEDURAL STATES: Chronic | ICD-10-CM

## 2022-06-13 DIAGNOSIS — C50.911 MALIGNANT NEOPLASM OF UNSPECIFIED SITE OF RIGHT FEMALE BREAST: ICD-10-CM

## 2022-06-13 DIAGNOSIS — M67.432 GANGLION, LEFT WRIST: Chronic | ICD-10-CM

## 2022-06-13 PROBLEM — F41.9 ANXIETY DISORDER, UNSPECIFIED: Chronic | Status: ACTIVE | Noted: 2022-06-06

## 2022-06-13 PROBLEM — Z86.16 PERSONAL HISTORY OF COVID-19: Chronic | Status: ACTIVE | Noted: 2022-06-06

## 2022-06-13 PROBLEM — Z86.39 PERSONAL HISTORY OF OTHER ENDOCRINE, NUTRITIONAL AND METABOLIC DISEASE: Chronic | Status: ACTIVE | Noted: 2022-06-06

## 2022-06-13 PROCEDURE — 77066 DX MAMMO INCL CAD BI: CPT | Mod: 26

## 2022-06-13 PROCEDURE — 76641 ULTRASOUND BREAST COMPLETE: CPT

## 2022-06-13 PROCEDURE — 77066 DX MAMMO INCL CAD BI: CPT

## 2022-06-13 PROCEDURE — G0279: CPT | Mod: 26

## 2022-06-13 PROCEDURE — 76641 ULTRASOUND BREAST COMPLETE: CPT | Mod: 26,50

## 2022-06-13 PROCEDURE — G0279: CPT

## 2022-06-16 ENCOUNTER — TRANSCRIPTION ENCOUNTER (OUTPATIENT)
Age: 52
End: 2022-06-16

## 2022-06-17 ENCOUNTER — TRANSCRIPTION ENCOUNTER (OUTPATIENT)
Age: 52
End: 2022-06-17

## 2022-06-17 ENCOUNTER — RESULT REVIEW (OUTPATIENT)
Age: 52
End: 2022-06-17

## 2022-06-17 ENCOUNTER — OUTPATIENT (OUTPATIENT)
Dept: OUTPATIENT SERVICES | Facility: HOSPITAL | Age: 52
LOS: 1 days | End: 2022-06-17
Payer: COMMERCIAL

## 2022-06-17 VITALS
RESPIRATION RATE: 16 BRPM | SYSTOLIC BLOOD PRESSURE: 126 MMHG | TEMPERATURE: 97 F | HEART RATE: 72 BPM | OXYGEN SATURATION: 98 % | DIASTOLIC BLOOD PRESSURE: 70 MMHG

## 2022-06-17 VITALS
TEMPERATURE: 98 F | HEART RATE: 74 BPM | OXYGEN SATURATION: 96 % | HEIGHT: 64 IN | DIASTOLIC BLOOD PRESSURE: 75 MMHG | RESPIRATION RATE: 14 BRPM | WEIGHT: 166.67 LBS | SYSTOLIC BLOOD PRESSURE: 113 MMHG

## 2022-06-17 DIAGNOSIS — M67.432 GANGLION, LEFT WRIST: Chronic | ICD-10-CM

## 2022-06-17 DIAGNOSIS — Z30.430 ENCOUNTER FOR INSERTION OF INTRAUTERINE CONTRACEPTIVE DEVICE: Chronic | ICD-10-CM

## 2022-06-17 DIAGNOSIS — M24.575 CONTRACTURE, LEFT FOOT: ICD-10-CM

## 2022-06-17 DIAGNOSIS — M20.5X2 OTHER DEFORMITIES OF TOE(S) (ACQUIRED), LEFT FOOT: ICD-10-CM

## 2022-06-17 DIAGNOSIS — Z98.890 OTHER SPECIFIED POSTPROCEDURAL STATES: Chronic | ICD-10-CM

## 2022-06-17 DIAGNOSIS — M20.22 HALLUX RIGIDUS, LEFT FOOT: ICD-10-CM

## 2022-06-17 PROCEDURE — 73620 X-RAY EXAM OF FOOT: CPT

## 2022-06-17 PROCEDURE — 28232 INCISION OF TOE TENDON: CPT | Mod: T4

## 2022-06-17 PROCEDURE — 88311 DECALCIFY TISSUE: CPT

## 2022-06-17 PROCEDURE — 73620 X-RAY EXAM OF FOOT: CPT | Mod: 26,LT

## 2022-06-17 PROCEDURE — 88304 TISSUE EXAM BY PATHOLOGIST: CPT

## 2022-06-17 PROCEDURE — 88304 TISSUE EXAM BY PATHOLOGIST: CPT | Mod: 26

## 2022-06-17 PROCEDURE — 88311 DECALCIFY TISSUE: CPT | Mod: 26

## 2022-06-17 PROCEDURE — 28299 COR HLX VLGS DOUBLE OSTEOT: CPT | Mod: TA

## 2022-06-17 DEVICE — WIRE K DL TRC 0.062X6IN NS: Type: IMPLANTABLE DEVICE | Site: LEFT | Status: FUNCTIONAL

## 2022-06-17 DEVICE — WIRE K DL TRC 0.035X4IN SS: Type: IMPLANTABLE DEVICE | Site: LEFT | Status: FUNCTIONAL

## 2022-06-17 DEVICE — WIRE K THRD TRC 0.045X6IN NS: Type: IMPLANTABLE DEVICE | Site: LEFT | Status: FUNCTIONAL

## 2022-06-17 DEVICE — SCREW 2.0MM CORTEX 24MM: Type: IMPLANTABLE DEVICE | Site: LEFT | Status: FUNCTIONAL

## 2022-06-17 RX ORDER — LEVOTHYROXINE SODIUM 125 MCG
1 TABLET ORAL
Qty: 0 | Refills: 0 | DISCHARGE

## 2022-06-17 RX ORDER — SERTRALINE 25 MG/1
2 TABLET, FILM COATED ORAL
Qty: 0 | Refills: 0 | DISCHARGE

## 2022-06-17 RX ORDER — SODIUM CHLORIDE 9 MG/ML
1000 INJECTION, SOLUTION INTRAVENOUS
Refills: 0 | Status: DISCONTINUED | OUTPATIENT
Start: 2022-06-17 | End: 2022-06-17

## 2022-06-17 RX ORDER — HYDROMORPHONE HYDROCHLORIDE 2 MG/ML
0.5 INJECTION INTRAMUSCULAR; INTRAVENOUS; SUBCUTANEOUS
Refills: 0 | Status: DISCONTINUED | OUTPATIENT
Start: 2022-06-17 | End: 2022-06-17

## 2022-06-17 RX ORDER — ONDANSETRON 8 MG/1
4 TABLET, FILM COATED ORAL ONCE
Refills: 0 | Status: DISCONTINUED | OUTPATIENT
Start: 2022-06-17 | End: 2022-06-17

## 2022-06-17 RX ORDER — ALBUTEROL 90 UG/1
2 AEROSOL, METERED ORAL
Qty: 0 | Refills: 0 | DISCHARGE

## 2022-06-17 RX ORDER — ANASTROZOLE 1 MG/1
1 TABLET ORAL
Qty: 0 | Refills: 0 | DISCHARGE

## 2022-06-17 RX ORDER — IBUPROFEN 200 MG
1 TABLET ORAL
Qty: 0 | Refills: 0 | DISCHARGE

## 2022-06-17 RX ADMIN — SODIUM CHLORIDE 50 MILLILITER(S): 9 INJECTION, SOLUTION INTRAVENOUS at 10:31

## 2022-06-17 NOTE — BRIEF OPERATIVE NOTE - NSICDXBRIEFPREOP_GEN_ALL_CORE_FT
PRE-OP DIAGNOSIS:  Hallux limitus of left foot 17-Jun-2022 12:08:04  Grant Cheung  Bunion of left foot 17-Jun-2022 12:08:12  Grant Cheung  Hammertoe of left foot 17-Jun-2022 12:08:19  Grant Cheung

## 2022-06-17 NOTE — BRIEF OPERATIVE NOTE - NSICDXBRIEFPROCEDURE_GEN_ALL_CORE_FT
PROCEDURES:  Bunionectomy, [place], with cheilectomy if indicated 17-Jun-2022 12:07:15  Grant Cheung  Tenotomy, flexor, toe, open 17-Jun-2022 12:07:26  Grant Cheung  Capsulotomy, MTP joint 17-Jun-2022 12:07:36  Grant Cheung

## 2022-06-17 NOTE — BRIEF OPERATIVE NOTE - NSICDXBRIEFPOSTOP_GEN_ALL_CORE_FT
POST-OP DIAGNOSIS:  Hallux limitus, left 17-Jun-2022 12:08:38  Grant Cheung  Bunion of left foot 17-Jun-2022 12:08:54  Grant Cheung  Hammertoe of left foot 17-Jun-2022 12:09:03  Grant Cheung

## 2022-06-17 NOTE — ASU DISCHARGE PLAN (ADULT/PEDIATRIC) - CARE PROVIDER_API CALL
Grant Cheung (DPM)  Podiatric Medicine and Surgery  [address] [place] [address]  Phone: [phone]  Fax: [phone]  Follow Up Time: 1-3 days

## 2022-06-17 NOTE — ASU DISCHARGE PLAN (ADULT/PEDIATRIC) - NS MD DC FALL RISK RISK
For information on Fall & Injury Prevention, visit: [email or web address] OR  [email or web address] OR  https://www.cdc.gov/steadi/patient.html

## 2022-06-17 NOTE — ASU DISCHARGE PLAN (ADULT/PEDIATRIC) - ACTIVITY LEVEL
Patient states that the pharmacy called and said that they were having issues filling Rx.   Advised patient we sent message to Dr Bishop's staff and no issues seen.   Patient had no further questions.    No excercise/No sports/gym/Weight bearing as tolerated/Elevate extremity/No intercourse

## 2022-06-17 NOTE — ASU PATIENT PROFILE, ADULT - VISION (WITH CORRECTIVE LENSES IF THE PATIENT USUALLY WEARS THEM):
Partially impaired: cannot see medication labels or newsprint, but can see obstacles in path, and the surrounding layout; can count fingers at arm's length reading and distance/Normal vision: sees adequately in most situations; can see medication labels, newsprint

## 2022-06-23 ENCOUNTER — APPOINTMENT (OUTPATIENT)
Dept: RADIOLOGY | Facility: CLINIC | Age: 52
End: 2022-06-23
Payer: COMMERCIAL

## 2022-06-23 ENCOUNTER — APPOINTMENT (OUTPATIENT)
Dept: INFUSION THERAPY | Facility: HOSPITAL | Age: 52
End: 2022-06-23

## 2022-06-23 ENCOUNTER — OUTPATIENT (OUTPATIENT)
Dept: OUTPATIENT SERVICES | Facility: HOSPITAL | Age: 52
LOS: 1 days | End: 2022-06-23
Payer: COMMERCIAL

## 2022-06-23 DIAGNOSIS — Z00.8 ENCOUNTER FOR OTHER GENERAL EXAMINATION: ICD-10-CM

## 2022-06-23 DIAGNOSIS — M67.432 GANGLION, LEFT WRIST: Chronic | ICD-10-CM

## 2022-06-23 DIAGNOSIS — C50.911 MALIGNANT NEOPLASM OF UNSPECIFIED SITE OF RIGHT FEMALE BREAST: ICD-10-CM

## 2022-06-23 DIAGNOSIS — Z98.890 OTHER SPECIFIED POSTPROCEDURAL STATES: Chronic | ICD-10-CM

## 2022-06-23 DIAGNOSIS — Z30.430 ENCOUNTER FOR INSERTION OF INTRAUTERINE CONTRACEPTIVE DEVICE: Chronic | ICD-10-CM

## 2022-06-23 PROCEDURE — 77080 DXA BONE DENSITY AXIAL: CPT

## 2022-06-23 PROCEDURE — 77080 DXA BONE DENSITY AXIAL: CPT | Mod: 26

## 2022-07-06 ENCOUNTER — APPOINTMENT (OUTPATIENT)
Dept: HEMATOLOGY ONCOLOGY | Facility: CLINIC | Age: 52
End: 2022-07-06

## 2022-07-06 VITALS
HEIGHT: 63.39 IN | RESPIRATION RATE: 16 BRPM | BODY MASS INDEX: 29.17 KG/M2 | OXYGEN SATURATION: 98 % | WEIGHT: 166.67 LBS | HEART RATE: 70 BPM | DIASTOLIC BLOOD PRESSURE: 78 MMHG | TEMPERATURE: 96.7 F | SYSTOLIC BLOOD PRESSURE: 117 MMHG

## 2022-07-06 PROCEDURE — 99214 OFFICE O/P EST MOD 30 MIN: CPT

## 2022-07-06 RX ORDER — IBUPROFEN 600 MG/1
600 TABLET, FILM COATED ORAL
Qty: 20 | Refills: 0 | Status: DISCONTINUED | COMMUNITY
Start: 2022-06-08

## 2022-07-06 RX ORDER — ALENDRONATE SODIUM 70 MG/1
70 TABLET ORAL
Qty: 12 | Refills: 3 | Status: DISCONTINUED | COMMUNITY
Start: 2022-07-06 | End: 2022-07-06

## 2022-07-06 RX ORDER — CEFADROXIL 500 MG/1
500 CAPSULE ORAL
Qty: 20 | Refills: 0 | Status: DISCONTINUED | COMMUNITY
Start: 2022-06-08

## 2022-07-06 RX ORDER — OXYCODONE AND ACETAMINOPHEN 5; 325 MG/1; MG/1
5-325 TABLET ORAL
Qty: 20 | Refills: 0 | Status: DISCONTINUED | COMMUNITY
Start: 2022-06-08

## 2022-07-07 LAB — SURGICAL PATHOLOGY STUDY: SIGNIFICANT CHANGE UP

## 2022-07-07 NOTE — HISTORY OF PRESENT ILLNESS
[Disease: _____________________] : Disease: [unfilled] [T: ___] : T[unfilled] [N: ___] : N[unfilled] [M: ___] : M[unfilled] [AJCC Stage: ____] : AJCC Stage: [unfilled] [Treatment Protocol] : Treatment Protocol [Therapy: ___] : Therapy: [unfilled] [de-identified] : Pt seen for an initial visit by bella griffin 7/6/22 in order to transfer her care to me from that of Dr Shipman and before that Dr Horn. The history below is as per review of the AEHR notes of these oncologists and review / confirmation with the pt. \par \par 5/22/18 screening mammo with 1.5cm R breast mass; diagnostic mammo and US confirmed this mass RUOQ 1.6cm at 10:00, hypoechoic. \par \par US guided core biopsy of the mass was performed 6/1/18, which revealed 0.5 cm invasive ductal carcinoma, Lynette score 7/9, with nuclear grade 2 cribriform type DCIS with central necrosis and calcifications, estrogen receptor 95% strong, progesterone receptor 3-5% strong, HER-2 IHC 2+ negative [facility]. \par \par MRI of the bilateral breasts on 06/25/2018 showed a 1.7 X 1.6 X 1.8 cm focal area of non-mass enhancement with washout kinetics and biopsy marker at the upper outer posterior 10:00 position of the right breast. There were no other suspicious lesions in the right breast and no suspicious lesions in the left breast. There were no axillary or internal mammary lymphadenopathy. \par \par She saw Dr. Heller who performed CXR, nuclear bone scan, pelvic and abdominal sonograms, which were negative. \par \par On 8/15/18 she underwent a right lumpectomy with SLNB - pathology with 1.1 cm invasive ductal carcinoma, [place] 7-8/9 and no LVI. 0/2 LN involved. \par There was also a 1.5 cm nuclear grade 2 - 3 ductal carcinoma in situ, cribriform and solid type with comedonecrosis and calcifications. Regarding the margins, the invasive tumor was 0.15 mm from the deep margin. Ductal carcinoma in situ was noted within microns of the deep margin and less than 0.1 mm from the anterior and superior margins. Two sentinel nodes were examined and negative. \par \par Oncotype score 29 (20% risk of distant recurrence with tamoxifen alone). TC x 4 recommended. Patient elected to utilize cold cap.\par \par She saw radiation oncology in consultation and was recommended for adjuvant radiation pending medical oncology recommendations. \par \par 10/24-10/26 admitted to [facility].\par ~9pm just after driving home from getting food,  witness LOC with seizure activity x 20 seconds followed by lethargy - EMS called.\par She was taken to [facility] ER and admitted to ICU; sodium 119 with evidence of aspiration on CXR/CTA chest, O2 sat 86% briefly requiring BIPAP support quickly weaned. Neurology, Pulmonary, Renal consults following. Sodium corrected over the next 24h with normal saline infusion, antibiotics discontinued as no further evidence of infection. Neulasta onpro administered as scheduled 10/25 afternoon. She had a CTH and MRI Brain w/wo contrast which showed no acute findings. Zoloft was held, and she was discharged home. Incidental note of possible compression deformity of thoracic spine on imaging while inpatient. She admits to drinking ~4L water on day of chemotherapy as advised by cold cap literature. Her  recalls in retrospect that she seemed overly sleepy and slightly confused (forgot she had chinese food for lunch, eyes closing while sitting up) on the afternoon/evening after treatment prior to seizure. CK and LFTs elevated post discharge, continued to downtrend over the next few days to normal.\par \par She underwent cycle 3 of TC followed by 24 hours of observation at [facility] inpatient with neuro checks and q4h BMP monitoring. Her sodium remained normal and she was asymptomatic. She completed her final cycle of TC on 12/5/18 without event.\par \par Adjuvant radiation initiated 2/4/2019 and completed 3/2/19.\par \par Lupron for ovarian suppression initiated 2/2019. Arimidex initiated April 2019.\par \par My Risk panel on 6/13/2018 was negative.  FHx significant for father with prostate cancer in 60s, paternal cousin with leukemia age 13 and breast cancer age 45. [de-identified] : T1cN0 [de-identified] : ER+[place]-HER2- [de-identified] : Oncotype 29 [FreeTextEntry1] : x 4 cycles, with Penguin cold cap [de-identified] : Th ept is here for routine f/u.\par \par She remains on Lupron since 2/19 and anastrozole since 4/19. \par \par She has ongoing mild joint pains and hot flashes, which have gradually decreased over time and tolerable for now. .  \par \par She has history of Hashimoto thyroiditis followed by endocrinology. \par  \par She had left foot surgery and is wearing a soft boot which she expects will be taken off soon. \par \par Notes a moderate appetite ("i'm not eating that much but can't lose wt"), stable wt and performance status w/in confines of wearing a soft boot.   \par \par Breast MRI, 12/13/21- BIRADS 2\par \par Mammo/sono, 6/9/22- neg\par \par DEXA, 6/22- Osteoporosis \par

## 2022-07-07 NOTE — PHYSICAL EXAM
[Fully active, able to carry on all pre-disease performance without restriction] : Status 0 - Fully active, able to carry on all pre-disease performance without restriction [Normal] : affect appropriate [de-identified] : R s/p LE with well healed scar, no nipple retraction sin dimpling or palp masses. L w/o nipple retraction skin dimpling or palp masses. B/L ax neg

## 2022-07-08 ENCOUNTER — OUTPATIENT (OUTPATIENT)
Dept: OUTPATIENT SERVICES | Facility: HOSPITAL | Age: 52
LOS: 1 days | Discharge: HOME | End: 2022-07-08

## 2022-07-08 DIAGNOSIS — C50.919 MALIGNANT NEOPLASM OF UNSPECIFIED SITE OF UNSPECIFIED FEMALE BREAST: ICD-10-CM

## 2022-07-08 DIAGNOSIS — Z98.890 OTHER SPECIFIED POSTPROCEDURAL STATES: Chronic | ICD-10-CM

## 2022-07-08 DIAGNOSIS — M67.432 GANGLION, LEFT WRIST: Chronic | ICD-10-CM

## 2022-07-08 DIAGNOSIS — Z30.430 ENCOUNTER FOR INSERTION OF INTRAUTERINE CONTRACEPTIVE DEVICE: Chronic | ICD-10-CM

## 2022-07-21 ENCOUNTER — APPOINTMENT (OUTPATIENT)
Dept: INFUSION THERAPY | Facility: HOSPITAL | Age: 52
End: 2022-07-21

## 2022-08-18 ENCOUNTER — APPOINTMENT (OUTPATIENT)
Dept: INFUSION THERAPY | Facility: HOSPITAL | Age: 52
End: 2022-08-18

## 2022-08-19 DIAGNOSIS — Z51.11 ENCOUNTER FOR ANTINEOPLASTIC CHEMOTHERAPY: ICD-10-CM

## 2022-09-05 ENCOUNTER — NON-APPOINTMENT (OUTPATIENT)
Age: 52
End: 2022-09-05

## 2022-09-12 ENCOUNTER — OUTPATIENT (OUTPATIENT)
Dept: OUTPATIENT SERVICES | Facility: HOSPITAL | Age: 52
LOS: 1 days | Discharge: HOME | End: 2022-09-12

## 2022-09-12 DIAGNOSIS — Z30.430 ENCOUNTER FOR INSERTION OF INTRAUTERINE CONTRACEPTIVE DEVICE: Chronic | ICD-10-CM

## 2022-09-12 DIAGNOSIS — M67.432 GANGLION, LEFT WRIST: Chronic | ICD-10-CM

## 2022-09-12 DIAGNOSIS — C50.919 MALIGNANT NEOPLASM OF UNSPECIFIED SITE OF UNSPECIFIED FEMALE BREAST: ICD-10-CM

## 2022-09-12 DIAGNOSIS — Z98.890 OTHER SPECIFIED POSTPROCEDURAL STATES: Chronic | ICD-10-CM

## 2022-09-15 ENCOUNTER — APPOINTMENT (OUTPATIENT)
Dept: INFUSION THERAPY | Facility: HOSPITAL | Age: 52
End: 2022-09-15

## 2022-09-16 DIAGNOSIS — Z51.11 ENCOUNTER FOR ANTINEOPLASTIC CHEMOTHERAPY: ICD-10-CM

## 2022-09-21 NOTE — H&P PST ADULT - VENOUS THROMBOEMBOLISM CURRENT STATUS
Chonodrocutaneous Helical Advancement Flap Text: The defect edges were debeveled with a #15 scalpel blade.  Given the location of the defect and the proximity to free margins a chondrocutaneous helical advancement flap was deemed most appropriate.  Using a sterile surgical marker, the appropriate advancement flap was drawn incorporating the defect and placing the expected incisions within the relaxed skin tension lines where possible.    The area thus outlined was incised deep to adipose tissue with a #15 scalpel blade.  The skin margins were undermined to an appropriate distance in all directions utilizing iris scissors. major surgery, including arthroscopic and laparoscopic (greater than 1 hr)

## 2022-10-13 ENCOUNTER — APPOINTMENT (OUTPATIENT)
Dept: INFUSION THERAPY | Facility: HOSPITAL | Age: 52
End: 2022-10-13

## 2022-11-04 ENCOUNTER — EMERGENCY (EMERGENCY)
Facility: HOSPITAL | Age: 52
LOS: 1 days | Discharge: HOME | End: 2022-11-04
Attending: EMERGENCY MEDICINE | Admitting: EMERGENCY MEDICINE
Payer: COMMERCIAL

## 2022-11-04 VITALS
DIASTOLIC BLOOD PRESSURE: 75 MMHG | TEMPERATURE: 98 F | SYSTOLIC BLOOD PRESSURE: 118 MMHG | OXYGEN SATURATION: 95 % | RESPIRATION RATE: 18 BRPM | HEART RATE: 88 BPM

## 2022-11-04 VITALS
WEIGHT: 160.06 LBS | SYSTOLIC BLOOD PRESSURE: 122 MMHG | HEIGHT: 63 IN | TEMPERATURE: 98 F | RESPIRATION RATE: 18 BRPM | DIASTOLIC BLOOD PRESSURE: 80 MMHG | HEART RATE: 106 BPM | OXYGEN SATURATION: 94 %

## 2022-11-04 DIAGNOSIS — Z98.890 OTHER SPECIFIED POSTPROCEDURAL STATES: Chronic | ICD-10-CM

## 2022-11-04 DIAGNOSIS — Z30.430 ENCOUNTER FOR INSERTION OF INTRAUTERINE CONTRACEPTIVE DEVICE: Chronic | ICD-10-CM

## 2022-11-04 DIAGNOSIS — M67.432 GANGLION, LEFT WRIST: Chronic | ICD-10-CM

## 2022-11-04 LAB
ALBUMIN SERPL ELPH-MCNC: 3.6 G/DL — SIGNIFICANT CHANGE UP (ref 3.3–5)
ALP SERPL-CCNC: 79 U/L — SIGNIFICANT CHANGE UP (ref 40–120)
ALT FLD-CCNC: 33 U/L — SIGNIFICANT CHANGE UP (ref 12–78)
ANION GAP SERPL CALC-SCNC: 6 MMOL/L — SIGNIFICANT CHANGE UP (ref 5–17)
AST SERPL-CCNC: 28 U/L — SIGNIFICANT CHANGE UP (ref 15–37)
BASOPHILS # BLD AUTO: 0.02 K/UL — SIGNIFICANT CHANGE UP (ref 0–0.2)
BASOPHILS NFR BLD AUTO: 0.4 % — SIGNIFICANT CHANGE UP (ref 0–2)
BILIRUB SERPL-MCNC: 0.4 MG/DL — SIGNIFICANT CHANGE UP (ref 0.2–1.2)
BUN SERPL-MCNC: 9 MG/DL — SIGNIFICANT CHANGE UP (ref 7–23)
CALCIUM SERPL-MCNC: 8.7 MG/DL — SIGNIFICANT CHANGE UP (ref 8.5–10.1)
CHLORIDE SERPL-SCNC: 113 MMOL/L — HIGH (ref 96–108)
CO2 SERPL-SCNC: 24 MMOL/L — SIGNIFICANT CHANGE UP (ref 22–31)
CREAT SERPL-MCNC: 0.77 MG/DL — SIGNIFICANT CHANGE UP (ref 0.5–1.3)
EGFR: 94 ML/MIN/1.73M2 — SIGNIFICANT CHANGE UP
EOSINOPHIL # BLD AUTO: 0.19 K/UL — SIGNIFICANT CHANGE UP (ref 0–0.5)
EOSINOPHIL NFR BLD AUTO: 3.8 % — SIGNIFICANT CHANGE UP (ref 0–6)
GLUCOSE SERPL-MCNC: 97 MG/DL — SIGNIFICANT CHANGE UP (ref 70–99)
HCT VFR BLD CALC: 37.4 % — SIGNIFICANT CHANGE UP (ref 34.5–45)
HGB BLD-MCNC: 12.9 G/DL — SIGNIFICANT CHANGE UP (ref 11.5–15.5)
IMM GRANULOCYTES NFR BLD AUTO: 0.4 % — SIGNIFICANT CHANGE UP (ref 0–0.9)
LYMPHOCYTES # BLD AUTO: 0.53 K/UL — LOW (ref 1–3.3)
LYMPHOCYTES # BLD AUTO: 10.5 % — LOW (ref 13–44)
MCHC RBC-ENTMCNC: 32.6 PG — SIGNIFICANT CHANGE UP (ref 27–34)
MCHC RBC-ENTMCNC: 34.5 GM/DL — SIGNIFICANT CHANGE UP (ref 32–36)
MCV RBC AUTO: 94.4 FL — SIGNIFICANT CHANGE UP (ref 80–100)
MONOCYTES # BLD AUTO: 0.38 K/UL — SIGNIFICANT CHANGE UP (ref 0–0.9)
MONOCYTES NFR BLD AUTO: 7.5 % — SIGNIFICANT CHANGE UP (ref 2–14)
NEUTROPHILS # BLD AUTO: 3.9 K/UL — SIGNIFICANT CHANGE UP (ref 1.8–7.4)
NEUTROPHILS NFR BLD AUTO: 77.4 % — HIGH (ref 43–77)
NRBC # BLD: 0 /100 WBCS — SIGNIFICANT CHANGE UP (ref 0–0)
PLATELET # BLD AUTO: 196 K/UL — SIGNIFICANT CHANGE UP (ref 150–400)
POTASSIUM SERPL-MCNC: 4.1 MMOL/L — SIGNIFICANT CHANGE UP (ref 3.5–5.3)
POTASSIUM SERPL-SCNC: 4.1 MMOL/L — SIGNIFICANT CHANGE UP (ref 3.5–5.3)
PROT SERPL-MCNC: 7.7 G/DL — SIGNIFICANT CHANGE UP (ref 6–8.3)
RAPID RVP RESULT: DETECTED
RBC # BLD: 3.96 M/UL — SIGNIFICANT CHANGE UP (ref 3.8–5.2)
RBC # FLD: 12.1 % — SIGNIFICANT CHANGE UP (ref 10.3–14.5)
SARS-COV-2 RNA SPEC QL NAA+PROBE: DETECTED
SODIUM SERPL-SCNC: 143 MMOL/L — SIGNIFICANT CHANGE UP (ref 135–145)
WBC # BLD: 5.04 K/UL — SIGNIFICANT CHANGE UP (ref 3.8–10.5)
WBC # FLD AUTO: 5.04 K/UL — SIGNIFICANT CHANGE UP (ref 3.8–10.5)

## 2022-11-04 PROCEDURE — 0225U NFCT DS DNA&RNA 21 SARSCOV2: CPT

## 2022-11-04 PROCEDURE — 36415 COLL VENOUS BLD VENIPUNCTURE: CPT

## 2022-11-04 PROCEDURE — 93005 ELECTROCARDIOGRAM TRACING: CPT

## 2022-11-04 PROCEDURE — 99285 EMERGENCY DEPT VISIT HI MDM: CPT | Mod: 25

## 2022-11-04 PROCEDURE — 85025 COMPLETE CBC W/AUTO DIFF WBC: CPT

## 2022-11-04 PROCEDURE — 80053 COMPREHEN METABOLIC PANEL: CPT

## 2022-11-04 PROCEDURE — 94640 AIRWAY INHALATION TREATMENT: CPT

## 2022-11-04 PROCEDURE — 99285 EMERGENCY DEPT VISIT HI MDM: CPT

## 2022-11-04 PROCEDURE — 93010 ELECTROCARDIOGRAM REPORT: CPT

## 2022-11-04 PROCEDURE — 71045 X-RAY EXAM CHEST 1 VIEW: CPT | Mod: 26

## 2022-11-04 PROCEDURE — 71045 X-RAY EXAM CHEST 1 VIEW: CPT

## 2022-11-04 RX ORDER — BUPROPION HYDROCHLORIDE 150 MG/1
2 TABLET, EXTENDED RELEASE ORAL
Qty: 0 | Refills: 0 | DISCHARGE

## 2022-11-04 RX ORDER — IPRATROPIUM/ALBUTEROL SULFATE 18-103MCG
3 AEROSOL WITH ADAPTER (GRAM) INHALATION
Refills: 0 | Status: DISCONTINUED | OUTPATIENT
Start: 2022-11-04 | End: 2022-11-07

## 2022-11-04 RX ORDER — SERTRALINE 25 MG/1
1 TABLET, FILM COATED ORAL
Qty: 0 | Refills: 0 | DISCHARGE

## 2022-11-04 RX ORDER — ALENDRONATE SODIUM 70 MG/1
0 TABLET ORAL
Qty: 0 | Refills: 0 | DISCHARGE

## 2022-11-04 RX ORDER — ANASTROZOLE 1 MG/1
1 TABLET ORAL
Qty: 0 | Refills: 0 | DISCHARGE

## 2022-11-04 RX ORDER — SODIUM CHLORIDE 9 MG/ML
1000 INJECTION INTRAMUSCULAR; INTRAVENOUS; SUBCUTANEOUS ONCE
Refills: 0 | Status: COMPLETED | OUTPATIENT
Start: 2022-11-04 | End: 2022-11-04

## 2022-11-04 RX ORDER — LEVOTHYROXINE SODIUM 125 MCG
1 TABLET ORAL
Qty: 0 | Refills: 0 | DISCHARGE

## 2022-11-04 RX ADMIN — Medication 60 MILLIGRAM(S): at 08:47

## 2022-11-04 RX ADMIN — Medication 3 MILLILITER(S): at 08:47

## 2022-11-04 RX ADMIN — SODIUM CHLORIDE 1000 MILLILITER(S): 9 INJECTION INTRAMUSCULAR; INTRAVENOUS; SUBCUTANEOUS at 09:17

## 2022-11-04 RX ADMIN — Medication 200 MILLIGRAM(S): at 09:14

## 2022-11-04 RX ADMIN — Medication 3 MILLILITER(S): at 10:20

## 2022-11-04 NOTE — ED PROVIDER NOTE - PATIENT PORTAL LINK FT
You can access the FollowMyHealth Patient Portal offered by [facility] by registering at the following website: [email or web address]. By joining [employer]’s FollowMyHealth portal, you will also be able to view your health information using other applications (apps) compatible with our system.

## 2022-11-04 NOTE — ED PROVIDER NOTE - PHYSICAL EXAMINATION
Gen: alert, NAD  HEENT:  NC/AT, PERR, no exophthalmos  CV:  well perfused, rrr   Pulm:  wheezing b/l  Abd: s/nt/nd  MSK: moving all extremities  Neuro:  non-focal  Skin:  visualized areas intact  Psych: AOx3

## 2022-11-04 NOTE — ED PROVIDER NOTE - NS_EDPROVIDERDISPOUSERTYPE_ED_A_ED
Attending Attestation (For Attendings USE Only)...
Verbalized Understanding/Simple: Patient demonstrates quick and easy understanding

## 2022-11-04 NOTE — ED ADULT TRIAGE NOTE - CHIEF COMPLAINT QUOTE
Pt ambulatory to triage stating she began feeling sick with rhinorrhea/non productive cough/sob/chills yesterday now worsening today.   Pt states "I have asthma and I know if I wait any longer ill be in a fully blow asthma attack".   Pt able to speak in full sentences though appears winded at times/post ambulation.    Skin warm and dry, pt denies fevers at home.  Denies sick contact.  BN

## 2022-11-04 NOTE — ED PROVIDER NOTE - OBJECTIVE STATEMENT
Patient states that yesterday afternoon she started to have cold symptoms and today woke up with coughing and wheezing, patient has asthma, took 2 puffs of her rescue inhaler and states was not helpful and then used a home pulse oximeter and saw that her oxygen level was 90% so decided to come to the ER for evaluation.  Patient denies any fever body aches or sore throat.  Patient has history of breast cancer in remission, on Lupron injections monthly.  Otherwise no significant medical problems.

## 2022-11-04 NOTE — ED PROVIDER NOTE - CLINICAL SUMMARY MEDICAL DECISION MAKING FREE TEXT BOX
pt with URI triggering her asthma pt with URI triggering her asthma, RVP pending, pt feeling much better, stable for discharge

## 2022-11-08 ENCOUNTER — OUTPATIENT (OUTPATIENT)
Dept: OUTPATIENT SERVICES | Facility: HOSPITAL | Age: 52
LOS: 1 days | Discharge: HOME | End: 2022-11-08

## 2022-11-08 DIAGNOSIS — Z30.430 ENCOUNTER FOR INSERTION OF INTRAUTERINE CONTRACEPTIVE DEVICE: Chronic | ICD-10-CM

## 2022-11-08 DIAGNOSIS — M67.432 GANGLION, LEFT WRIST: Chronic | ICD-10-CM

## 2022-11-08 DIAGNOSIS — C50.919 MALIGNANT NEOPLASM OF UNSPECIFIED SITE OF UNSPECIFIED FEMALE BREAST: ICD-10-CM

## 2022-11-08 DIAGNOSIS — Z98.890 OTHER SPECIFIED POSTPROCEDURAL STATES: Chronic | ICD-10-CM

## 2022-11-14 ENCOUNTER — APPOINTMENT (OUTPATIENT)
Dept: INFUSION THERAPY | Facility: HOSPITAL | Age: 52
End: 2022-11-14

## 2022-12-15 ENCOUNTER — APPOINTMENT (OUTPATIENT)
Dept: OBGYN | Facility: CLINIC | Age: 52
End: 2022-12-15

## 2022-12-15 ENCOUNTER — APPOINTMENT (OUTPATIENT)
Dept: INFUSION THERAPY | Facility: HOSPITAL | Age: 52
End: 2022-12-15

## 2022-12-15 ENCOUNTER — APPOINTMENT (OUTPATIENT)
Dept: HEMATOLOGY ONCOLOGY | Facility: CLINIC | Age: 52
End: 2022-12-15

## 2022-12-15 VITALS
DIASTOLIC BLOOD PRESSURE: 71 MMHG | HEART RATE: 74 BPM | HEIGHT: 63.43 IN | WEIGHT: 163.58 LBS | OXYGEN SATURATION: 98 % | RESPIRATION RATE: 16 BRPM | BODY MASS INDEX: 28.63 KG/M2 | TEMPERATURE: 98 F | SYSTOLIC BLOOD PRESSURE: 113 MMHG

## 2022-12-15 PROCEDURE — 58301 REMOVE INTRAUTERINE DEVICE: CPT

## 2022-12-15 PROCEDURE — 99214 OFFICE O/P EST MOD 30 MIN: CPT

## 2022-12-15 PROCEDURE — 76830 TRANSVAGINAL US NON-OB: CPT

## 2022-12-15 PROCEDURE — 99396 PREV VISIT EST AGE 40-64: CPT | Mod: 25

## 2022-12-15 PROCEDURE — 81002 URINALYSIS NONAUTO W/O SCOPE: CPT

## 2022-12-15 PROCEDURE — 82270 OCCULT BLOOD FECES: CPT

## 2022-12-15 NOTE — HISTORY OF PRESENT ILLNESS
[Disease: _____________________] : Disease: [unfilled] [T: ___] : T[unfilled] [N: ___] : N[unfilled] [M: ___] : M[unfilled] [AJCC Stage: ____] : AJCC Stage: [unfilled] [Treatment Protocol] : Treatment Protocol [Therapy: ___] : Therapy: [unfilled] [de-identified] : Pt seen for an initial visit by bella griffin 7/6/22 in order to transfer her care to me from that of Dr Shipman and before that Dr Horn. The history below is as per review of the AEHR notes of these oncologists and review / confirmation with the pt. \par \par 5/22/18 screening mammo with 1.5cm R breast mass; diagnostic mammo and US confirmed this mass RUOQ 1.6cm at 10:00, hypoechoic. \par \par US guided core biopsy of the mass was performed 6/1/18, which revealed 0.5 cm invasive ductal carcinoma, Lynette score 7/9, with nuclear grade 2 cribriform type DCIS with central necrosis and calcifications, estrogen receptor 95% strong, progesterone receptor 3-5% strong, HER-2 IHC 2+ negative by [facility]. \par \par MRI of the bilateral breasts on 06/25/2018 showed a 1.7 X 1.6 X 1.8 cm focal area of non-mass enhancement with washout kinetics and biopsy marker at the upper outer posterior 10:00 position of the right breast. There were no other suspicious lesions in the right breast and no suspicious lesions in the left breast. There were no axillary or internal mammary lymphadenopathy. \par \par She saw Dr. Heller who performed CXR, nuclear bone scan, pelvic and abdominal sonograms, which were negative. \par \par On 8/15/18 she underwent a right lumpectomy with SLNB - pathology with 1.1 cm invasive ductal carcinoma, [place] 7-8/9 and no LVI. 0/2 LN involved. \par There was also a 1.5 cm nuclear grade 2 - 3 ductal carcinoma in situ, cribriform and solid type with comedonecrosis and calcifications. Regarding the margins, the invasive tumor was 0.15 mm from the deep margin. Ductal carcinoma in situ was noted within microns of the deep margin and less than 0.1 mm from the anterior and superior margins. Two sentinel nodes were examined and negative. \par \par Oncotype score 29 (20% risk of distant recurrence with tamoxifen alone). TC x 4 recommended. Patient elected to utilize cold cap.\par \par She saw radiation oncology in consultation and was recommended for adjuvant radiation pending medical oncology recommendations. \par \par 10/24-10/26 admitted to [facility].\par ~9pm just after driving home from getting food,  witness LOC with seizure activity x 20 seconds followed by lethargy - EMS called.\par She was taken to [facility] ER and admitted to ICU; sodium 119 with evidence of aspiration on CXR/CTA chest, O2 sat 86% briefly requiring BIPAP support quickly weaned. Neurology, Pulmonary, Renal consults following. Sodium corrected over the next 24h with normal saline infusion, antibiotics discontinued as no further evidence of infection. Neulasta onpro administered as scheduled 10/25 afternoon. She had a CTH and MRI Brain w/wo contrast which showed no acute findings. Zoloft was held, and she was discharged home. Incidental note of possible compression deformity of thoracic spine on imaging while inpatient. She admits to drinking ~4L water on day of chemotherapy as advised by cold cap literature. Her  recalls in retrospect that she seemed overly sleepy and slightly confused (forgot she had chinese food for lunch, eyes closing while sitting up) on the afternoon/evening after treatment prior to seizure. CK and LFTs elevated post discharge, continued to downtrend over the next few days to normal.\par \par She underwent cycle 3 of TC followed by 24 hours of observation at [facility] inpatient with neuro checks and q4h BMP monitoring. Her sodium remained normal and she was asymptomatic. She completed her final cycle of TC on 12/5/18 without event.\par \par Adjuvant radiation initiated 2/4/2019 and completed 3/2/19.\par \par Lupron for ovarian suppression initiated 2/2019. Arimidex initiated April 2019.\par \par My Risk panel on 6/13/2018 was negative.  FHx significant for father with prostate cancer in 60s, paternal cousin with leukemia age 13 and breast cancer age 45. [de-identified] : T1cN0 [de-identified] : ER+[place]-HER2- [de-identified] : Oncotype 29 [FreeTextEntry1] : x 4 cycles, with Penguin cold cap [de-identified] : The patient is here for routine f/u.\par \par She remains on Lupron since 2/19 and anastrozole since 4/19. \par \par She states no longer experiencing hot flashes.  States mild arthralgias that are manageable. \par \par She has history of Hashimoto thyroiditis followed by endocrinology. \par  \par She states she is feeling well with no complaints.  Notes a good appetite, stable wt and stable performance status.   \par \par Mammo/sono, 6/9/22- neg\par \par DEXA, 6/22- Osteoporosis, on alendronate\par \par Breast MRI, 12/13/21- BIRADS 2

## 2022-12-15 NOTE — PHYSICAL EXAM
[Fully active, able to carry on all pre-disease performance without restriction] : Status 0 - Fully active, able to carry on all pre-disease performance without restriction [Normal] : affect appropriate [de-identified] : No skin or nipple changes either breast.  Right breast with well healed surgical scar and changes from radiation, no discrete palpable masses, no palpable axillary nodes.   Left breast with no discrete palpable masses, no palpable axillary nodes.

## 2023-01-06 ENCOUNTER — OUTPATIENT (OUTPATIENT)
Dept: OUTPATIENT SERVICES | Facility: HOSPITAL | Age: 53
LOS: 1 days | Discharge: HOME | End: 2023-01-06

## 2023-01-06 DIAGNOSIS — Z98.890 OTHER SPECIFIED POSTPROCEDURAL STATES: Chronic | ICD-10-CM

## 2023-01-06 DIAGNOSIS — M67.432 GANGLION, LEFT WRIST: Chronic | ICD-10-CM

## 2023-01-06 DIAGNOSIS — C50.919 MALIGNANT NEOPLASM OF UNSPECIFIED SITE OF UNSPECIFIED FEMALE BREAST: ICD-10-CM

## 2023-01-06 DIAGNOSIS — Z30.430 ENCOUNTER FOR INSERTION OF INTRAUTERINE CONTRACEPTIVE DEVICE: Chronic | ICD-10-CM

## 2023-01-12 ENCOUNTER — APPOINTMENT (OUTPATIENT)
Dept: INFUSION THERAPY | Facility: HOSPITAL | Age: 53
End: 2023-01-12

## 2023-02-09 ENCOUNTER — APPOINTMENT (OUTPATIENT)
Dept: INFUSION THERAPY | Facility: HOSPITAL | Age: 53
End: 2023-02-09

## 2023-02-11 NOTE — ASU PATIENT PROFILE, ADULT - NS PRO LAST MENSTRUAL
CERTIFICATE OF RETURN TO WORK    February 11, 2023      Re:   Eleno Barahona  [id number]  [facility] [address]                        This is to certify that Eleno Barahona was seen on 2/11/2023 and can return to regular work on Monday 2/13/2023 if symptoms have improved and he is fever free..    RESTRICTIONS: none.    Medical information is private and protected by HIPAA Law.         SIGNATURE:___________________________________________,   2/11/2023      SHANICE Gary APNP  [facility] Telehealth Visit  [address] [facility] [address]  Dept Phone: [phone]  
5/7/18

## 2023-02-24 NOTE — H&P PST ADULT - TEMPERATURE IN CELSIUS (DEGREES C)
After visit summary      2/24/2023  Irma SALES Elaina    HOME INSTRUCTIONS;  Use [employer] trisha or Web site  for up to date Nutritional information.         Meal Planning  Eat regularly during the day, every 4-5 hours, Do not skip meals, Include the same amount of carbohydrates in your meals.  g carb per day     Add berries and cuties  Healthy choice Fudge bars  [employer] or [employer]  [place] diet hot cocoa mix with a little milk     Physical Activity  Other   walk 1 mile 2 x per week and one Yoga or Pilate's      Goal:  add 1 serving of fruit in 3-4 x per week            Add yogurt in 3 days per week or Yoplait lite             
36.6

## 2023-03-01 ENCOUNTER — OUTPATIENT (OUTPATIENT)
Dept: OUTPATIENT SERVICES | Facility: HOSPITAL | Age: 53
LOS: 1 days | Discharge: HOME | End: 2023-03-01

## 2023-03-01 DIAGNOSIS — Z30.430 ENCOUNTER FOR INSERTION OF INTRAUTERINE CONTRACEPTIVE DEVICE: Chronic | ICD-10-CM

## 2023-03-01 DIAGNOSIS — Z98.890 OTHER SPECIFIED POSTPROCEDURAL STATES: Chronic | ICD-10-CM

## 2023-03-01 DIAGNOSIS — M67.432 GANGLION, LEFT WRIST: Chronic | ICD-10-CM

## 2023-03-01 DIAGNOSIS — C50.919 MALIGNANT NEOPLASM OF UNSPECIFIED SITE OF UNSPECIFIED FEMALE BREAST: ICD-10-CM

## 2023-03-09 ENCOUNTER — APPOINTMENT (OUTPATIENT)
Dept: INFUSION THERAPY | Facility: HOSPITAL | Age: 53
End: 2023-03-09

## 2023-03-11 ENCOUNTER — EMERGENCY (EMERGENCY)
Facility: HOSPITAL | Age: 53
LOS: 1 days | Discharge: HOME | End: 2023-03-11
Attending: EMERGENCY MEDICINE | Admitting: EMERGENCY MEDICINE
Payer: COMMERCIAL

## 2023-03-11 VITALS
SYSTOLIC BLOOD PRESSURE: 103 MMHG | RESPIRATION RATE: 18 BRPM | TEMPERATURE: 99 F | DIASTOLIC BLOOD PRESSURE: 70 MMHG | HEART RATE: 83 BPM | OXYGEN SATURATION: 95 %

## 2023-03-11 VITALS
TEMPERATURE: 99 F | HEART RATE: 113 BPM | OXYGEN SATURATION: 93 % | DIASTOLIC BLOOD PRESSURE: 76 MMHG | RESPIRATION RATE: 20 BRPM | HEIGHT: 63 IN | SYSTOLIC BLOOD PRESSURE: 106 MMHG | WEIGHT: 154.98 LBS

## 2023-03-11 DIAGNOSIS — M67.432 GANGLION, LEFT WRIST: Chronic | ICD-10-CM

## 2023-03-11 DIAGNOSIS — Z98.890 OTHER SPECIFIED POSTPROCEDURAL STATES: Chronic | ICD-10-CM

## 2023-03-11 DIAGNOSIS — Z30.430 ENCOUNTER FOR INSERTION OF INTRAUTERINE CONTRACEPTIVE DEVICE: Chronic | ICD-10-CM

## 2023-03-11 LAB
ALBUMIN SERPL ELPH-MCNC: 3.8 G/DL — SIGNIFICANT CHANGE UP (ref 3.3–5)
ALP SERPL-CCNC: 93 U/L — SIGNIFICANT CHANGE UP (ref 40–120)
ALT FLD-CCNC: 28 U/L — SIGNIFICANT CHANGE UP (ref 12–78)
ANION GAP SERPL CALC-SCNC: 4 MMOL/L — LOW (ref 5–17)
AST SERPL-CCNC: 25 U/L — SIGNIFICANT CHANGE UP (ref 15–37)
BASOPHILS # BLD AUTO: 0.01 K/UL — SIGNIFICANT CHANGE UP (ref 0–0.2)
BASOPHILS NFR BLD AUTO: 0.2 % — SIGNIFICANT CHANGE UP (ref 0–2)
BILIRUB SERPL-MCNC: 0.6 MG/DL — SIGNIFICANT CHANGE UP (ref 0.2–1.2)
BUN SERPL-MCNC: 7 MG/DL — SIGNIFICANT CHANGE UP (ref 7–23)
CALCIUM SERPL-MCNC: 8.5 MG/DL — SIGNIFICANT CHANGE UP (ref 8.5–10.1)
CHLORIDE SERPL-SCNC: 112 MMOL/L — HIGH (ref 96–108)
CO2 SERPL-SCNC: 24 MMOL/L — SIGNIFICANT CHANGE UP (ref 22–31)
CREAT SERPL-MCNC: 0.81 MG/DL — SIGNIFICANT CHANGE UP (ref 0.5–1.3)
EGFR: 88 ML/MIN/1.73M2 — SIGNIFICANT CHANGE UP
EOSINOPHIL # BLD AUTO: 0.07 K/UL — SIGNIFICANT CHANGE UP (ref 0–0.5)
EOSINOPHIL NFR BLD AUTO: 1.5 % — SIGNIFICANT CHANGE UP (ref 0–6)
GLUCOSE SERPL-MCNC: 100 MG/DL — HIGH (ref 70–99)
HCT VFR BLD CALC: 38.6 % — SIGNIFICANT CHANGE UP (ref 34.5–45)
HGB BLD-MCNC: 13.2 G/DL — SIGNIFICANT CHANGE UP (ref 11.5–15.5)
HMPV RNA SPEC QL NAA+PROBE: DETECTED
IMM GRANULOCYTES NFR BLD AUTO: 0.2 % — SIGNIFICANT CHANGE UP (ref 0–0.9)
LYMPHOCYTES # BLD AUTO: 0.73 K/UL — LOW (ref 1–3.3)
LYMPHOCYTES # BLD AUTO: 15.3 % — SIGNIFICANT CHANGE UP (ref 13–44)
MAGNESIUM SERPL-MCNC: 2.2 MG/DL — SIGNIFICANT CHANGE UP (ref 1.6–2.6)
MCHC RBC-ENTMCNC: 31.6 PG — SIGNIFICANT CHANGE UP (ref 27–34)
MCHC RBC-ENTMCNC: 34.2 GM/DL — SIGNIFICANT CHANGE UP (ref 32–36)
MCV RBC AUTO: 92.3 FL — SIGNIFICANT CHANGE UP (ref 80–100)
MONOCYTES # BLD AUTO: 0.46 K/UL — SIGNIFICANT CHANGE UP (ref 0–0.9)
MONOCYTES NFR BLD AUTO: 9.6 % — SIGNIFICANT CHANGE UP (ref 2–14)
NEUTROPHILS # BLD AUTO: 3.5 K/UL — SIGNIFICANT CHANGE UP (ref 1.8–7.4)
NEUTROPHILS NFR BLD AUTO: 73.2 % — SIGNIFICANT CHANGE UP (ref 43–77)
NRBC # BLD: 0 /100 WBCS — SIGNIFICANT CHANGE UP (ref 0–0)
PLATELET # BLD AUTO: 174 K/UL — SIGNIFICANT CHANGE UP (ref 150–400)
POTASSIUM SERPL-MCNC: 4 MMOL/L — SIGNIFICANT CHANGE UP (ref 3.5–5.3)
POTASSIUM SERPL-SCNC: 4 MMOL/L — SIGNIFICANT CHANGE UP (ref 3.5–5.3)
PROT SERPL-MCNC: 8.3 G/DL — SIGNIFICANT CHANGE UP (ref 6–8.3)
RAPID RVP RESULT: DETECTED
RBC # BLD: 4.18 M/UL — SIGNIFICANT CHANGE UP (ref 3.8–5.2)
RBC # FLD: 12.1 % — SIGNIFICANT CHANGE UP (ref 10.3–14.5)
SARS-COV-2 RNA SPEC QL NAA+PROBE: SIGNIFICANT CHANGE UP
SODIUM SERPL-SCNC: 140 MMOL/L — SIGNIFICANT CHANGE UP (ref 135–145)
WBC # BLD: 4.78 K/UL — SIGNIFICANT CHANGE UP (ref 3.8–10.5)
WBC # FLD AUTO: 4.78 K/UL — SIGNIFICANT CHANGE UP (ref 3.8–10.5)

## 2023-03-11 PROCEDURE — 83735 ASSAY OF MAGNESIUM: CPT

## 2023-03-11 PROCEDURE — 71045 X-RAY EXAM CHEST 1 VIEW: CPT | Mod: 26

## 2023-03-11 PROCEDURE — 99284 EMERGENCY DEPT VISIT MOD MDM: CPT

## 2023-03-11 PROCEDURE — 85025 COMPLETE CBC W/AUTO DIFF WBC: CPT

## 2023-03-11 PROCEDURE — 94640 AIRWAY INHALATION TREATMENT: CPT

## 2023-03-11 PROCEDURE — 96375 TX/PRO/DX INJ NEW DRUG ADDON: CPT

## 2023-03-11 PROCEDURE — 96374 THER/PROPH/DIAG INJ IV PUSH: CPT

## 2023-03-11 PROCEDURE — 0225U NFCT DS DNA&RNA 21 SARSCOV2: CPT

## 2023-03-11 PROCEDURE — 99285 EMERGENCY DEPT VISIT HI MDM: CPT | Mod: 25

## 2023-03-11 PROCEDURE — 36415 COLL VENOUS BLD VENIPUNCTURE: CPT

## 2023-03-11 PROCEDURE — 71045 X-RAY EXAM CHEST 1 VIEW: CPT

## 2023-03-11 PROCEDURE — 80053 COMPREHEN METABOLIC PANEL: CPT

## 2023-03-11 RX ORDER — IPRATROPIUM/ALBUTEROL SULFATE 18-103MCG
3 AEROSOL WITH ADAPTER (GRAM) INHALATION
Refills: 0 | Status: COMPLETED | OUTPATIENT
Start: 2023-03-11 | End: 2023-03-11

## 2023-03-11 RX ORDER — SODIUM CHLORIDE 9 MG/ML
1000 INJECTION INTRAMUSCULAR; INTRAVENOUS; SUBCUTANEOUS ONCE
Refills: 0 | Status: COMPLETED | OUTPATIENT
Start: 2023-03-11 | End: 2023-03-11

## 2023-03-11 RX ORDER — ALBUTEROL 90 UG/1
3 AEROSOL, METERED ORAL
Qty: 90 | Refills: 0
Start: 2023-03-11 | End: 2023-03-15

## 2023-03-11 RX ORDER — ONDANSETRON 8 MG/1
4 TABLET, FILM COATED ORAL ONCE
Refills: 0 | Status: COMPLETED | OUTPATIENT
Start: 2023-03-11 | End: 2023-03-11

## 2023-03-11 RX ORDER — ACETAMINOPHEN 500 MG
1000 TABLET ORAL ONCE
Refills: 0 | Status: COMPLETED | OUTPATIENT
Start: 2023-03-11 | End: 2023-03-11

## 2023-03-11 RX ADMIN — Medication 3 MILLILITER(S): at 16:53

## 2023-03-11 RX ADMIN — Medication 1000 MILLIGRAM(S): at 16:52

## 2023-03-11 RX ADMIN — Medication 400 MILLIGRAM(S): at 16:22

## 2023-03-11 RX ADMIN — Medication 125 MILLIGRAM(S): at 16:53

## 2023-03-11 RX ADMIN — SODIUM CHLORIDE 2000 MILLILITER(S): 9 INJECTION INTRAMUSCULAR; INTRAVENOUS; SUBCUTANEOUS at 16:23

## 2023-03-11 RX ADMIN — ONDANSETRON 4 MILLIGRAM(S): 8 TABLET, FILM COATED ORAL at 16:22

## 2023-03-11 RX ADMIN — Medication 3 MILLILITER(S): at 16:23

## 2023-03-11 RX ADMIN — Medication 3 MILLILITER(S): at 17:00

## 2023-03-11 NOTE — ED PROVIDER NOTE - BREATH SOUNDS
tight breath sounds with scattered wheezing. + coughing. no respiratory distress, talking in full sentences.

## 2023-03-11 NOTE — ED ADULT NURSE NOTE - NS ED NURSE RECORD ANOTHER VITAL SIGN
[Follow-Up Visit] : a follow-up [Spouse] : spouse [Pre-Treatment Visit] : a pre-treatment [FreeTextEntry2] : Metastatic breast cancer Yes

## 2023-03-11 NOTE — ED PROVIDER NOTE - PROGRESS NOTE DETAILS
Patient feeling better, wheezing improved. Will dc with steroids, albuterol, recommend follow up with pmd and pulmonology. Results discussed and copy provided, all questions answered. Patient understands return precautions.

## 2023-03-11 NOTE — ED PROVIDER NOTE - OBJECTIVE STATEMENT
53-year-old female with history of asthma, hypothyroidism presents to the emergency department complaining of cough, congestion, fever, nausea, vomiting, headache and malaise for the past 4/5 days.   was recently sick with similar symptoms.  Patient had a fever today of 100.7.  Denies chest pain, shortness of breath, abdominal pain, neck pain or stiffness, diarrhea.  Associated with decreased p.o. intake.

## 2023-03-11 NOTE — ED ADULT NURSE NOTE - NSIMPLEMENTINTERV_GEN_ALL_ED
Implemented All Universal Safety Interventions:  [place] to call system. Call bell, personal items and telephone within reach. Instruct patient to call for assistance. Room bathroom lighting operational. Non-slip footwear when patient is off stretcher. Physically safe environment: no spills, clutter or unnecessary equipment. Stretcher in lowest position, wheels locked, appropriate side rails in place.

## 2023-03-11 NOTE — ED ADULT TRIAGE NOTE - CHIEF COMPLAINT QUOTE
Patient complaining of productive cough dark phlegm x3days, headache, nausea and vomiting, fever x1 day. h/x asthma, did not take any medications, difficulty keeping solids/fluids down.

## 2023-03-11 NOTE — ED PROVIDER NOTE - PATIENT PORTAL LINK FT
You can access the FollowMyHealth Patient Portal offered by [facility] by registering at the following website: [email or web address]. By joining [employer]’s FollowMyHealth portal, you will also be able to view your health information using other applications (apps) compatible with our system.

## 2023-03-11 NOTE — ED ADULT NURSE NOTE - OBJECTIVE STATEMENT
Spouse of Patient states she has productive cough dark phlegm x3days, headache, nausea and vomiting, fever x1 day. h/x asthma, did not take any medications due to difficulty keeping solids/fluids down.

## 2023-03-11 NOTE — ED PROVIDER NOTE - CLINICAL SUMMARY MEDICAL DECISION MAKING FREE TEXT BOX
53-year-old female with a history of asthma, hypothyroid presents with cough, nasal congestion and general malaise over past 4 to 5 days.  Patient  had a URI last week, and the patient began feeling ill 4 to 5 days ago.  Patient now with increased general malaise,  mild diffuse headache  No neck pain or stiffness.  No acute chest pain or shortness of breath.  Some nausea, vomiting.  Some decreased p.o. intake.  Patient with history of occasional migraines, feels this is similar to that.  Patient states sometimes when she has URI she gets these headaches.  No recent fall or trauma.  No numbness/tingling/focal weakness.  No aggravating or alleviating factors otherwise noted.  No other acute complaints.  Exam: Nontoxic appearing.  Neck supple.  No meningeal signs.  CTA BL with a tight wheezy cough.  Normal cardiac exam.  Abdomen soft, nontender, nondistended.  Normal nonfocal detailed neurologic exam.  No C/C/E.  No calf tenderness.  No other acute findings on exam.  Acute URI symptoms over past 4 to 5 days with generalized headache, neurologic intact with no meningeal signs.  Will check labs, IV fluids, pain control, RVP, reeval.

## 2023-04-07 ENCOUNTER — APPOINTMENT (OUTPATIENT)
Dept: INFUSION THERAPY | Facility: HOSPITAL | Age: 53
End: 2023-04-07

## 2023-04-27 ENCOUNTER — OUTPATIENT (OUTPATIENT)
Dept: OUTPATIENT SERVICES | Facility: HOSPITAL | Age: 53
LOS: 1 days | Discharge: HOME | End: 2023-04-27

## 2023-04-27 DIAGNOSIS — C50.919 MALIGNANT NEOPLASM OF UNSPECIFIED SITE OF UNSPECIFIED FEMALE BREAST: ICD-10-CM

## 2023-04-27 DIAGNOSIS — Z98.890 OTHER SPECIFIED POSTPROCEDURAL STATES: Chronic | ICD-10-CM

## 2023-04-27 DIAGNOSIS — M67.432 GANGLION, LEFT WRIST: Chronic | ICD-10-CM

## 2023-04-27 DIAGNOSIS — Z30.430 ENCOUNTER FOR INSERTION OF INTRAUTERINE CONTRACEPTIVE DEVICE: Chronic | ICD-10-CM

## 2023-05-05 ENCOUNTER — APPOINTMENT (OUTPATIENT)
Dept: INFUSION THERAPY | Facility: HOSPITAL | Age: 53
End: 2023-05-05

## 2023-06-02 ENCOUNTER — APPOINTMENT (OUTPATIENT)
Dept: INFUSION THERAPY | Facility: HOSPITAL | Age: 53
End: 2023-06-02

## 2023-06-17 ENCOUNTER — RX RENEWAL (OUTPATIENT)
Age: 53
End: 2023-06-17

## 2023-06-20 ENCOUNTER — OUTPATIENT (OUTPATIENT)
Dept: OUTPATIENT SERVICES | Facility: HOSPITAL | Age: 53
LOS: 1 days | Discharge: HOME | End: 2023-06-20

## 2023-06-20 DIAGNOSIS — Z98.890 OTHER SPECIFIED POSTPROCEDURAL STATES: Chronic | ICD-10-CM

## 2023-06-20 DIAGNOSIS — C50.919 MALIGNANT NEOPLASM OF UNSPECIFIED SITE OF UNSPECIFIED FEMALE BREAST: ICD-10-CM

## 2023-06-20 DIAGNOSIS — M67.432 GANGLION, LEFT WRIST: Chronic | ICD-10-CM

## 2023-06-20 DIAGNOSIS — Z30.430 ENCOUNTER FOR INSERTION OF INTRAUTERINE CONTRACEPTIVE DEVICE: Chronic | ICD-10-CM

## 2023-06-21 ENCOUNTER — APPOINTMENT (OUTPATIENT)
Dept: HEMATOLOGY ONCOLOGY | Facility: CLINIC | Age: 53
End: 2023-06-21

## 2023-06-22 ENCOUNTER — APPOINTMENT (OUTPATIENT)
Dept: MAMMOGRAPHY | Facility: CLINIC | Age: 53
End: 2023-06-22
Payer: COMMERCIAL

## 2023-06-22 ENCOUNTER — RESULT REVIEW (OUTPATIENT)
Age: 53
End: 2023-06-22

## 2023-06-22 ENCOUNTER — OUTPATIENT (OUTPATIENT)
Dept: OUTPATIENT SERVICES | Facility: HOSPITAL | Age: 53
LOS: 1 days | End: 2023-06-22
Payer: COMMERCIAL

## 2023-06-22 ENCOUNTER — APPOINTMENT (OUTPATIENT)
Dept: ULTRASOUND IMAGING | Facility: CLINIC | Age: 53
End: 2023-06-22
Payer: COMMERCIAL

## 2023-06-22 DIAGNOSIS — M67.432 GANGLION, LEFT WRIST: Chronic | ICD-10-CM

## 2023-06-22 DIAGNOSIS — Z98.890 OTHER SPECIFIED POSTPROCEDURAL STATES: Chronic | ICD-10-CM

## 2023-06-22 DIAGNOSIS — Z00.8 ENCOUNTER FOR OTHER GENERAL EXAMINATION: ICD-10-CM

## 2023-06-22 DIAGNOSIS — Z30.430 ENCOUNTER FOR INSERTION OF INTRAUTERINE CONTRACEPTIVE DEVICE: Chronic | ICD-10-CM

## 2023-06-22 PROCEDURE — G0279: CPT

## 2023-06-22 PROCEDURE — G0279: CPT | Mod: 26

## 2023-06-22 PROCEDURE — 76641 ULTRASOUND BREAST COMPLETE: CPT

## 2023-06-22 PROCEDURE — 76641 ULTRASOUND BREAST COMPLETE: CPT | Mod: 26,50

## 2023-06-22 PROCEDURE — 77066 DX MAMMO INCL CAD BI: CPT | Mod: 26

## 2023-06-22 PROCEDURE — 77066 DX MAMMO INCL CAD BI: CPT

## 2023-06-26 ENCOUNTER — EMERGENCY (EMERGENCY)
Facility: HOSPITAL | Age: 53
LOS: 1 days | Discharge: HOME | End: 2023-06-26
Attending: EMERGENCY MEDICINE | Admitting: EMERGENCY MEDICINE
Payer: COMMERCIAL

## 2023-06-26 VITALS
SYSTOLIC BLOOD PRESSURE: 106 MMHG | RESPIRATION RATE: 17 BRPM | DIASTOLIC BLOOD PRESSURE: 73 MMHG | HEART RATE: 61 BPM | OXYGEN SATURATION: 97 %

## 2023-06-26 VITALS
SYSTOLIC BLOOD PRESSURE: 131 MMHG | HEART RATE: 66 BPM | WEIGHT: 164.91 LBS | DIASTOLIC BLOOD PRESSURE: 79 MMHG | TEMPERATURE: 98 F | OXYGEN SATURATION: 98 % | RESPIRATION RATE: 12 BRPM

## 2023-06-26 DIAGNOSIS — Z98.890 OTHER SPECIFIED POSTPROCEDURAL STATES: Chronic | ICD-10-CM

## 2023-06-26 DIAGNOSIS — Z30.430 ENCOUNTER FOR INSERTION OF INTRAUTERINE CONTRACEPTIVE DEVICE: Chronic | ICD-10-CM

## 2023-06-26 DIAGNOSIS — M67.432 GANGLION, LEFT WRIST: Chronic | ICD-10-CM

## 2023-06-26 LAB
ALBUMIN SERPL ELPH-MCNC: 3.7 G/DL — SIGNIFICANT CHANGE UP (ref 3.3–5)
ALP SERPL-CCNC: 74 U/L — SIGNIFICANT CHANGE UP (ref 40–120)
ALT FLD-CCNC: 24 U/L — SIGNIFICANT CHANGE UP (ref 12–78)
ANION GAP SERPL CALC-SCNC: 3 MMOL/L — LOW (ref 5–17)
APPEARANCE UR: CLEAR — SIGNIFICANT CHANGE UP
AST SERPL-CCNC: 20 U/L — SIGNIFICANT CHANGE UP (ref 15–37)
BACTERIA # UR AUTO: ABNORMAL /HPF
BASOPHILS # BLD AUTO: 0.04 K/UL — SIGNIFICANT CHANGE UP (ref 0–0.2)
BASOPHILS NFR BLD AUTO: 0.8 % — SIGNIFICANT CHANGE UP (ref 0–2)
BILIRUB SERPL-MCNC: 0.2 MG/DL — SIGNIFICANT CHANGE UP (ref 0.2–1.2)
BILIRUB UR-MCNC: NEGATIVE — SIGNIFICANT CHANGE UP
BUN SERPL-MCNC: 12 MG/DL — SIGNIFICANT CHANGE UP (ref 7–23)
CALCIUM SERPL-MCNC: 9 MG/DL — SIGNIFICANT CHANGE UP (ref 8.5–10.1)
CHLORIDE SERPL-SCNC: 111 MMOL/L — HIGH (ref 96–108)
CO2 SERPL-SCNC: 25 MMOL/L — SIGNIFICANT CHANGE UP (ref 22–31)
COLOR SPEC: YELLOW — SIGNIFICANT CHANGE UP
CREAT SERPL-MCNC: 0.89 MG/DL — SIGNIFICANT CHANGE UP (ref 0.5–1.3)
DIFF PNL FLD: NEGATIVE — SIGNIFICANT CHANGE UP
EGFR: 78 ML/MIN/1.73M2 — SIGNIFICANT CHANGE UP
EOSINOPHIL # BLD AUTO: 0.39 K/UL — SIGNIFICANT CHANGE UP (ref 0–0.5)
EOSINOPHIL NFR BLD AUTO: 7.6 % — HIGH (ref 0–6)
EPI CELLS # UR: PRESENT
GLUCOSE SERPL-MCNC: 96 MG/DL — SIGNIFICANT CHANGE UP (ref 70–99)
GLUCOSE UR QL: NEGATIVE MG/DL — SIGNIFICANT CHANGE UP
HCT VFR BLD CALC: 37.2 % — SIGNIFICANT CHANGE UP (ref 34.5–45)
HGB BLD-MCNC: 13 G/DL — SIGNIFICANT CHANGE UP (ref 11.5–15.5)
IMM GRANULOCYTES NFR BLD AUTO: 0.2 % — SIGNIFICANT CHANGE UP (ref 0–0.9)
KETONES UR-MCNC: NEGATIVE MG/DL — SIGNIFICANT CHANGE UP
LEUKOCYTE ESTERASE UR-ACNC: ABNORMAL
LIDOCAIN IGE QN: 198 U/L — SIGNIFICANT CHANGE UP (ref 73–393)
LYMPHOCYTES # BLD AUTO: 1.68 K/UL — SIGNIFICANT CHANGE UP (ref 1–3.3)
LYMPHOCYTES # BLD AUTO: 32.9 % — SIGNIFICANT CHANGE UP (ref 13–44)
MAGNESIUM SERPL-MCNC: 2.3 MG/DL — SIGNIFICANT CHANGE UP (ref 1.6–2.6)
MCHC RBC-ENTMCNC: 31.6 PG — SIGNIFICANT CHANGE UP (ref 27–34)
MCHC RBC-ENTMCNC: 34.9 GM/DL — SIGNIFICANT CHANGE UP (ref 32–36)
MCV RBC AUTO: 90.5 FL — SIGNIFICANT CHANGE UP (ref 80–100)
MONOCYTES # BLD AUTO: 0.47 K/UL — SIGNIFICANT CHANGE UP (ref 0–0.9)
MONOCYTES NFR BLD AUTO: 9.2 % — SIGNIFICANT CHANGE UP (ref 2–14)
NEUTROPHILS # BLD AUTO: 2.51 K/UL — SIGNIFICANT CHANGE UP (ref 1.8–7.4)
NEUTROPHILS NFR BLD AUTO: 49.3 % — SIGNIFICANT CHANGE UP (ref 43–77)
NITRITE UR-MCNC: NEGATIVE — SIGNIFICANT CHANGE UP
NRBC # BLD: 0 /100 WBCS — SIGNIFICANT CHANGE UP (ref 0–0)
PH UR: 5.5 — SIGNIFICANT CHANGE UP (ref 5–8)
PLATELET # BLD AUTO: 200 K/UL — SIGNIFICANT CHANGE UP (ref 150–400)
POTASSIUM SERPL-MCNC: 4.4 MMOL/L — SIGNIFICANT CHANGE UP (ref 3.5–5.3)
POTASSIUM SERPL-SCNC: 4.4 MMOL/L — SIGNIFICANT CHANGE UP (ref 3.5–5.3)
PROT SERPL-MCNC: 7.6 G/DL — SIGNIFICANT CHANGE UP (ref 6–8.3)
PROT UR-MCNC: NEGATIVE MG/DL — SIGNIFICANT CHANGE UP
RBC # BLD: 4.11 M/UL — SIGNIFICANT CHANGE UP (ref 3.8–5.2)
RBC # FLD: 12.1 % — SIGNIFICANT CHANGE UP (ref 10.3–14.5)
RBC CASTS # UR COMP ASSIST: 2 /HPF — SIGNIFICANT CHANGE UP (ref 0–4)
SODIUM SERPL-SCNC: 139 MMOL/L — SIGNIFICANT CHANGE UP (ref 135–145)
SP GR SPEC: 1.02 — SIGNIFICANT CHANGE UP (ref 1–1.03)
TROPONIN I, HIGH SENSITIVITY RESULT: 4.1 NG/L — SIGNIFICANT CHANGE UP
UROBILINOGEN FLD QL: 0.2 MG/DL — SIGNIFICANT CHANGE UP (ref 0.2–1)
WBC # BLD: 5.1 K/UL — SIGNIFICANT CHANGE UP (ref 3.8–10.5)
WBC # FLD AUTO: 5.1 K/UL — SIGNIFICANT CHANGE UP (ref 3.8–10.5)
WBC UR QL: 12 /HPF — HIGH (ref 0–5)

## 2023-06-26 PROCEDURE — 96374 THER/PROPH/DIAG INJ IV PUSH: CPT

## 2023-06-26 PROCEDURE — 99284 EMERGENCY DEPT VISIT MOD MDM: CPT | Mod: 25

## 2023-06-26 PROCEDURE — 80053 COMPREHEN METABOLIC PANEL: CPT

## 2023-06-26 PROCEDURE — 83690 ASSAY OF LIPASE: CPT

## 2023-06-26 PROCEDURE — 71045 X-RAY EXAM CHEST 1 VIEW: CPT | Mod: 26

## 2023-06-26 PROCEDURE — 81001 URINALYSIS AUTO W/SCOPE: CPT

## 2023-06-26 PROCEDURE — 85025 COMPLETE CBC W/AUTO DIFF WBC: CPT

## 2023-06-26 PROCEDURE — 83735 ASSAY OF MAGNESIUM: CPT

## 2023-06-26 PROCEDURE — 36415 COLL VENOUS BLD VENIPUNCTURE: CPT

## 2023-06-26 PROCEDURE — 70450 CT HEAD/BRAIN W/O DYE: CPT | Mod: 26,MA

## 2023-06-26 PROCEDURE — 82962 GLUCOSE BLOOD TEST: CPT

## 2023-06-26 PROCEDURE — 96361 HYDRATE IV INFUSION ADD-ON: CPT

## 2023-06-26 PROCEDURE — 99285 EMERGENCY DEPT VISIT HI MDM: CPT

## 2023-06-26 PROCEDURE — 84484 ASSAY OF TROPONIN QUANT: CPT

## 2023-06-26 PROCEDURE — 71045 X-RAY EXAM CHEST 1 VIEW: CPT

## 2023-06-26 PROCEDURE — 70450 CT HEAD/BRAIN W/O DYE: CPT | Mod: MA

## 2023-06-26 RX ORDER — METOCLOPRAMIDE HCL 10 MG
10 TABLET ORAL ONCE
Refills: 0 | Status: COMPLETED | OUTPATIENT
Start: 2023-06-26 | End: 2023-06-26

## 2023-06-26 RX ORDER — SODIUM CHLORIDE 9 MG/ML
1000 INJECTION INTRAMUSCULAR; INTRAVENOUS; SUBCUTANEOUS ONCE
Refills: 0 | Status: COMPLETED | OUTPATIENT
Start: 2023-06-26 | End: 2023-06-26

## 2023-06-26 RX ADMIN — SODIUM CHLORIDE 1000 MILLILITER(S): 9 INJECTION INTRAMUSCULAR; INTRAVENOUS; SUBCUTANEOUS at 19:59

## 2023-06-26 RX ADMIN — SODIUM CHLORIDE 1000 MILLILITER(S): 9 INJECTION INTRAMUSCULAR; INTRAVENOUS; SUBCUTANEOUS at 21:11

## 2023-06-26 RX ADMIN — Medication 10 MILLIGRAM(S): at 19:59

## 2023-06-26 NOTE — ED PROVIDER NOTE - OBJECTIVE STATEMENT
Patient is a 53-year-old female who has a history of anxiety depression, bipolar disorder.  Osteoporosis.  Breast cancer on hormonal therapy.  And history of Hashimoto's.  Status postlumpectomy of the right breast.  Denies smoker or drinker.  Primary care physician is Dr. Elian Giang.  For the past 24 to 36 hours she has had intermittent viselike headache, today at her place of employment which was hot and poorly air-conditioned she felt unwell, it felt hot.  And has someone who works with Monday she says she was having trouble counting.  She felt shaky and tremulous.  Says she had symptoms similar to this in the past diagnosed with heat exertion.  Today she also says she feels near faint with blurry vision, which is also not new.  The new symptom is in the bottom of the difficulty in concentrating and being able to count.  Brought in by her  for evaluation.  She denies any recent trauma or injury.  She denies any fevers or chills.  She denies any neck pain back pain neck stiffness photophobia.  She denies any fever chills ill contacts trauma or travel.

## 2023-06-26 NOTE — ED PROVIDER NOTE - MDM ORDERS SUBMITTED SELECTION
Quality 431: Preventive Care And Screening: Unhealthy Alcohol Use - Screening: Patient screened for unhealthy alcohol use using a single question and scores less than 2 times per year Quality 130: Documentation Of Current Medications In The Medical Record: Current Medications Documented Quality 110: Preventive Care And Screening: Influenza Immunization: Influenza Immunization previously received during influenza season Quality 111:Pneumonia Vaccination Status For Older Adults: Pneumococcal Vaccination Previously Received Detail Level: Detailed Labs/Imaging Studies/Medications

## 2023-06-26 NOTE — ED PROVIDER NOTE - CARE PROVIDER_API CALL
Elian Giang  Family Medicine  [address] [place] [address], [address]  [place] [address]  Phone: [phone]  Fax: [phone]  Follow Up Time:

## 2023-06-26 NOTE — ED PROVIDER NOTE - NSFOLLOWUPINSTRUCTIONS_ED_ALL_ED_FT
Rest   stay well hydrated avoiding heat and humidity  tylenol for discomfort  follow up with your doctor for re-evaluation  General Headache   A headache is pain or discomfort you feel around the head or neck area. There are many causes and types of headaches. In some cases, the cause may not be found.    Follow these instructions at home:  Watch your condition for any changes. Let your doctor know about them. Take these steps to help with your condition:    Managing pain    A bag of ice on a towel on the skin.   A heating pad for use on the painful area.   Take over-the-counter and prescription medicines only as told by your doctor. This includes medicines for pain that are taken by mouth or put on the skin.  Lie down in a dark, quiet room when you have a headache.  If told, put ice on your head and neck area:  Put ice in a plastic bag.  Place a towel between your skin and the bag.  Leave the ice on for 20 minutes, 2–3 times per day.  Take off the ice if your skin turns bright red. This is very important. If you cannot feel pain, heat, or cold, you have a greater risk of damage to the area.  If told, put heat on the affected area. Use the heat source that your doctor recommends, such as a moist heat pack or a heating pad.  Place a towel between your skin and the heat source.  Leave the heat on for 20–30 minutes.  Take off the heat if your skin turns bright red. This is very important. If you cannot feel pain, heat, or cold, you have a greater risk of getting burned.  Keep lights dim if bright lights bother you or make your headaches worse.  Eating and drinking    Eat meals on a regular schedule.  If you drink alcohol:  Limit how much you have to:  0–1 drink a day for women who are not pregnant.  0–2 drinks a day for men.  Know how much alcohol is in a drink. In the [place] one drink equals one 12 oz bottle of beer (355 mL), one 5 oz glass of wine (148 mL), or one 1½ oz glass of hard liquor (44 mL).  Stop drinking caffeine, or drink less caffeine.  General instructions    A person writing in a journal.   Keep a journal to find out if certain things bring on headaches. For example, write down:  What you eat and drink.  How much sleep you get.  Any change to your diet or medicines.  Get a massage or try other ways to relax.  Limit stress.  Sit up straight. Do not tighten (tense) your muscles.  Do not smoke or use any products that contain nicotine or tobacco. If you need help quitting, ask your doctor.  Exercise regularly as told by your doctor.  Get enough sleep. This often means 7–9 hours of sleep each night.  Keep all follow-up visits. This is important.  Contact a doctor if:  Medicine does not help your symptoms.  You have a headache that feels different than the other headaches.  You feel like you may vomit (nauseous) or you vomit.  You have a fever.  Get help right away if:  Your headache:  Gets very bad quickly.  Gets worse after a lot of physical activity.  You have any of these symptoms:  You continue to vomit.  A stiff neck.  Trouble seeing.  Your eye or ear hurts.  Trouble speaking.  Weak muscles or you lose muscle control.  You lose your balance or have trouble walking.  You feel like you will pass out (faint) or you pass out.  You are mixed up (confused).  You have a seizure.  These symptoms may be an emergency. Get help right away. Call your local emergency services (911 in the [place]  Do not wait to see if the symptoms will go away.  Do not drive yourself to the hospital.  Summary  A headache is pain or discomfort that is felt around the head or neck area.  There are many causes and types of headaches. In some cases, the cause may not be found.  Keep a journal to help find out what causes your headaches. Watch your condition for any changes. Let your doctor know about them.  Contact a doctor if you have a headache that is different from usual, or if medicine does not help your headache.  Get help right away if your headache gets very bad, you throw up, you have trouble seeing, you lose your balance, or you have a seizure.  This information is not intended to replace advice given to you by your health care provider. Make sure you discuss any questions you have with your health care provider.

## 2023-06-26 NOTE — ED PROVIDER NOTE - PATIENT PORTAL LINK FT
You can access the FollowMyHealth Patient Portal offered by [facility] by registering at the following website: [email or web address]. By joining [facility]’s FollowMyHealth portal, you will also be able to view your health information using other applications (apps) compatible with our system.

## 2023-06-26 NOTE — ED PROVIDER NOTE - PHYSICAL EXAMINATION
Patient is a well-appearing female who keeps her eyes covered with a bedsheet.  She does remove for the examination.  HEENT reveals normocephalic atraumatic head.  Normal vision.  No nystagmus.  No G/F/R.  No stridor.  Neck is supple.  Without meningismus.  No pallor or cyanosis.  Cardiopulmonary exam is unremarkable.  There is no murmur.  No respiratory distress.  Abdominal exam is soft and nontender without guarding or rebound.  Musculoskeletal exam is normal.  Patient has full range of motion without motor weakness without rigidity or hesitation.  She does have a lobotomy bruise to the left anterior cubital fossa which is from a blood draw she had in preparation for doctor's visit next week.  Her neurologic exam includes her NIHSS stroke score of 0 pleat.  Normal reflexes normal motor normal sensory, cranial nerves II through XII are intact.

## 2023-06-26 NOTE — ED ADULT NURSE NOTE - OBJECTIVE STATEMENT
53y female here for throbbing headahce for the past 24-36 hours. She states in the past she has experienced similar symptoms when the whether was extremely hot. Pt states today at work the room was poorly-air conditioned. States she started to feel unwell and started to have trouble counting. Did not pass out but felt faint with blurry vision. Has been treated for migraines but never diagnosed. Pmh of anxiety, bipolar disorder, depression, breast cancer on hormonal therapy, Hashimoto's, and osteoporosis.

## 2023-06-26 NOTE — ED PROVIDER NOTE - CLINICAL SUMMARY MEDICAL DECISION MAKING FREE TEXT BOX
Patient is a 53-year-old female with a history of breast cancer anxiety depression bipolar disorder on hormonal therapy who was in a hot work environment and has a history of heat sensitivity dehydration secondary to the heat.  Presents with difficulty concentrating head pain for 24 to 36 hours.  She has little nausea and was uncomfortable with the light.  But denied any vision changes blurry vision photophobia she has no meningismus symptoms on exam she is otherwise nontoxic-appearing with female.  Who removes the cover of over her eyes for the evaluation and her speech is clear.  Plan of care includes CT imaging to rule out mass or bleed given the history of breast cancer on hormonal therapy, IV hydration, antiemetic therapy for the nausea and the headache, baseline laboratory studies including magnesium and electrolytes renal function tests, CBC to rule out anemia.  EKG.  Orthostatic testing and urinalysis to rule out UTI.  We will disposition accordingly.  This chart was made with dictation software and may contain typographical errors.

## 2023-06-30 ENCOUNTER — APPOINTMENT (OUTPATIENT)
Dept: MAMMOGRAPHY | Facility: CLINIC | Age: 53
End: 2023-06-30

## 2023-06-30 ENCOUNTER — APPOINTMENT (OUTPATIENT)
Dept: ULTRASOUND IMAGING | Facility: CLINIC | Age: 53
End: 2023-06-30

## 2023-06-30 ENCOUNTER — APPOINTMENT (OUTPATIENT)
Dept: INFUSION THERAPY | Facility: HOSPITAL | Age: 53
End: 2023-06-30

## 2023-07-28 ENCOUNTER — APPOINTMENT (OUTPATIENT)
Dept: INFUSION THERAPY | Facility: HOSPITAL | Age: 53
End: 2023-07-28

## 2023-08-07 ENCOUNTER — NON-APPOINTMENT (OUTPATIENT)
Age: 53
End: 2023-08-07

## 2023-08-23 ENCOUNTER — OUTPATIENT (OUTPATIENT)
Dept: OUTPATIENT SERVICES | Facility: HOSPITAL | Age: 53
LOS: 1 days | Discharge: HOME | End: 2023-08-23

## 2023-08-23 DIAGNOSIS — C50.919 MALIGNANT NEOPLASM OF UNSPECIFIED SITE OF UNSPECIFIED FEMALE BREAST: ICD-10-CM

## 2023-08-23 DIAGNOSIS — M67.432 GANGLION, LEFT WRIST: Chronic | ICD-10-CM

## 2023-08-23 DIAGNOSIS — Z30.430 ENCOUNTER FOR INSERTION OF INTRAUTERINE CONTRACEPTIVE DEVICE: Chronic | ICD-10-CM

## 2023-08-23 DIAGNOSIS — Z98.890 OTHER SPECIFIED POSTPROCEDURAL STATES: Chronic | ICD-10-CM

## 2023-08-25 ENCOUNTER — APPOINTMENT (OUTPATIENT)
Dept: INFUSION THERAPY | Facility: HOSPITAL | Age: 53
End: 2023-08-25

## 2023-09-22 ENCOUNTER — APPOINTMENT (OUTPATIENT)
Dept: INFUSION THERAPY | Facility: HOSPITAL | Age: 53
End: 2023-09-22

## 2023-09-22 ENCOUNTER — APPOINTMENT (OUTPATIENT)
Dept: HEMATOLOGY ONCOLOGY | Facility: CLINIC | Age: 53
End: 2023-09-22
Payer: COMMERCIAL

## 2023-09-22 VITALS
RESPIRATION RATE: 16 BRPM | HEART RATE: 80 BPM | WEIGHT: 164.22 LBS | DIASTOLIC BLOOD PRESSURE: 71 MMHG | OXYGEN SATURATION: 97 % | SYSTOLIC BLOOD PRESSURE: 107 MMHG | BODY MASS INDEX: 28.71 KG/M2 | TEMPERATURE: 97.1 F

## 2023-09-22 PROCEDURE — 99214 OFFICE O/P EST MOD 30 MIN: CPT

## 2023-10-20 ENCOUNTER — APPOINTMENT (OUTPATIENT)
Dept: INFUSION THERAPY | Facility: HOSPITAL | Age: 53
End: 2023-10-20

## 2023-11-01 RX ORDER — ANASTROZOLE TABLETS 1 MG/1
1 TABLET ORAL
Qty: 90 | Refills: 3 | Status: ACTIVE | COMMUNITY
Start: 2019-04-18 | End: 1900-01-01

## 2023-11-06 ENCOUNTER — OUTPATIENT (OUTPATIENT)
Dept: OUTPATIENT SERVICES | Facility: HOSPITAL | Age: 53
LOS: 1 days | Discharge: HOME | End: 2023-11-06

## 2023-11-06 DIAGNOSIS — Z30.430 ENCOUNTER FOR INSERTION OF INTRAUTERINE CONTRACEPTIVE DEVICE: Chronic | ICD-10-CM

## 2023-11-06 DIAGNOSIS — Z98.890 OTHER SPECIFIED POSTPROCEDURAL STATES: Chronic | ICD-10-CM

## 2023-11-06 DIAGNOSIS — M67.432 GANGLION, LEFT WRIST: Chronic | ICD-10-CM

## 2023-11-06 DIAGNOSIS — C50.919 MALIGNANT NEOPLASM OF UNSPECIFIED SITE OF UNSPECIFIED FEMALE BREAST: ICD-10-CM

## 2023-11-17 ENCOUNTER — APPOINTMENT (OUTPATIENT)
Dept: INFUSION THERAPY | Facility: HOSPITAL | Age: 53
End: 2023-11-17

## 2023-11-20 DIAGNOSIS — Z51.11 ENCOUNTER FOR ANTINEOPLASTIC CHEMOTHERAPY: ICD-10-CM

## 2023-11-27 ENCOUNTER — APPOINTMENT (OUTPATIENT)
Dept: CT IMAGING | Facility: CLINIC | Age: 53
End: 2023-11-27
Payer: COMMERCIAL

## 2023-11-27 PROCEDURE — 75571 CT HRT W/O DYE W/CA TEST: CPT

## 2023-12-05 NOTE — ED PROVIDER NOTE - NSFOLLOWUPINSTRUCTIONS_ED_ALL_ED_FT
Chief Complaint: Urinary Frequency    Subjective         History of Present Illness  Jai Dow is a 62 y.o. male presents to [facility] to be seen for f/u urinary urgency.    At last visit we started him on gemtesa.     He states that the gemtesa was not covered.     He was started on myrbetriq due to gemtesa not being covered.     He states the gemtesa was working well. He was having less leakage.    He states his stream is better.     He is stil having leakge in the AM.    He is still with urgency and frequency during the day and night.    [facility].    He has been on myrbetriq for 1 week at this time.       Previous:   He states that he is with urgency and still with urge incontinence.    He sates he is having constipation.    Still with weak and split stream.    He began last summer with neurologic symptoms and  has been going through testing. He he was DX with ms.    He states that that he has symptoms that come and go with urinary urgncy and frequency.     He reports nocturia x 2-3.     Stream is split and weak at times.     He does have to strain to void at times.     Some hesitancy.     He was placed on oxynutynin by dr. Vela 1 week ago.        Objective     Past Medical History:   Diagnosis Date    Acoustic neuroma 8/8/2023    Cluster headache 2003    [facility]    Difficulty walking 2018    Knee replacements    Dizziness 1990 and 2003    Deployments to [place] and [place]    Head injury 2001    In the [employer] in the back of a track vehicle    Headache, tension-type 2003    [facility]    Hypertension 2006    Memory loss 2001    Increasing, memory loss Family members names, co-workers, recent projects    Migraine     Migraine     Multiple sclerosis 12/22/2022    PTSD (post-traumatic stress disorder)     Sleep apnea     Stop using the machine/felt like I couldn’t breath  during nightmares    Tinnitus     1990 and 2003    Vision loss 2022     Blurred       Past Surgical History:   Procedure Laterality Date    COLONOSCOPY  09/2015    COLONOSCOPY N/A 8/7/2023    Procedure: COLONOSCOPY;  Surgeon: Alexis Bazzi MD;  Location: [facility] ENDOSCOPY;  Service: Gastroenterology;  Laterality: N/A;  HEMORRHOIDS    KNEE ACL RECONSTRUCTION      MENISCECTOMY      REPLACEMENT TOTAL KNEE Bilateral          Current Outpatient Medications:     amitriptyline (ELAVIL) 25 MG tablet, Take 2 tablets by mouth Every Night., Disp: , Rfl:     atenolol (TENORMIN) 50 MG tablet, Take 1 tablet by mouth Daily., Disp: , Rfl:     atorvastatin (LIPITOR) 10 MG tablet, Take 1 tablet by mouth Daily., Disp: , Rfl:     clobetasol (TEMOVATE) 0.05 % ointment, , Disp: , Rfl:     cyclobenzaprine (FLEXERIL) 10 MG tablet, Take 1 tablet by mouth 3 (Three) Times a Day As Needed., Disp: , Rfl:     Dalfampridine ER 10 MG tablet sustained-release 12 hour, Take 10 mg by mouth 2 (Two) Times a Day., Disp: 180 tablet, Rfl: 3    Emollient (AQUAPHOR OINTMENT BODY EX), Apply  topically., Disp: , Rfl:     erythromycin (ROMYCIN) 5 MG/GM ophthalmic ointment, Administer  to the right eye Every Night., Disp: 3.5 g, Rfl: 0    Fremanezumab-vfrm (Ajovy) 225 MG/1.5ML solution auto-injector, Inject 225 mg under the skin into the appropriate area as directed Every 28 (Twenty-Eight) Days., Disp: 4.5 mL, Rfl: 3    hydroCHLOROthiazide (HYDRODIURIL) 25 MG tablet, Take 1 tablet by mouth Daily., Disp: 90 tablet, Rfl: 1    hydrocortisone 2.5 % cream, , Disp: , Rfl:     ibuprofen (ADVIL,MOTRIN) 800 MG tablet, ibuprofen 800 mg oral tablet take 1 tablet (800 mg) by oral route 3 times per day with food   Active, Disp: , Rfl:     lisinopril (PRINIVIL,ZESTRIL) 40 MG tablet, Take 1 tablet by mouth Daily., Disp: 90 tablet, Rfl: 1    Mirabegron ER (Myrbetriq) 25 MG tablet sustained-release 24 hour 24 hr tablet, Take 1 tablet by mouth Daily., Disp: 30 tablet, Rfl: 3    Mirabegron ER (Myrbetriq) 25 MG tablet sustained-release 24  hour 24 hr tablet, Take 1 tablet by mouth Daily., Disp: 84 tablet, Rfl: 0    mupirocin (BACTROBAN) 2 % ointment, Apply  topically to the appropriate area as directed 3 (Three) Times a Day. apply topically to the affected area three times daily, Disp: , Rfl:     naproxen sodium (ANAPROX) 275 MG tablet, Take 1 tablet by mouth 2 (Two) Times a Day With Meals., Disp: , Rfl:     neomycin-polymyxin-dexamethamethasone (POLYDEX) 3.5-90778-4.1 ointment ophthalmic ointment, APPLY TO RIGHT EYELID THREE TIMES DAILY FOR 2 WEEKS THEN STOP, Disp: , Rfl:     Ocrelizumab (OCREVUS IV), , Disp: , Rfl:     Omega-3 Fatty Acids (fish oil) 1000 MG capsule capsule, Take 1 capsule by mouth Daily With Breakfast., Disp: , Rfl:     ondansetron (ZOFRAN) 4 MG tablet, Zofran 4 mg oral tablet take 1 tablet by oral route PRN for migraines   Active, Disp: , Rfl:     prazosin (MINIPRESS) 2 MG capsule, Take 1 capsule by mouth Daily., Disp: , Rfl:     pregabalin (Lyrica) 150 MG capsule, Take 1 capsule by mouth 2 (Two) Times a Day., Disp: 60 capsule, Rfl: 2    Rimegepant Sulfate (Nurtec) 75 MG tablet dispersible tablet, Take 1 tablet by mouth Take As Directed., Disp: , Rfl:     sertraline (ZOLOFT) 100 MG tablet, 2 tablets., Disp: , Rfl:     sildenafil (Viagra) 25 MG tablet, Take 1 tablet by mouth Daily As Needed for Erectile Dysfunction., Disp: 30 tablet, Rfl: 1    urea (CARMOL) 20 % cream, Apply 1 application  topically to the appropriate area as directed As Needed for Dry Skin., Disp: , Rfl:     Vibegron 75 MG tablet, Take 1 tablet by mouth Daily. (Patient not taking: Reported on 12/5/2023), Disp: 90 tablet, Rfl: 3    Current Facility-Administered Medications:     Fremanezumab-vfrm solution prefilled syringe 225 mg, 225 mg, Subcutaneous, Once, OropillaLeopoldo MD    Allergies   Allergen Reactions    Amoxil [Amoxicillin] Angioedema        Family History   Problem Relation Age of Onset    Dementia Mother     Stroke Father     Hypertension Father   "   Osteoarthritis Father     Stroke Brother     Migraines Brother        Social History     Socioeconomic History    Marital status:    Tobacco Use    Smoking status: Former     Packs/day: 0.50     Years: 10.00     Additional pack years: 0.00     Total pack years: 5.00     Types: Cigarettes     Start date: 3/31/1980     Quit date: 6/10/1990     Years since quittin.5     Passive exposure: Past    Smokeless tobacco: Never   Vaping Use    Vaping Use: Never used   Substance and Sexual Activity    Alcohol use: Not Currently     Alcohol/week: 5.0 - 9.0 standard drinks of alcohol     Types: 1 - 2 Cans of beer, 4 - 7 Shots of liquor per week     Comment: Drank everyday after the  War    Drug use: Never    Sexual activity: Yes     Partners: Female     Birth control/protection: None       Vital Signs:   /97 (BP Location: Right arm, Patient Position: Sitting)   Ht 176.5 cm (69.5\")   Wt 102 kg (225 lb 3.2 oz)   BMI 32.78 kg/m²      Physical Exam     Result Review :   The following data was reviewed by: SARATH Gottlieb on 10/13/2023:  Results for orders placed or performed in visit on 10/13/23   Bladder Scan   Result Value Ref Range    Urine Volume 0    POC Urinalysis Dipstick, Automated    Specimen: Urine   Result Value Ref Range    Color Yellow Yellow, Straw, Dark Yellow, Mariely    Clarity, UA Clear Clear    Specific Gravity  1.020 1.005 - 1.030    pH, Urine 6.0 5.0 - 8.0    Leukocytes Negative Negative    Nitrite, UA Negative Negative    Protein, POC Trace (A) Negative mg/dL    Glucose, UA Negative Negative mg/dL    Ketones, UA Negative Negative    Urobilinogen, UA Normal Normal, 0.2 E.U./dL    Bilirubin Small (1+) (A) Negative    Blood, UA Negative Negative    Lot Number [id number]     Expiration Date        PSA          2023    09:49   PSA   PSA 1.520               Procedures        Assessment and Plan    Diagnoses and all orders for this visit:    1. Urgency of urination " (Primary)    2. Overactive bladder        He will [facility] provider to [facility] to get gemtesa.     He will continue myrbetriq for now.     We will f/u in 3 months or sooner if needed.        I spent 10  minutes caring for Jai on this date of service. This time includes time spent by me in the following activities:reviewing tests, obtaining and/or reviewing a separately obtained history, performing a medically appropriate examination and/or evaluation , counseling and educating the patient/family/caregiver, ordering medications, tests, or procedures, and documenting information in the medical record  Follow Up   Return in about 3 months (around 3/5/2024) for f/u BPH/ OAB with PVR .  Patient was given instructions and counseling regarding his condition or for health maintenance advice. Please see specific information pulled into the AVS if appropriate.         This document has been electronically signed by SARATH Gottlieb  December 5, 2023 10:11 EST        -- Follow up with your primary care physician in 48 hours.    -- Return to the ER for worsening or persistent symptoms, and/or ANY NEW OR CONCERNING SYMPTOMS.    -- If you have difficulty following up, please call: [phone] to obtain a [facility] doctor or specialist who takes your insurance in your area.

## 2023-12-12 ENCOUNTER — APPOINTMENT (OUTPATIENT)
Dept: INFUSION THERAPY | Facility: HOSPITAL | Age: 53
End: 2023-12-12

## 2023-12-12 ENCOUNTER — APPOINTMENT (OUTPATIENT)
Dept: HEMATOLOGY ONCOLOGY | Facility: CLINIC | Age: 53
End: 2023-12-12
Payer: COMMERCIAL

## 2023-12-12 VITALS
HEART RATE: 84 BPM | OXYGEN SATURATION: 95 % | TEMPERATURE: 98.5 F | SYSTOLIC BLOOD PRESSURE: 130 MMHG | RESPIRATION RATE: 16 BRPM | DIASTOLIC BLOOD PRESSURE: 79 MMHG | WEIGHT: 164 LBS | BODY MASS INDEX: 28.67 KG/M2

## 2023-12-12 DIAGNOSIS — R63.5 ABNORMAL WEIGHT GAIN: ICD-10-CM

## 2023-12-12 DIAGNOSIS — R53.83 OTHER FATIGUE: ICD-10-CM

## 2023-12-12 PROCEDURE — 99214 OFFICE O/P EST MOD 30 MIN: CPT

## 2023-12-13 PROBLEM — R63.5 WEIGHT GAIN: Status: ACTIVE | Noted: 2023-12-13

## 2023-12-13 PROBLEM — R53.83 FATIGUE, UNSPECIFIED TYPE: Status: ACTIVE | Noted: 2023-12-13

## 2023-12-13 NOTE — PHYSICAL EXAM
[Fully active, able to carry on all pre-disease performance without restriction] : Status 0 - Fully active, able to carry on all pre-disease performance without restriction [Normal] : affect appropriate [de-identified] : No skin or nipple changes either breast.  Right breast with well healed surgical scar, no discrete palpable masses, no palpable axillary nodes.   Left breast with no discrete palpable masses, no palpable axillary nodes.

## 2023-12-13 NOTE — HISTORY OF PRESENT ILLNESS
[Disease: _____________________] : Disease: [unfilled] [T: ___] : T[unfilled] [N: ___] : N[unfilled] [M: ___] : M[unfilled] [AJCC Stage: ____] : AJCC Stage: [unfilled] [de-identified] : Pt seen for an initial visit by bella griffin 7/6/22 in order to transfer her care to me from that of Dr Shipman and before that Dr Horn. The history below is as per review of the AEHR notes of these oncologists and review / confirmation with the pt. \par  \par  5/22/18 screening mammo with 1.5cm R breast mass; diagnostic mammo and US confirmed this mass RUOQ 1.6cm at 10:00, hypoechoic. \par  \par  US guided core biopsy of the mass was performed 6/1/18, which revealed 0.5 cm invasive ductal carcinoma, Lynette score 7/9, with nuclear grade 2 cribriform type DCIS with central necrosis and calcifications, estrogen receptor 95% strong, progesterone receptor 3-5% strong, HER-2 IHC 2+ negative by [facility]. \par  \par  MRI of the bilateral breasts on 06/25/2018 showed a 1.7 X 1.6 X 1.8 cm focal area of non-mass enhancement with washout kinetics and biopsy marker at the upper outer posterior 10:00 position of the right breast. There were no other suspicious lesions in the right breast and no suspicious lesions in the left breast. There were no axillary or internal mammary lymphadenopathy. \par  \par  She saw Dr. Heller who performed CXR, nuclear bone scan, pelvic and abdominal sonograms, which were negative. \par  \par  On 8/15/18 she underwent a right lumpectomy with SLNB - pathology with 1.1 cm invasive ductal carcinoma, [place] 7-8/9 and no LVI. 0/2 LN involved. \par  There was also a 1.5 cm nuclear grade 2 - 3 ductal carcinoma in situ, cribriform and solid type with comedonecrosis and calcifications. Regarding the margins, the invasive tumor was 0.15 mm from the deep margin. Ductal carcinoma in situ was noted within microns of the deep margin and less than 0.1 mm from the anterior and superior margins. Two sentinel nodes were examined and negative. \par  \par  Oncotype score 29 (20% risk of distant recurrence with tamoxifen alone). TC x 4 recommended. Patient elected to utilize cold cap.\par  \par  She saw radiation oncology in consultation and was recommended for adjuvant radiation pending medical oncology recommendations. \par  \par  10/24-10/26 admitted to [facility].\par  ~9pm just after driving home from getting food,  witness LOC with seizure activity x 20 seconds followed by lethargy - EMS called.\par  She was taken to [facility] ER and admitted to ICU; sodium 119 with evidence of aspiration on CXR/CTA chest, O2 sat 86% briefly requiring BIPAP support quickly weaned. Neurology, Pulmonary, Renal consults following. Sodium corrected over the next 24h with normal saline infusion, antibiotics discontinued as no further evidence of infection. Neulasta onpro administered as scheduled 10/25 afternoon. She had a CTH and MRI Brain w/wo contrast which showed no acute findings. Zoloft was held, and she was discharged home. Incidental note of possible compression deformity of thoracic spine on imaging while inpatient. She admits to drinking ~4L water on day of chemotherapy as advised by cold cap literature. Her  recalls in retrospect that she seemed overly sleepy and slightly confused (forgot she had chinese food for lunch, eyes closing while sitting up) on the afternoon/evening after treatment prior to seizure. CK and LFTs elevated post discharge, continued to downtrend over the next few days to normal.\par  \par  She underwent cycle 3 of TC followed by 24 hours of observation at [facility] inpatient with neuro checks and q4h BMP monitoring. Her sodium remained normal and she was asymptomatic. She completed her final cycle of TC on 12/5/18 without event.\par  \par  Adjuvant radiation initiated 2/4/2019 and completed 3/2/19.\par  \par  Lupron for ovarian suppression initiated 2/2019. Arimidex initiated April 2019.\par  \par  My Risk panel on 6/13/2018 was negative.  FHx significant for father with prostate cancer in 60s, paternal cousin with leukemia age 13 and breast cancer age 45. [de-identified] : T1cN0 [de-identified] : Oncotype 29 [de-identified] : ER+VA-HER2- [Treatment Protocol] : Treatment Protocol [Therapy: ___] : Therapy: [unfilled] [FreeTextEntry1] : x 4 cycles, with Penguin cold cap [de-identified] : The patient is here for routine f/u.  She remains on Lupron since 2/19 and anastrozole since 4/19.   She c/o increased fatigue and wt gain - "I am eating less and gaining weight".     She denies any other treatment related complaints.   She has history of Hashimoto thyroiditis followed by endocrinology.    She states she is feeling well with no complaints.    Notes a good appetite, increased wt and stable performance status.     Mammo/sono, 6/22/23- BIRADS 2  DEXA, 6/22- Osteoporosis, on alendronate

## 2023-12-18 ENCOUNTER — APPOINTMENT (OUTPATIENT)
Dept: OBGYN | Facility: CLINIC | Age: 53
End: 2023-12-18

## 2024-01-09 ENCOUNTER — APPOINTMENT (OUTPATIENT)
Dept: INFUSION THERAPY | Facility: HOSPITAL | Age: 54
End: 2024-01-09

## 2024-01-09 ENCOUNTER — OUTPATIENT (OUTPATIENT)
Dept: OUTPATIENT SERVICES | Facility: HOSPITAL | Age: 54
LOS: 1 days | Discharge: HOME | End: 2024-01-09

## 2024-01-09 DIAGNOSIS — Z30.430 ENCOUNTER FOR INSERTION OF INTRAUTERINE CONTRACEPTIVE DEVICE: Chronic | ICD-10-CM

## 2024-01-09 DIAGNOSIS — Z98.890 OTHER SPECIFIED POSTPROCEDURAL STATES: Chronic | ICD-10-CM

## 2024-01-09 DIAGNOSIS — C50.919 MALIGNANT NEOPLASM OF UNSPECIFIED SITE OF UNSPECIFIED FEMALE BREAST: ICD-10-CM

## 2024-01-09 DIAGNOSIS — M67.432 GANGLION, LEFT WRIST: Chronic | ICD-10-CM

## 2024-01-10 DIAGNOSIS — Z51.11 ENCOUNTER FOR ANTINEOPLASTIC CHEMOTHERAPY: ICD-10-CM

## 2024-01-13 ENCOUNTER — EMERGENCY (EMERGENCY)
Facility: HOSPITAL | Age: 54
LOS: 1 days | Discharge: HOME | End: 2024-01-13
Attending: EMERGENCY MEDICINE | Admitting: EMERGENCY MEDICINE
Payer: COMMERCIAL

## 2024-01-13 VITALS
RESPIRATION RATE: 16 BRPM | OXYGEN SATURATION: 98 % | HEIGHT: 63 IN | SYSTOLIC BLOOD PRESSURE: 114 MMHG | HEART RATE: 75 BPM | WEIGHT: 160.06 LBS | TEMPERATURE: 97 F | DIASTOLIC BLOOD PRESSURE: 78 MMHG

## 2024-01-13 VITALS
DIASTOLIC BLOOD PRESSURE: 84 MMHG | TEMPERATURE: 98 F | HEART RATE: 73 BPM | RESPIRATION RATE: 18 BRPM | OXYGEN SATURATION: 96 % | SYSTOLIC BLOOD PRESSURE: 123 MMHG

## 2024-01-13 DIAGNOSIS — M67.432 GANGLION, LEFT WRIST: Chronic | ICD-10-CM

## 2024-01-13 DIAGNOSIS — Z30.430 ENCOUNTER FOR INSERTION OF INTRAUTERINE CONTRACEPTIVE DEVICE: Chronic | ICD-10-CM

## 2024-01-13 DIAGNOSIS — Z98.890 OTHER SPECIFIED POSTPROCEDURAL STATES: Chronic | ICD-10-CM

## 2024-01-13 PROCEDURE — 90715 TDAP VACCINE 7 YRS/> IM: CPT

## 2024-01-13 PROCEDURE — 72125 CT NECK SPINE W/O DYE: CPT | Mod: 26,MA

## 2024-01-13 PROCEDURE — 99284 EMERGENCY DEPT VISIT MOD MDM: CPT | Mod: 25

## 2024-01-13 PROCEDURE — 90471 IMMUNIZATION ADMIN: CPT

## 2024-01-13 PROCEDURE — 99284 EMERGENCY DEPT VISIT MOD MDM: CPT

## 2024-01-13 PROCEDURE — 70450 CT HEAD/BRAIN W/O DYE: CPT | Mod: MA

## 2024-01-13 PROCEDURE — 70450 CT HEAD/BRAIN W/O DYE: CPT | Mod: 26,MA

## 2024-01-13 PROCEDURE — 72125 CT NECK SPINE W/O DYE: CPT | Mod: MA

## 2024-01-13 RX ORDER — IBUPROFEN 200 MG
600 TABLET ORAL ONCE
Refills: 0 | Status: COMPLETED | OUTPATIENT
Start: 2024-01-13 | End: 2024-01-13

## 2024-01-13 RX ORDER — TETANUS TOXOID, REDUCED DIPHTHERIA TOXOID AND ACELLULAR PERTUSSIS VACCINE, ADSORBED 5; 2.5; 8; 8; 2.5 [IU]/.5ML; [IU]/.5ML; UG/.5ML; UG/.5ML; UG/.5ML
0.5 SUSPENSION INTRAMUSCULAR ONCE
Refills: 0 | Status: COMPLETED | OUTPATIENT
Start: 2024-01-13 | End: 2024-01-13

## 2024-01-13 RX ADMIN — TETANUS TOXOID, REDUCED DIPHTHERIA TOXOID AND ACELLULAR PERTUSSIS VACCINE, ADSORBED 0.5 MILLILITER(S): 5; 2.5; 8; 8; 2.5 SUSPENSION INTRAMUSCULAR at 14:50

## 2024-01-13 RX ADMIN — Medication 600 MILLIGRAM(S): at 16:54

## 2024-01-13 RX ADMIN — Medication 600 MILLIGRAM(S): at 17:43

## 2024-01-13 NOTE — ED PROVIDER NOTE - IV ALTEPLASE EXCL ABS HIDDEN
show Transposition Flap Text: The defect edges were debeveled with a #15 scalpel blade.  Given the location of the defect and the proximity to free margins a transposition flap was deemed most appropriate.  Using a sterile surgical marker, an appropriate transposition flap was drawn incorporating the defect.    The area thus outlined was incised deep to adipose tissue with a #15 scalpel blade.  The skin margins were undermined to an appropriate distance in all directions utilizing iris scissors.

## 2024-01-13 NOTE — ED PROVIDER NOTE - NSFOLLOWUPINSTRUCTIONS_ED_ALL_ED_FT
Head Injury, Adult    There are many types of head injuries. They can be as minor as a small bump. Some head injuries can be worse. Worse injuries include:  A strong hit to the head that shakes the brain back and forth, causing damage (concussion).  A bruise (contusion) of the brain. This means there is bleeding in the brain that can cause swelling.  A cracked skull (skull fracture).  Bleeding in the brain that gathers, gets thick (makes a clot), and forms a bump (hematoma).  Most problems from a head injury come in the first 24 hours. However, you may still have side effects up to 7–10 days after your injury. It is important to watch your condition for any changes. You may need to be watched in the emergency department or urgent care, or you may need to stay in the hospital.    What are the causes?  There are many possible causes of a head injury. A serious head injury may be caused by:  A car accident.  Bicycle or motorcycle accidents.  Sports injuries.  Falls.  Being hit by an object.  What are the signs or symptoms?  Symptoms of a head injury include a bruise, bump, or bleeding where the injury happened. Other physical symptoms may include:  Headache.  Feeling like you may vomit (nauseous) or vomiting.  Dizziness.  Blurred or double vision.  Being uncomfortable around bright lights or loud noises.  Shaking movements that you cannot control (seizures).  Feeling tired.  Trouble being woken up.  Fainting or loss of consciousness.  Mental or emotional symptoms may include:  Feeling grumpy or cranky.  Confusion and memory problems.  Having trouble paying attention or concentrating.  Changes in eating or sleeping habits.  Feeling worried or nervous (anxious).  Feeling sad (depressed).  How is this treated?  Treatment for this condition depends on how severe the injury is and the type of injury you have. The main goal is to prevent problems and to allow the brain time to heal.    Mild head injury    If you have a mild head injury, you may be sent home, and treatment may include:  Being watched. A responsible adult should stay with you for 24 hours after your injury and check on you often.  Physical rest.  Brain rest.  Pain medicines.  Severe head injury    If you have a severe head injury, treatment may include:  Being watched closely. This includes staying in the hospital.  Medicines to:  Help with pain.  Prevent seizures.  Help with brain swelling.  Protecting your airway and using a machine that helps you breathe (ventilator).  Treatments to watch for and manage swelling inside the brain.  Brain surgery. This may be needed to:  Remove a collection of blood or blood clots.  Stop the bleeding.  Remove a part of the skull. This allows room for the brain to swell.  Follow these instructions at home:  Activity    Rest.  Avoid activities that are hard or tiring.  Make sure you get enough sleep.  Let your brain rest. Do this by limiting activities that need a lot of thought or attention, such as:  Watching TV.  Playing memory games and puzzles.  Job-related work or homework.  Working on the computer, social media, and texting.  Avoid activities that could cause another head injury until your doctor says it is okay. This includes playing sports. Having another head injury, especially before the first one has healed, can be dangerous.  Ask your doctor when it is safe for you to go back to your normal activities, such as work or school. Ask your doctor for a step-by-step plan for slowly going back to your normal activities.  Ask your doctor when you can drive, ride a bicycle, or use heavy machinery. Do not do these activities if you are dizzy.  Lifestyle      Do not drink alcohol until your doctor says it is okay.  Do not use drugs.  If it is harder than usual to remember things, write them down.  If you are easily distracted, try to do one thing at a time.  Talk with family members or close friends when making important decisions.  Tell your friends, family, a trusted co-worker, and  about your injury, symptoms, and limits (restrictions). Have them watch for any problems that are new or getting worse.  General instructions    Take over-the-counter and prescription medicines only as told by your doctor.  Have someone stay with you for 24 hours after your head injury. This person should watch you for any changes in your symptoms and be ready to get help.  Keep all follow-up visits as told by your doctor. This is important.  How is this prevented?    Work on your balance and strength. This can help you avoid falls.  Wear a seat belt when you are in a moving vehicle.  Wear a helmet when you:  Ride a bicycle.  Ski.  Do any other sport or activity that has a risk of injury.  If you drink alcohol:  Limit how much you use to:  0–1 drink a day for nonpregnant women.  0–2 drinks a day for men.  Be aware of how much alcohol is in your drink. In the [place] one drink equals one 12 oz bottle of beer (355 mL), one 5 oz glass of wine (148 mL), or one 1½ oz glass of hard liquor (44 mL).  Make your home safer by:  Getting rid of clutter from the floors and stairs. This includes things that can make you trip.  Using grab bars in bathrooms and handrails by stairs.  Placing non-slip mats on floors and in bathtubs.  Putting more light in dim areas.  Where to find more information  Centers for Disease Control and Prevention: www.cdc.gov  Get help right away if:  You have:  A very bad headache that is not helped by medicine.  Trouble walking or weakness in your arms and legs.  Clear or bloody fluid coming from your nose or ears.  Changes in how you see (vision).  A seizure.  More confusion or more grumpy moods.  Your symptoms get worse.  You are sleepier than normal and have trouble staying awake.  You lose your balance.  The black centers of your eyes (pupils) change in size.  Your speech is slurred.  Your dizziness gets worse.  You vomit.  These symptoms may be an emergency. Do not wait to see if the symptoms will go away. Get medical help right away. Call your local emergency services (911 in the [place] Do not drive yourself to the hospital.    Summary  Head injuries can be as minor as a small bump. Some head injuries can be worse.  Treatment for this condition depends on how severe the injury is and the type of injury you have.  Have someone stay with you for 24 hours after your head injury.  Ask your doctor when it is safe for you to go back to your normal activities, such as work or school.  To prevent a head injury, wear a seat belt in a car, wear a helmet when you use a bicycle, limit your alcohol use, and make your home safer.  This information is not intended to replace advice given to you by your health care provider. Make sure you discuss any questions you have with your health care provider.    Document Revised: 10/30/2020 Document Reviewed: 10/30/2020  Elsevier Patient Education © 2023 [employer]

## 2024-01-13 NOTE — ED PROVIDER NOTE - CLINICAL SUMMARY MEDICAL DECISION MAKING FREE TEXT BOX
pt s/p mechanical trip and fall with posterior head trauma. no blood thinners.  CT, Tdap, pain control

## 2024-01-13 NOTE — ED ADULT NURSE NOTE - OBJECTIVE STATEMENT
Patient received complaining of head injury, stated she hit her head on a metal cabinet while trying to stand up. Denies LoC

## 2024-01-13 NOTE — ED ADULT TRIAGE NOTE - CHIEF COMPLAINT QUOTE
Pt states" I hit the back of my head on a metal cabinet after raising up from a bended position. " Pt denies LOC

## 2024-01-13 NOTE — ED PROVIDER NOTE - PATIENT PORTAL LINK FT
You can access the FollowMyHealth Patient Portal offered by [facility] by registering at the following website: [email or web address]. By joining [employer]’s FollowMyHealth portal, you will also be able to view your health information using other applications (apps) compatible with our system. You can access the FollowMyHealth Patient Portal offered by [facility] by registering at the following website: [email or web address]. By joining [employer]’s FollowMyHealth portal, you will also be able to view your health information using other applications (apps) compatible with our system.

## 2024-01-13 NOTE — ED ADULT NURSE NOTE - NSFALLUNIVINTERV_ED_ALL_ED
Bed/Stretcher in lowest position, wheels locked, appropriate side rails in place/Call bell, personal items and telephone in reach/Instruct patient to call for assistance before getting out of bed/chair/stretcher/Non-slip footwear applied when patient is off stretcher/[place] to call system/Physically safe environment - no spills, clutter or unnecessary equipment/Purposeful proactive rounding/Room/bathroom lighting operational, light cord in reach Bed/Stretcher in lowest position, wheels locked, appropriate side rails in place/Call bell, personal items and telephone in reach/Instruct patient to call for assistance before getting out of bed/chair/stretcher/Non-slip footwear applied when patient is off stretcher/[place] to call system/Physically safe environment - no spills, clutter or unnecessary equipment/Purposeful proactive rounding/Room/bathroom lighting operational, light cord in reach

## 2024-01-13 NOTE — ED PROVIDER NOTE - OBJECTIVE STATEMENT
53-year-old female with no significant past medical history presents to the ED with complaints of head contusion status post mechanical trip and fall hitting the back of her head on a .  Patient denies any blood thinners or LOC.

## 2024-02-06 ENCOUNTER — APPOINTMENT (OUTPATIENT)
Dept: INFUSION THERAPY | Facility: HOSPITAL | Age: 54
End: 2024-02-06

## 2024-02-08 ENCOUNTER — APPOINTMENT (OUTPATIENT)
Dept: RADIOLOGY | Facility: IMAGING CENTER | Age: 54
End: 2024-02-08
Payer: COMMERCIAL

## 2024-02-08 ENCOUNTER — OUTPATIENT (OUTPATIENT)
Dept: OUTPATIENT SERVICES | Facility: HOSPITAL | Age: 54
LOS: 1 days | End: 2024-02-08
Payer: COMMERCIAL

## 2024-02-08 ENCOUNTER — APPOINTMENT (OUTPATIENT)
Dept: PULMONOLOGY | Facility: CLINIC | Age: 54
End: 2024-02-08
Payer: COMMERCIAL

## 2024-02-08 ENCOUNTER — APPOINTMENT (OUTPATIENT)
Dept: CT IMAGING | Facility: IMAGING CENTER | Age: 54
End: 2024-02-08
Payer: COMMERCIAL

## 2024-02-08 VITALS
HEART RATE: 79 BPM | SYSTOLIC BLOOD PRESSURE: 123 MMHG | RESPIRATION RATE: 17 BRPM | BODY MASS INDEX: 28.35 KG/M2 | OXYGEN SATURATION: 97 % | HEIGHT: 63.43 IN | DIASTOLIC BLOOD PRESSURE: 86 MMHG | WEIGHT: 162 LBS

## 2024-02-08 DIAGNOSIS — K21.9 GASTRO-ESOPHAGEAL REFLUX DISEASE W/OUT ESOPHAGITIS: ICD-10-CM

## 2024-02-08 DIAGNOSIS — J47.9 BRONCHIECTASIS, UNCOMPLICATED: ICD-10-CM

## 2024-02-08 DIAGNOSIS — Z79.811 LONG TERM (CURRENT) USE OF AROMATASE INHIBITORS: ICD-10-CM

## 2024-02-08 DIAGNOSIS — Z79.818 LONG TERM (CURRENT) USE OF OTHER AGENTS AFFECTING ESTROGEN RECEPTORS AND ESTROGEN LEVELS: ICD-10-CM

## 2024-02-08 DIAGNOSIS — J30.2 OTHER SEASONAL ALLERGIC RHINITIS: ICD-10-CM

## 2024-02-08 DIAGNOSIS — J45.40 MODERATE PERSISTENT ASTHMA, UNCOMPLICATED: ICD-10-CM

## 2024-02-08 DIAGNOSIS — Z30.430 ENCOUNTER FOR INSERTION OF INTRAUTERINE CONTRACEPTIVE DEVICE: Chronic | ICD-10-CM

## 2024-02-08 DIAGNOSIS — Z23 ENCOUNTER FOR IMMUNIZATION: ICD-10-CM

## 2024-02-08 DIAGNOSIS — Z98.890 OTHER SPECIFIED POSTPROCEDURAL STATES: Chronic | ICD-10-CM

## 2024-02-08 DIAGNOSIS — J31.0 CHRONIC RHINITIS: ICD-10-CM

## 2024-02-08 PROCEDURE — 71250 CT THORAX DX C-: CPT | Mod: 26

## 2024-02-08 PROCEDURE — 90677 PCV20 VACCINE IM: CPT

## 2024-02-08 PROCEDURE — 71046 X-RAY EXAM CHEST 2 VIEWS: CPT | Mod: 26

## 2024-02-08 PROCEDURE — 71250 CT THORAX DX C-: CPT

## 2024-02-08 PROCEDURE — 99205 OFFICE O/P NEW HI 60 MIN: CPT | Mod: 25

## 2024-02-08 PROCEDURE — G0009: CPT

## 2024-02-08 PROCEDURE — 71046 X-RAY EXAM CHEST 2 VIEWS: CPT

## 2024-02-08 RX ORDER — PREDNISONE 10 MG/1
10 TABLET ORAL DAILY
Qty: 15 | Refills: 1 | Status: ACTIVE | COMMUNITY
Start: 2024-02-08 | End: 1900-01-01

## 2024-02-08 RX ORDER — FLUTICASONE PROPIONATE 50 UG/1
50 SPRAY, METERED NASAL DAILY
Qty: 1 | Refills: 6 | Status: ACTIVE | COMMUNITY
Start: 2024-02-08 | End: 1900-01-01

## 2024-02-08 RX ORDER — FLUTICASONE PROPIONATE AND SALMETEROL 500; 50 UG/1; UG/1
500-50 POWDER RESPIRATORY (INHALATION)
Qty: 1 | Refills: 6 | Status: ACTIVE | COMMUNITY
Start: 2024-02-08 | End: 1900-01-01

## 2024-02-08 RX ORDER — FAMOTIDINE 20 MG/1
20 TABLET, FILM COATED ORAL
Qty: 90 | Refills: 3 | Status: ACTIVE | COMMUNITY
Start: 2024-02-08 | End: 1900-01-01

## 2024-02-08 NOTE — HISTORY OF PRESENT ILLNESS
[Never] : never [TextBox_4] : Ms. Castaneda is a 52 yo F with PMHx Breast cancer (infiltrating DCIS, s/p chemo/radiation, remains on Anastrazole/Lupron), osteoporosis, Hashimoto's thyroiditis, seasonal allergies ("year round"), seasonal depression, asthma (dx 10-15yrs ago) presenting for evaluation for increased dyspnea, chest tightness and cough over past 2-3 months.  She reports symptoms may have been mildly progressive over past year but have been more pronounced over these recent months.  No associated  chest pain per se, chest tightness does increase on exertion but leads to wheezing and subsequent cough.  Sensitive to extremes of air temperature, cold dry air will worsen symptoms as does humid, hot air.  She is on Advair 250/50 once daily prescribed by her PCP (has never seen Pulmonologist) who had recently added Advair 500/50 once daily to her inhaler regimen?  Currently on the Advair 250 once daily.  Has symptoms of PND, also suffers from chronic GERD for which takes Pepcid prn (2-3 times weekly) and monitors diet but is still not well controlled.  No nighttime symptoms of cough/SOB. For chronic rhinitis she uses Flonase from time to time but not regularly. NO fever, chills, weight loss.  Does have night sweats which attributes to menopausal symptoms from breast ca treatment. Review of C-spine from 1/13/24 shows significant GGOs and interlobular septal thickening in upper lobes.  Also cardiac CT from 11/27/23 showing some bronchial wall thickening and mild bronchiectasis in RML/lingula along with atelectasis; calcium score was 0.  Patient unaware of having any recent echo.  No respiratory issues as a child.  Has been hospitalized x 2 over past 10 years requiring steroids for asthma but never intubated.

## 2024-02-08 NOTE — ASSESSMENT
[FreeTextEntry1] : Ms. Castaneda is a 52 yo F with PMHx Breast cancer (infiltrating DCIS, s/p chemo/radiation, remains on Anastrazole/Lupron), osteoporosis, Hashimoto's thyroiditis, seasonal allergies ("year round"), seasonal depression, asthma (dx 10-15yrs ago) presenting for evaluation for increased dyspnea, chest tightness and cough over past 2-3 months.  Suspect chronic rhinitis, uncontrolled GERD contributing to asthma symptoms.  However would like CXR given chronicity of symptoms followed by HRCT Chest given findings seen on C-spine and Cardiac CT which warrant further investigation.  Recommend: #Asthma, moderate persistent - start Advair 500/50 1 puff BID (rinse mouth after use) - Albuterol neb or inhaler q6h prn SOB/wheezing/cough - Prednisone 30mg x 5 day given as part of asthma action plan (no need to use now) - PFTs with FENO and Peak flow next visit - CXR today  #Chronic rhinitis - Start Flonase 1-2 sprays each nostril BID - Antihistamine prn  #GERD - Start Pepcid 20mg qhs - GI referral for possible endoscopy given longstanding GERD and familial history of Celiac disease (daughter)  #Bronchiectasis - HRCT Chest before next visit  RTC in 6-8 weeks for follow-up.  call if any worsening of symptoms.  Xena Rainey MD

## 2024-02-08 NOTE — REVIEW OF SYSTEMS
[Postnasal Drip] : postnasal drip [Cough] : cough [Sinus Problems] : sinus problems [Chest Tightness] : chest tightness [Dyspnea] : dyspnea [Wheezing] : wheezing [SOB on Exertion] : sob on exertion [Chest Discomfort] : chest discomfort [Hay Fever] : hay fever [Seasonal Allergies] : seasonal allergies [GERD] : gerd [Thyroid Problem] : thyroid problem [Fever] : no fever [Fatigue] : no fatigue [Recent Wt Gain (___ Lbs)] : ~T no recent weight gain [Chills] : no chills [Poor Appetite] : no poor appetite [Recent Wt Loss (___ Lbs)] : ~T no recent weight loss [Sore Throat] : no sore throat [Hemoptysis] : no hemoptysis [Sputum] : no sputum [Pleuritic Pain] : no pleuritic pain [Edema] : no edema [Orthopnea] : no orthopnea [Palpitations] : no palpitations [Syncope] : no syncope [Abdominal Pain] : no abdominal pain [Nausea] : no nausea [Vomiting] : no vomiting [Diarrhea] : no diarrhea [Constipation] : no constipation [Dysphagia] : no dysphagia [Dysuria] : no dysuria [Arthralgias] : no arthralgias [Myalgias] : no myalgias [Back Pain] : no back pain [Rash] : no rash [Easy Bruising] : no easy bruising [Clotting Disorder/ Frequent bleeding] : no clotting disorder/ frequent bleeding [Headache] : no headache [Focal Weakness] : no focal weakness [Dizziness] : no dizziness [Numbness] : no numbness [Depression] : no depression [Panic Attacks] : no panic attacks [Diabetes] : no diabetes

## 2024-02-08 NOTE — PHYSICAL EXAM
[No Acute Distress] : no acute distress [Well Nourished] : well nourished [Well Groomed] : well groomed [Well Developed] : well developed [Normal Oropharynx] : normal oropharynx [Normal Appearance] : normal appearance [Supple] : supple [No Neck Mass] : no neck mass [Normal Rate/Rhythm] : normal rate/rhythm [Normal S1, S2] : normal s1, s2 [No Murmurs] : no murmurs [No Rubs] : no rubs [No Gallops] : no gallops [No Resp Distress] : no resp distress [No Acc Muscle Use] : no acc muscle use [Normal Rhythm and Effort] : normal rhythm and effort [Clear to Auscultation Bilaterally] : clear to auscultation bilaterally [No Abnormalities] : no abnormalities [Benign] : benign [Normal Gait] : normal gait [No Clubbing] : no clubbing [No Cyanosis] : no cyanosis [No Edema] : no edema [Normal Color/ Pigmentation] : normal color/ pigmentation [No Focal Deficits] : no focal deficits [Oriented x3] : oriented x3 [Recent Memory Normal] : recent memory normal [Normal Insight/judgment] : normal insight/judgment [Normal Affect] : normal affect

## 2024-02-09 RX ORDER — ALBUTEROL SULFATE 90 UG/1
108 (90 BASE) AEROSOL, METERED RESPIRATORY (INHALATION)
Qty: 1 | Refills: 6 | Status: ACTIVE | COMMUNITY
Start: 2024-02-09 | End: 1900-01-01

## 2024-03-05 ENCOUNTER — APPOINTMENT (OUTPATIENT)
Dept: INFUSION THERAPY | Facility: HOSPITAL | Age: 54
End: 2024-03-05

## 2024-03-21 ENCOUNTER — OUTPATIENT (OUTPATIENT)
Dept: OUTPATIENT SERVICES | Facility: HOSPITAL | Age: 54
LOS: 1 days | Discharge: HOME | End: 2024-03-21

## 2024-03-21 DIAGNOSIS — Z30.430 ENCOUNTER FOR INSERTION OF INTRAUTERINE CONTRACEPTIVE DEVICE: Chronic | ICD-10-CM

## 2024-03-21 DIAGNOSIS — Z98.890 OTHER SPECIFIED POSTPROCEDURAL STATES: Chronic | ICD-10-CM

## 2024-03-21 DIAGNOSIS — M67.432 GANGLION, LEFT WRIST: Chronic | ICD-10-CM

## 2024-03-21 DIAGNOSIS — C50.919 MALIGNANT NEOPLASM OF UNSPECIFIED SITE OF UNSPECIFIED FEMALE BREAST: ICD-10-CM

## 2024-03-23 ENCOUNTER — RX RENEWAL (OUTPATIENT)
Age: 54
End: 2024-03-23

## 2024-03-23 RX ORDER — ALENDRONATE SODIUM 70 MG/1
70 TABLET ORAL
Qty: 13 | Refills: 1 | Status: ACTIVE | COMMUNITY
Start: 2022-07-06 | End: 1900-01-01

## 2024-04-02 ENCOUNTER — APPOINTMENT (OUTPATIENT)
Dept: INFUSION THERAPY | Facility: HOSPITAL | Age: 54
End: 2024-04-02

## 2024-04-02 ENCOUNTER — APPOINTMENT (OUTPATIENT)
Dept: HEMATOLOGY ONCOLOGY | Facility: CLINIC | Age: 54
End: 2024-04-02
Payer: COMMERCIAL

## 2024-04-02 VITALS
SYSTOLIC BLOOD PRESSURE: 108 MMHG | BODY MASS INDEX: 28.78 KG/M2 | DIASTOLIC BLOOD PRESSURE: 72 MMHG | TEMPERATURE: 97 F | HEART RATE: 70 BPM | WEIGHT: 164.66 LBS | OXYGEN SATURATION: 98 % | RESPIRATION RATE: 16 BRPM

## 2024-04-02 DIAGNOSIS — Z79.899 OTHER LONG TERM (CURRENT) DRUG THERAPY: ICD-10-CM

## 2024-04-02 DIAGNOSIS — C50.911 MALIGNANT NEOPLASM OF UNSPECIFIED SITE OF RIGHT FEMALE BREAST: ICD-10-CM

## 2024-04-02 DIAGNOSIS — M81.0 AGE-RELATED OSTEOPOROSIS W/OUT CURRENT PATHOLOGICAL FRACTURE: ICD-10-CM

## 2024-04-02 PROCEDURE — G2211 COMPLEX E/M VISIT ADD ON: CPT

## 2024-04-02 PROCEDURE — 99215 OFFICE O/P EST HI 40 MIN: CPT

## 2024-04-03 DIAGNOSIS — Z51.11 ENCOUNTER FOR ANTINEOPLASTIC CHEMOTHERAPY: ICD-10-CM

## 2024-04-03 PROBLEM — M81.0 OSTEOPOROSIS: Status: ACTIVE | Noted: 2019-03-21

## 2024-04-03 PROBLEM — C50.911 BREAST CANCER, RIGHT BREAST: Status: ACTIVE | Noted: 2018-06-13

## 2024-04-03 PROBLEM — Z79.899 HIGH RISK MEDICATION USE: Status: ACTIVE | Noted: 2021-12-03

## 2024-04-03 NOTE — HISTORY OF PRESENT ILLNESS
[Disease: _____________________] : Disease: [unfilled] [T: ___] : T[unfilled] [N: ___] : N[unfilled] [M: ___] : M[unfilled] [AJCC Stage: ____] : AJCC Stage: [unfilled] [de-identified] : Pt seen for an initial visit by me on 4/2/24 in order to transfer her care to me from that of Dr Escobar and before that Dr Shipman and before that Dr. Horn. The history below is as per review of the AEHR notes of these oncologists and review / confirmation with the pt.   5/22/18 screening mammo with 1.5cm R breast mass; diagnostic mammo and US confirmed this mass RUOQ 1.6cm at 10:00, hypoechoic.   US guided core biopsy of the mass was performed 6/1/18, which revealed 0.5 cm invasive ductal carcinoma, Guin score 7/9, with nuclear grade 2 cribriform type DCIS with central necrosis and calcifications, estrogen receptor 95% strong, progesterone receptor 3-5% strong, HER-2 IHC 2+ negative by [facility].   MRI of the bilateral breasts on 06/25/2018 showed a 1.7 X 1.6 X 1.8 cm focal area of non-mass enhancement with washout kinetics and biopsy marker at the upper outer posterior 10:00 position of the right breast. There were no other suspicious lesions in the right breast and no suspicious lesions in the left breast. There were no axillary or internal mammary lymphadenopathy.   She saw Dr. Heller who performed CXR, nuclear bone scan, pelvic and abdominal sonograms, which were negative.   On 8/15/18 she underwent a right lumpectomy with SLNB - pathology with 1.1 cm invasive ductal carcinoma, Lynette 7-8/9 and no LVI. 0/2 LN involved.  There was also a 1.5 cm nuclear grade 2 - 3 ductal carcinoma in situ, cribriform and solid type with comedonecrosis and calcifications. Regarding the margins, the invasive tumor was 0.15 mm from the deep margin. Ductal carcinoma in situ was noted within microns of the deep margin and less than 0.1 mm from the anterior and superior margins. Two sentinel nodes were examined and negative.   Oncotype score 29 (20% risk of distant recurrence with tamoxifen alone). TC x 4 recommende which she completed with the use of cold cap therapy to prevent alopecia.  She saw radiation oncology in consultation and was recommended for adjuvant radiation pending medical oncology recommendations.   10/24/18-10/26/18 admitted to [facility]. ~9pm just after driving home from getting food,  witness LOC with seizure activity x 20 seconds followed by lethargy - EMS called. She was taken to [facility] ER and admitted to ICU; sodium 119 with evidence of aspiration on CXR/CTA chest, O2 sat 86% briefly requiring BIPAP support quickly weaned. Neurology, Pulmonary, Renal consults following. Sodium corrected over the next 24h with normal saline infusion, antibiotics discontinued as no further evidence of infection. Neulasta onpro administered as scheduled 10/25 afternoon. She had a CTH and MRI Brain w/wo contrast which showed no acute findings. Zoloft was held, and she was discharged home. Incidental note of possible compression deformity of thoracic spine on imaging while inpatient. She admited to drinking ~4L water on day of chemotherapy as advised by cold cap literature. Her  recalled in retrospect that she seemed overly sleepy and slightly confused (forgot she had chinese food for lunch, eyes closing while sitting up) on the afternoon/evening after treatment prior to seizure. CK and LFTs elevated post discharge, continued to downtrend over the next few days to normal.  She underwent cycle 3 of TC followed by 24 hours of observation at [facility] inpatient with neuro checks and q4h BMP monitoring. Her sodium remained normal and she was asymptomatic. She completed her final cycle of TC on 12/5/18 without event.  Adjuvant radiation initiated 2/4/2019 and completed 3/2/19.  Lupron for ovarian suppression initiated 2/2019. Arimidex initiated April 2019.  My Risk panel on 6/13/2018 was negative.  FHx significant for father with prostate cancer in 60s, paternal cousin with leukemia age 13 and breast cancer age 45. [de-identified] : T1cN0 [de-identified] : ER+OH-HER2- [de-identified] : Oncotype 29 [Treatment Protocol] : Treatment Protocol [Therapy: ___] : Therapy: [unfilled] [FreeTextEntry1] : TC x 4 cycles, with Penguin cold cap [de-identified] : 4/2/24 Transferring care from Dr. Escobar to me today All of the patient's prior records including radiology, pathology and prior notes reviewed; Past Medical History, Past Surgical History, Family History and Social history reviewed and updated in the patient's chart. Patient returns today to rule out progression or recurrence of breast cancer and to assess treatment toxicity.  She remains on Lupron since 2/19 and anastrozole since 4/19. She temporarily changed to every 3 month Lupron injections during the height of the COVID pandemic but felt poor on them and has since changed back to monthly injections.  She has history of Hashimoto thyroiditis followed by endocrinology.    RECENT IMAGING ================ Mammo/sono, 6/22/23- BIRADS 2 DEXA, 6/22- Osteoporosis, on alendronate  ===================  [100: Normal, no complaints, no evidence of disease.] : 100: Normal, no complaints, no evidence of disease. [ECOG Performance Status: 0 - Fully active, able to carry on all pre-disease performance without restriction] : Performance Status: 0 - Fully active, able to carry on all pre-disease performance without restriction

## 2024-04-03 NOTE — ASSESSMENT
[FreeTextEntry1] : 55 yo woman with history of right breast cancer; [id number] ER+ND-, HER2-, high Oncotype score 29; s/p adjuvant TC chemotherapy; course complicated by seizure due to hyponatremia after cycle #2 of TC; completed therapy without incident; s/p radiation to the right breast and has been on OS/AI since 2/2019 & 4/2019 respectively.  - discussed need for adjuvant endocrine therapy for a period of 7 years - for now she would like to continue monthly Lupron despite likely being post menopausal. Would like to wait another 6 months prior to trial of stopping and monitoring FSH/Estradiol levels - bone density 8/2023 with osteopnenia;  being followed by Endocrine (Dr. Anita Cui); remains on Alendronate, calcium/vitamin D - mammo/sono from 6/2023 reviewed; repeat annual imaging due in 6/2024 - Patient and spouse had the opportunity to have all their questions answered to their satisfaction - f/u in 6 months

## 2024-04-03 NOTE — PHYSICAL EXAM
[Fully active, able to carry on all pre-disease performance without restriction] : Status 0 - Fully active, able to carry on all pre-disease performance without restriction [Normal] : affect appropriate [de-identified] : Right breast with well healed surgical scar at. the axillary tail, no discrete palpable masses, no palpable axillary nodes.   Left breast with no discrete palpable masses, no palpable axillary nodes.

## 2024-04-18 ENCOUNTER — APPOINTMENT (OUTPATIENT)
Age: 54
End: 2024-04-18
Payer: COMMERCIAL

## 2024-04-18 VITALS
OXYGEN SATURATION: 97 % | HEART RATE: 79 BPM | HEIGHT: 63 IN | SYSTOLIC BLOOD PRESSURE: 116 MMHG | DIASTOLIC BLOOD PRESSURE: 80 MMHG | WEIGHT: 157 LBS | BODY MASS INDEX: 27.82 KG/M2

## 2024-04-18 DIAGNOSIS — J45.909 UNSPECIFIED ASTHMA, UNCOMPLICATED: ICD-10-CM

## 2024-04-18 PROCEDURE — 94726 PLETHYSMOGRAPHY LUNG VOLUMES: CPT

## 2024-04-18 PROCEDURE — ZZZZZ: CPT

## 2024-04-18 PROCEDURE — 94729 DIFFUSING CAPACITY: CPT

## 2024-04-18 PROCEDURE — 94060 EVALUATION OF WHEEZING: CPT

## 2024-04-18 PROCEDURE — 99214 OFFICE O/P EST MOD 30 MIN: CPT | Mod: 25

## 2024-04-18 RX ORDER — FLUTICASONE PROPIONATE AND SALMETEROL 500; 50 UG/1; UG/1
500-50 POWDER RESPIRATORY (INHALATION) TWICE DAILY
Qty: 3 | Refills: 1 | Status: ACTIVE | COMMUNITY
Start: 2024-04-18 | End: 1900-01-01

## 2024-04-18 RX ORDER — SODIUM CHLORIDE FOR INHALATION 3.5 %
3.5 VIAL, NEBULIZER (ML) INHALATION TWICE DAILY
Qty: 60 | Refills: 6 | Status: ACTIVE | COMMUNITY
Start: 2024-04-18 | End: 1900-01-01

## 2024-04-18 NOTE — HISTORY OF PRESENT ILLNESS
[Never] : never [TextBox_4] : Current HPI: Ms. Castaneda returns for visit, reports being seen by GI and is s/p EGD/C-scope findings c/w severe gastritis.  Started on PPI once daily but still with GERD symptoms AM through PM, now on BID with improved control of symptoms along with dietary discretion which has also led to weight loss.  Using Advair 500/50 twice daily but still with chest tightness and avoids taking deep breaths for fear of coughing fit and chest tightness.  Used course of Prednisone after last visit with improvement.  Having allergy symptoms with change of season, using Flonase for PND which has helped a lot.  Not on antihistamine as makes her far too sleepy and can't tolerate.  Has never been on Singulair.  No fever, chills, minimal to no sputum production.  Not using rescue inhaler unless absolutely necessary as makes her very "jittery".   (2/8/24): Ms. Castaneda is a 52 yo F with PMHx Breast cancer (infiltrating DCIS, s/p chemo/radiation, remains on Anastrazole/Lupron), osteoporosis, Hashimoto's thyroiditis, seasonal allergies ("year round"), seasonal depression, asthma (dx 10-15yrs ago) presenting for evaluation for increased dyspnea, chest tightness and cough over past 2-3 months. She reports symptoms may have been mildly progressive over past year but have been more pronounced over these recent months. No associated chest pain per se, chest tightness does increase on exertion but leads to wheezing and subsequent cough. Sensitive to extremes of air temperature, cold dry air will worsen symptoms as does humid, hot air. She is on Advair 250/50 once daily prescribed by her PCP (has never seen Pulmonologist) who had recently added Advair 500/50 once daily to her inhaler regimen? Currently on the Advair 250 once daily. Has symptoms of PND, also suffers from chronic GERD for which takes Pepcid prn (2-3 times weekly) and monitors diet but is still not well controlled. No nighttime symptoms of cough/SOB. For chronic rhinitis she uses Flonase from time to time but not regularly. NO fever, chills, weight loss. Does have night sweats which attributes to menopausal symptoms from breast ca treatment. Review of C-spine from 1/13/24 shows significant GGOs and interlobular septal thickening in upper lobes. Also cardiac CT from 11/27/23 showing some bronchial wall thickening and mild bronchiectasis in RML/lingula along with atelectasis; calcium score was 0. Patient unaware of having any recent echo.  No respiratory issues as a child. Has been hospitalized x 2 over past 10 years requiring steroids for asthma but never intubated.

## 2024-04-18 NOTE — PHYSICAL EXAM
[No Acute Distress] : no acute distress [Well Nourished] : well nourished [Well Developed] : well developed [Normal Oropharynx] : normal oropharynx [Normal Appearance] : normal appearance [Supple] : supple [No Neck Mass] : no neck mass [Normal Rate/Rhythm] : normal rate/rhythm [Normal S1, S2] : normal s1, s2 [No Murmurs] : no murmurs [No Resp Distress] : no resp distress [No Acc Muscle Use] : no acc muscle use [Clear to Auscultation Bilaterally] : clear to auscultation bilaterally [No Abnormalities] : no abnormalities [Benign] : benign [Normal Gait] : normal gait [No Clubbing] : no clubbing [No Cyanosis] : no cyanosis [No Edema] : no edema [Normal Color/ Pigmentation] : normal color/ pigmentation [No Focal Deficits] : no focal deficits [Oriented x3] : oriented x3 [Normal Insight/judgment] : normal insight/judgment [Normal Affect] : normal affect

## 2024-04-18 NOTE — REVIEW OF SYSTEMS
[Fever] : no fever [Fatigue] : no fatigue [Chills] : no chills [Poor Appetite] : no poor appetite [Recent Wt Loss (___ Lbs)] : ~T recent [unfilled] lb weight loss [Sore Throat] : no sore throat [Postnasal Drip] : postnasal drip [Sinus Problems] : no sinus problems [Cough] : cough [Hemoptysis] : no hemoptysis [Chest Tightness] : chest tightness [Sputum] : no sputum [Dyspnea] : dyspnea [Pleuritic Pain] : no pleuritic pain [Wheezing] : wheezing [SOB on Exertion] : sob on exertion [Edema] : no edema [Palpitations] : no palpitations [Syncope] : no syncope [Seasonal Allergies] : seasonal allergies [GERD] : gerd [Abdominal Pain] : no abdominal pain [Nausea] : no nausea [Vomiting] : no vomiting [Dysphagia] : no dysphagia [Dysuria] : no dysuria [Arthralgias] : no arthralgias [Myalgias] : no myalgias [Rash] : no rash [Easy Bruising] : no easy bruising [Clotting Disorder/ Frequent bleeding] : no clotting disorder/ frequent bleeding [Headache] : no headache [Dizziness] : no dizziness [Negative] : Endocrine

## 2024-04-18 NOTE — ASSESSMENT
[FreeTextEntry1] : Ms. Castaneda is a 53 yo F with PMHx Breast cancer (infiltrating DCIS, s/p chemo/radiation, remains on Anastrazole/Lupron), osteoporosis, Hashimoto's thyroiditis, seasonal allergies ("year round"), seasonal depression, asthma (dx 10-15yrs ago) presenting for evaluation for increased dyspnea, chest tightness and cough over past 2-3 months. Suspect chronic rhinitis, uncontrolled GERD contributing to asthma symptoms.  HRCT Chest 2/9/24 demonstrating RUL/RML bronchiectasis (mild) with associated post-radiation changes.  No active wheezing on exam (but examined post BD from PFTs) . Peak flow 280 / 240 / 250.  PFTs (4/18/24): FEV1/FVC = 74, FEV1 (2.02) 76% postBD 2.47 (93%), FVC (2.73) 81% postBD (3.22) 95%, TLC 92%, DLCO 81%  ---- no obstruction but significant postBD response, normal lung volumes, normal diffusion capacity.  Recommend: #Asthma, moderate persistent - continue Advair 500/50 1 puff BID (rinse mouth after use) (generic Rx ordered) - add Singulair 10mg qhs - if no significant improvement/still symptomatic will add Flovent inh or Budesonide neb - Albuterol neb or inhaler q6h prn SOB/wheezing/cough - Prednisone 30mg x 5 day given as part of asthma action plan (used x 1 after last visit, has 1 refill remaining), hold on starting for now given gastritis and asthma symptoms not limiting  #Chronic rhinitis/Seasonal allergies - cont Flonase 1-2 sprays each nostril BID - unable to tolerate antihistamine as too sedating - start Singulair qhs  #GERD - seen by GI who completed EGD/C-scope - findings c/w severe gastritis - currently on PPI BID (once daily was not controlling) with improved control with dietary changes  #Bronchiectasis - start Albuterol (can use 1/2 vial if not tolerating full vial) + Hypersal 3.5% three times weekly for ACT  RTC in 3 months for follow-up, sooner if any change / worsening symptoms.  Xena Rainey MD.

## 2024-05-01 ENCOUNTER — APPOINTMENT (OUTPATIENT)
Dept: INFUSION THERAPY | Facility: HOSPITAL | Age: 54
End: 2024-05-01

## 2024-05-22 ENCOUNTER — OUTPATIENT (OUTPATIENT)
Dept: OUTPATIENT SERVICES | Facility: HOSPITAL | Age: 54
LOS: 1 days | Discharge: HOME | End: 2024-05-22

## 2024-05-22 DIAGNOSIS — C50.919 MALIGNANT NEOPLASM OF UNSPECIFIED SITE OF UNSPECIFIED FEMALE BREAST: ICD-10-CM

## 2024-05-22 DIAGNOSIS — M67.432 GANGLION, LEFT WRIST: Chronic | ICD-10-CM

## 2024-05-22 DIAGNOSIS — Z98.890 OTHER SPECIFIED POSTPROCEDURAL STATES: Chronic | ICD-10-CM

## 2024-05-22 DIAGNOSIS — Z30.430 ENCOUNTER FOR INSERTION OF INTRAUTERINE CONTRACEPTIVE DEVICE: Chronic | ICD-10-CM

## 2024-05-23 ENCOUNTER — APPOINTMENT (OUTPATIENT)
Dept: OBGYN | Facility: CLINIC | Age: 54
End: 2024-05-23
Payer: COMMERCIAL

## 2024-05-23 PROCEDURE — 82270 OCCULT BLOOD FECES: CPT

## 2024-05-23 PROCEDURE — 81002 URINALYSIS NONAUTO W/O SCOPE: CPT

## 2024-05-23 PROCEDURE — 99396 PREV VISIT EST AGE 40-64: CPT | Mod: 25

## 2024-05-23 PROCEDURE — 99459 PELVIC EXAMINATION: CPT

## 2024-06-10 ENCOUNTER — APPOINTMENT (OUTPATIENT)
Dept: INFUSION THERAPY | Facility: HOSPITAL | Age: 54
End: 2024-06-10

## 2024-06-11 DIAGNOSIS — Z51.11 ENCOUNTER FOR ANTINEOPLASTIC CHEMOTHERAPY: ICD-10-CM

## 2024-06-18 ENCOUNTER — RX RENEWAL (OUTPATIENT)
Age: 54
End: 2024-06-18

## 2024-06-18 RX ORDER — MONTELUKAST 10 MG/1
10 TABLET, FILM COATED ORAL
Qty: 30 | Refills: 3 | Status: ACTIVE | COMMUNITY
Start: 2024-04-18 | End: 1900-01-01

## 2024-07-03 ENCOUNTER — APPOINTMENT (OUTPATIENT)
Dept: INFUSION THERAPY | Facility: HOSPITAL | Age: 54
End: 2024-07-03

## 2024-07-18 ENCOUNTER — RESULT REVIEW (OUTPATIENT)
Age: 54
End: 2024-07-18

## 2024-07-18 ENCOUNTER — OUTPATIENT (OUTPATIENT)
Dept: OUTPATIENT SERVICES | Facility: HOSPITAL | Age: 54
LOS: 1 days | End: 2024-07-18
Payer: COMMERCIAL

## 2024-07-18 ENCOUNTER — APPOINTMENT (OUTPATIENT)
Dept: ULTRASOUND IMAGING | Facility: CLINIC | Age: 54
End: 2024-07-18
Payer: COMMERCIAL

## 2024-07-18 ENCOUNTER — APPOINTMENT (OUTPATIENT)
Dept: MAMMOGRAPHY | Facility: CLINIC | Age: 54
End: 2024-07-18
Payer: COMMERCIAL

## 2024-07-18 DIAGNOSIS — Z30.430 ENCOUNTER FOR INSERTION OF INTRAUTERINE CONTRACEPTIVE DEVICE: Chronic | ICD-10-CM

## 2024-07-18 DIAGNOSIS — Z00.8 ENCOUNTER FOR OTHER GENERAL EXAMINATION: ICD-10-CM

## 2024-07-18 PROCEDURE — 77063 BREAST TOMOSYNTHESIS BI: CPT

## 2024-07-18 PROCEDURE — 76641 ULTRASOUND BREAST COMPLETE: CPT | Mod: 26,50

## 2024-07-18 PROCEDURE — 76641 ULTRASOUND BREAST COMPLETE: CPT

## 2024-07-18 PROCEDURE — 77067 SCR MAMMO BI INCL CAD: CPT | Mod: 26

## 2024-07-18 PROCEDURE — 77063 BREAST TOMOSYNTHESIS BI: CPT | Mod: 26

## 2024-07-18 PROCEDURE — 77067 SCR MAMMO BI INCL CAD: CPT

## 2024-07-28 ENCOUNTER — OUTPATIENT (OUTPATIENT)
Dept: OUTPATIENT SERVICES | Facility: HOSPITAL | Age: 54
LOS: 1 days | Discharge: HOME | End: 2024-07-28

## 2024-07-28 DIAGNOSIS — Z30.430 ENCOUNTER FOR INSERTION OF INTRAUTERINE CONTRACEPTIVE DEVICE: Chronic | ICD-10-CM

## 2024-07-28 DIAGNOSIS — C50.919 MALIGNANT NEOPLASM OF UNSPECIFIED SITE OF UNSPECIFIED FEMALE BREAST: ICD-10-CM

## 2024-07-28 DIAGNOSIS — Z98.890 OTHER SPECIFIED POSTPROCEDURAL STATES: Chronic | ICD-10-CM

## 2024-07-28 DIAGNOSIS — M67.432 GANGLION, LEFT WRIST: Chronic | ICD-10-CM

## 2024-08-05 ENCOUNTER — NON-APPOINTMENT (OUTPATIENT)
Age: 54
End: 2024-08-05

## 2024-08-06 ENCOUNTER — APPOINTMENT (OUTPATIENT)
Dept: HEMATOLOGY ONCOLOGY | Facility: CLINIC | Age: 54
End: 2024-08-06

## 2024-08-06 ENCOUNTER — APPOINTMENT (OUTPATIENT)
Dept: INFUSION THERAPY | Facility: HOSPITAL | Age: 54
End: 2024-08-06

## 2024-08-06 PROCEDURE — 99214 OFFICE O/P EST MOD 30 MIN: CPT

## 2024-08-07 NOTE — ASSESSMENT
[FreeTextEntry1] : 53 yo woman with history of right breast cancer; [id number] ER+AL-, HER2-, high Oncotype score 29; s/p adjuvant TC chemotherapy; course complicated by seizure due to hyponatremia after cycle #2 of TC; completed therapy without incident; s/p radiation to the right breast and has been on OS/AI since 2/2019 & 4/2019 respectively.  - discussed need for adjuvant endocrine therapy for a period of 7 years - for now, she would like to continue monthly Lupron despite likely being post-menopausal. Would like to wait another 6-12 months prior to trial of stopping and monitoring FSH/Estradiol levels, discussed again today. - bone density 8/2023 with osteopenia; being followed by Endocrine (Dr. Anita Cui); remains on Alendronate, calcium/vitamin D - mammo/sono from 7/18/24-BIRADS2, reviewed; repeat annual imaging due in 7/2025. - Patient and spouse had the opportunity to have all their questions answered to their satisfaction - f/u in 6 months

## 2024-08-07 NOTE — BEGINNING OF VISIT
[Never] : Never [Date Discussed (MM/DD/YY): ___] : Discussed: [unfilled] [Reviewed, no changes] : Reviewed, no changes

## 2024-08-07 NOTE — PHYSICAL EXAM
[Fully active, able to carry on all pre-disease performance without restriction] : Status 0 - Fully active, able to carry on all pre-disease performance without restriction [Normal] : affect appropriate [de-identified] : Right breast with well healed surgical scar at the axillary tail; no discrete palpable masses, no palpable axillary nodes.   Left breast with no discrete palpable masses, no palpable axillary nodes.

## 2024-08-07 NOTE — ASSESSMENT
[FreeTextEntry1] : 55 yo woman with history of right breast cancer; [id number] ER+WA-, HER2-, high Oncotype score 29; s/p adjuvant TC chemotherapy; course complicated by seizure due to hyponatremia after cycle #2 of TC; completed therapy without incident; s/p radiation to the right breast and has been on OS/AI since 2/2019 & 4/2019 respectively.  - discussed need for adjuvant endocrine therapy for a period of 7 years - for now, she would like to continue monthly Lupron despite likely being post-menopausal. Would like to wait another 6-12 months prior to trial of stopping and monitoring FSH/Estradiol levels, discussed again today. - bone density 8/2023 with osteopenia; being followed by Endocrine (Dr. Anita Cui); remains on Alendronate, calcium/vitamin D - mammo/sono from 7/18/24-BIRADS2, reviewed; repeat annual imaging due in 7/2025. - Patient and spouse had the opportunity to have all their questions answered to their satisfaction - f/u in 6 months

## 2024-08-07 NOTE — HISTORY OF PRESENT ILLNESS
[Disease: _____________________] : Disease: [unfilled] [T: ___] : T[unfilled] [N: ___] : N[unfilled] [M: ___] : M[unfilled] [AJCC Stage: ____] : AJCC Stage: [unfilled] [Treatment Protocol] : Treatment Protocol [Therapy: ___] : Therapy: [unfilled] [100: Normal, no complaints, no evidence of disease.] : 100: Normal, no complaints, no evidence of disease. [ECOG Performance Status: 0 - Fully active, able to carry on all pre-disease performance without restriction] : Performance Status: 0 - Fully active, able to carry on all pre-disease performance without restriction [de-identified] : Pt seen for an initial visit by me on 4/2/24 in order to transfer her care to me from that of Dr Escobar and before that Dr Shipman and before that Dr. Horn. The history below is as per review of the AEHR notes of these oncologists and review / confirmation with the pt.   5/22/18 screening mammo with 1.5cm R breast mass; diagnostic mammo and US confirmed this mass RUOQ 1.6cm at 10:00, hypoechoic.   US guided core biopsy of the mass was performed 6/1/18, which revealed 0.5 cm invasive ductal carcinoma, [place] score 7/9, with nuclear grade 2 cribriform type DCIS with central necrosis and calcifications, estrogen receptor 95% strong, progesterone receptor 3-5% strong, HER-2 IHC 2+ negative by [facility].   MRI of the bilateral breasts on 06/25/2018 showed a 1.7 X 1.6 X 1.8 cm focal area of non-mass enhancement with washout kinetics and biopsy marker at the upper outer posterior 10:00 position of the right breast. There were no other suspicious lesions in the right breast and no suspicious lesions in the left breast. There were no axillary or internal mammary lymphadenopathy.   She saw Dr. Heller who performed CXR, nuclear bone scan, pelvic and abdominal sonograms, which were negative.   On 8/15/18 she underwent a right lumpectomy with SLNB - pathology with 1.1 cm invasive ductal carcinoma, Lynette 7-8/9 and no LVI. 0/2 LN involved.  There was also a 1.5 cm nuclear grade 2 - 3 ductal carcinoma in situ, cribriform and solid type with comedonecrosis and calcifications. Regarding the margins, the invasive tumor was 0.15 mm from the deep margin. Ductal carcinoma in situ was noted within microns of the deep margin and less than 0.1 mm from the anterior and superior margins. Two sentinel nodes were examined and negative.   Oncotype score 29 (20% risk of distant recurrence with tamoxifen alone). TC x 4 recommende which she completed with the use of cold cap therapy to prevent alopecia.  She saw radiation oncology in consultation and was recommended for adjuvant radiation pending medical oncology recommendations.   10/24/18-10/26/18 admitted to [facility]. ~9pm just after driving home from getting food,  witness LOC with seizure activity x 20 seconds followed by lethargy - EMS called. She was taken to [facility] ER and admitted to ICU; sodium 119 with evidence of aspiration on CXR/CTA chest, O2 sat 86% briefly requiring BIPAP support quickly weaned. Neurology, Pulmonary, Renal consults following. Sodium corrected over the next 24h with normal saline infusion, antibiotics discontinued as no further evidence of infection. Neulasta onpro administered as scheduled 10/25 afternoon. She had a CTH and MRI Brain w/wo contrast which showed no acute findings. Zoloft was held, and she was discharged home. Incidental note of possible compression deformity of thoracic spine on imaging while inpatient. She admited to drinking ~4L water on day of chemotherapy as advised by cold cap literature. Her  recalled in retrospect that she seemed overly sleepy and slightly confused (forgot she had chinese food for lunch, eyes closing while sitting up) on the afternoon/evening after treatment prior to seizure. CK and LFTs elevated post discharge, continued to downtrend over the next few days to normal.  She underwent cycle 3 of TC followed by 24 hours of observation at [facility] inpatient with neuro checks and q4h BMP monitoring. Her sodium remained normal and she was asymptomatic. She completed her final cycle of TC on 12/5/18 without event.  Adjuvant radiation initiated 2/4/2019 and completed 3/2/19.  Lupron for ovarian suppression initiated 2/2019. Arimidex initiated April 2019.  My Risk panel on 6/13/2018 was negative.  FHx significant for father with prostate cancer in 60s, paternal cousin with leukemia age 13 and breast cancer age 45. [de-identified] : T1cN0 [de-identified] : ER+AR-HER2- [de-identified] : Oncotype 29 [FreeTextEntry1] : TC x 4 cycles, with Penguin cold cap [de-identified] : Patient returns today to rule out progression or recurrence of breast cancer and to assess treatment toxicity.  She remains on Lupron since 2/19 and anastrozole since 4/19. She temporarily changed to every 3-month Lupron injections during the height of the COVID pandemic, however, didn't tolerate well and switched back to monthly dosing as is tolerating well.  She continues to tolerate anastrozole well.   She has history of Hashimoto thyroiditis followed by endocrinology and asthma followed by pulmonary.  She recently was diagnosed with gastritis and has had to change her diet, which she has lost weight.  She is feeling better.   She is scheduled for right foot surgery next month.    RECENT IMAGING ================ Mammo/sono, 6/22/23- BIRADS 2 DEXA, 6/22- Osteoporosis, on alendronate  ===================

## 2024-08-07 NOTE — HISTORY OF PRESENT ILLNESS
[Disease: _____________________] : Disease: [unfilled] [T: ___] : T[unfilled] [N: ___] : N[unfilled] [M: ___] : M[unfilled] [AJCC Stage: ____] : AJCC Stage: [unfilled] [Treatment Protocol] : Treatment Protocol [Therapy: ___] : Therapy: [unfilled] [100: Normal, no complaints, no evidence of disease.] : 100: Normal, no complaints, no evidence of disease. [ECOG Performance Status: 0 - Fully active, able to carry on all pre-disease performance without restriction] : Performance Status: 0 - Fully active, able to carry on all pre-disease performance without restriction [de-identified] : Pt seen for an initial visit by me on 4/2/24 in order to transfer her care to me from that of Dr Escobar and before that Dr Shipman and before that Dr. Horn. The history below is as per review of the AEHR notes of these oncologists and review / confirmation with the pt.   5/22/18 screening mammo with 1.5cm R breast mass; diagnostic mammo and US confirmed this mass RUOQ 1.6cm at 10:00, hypoechoic.   US guided core biopsy of the mass was performed 6/1/18, which revealed 0.5 cm invasive ductal carcinoma, [place] score 7/9, with nuclear grade 2 cribriform type DCIS with central necrosis and calcifications, estrogen receptor 95% strong, progesterone receptor 3-5% strong, HER-2 IHC 2+ negative by [facility].   MRI of the bilateral breasts on 06/25/2018 showed a 1.7 X 1.6 X 1.8 cm focal area of non-mass enhancement with washout kinetics and biopsy marker at the upper outer posterior 10:00 position of the right breast. There were no other suspicious lesions in the right breast and no suspicious lesions in the left breast. There were no axillary or internal mammary lymphadenopathy.   She saw Dr. Heller who performed CXR, nuclear bone scan, pelvic and abdominal sonograms, which were negative.   On 8/15/18 she underwent a right lumpectomy with SLNB - pathology with 1.1 cm invasive ductal carcinoma, Lynette 7-8/9 and no LVI. 0/2 LN involved.  There was also a 1.5 cm nuclear grade 2 - 3 ductal carcinoma in situ, cribriform and solid type with comedonecrosis and calcifications. Regarding the margins, the invasive tumor was 0.15 mm from the deep margin. Ductal carcinoma in situ was noted within microns of the deep margin and less than 0.1 mm from the anterior and superior margins. Two sentinel nodes were examined and negative.   Oncotype score 29 (20% risk of distant recurrence with tamoxifen alone). TC x 4 recommende which she completed with the use of cold cap therapy to prevent alopecia.  She saw radiation oncology in consultation and was recommended for adjuvant radiation pending medical oncology recommendations.   10/24/18-10/26/18 admitted to [facility]. ~9pm just after driving home from getting food,  witness LOC with seizure activity x 20 seconds followed by lethargy - EMS called. She was taken to [facility] ER and admitted to ICU; sodium 119 with evidence of aspiration on CXR/CTA chest, O2 sat 86% briefly requiring BIPAP support quickly weaned. Neurology, Pulmonary, Renal consults following. Sodium corrected over the next 24h with normal saline infusion, antibiotics discontinued as no further evidence of infection. Neulasta onpro administered as scheduled 10/25 afternoon. She had a CTH and MRI Brain w/wo contrast which showed no acute findings. Zoloft was held, and she was discharged home. Incidental note of possible compression deformity of thoracic spine on imaging while inpatient. She admited to drinking ~4L water on day of chemotherapy as advised by cold cap literature. Her  recalled in retrospect that she seemed overly sleepy and slightly confused (forgot she had chinese food for lunch, eyes closing while sitting up) on the afternoon/evening after treatment prior to seizure. CK and LFTs elevated post discharge, continued to downtrend over the next few days to normal.  She underwent cycle 3 of TC followed by 24 hours of observation at [facility] inpatient with neuro checks and q4h BMP monitoring. Her sodium remained normal and she was asymptomatic. She completed her final cycle of TC on 12/5/18 without event.  Adjuvant radiation initiated 2/4/2019 and completed 3/2/19.  Lupron for ovarian suppression initiated 2/2019. Arimidex initiated April 2019.  My Risk panel on 6/13/2018 was negative.  FHx significant for father with prostate cancer in 60s, paternal cousin with leukemia age 13 and breast cancer age 45. [de-identified] : T1cN0 [de-identified] : ER+MN-HER2- [de-identified] : Oncotype 29 [FreeTextEntry1] : TC x 4 cycles, with Penguin cold cap [de-identified] : Patient returns today to rule out progression or recurrence of breast cancer and to assess treatment toxicity.  She remains on Lupron since 2/19 and anastrozole since 4/19. She temporarily changed to every 3-month Lupron injections during the height of the COVID pandemic, however, didn't tolerate well and switched back to monthly dosing as is tolerating well.  She continues to tolerate anastrozole well.   She has history of Hashimoto thyroiditis followed by endocrinology and asthma followed by pulmonary.  She recently was diagnosed with gastritis and has had to change her diet, which she has lost weight.  She is feeling better.   She is scheduled for right foot surgery next month.    RECENT IMAGING ================ Mammo/sono, 6/22/23- BIRADS 2 DEXA, 6/22- Osteoporosis, on alendronate  ===================

## 2024-08-07 NOTE — PHYSICAL EXAM
[Fully active, able to carry on all pre-disease performance without restriction] : Status 0 - Fully active, able to carry on all pre-disease performance without restriction [Normal] : affect appropriate [de-identified] : Right breast with well healed surgical scar at the axillary tail; no discrete palpable masses, no palpable axillary nodes.   Left breast with no discrete palpable masses, no palpable axillary nodes.

## 2024-08-13 ENCOUNTER — EMERGENCY (EMERGENCY)
Facility: HOSPITAL | Age: 54
LOS: 1 days | Discharge: HOME | End: 2024-08-13
Attending: STUDENT IN AN ORGANIZED HEALTH CARE EDUCATION/TRAINING PROGRAM | Admitting: STUDENT IN AN ORGANIZED HEALTH CARE EDUCATION/TRAINING PROGRAM
Payer: COMMERCIAL

## 2024-08-13 VITALS
HEART RATE: 73 BPM | OXYGEN SATURATION: 96 % | DIASTOLIC BLOOD PRESSURE: 79 MMHG | RESPIRATION RATE: 18 BRPM | TEMPERATURE: 99 F | WEIGHT: 136.91 LBS | SYSTOLIC BLOOD PRESSURE: 119 MMHG

## 2024-08-13 VITALS
TEMPERATURE: 98 F | DIASTOLIC BLOOD PRESSURE: 75 MMHG | OXYGEN SATURATION: 98 % | SYSTOLIC BLOOD PRESSURE: 120 MMHG | RESPIRATION RATE: 16 BRPM | HEART RATE: 66 BPM

## 2024-08-13 DIAGNOSIS — Z30.430 ENCOUNTER FOR INSERTION OF INTRAUTERINE CONTRACEPTIVE DEVICE: Chronic | ICD-10-CM

## 2024-08-13 DIAGNOSIS — Z98.890 OTHER SPECIFIED POSTPROCEDURAL STATES: Chronic | ICD-10-CM

## 2024-08-13 DIAGNOSIS — M67.432 GANGLION, LEFT WRIST: Chronic | ICD-10-CM

## 2024-08-13 LAB
ALBUMIN SERPL ELPH-MCNC: 3.5 G/DL — SIGNIFICANT CHANGE UP (ref 3.3–5)
ALP SERPL-CCNC: 60 U/L — SIGNIFICANT CHANGE UP (ref 40–120)
ALT FLD-CCNC: 23 U/L — SIGNIFICANT CHANGE UP (ref 12–78)
ANION GAP SERPL CALC-SCNC: 5 MMOL/L — SIGNIFICANT CHANGE UP (ref 5–17)
AST SERPL-CCNC: 19 U/L — SIGNIFICANT CHANGE UP (ref 15–37)
BASOPHILS # BLD AUTO: 0.03 K/UL — SIGNIFICANT CHANGE UP (ref 0–0.2)
BASOPHILS NFR BLD AUTO: 0.6 % — SIGNIFICANT CHANGE UP (ref 0–2)
BILIRUB SERPL-MCNC: 0.4 MG/DL — SIGNIFICANT CHANGE UP (ref 0.2–1.2)
BUN SERPL-MCNC: 10 MG/DL — SIGNIFICANT CHANGE UP (ref 7–23)
CALCIUM SERPL-MCNC: 8.7 MG/DL — SIGNIFICANT CHANGE UP (ref 8.5–10.1)
CHLORIDE SERPL-SCNC: 110 MMOL/L — HIGH (ref 96–108)
CO2 SERPL-SCNC: 26 MMOL/L — SIGNIFICANT CHANGE UP (ref 22–31)
CREAT SERPL-MCNC: 0.76 MG/DL — SIGNIFICANT CHANGE UP (ref 0.5–1.3)
D DIMER BLD IA.RAPID-MCNC: <150 NG/ML DDU — SIGNIFICANT CHANGE UP
EGFR: 94 ML/MIN/1.73M2 — SIGNIFICANT CHANGE UP
EOSINOPHIL # BLD AUTO: 0.23 K/UL — SIGNIFICANT CHANGE UP (ref 0–0.5)
EOSINOPHIL NFR BLD AUTO: 4.5 % — SIGNIFICANT CHANGE UP (ref 0–6)
FLUAV AG NPH QL: SIGNIFICANT CHANGE UP
FLUBV AG NPH QL: SIGNIFICANT CHANGE UP
GLUCOSE SERPL-MCNC: 94 MG/DL — SIGNIFICANT CHANGE UP (ref 70–99)
HCT VFR BLD CALC: 34.3 % — LOW (ref 34.5–45)
HGB BLD-MCNC: 11.6 G/DL — SIGNIFICANT CHANGE UP (ref 11.5–15.5)
IMM GRANULOCYTES NFR BLD AUTO: 0.2 % — SIGNIFICANT CHANGE UP (ref 0–0.9)
LYMPHOCYTES # BLD AUTO: 1.48 K/UL — SIGNIFICANT CHANGE UP (ref 1–3.3)
LYMPHOCYTES # BLD AUTO: 29.1 % — SIGNIFICANT CHANGE UP (ref 13–44)
MCHC RBC-ENTMCNC: 32 PG — SIGNIFICANT CHANGE UP (ref 27–34)
MCHC RBC-ENTMCNC: 33.8 GM/DL — SIGNIFICANT CHANGE UP (ref 32–36)
MCV RBC AUTO: 94.8 FL — SIGNIFICANT CHANGE UP (ref 80–100)
MONOCYTES # BLD AUTO: 0.4 K/UL — SIGNIFICANT CHANGE UP (ref 0–0.9)
MONOCYTES NFR BLD AUTO: 7.9 % — SIGNIFICANT CHANGE UP (ref 2–14)
NEUTROPHILS # BLD AUTO: 2.93 K/UL — SIGNIFICANT CHANGE UP (ref 1.8–7.4)
NEUTROPHILS NFR BLD AUTO: 57.7 % — SIGNIFICANT CHANGE UP (ref 43–77)
NRBC # BLD: 0 /100 WBCS — SIGNIFICANT CHANGE UP (ref 0–0)
NT-PROBNP SERPL-SCNC: 207 PG/ML — HIGH (ref 0–125)
PLATELET # BLD AUTO: 193 K/UL — SIGNIFICANT CHANGE UP (ref 150–400)
POTASSIUM SERPL-MCNC: 3.7 MMOL/L — SIGNIFICANT CHANGE UP (ref 3.5–5.3)
POTASSIUM SERPL-SCNC: 3.7 MMOL/L — SIGNIFICANT CHANGE UP (ref 3.5–5.3)
PROT SERPL-MCNC: 7.4 G/DL — SIGNIFICANT CHANGE UP (ref 6–8.3)
RBC # BLD: 3.62 M/UL — LOW (ref 3.8–5.2)
RBC # FLD: 12.7 % — SIGNIFICANT CHANGE UP (ref 10.3–14.5)
RSV RNA NPH QL NAA+NON-PROBE: SIGNIFICANT CHANGE UP
SARS-COV-2 RNA SPEC QL NAA+PROBE: SIGNIFICANT CHANGE UP
SODIUM SERPL-SCNC: 141 MMOL/L — SIGNIFICANT CHANGE UP (ref 135–145)
TROPONIN I, HIGH SENSITIVITY RESULT: <3 NG/L — SIGNIFICANT CHANGE UP
WBC # BLD: 5.08 K/UL — SIGNIFICANT CHANGE UP (ref 3.8–10.5)
WBC # FLD AUTO: 5.08 K/UL — SIGNIFICANT CHANGE UP (ref 3.8–10.5)

## 2024-08-13 PROCEDURE — 85379 FIBRIN DEGRADATION QUANT: CPT

## 2024-08-13 PROCEDURE — 87637 SARSCOV2&INF A&B&RSV AMP PRB: CPT

## 2024-08-13 PROCEDURE — 83880 ASSAY OF NATRIURETIC PEPTIDE: CPT

## 2024-08-13 PROCEDURE — 71046 X-RAY EXAM CHEST 2 VIEWS: CPT

## 2024-08-13 PROCEDURE — 94640 AIRWAY INHALATION TREATMENT: CPT

## 2024-08-13 PROCEDURE — 99285 EMERGENCY DEPT VISIT HI MDM: CPT | Mod: 25

## 2024-08-13 PROCEDURE — 93010 ELECTROCARDIOGRAM REPORT: CPT

## 2024-08-13 PROCEDURE — 71046 X-RAY EXAM CHEST 2 VIEWS: CPT | Mod: 26

## 2024-08-13 PROCEDURE — 93005 ELECTROCARDIOGRAM TRACING: CPT

## 2024-08-13 PROCEDURE — 80053 COMPREHEN METABOLIC PANEL: CPT

## 2024-08-13 PROCEDURE — 84484 ASSAY OF TROPONIN QUANT: CPT

## 2024-08-13 PROCEDURE — 36415 COLL VENOUS BLD VENIPUNCTURE: CPT

## 2024-08-13 PROCEDURE — 96374 THER/PROPH/DIAG INJ IV PUSH: CPT

## 2024-08-13 PROCEDURE — 99285 EMERGENCY DEPT VISIT HI MDM: CPT

## 2024-08-13 PROCEDURE — 85025 COMPLETE CBC W/AUTO DIFF WBC: CPT

## 2024-08-13 RX ORDER — PREDNISONE 10 MG/1
1 TABLET ORAL
Qty: 8 | Refills: 0
Start: 2024-08-13 | End: 2024-08-16

## 2024-08-13 RX ORDER — METHYLPREDNISOLONE ACETATE 40 MG/ML
125 INJECTION, SUSPENSION INTRA-ARTICULAR; INTRALESIONAL; INTRAMUSCULAR; INTRASYNOVIAL; SOFT TISSUE ONCE
Refills: 0 | Status: COMPLETED | OUTPATIENT
Start: 2024-08-13 | End: 2024-08-13

## 2024-08-13 RX ORDER — IPRATROPIUM BROMIDE AND ALBUTEROL SULFATE 2.5; .5 MG/3ML; MG/3ML
3 SOLUTION RESPIRATORY (INHALATION) ONCE
Refills: 0 | Status: COMPLETED | OUTPATIENT
Start: 2024-08-13 | End: 2024-08-13

## 2024-08-13 RX ORDER — SUCRALFATE 1 G/1
1 TABLET ORAL
Refills: 0 | DISCHARGE

## 2024-08-13 RX ORDER — PANTOPRAZOLE SODIUM 20 MG/1
1 TABLET, DELAYED RELEASE ORAL
Refills: 0 | DISCHARGE

## 2024-08-13 RX ADMIN — METHYLPREDNISOLONE ACETATE 125 MILLIGRAM(S): 40 INJECTION, SUSPENSION INTRA-ARTICULAR; INTRALESIONAL; INTRAMUSCULAR; INTRASYNOVIAL; SOFT TISSUE at 23:24

## 2024-08-13 RX ADMIN — IPRATROPIUM BROMIDE AND ALBUTEROL SULFATE 3 MILLILITER(S): 2.5; .5 SOLUTION RESPIRATORY (INHALATION) at 22:06

## 2024-08-13 NOTE — ED PROVIDER NOTE - WR ORDER DATE AND TIME 1
· Status post recent EVAR at [facility] AT THE [facility] October 2021 13-Aug-2024 21:17

## 2024-08-13 NOTE — ED PROVIDER NOTE - NSICDXPASTMEDICALHX_GEN_ALL_CORE_FT
PAST MEDICAL HISTORY:  Anxiety and depression     Asthma last major attack 2016, denies hx if intubation    Bipolar affective     Depression, unspecified depression type     Gastritis     H/O Hashimoto thyroiditis     History of 2019 novel coronavirus disease (COVID-19) 4/2020- not hospitalized, no resp. complications    Hypothyroidism, unspecified type     Malignant neoplasm of right female breast, unspecified estrogen receptor status, unspecified site of breast s/p lumpectomy 2018, compelted chemo and radiation 3/2019

## 2024-08-13 NOTE — ED PROVIDER NOTE - PATIENT PORTAL LINK FT
You can access the FollowMyHealth Patient Portal offered by [facility] by registering at the following website: [email or web address]. By joining [facility]’s FollowMyHealth portal, you will also be able to view your health information using other applications (apps) compatible with our system.

## 2024-08-13 NOTE — ED PROVIDER NOTE - CHPI ED SYMPTOMS POS
Clerical error while scheduling surgery. Contacting pt to reschedule for 6/16 with Dr. Rosanne Severino.
CHEST PAIN/COUGH/SHORTNESS OF BREATH/WHEEZING

## 2024-08-13 NOTE — ED PROVIDER NOTE - CLINICAL SUMMARY MEDICAL DECISION MAKING FREE TEXT BOX
54 year old female with a history of asthma presents with SOB x 1 week.  She reports CAMARA, occasional productive cough, and mild chest pain when coughing.  Noted her home O2 sat to be in high 80s-90%.  No fever. No wheezing on exam, room air O2 sat in ED 97%.  Check labs, troponin, BNP, d-dimer, EKG, CXR, flu/COVD swab, nebs, steroids, reassess

## 2024-08-13 NOTE — ED PROVIDER NOTE - DIFFERENTIAL DIAGNOSIS
Differential Diagnosis Ddx includes but not limited to asthma exacerbation, COPD, PNA, CHF, viral illness, flu/covid/rsv, PTX

## 2024-08-13 NOTE — ED ADULT NURSE NOTE - NSFALLUNIVINTERV_ED_ALL_ED
Bed/Stretcher in lowest position, wheels locked, appropriate side rails in place/Call bell, personal items and telephone in reach/Instruct patient to call for assistance before getting out of bed/chair/stretcher/Non-slip footwear applied when patient is off stretcher/[place] to call system/Physically safe environment - no spills, clutter or unnecessary equipment/Purposeful proactive rounding/Room/bathroom lighting operational, light cord in reach

## 2024-08-13 NOTE — ED ADULT NURSE NOTE - OBJECTIVE STATEMENT
Pt received A&O 4 from home with a chief complaint of SOB. PT states pulse ox was reading low 90s at home. pt denies any chest pain Pt received A&O4 walk in  from home with a chief complaint of SOB. PT states pulse ox was reading low 90s at home. pt denies any chest pain, fever or chills

## 2024-08-13 NOTE — ED PROVIDER NOTE - OBJECTIVE STATEMENT
54-year-old female with a history of bipolar disorder, anxiety, depression, hypothyroid, breast CA, asthma presents with shortness of breath x 1 week.  Patient states that she feels as though she is having an asthma exacerbation.  She has been using her emergency inhaler once a day in addition to her regular asthma medications but it is not providing her any relief.  The symptoms are associated with productive cough and chest tightness only when coughing.  Patient denies fever, headache, vomiting, body aches.  Patient states that when she was walking yesterday, her home pulse ox monitor was reading in the high 80s to 90%.  She tested herself for COVID at home and it was negative. She last used her emergency inhaler 24 hours ago.  Last chemo treatment with breast CA was 5 years ago, she is currently on monthly Lupron injections.  PMD Elian Olivas

## 2024-08-13 NOTE — ED PROVIDER NOTE - CARE PROVIDER_API CALL
Elian Giang  Family Medicine  [address], [address]  [place] [address]  Phone: [phone]  Fax: [phone]  Follow Up Time:     Xena Rainey  Pulmonary Disease  [address] [place] [address]  Phone: [phone]  Fax: [phone]  Follow Up Time:

## 2024-08-13 NOTE — ED PROVIDER NOTE - PROGRESS NOTE DETAILS
Results of work up d/w patient and copies of all reports given.  Patient continues to look well, no respiratory distress, no wheezing.  She has an appointment with her pulmonologist in 2 weeks.  Will d/c with steroids, patient has inhaler.  Return precautions discussed.  Patient expressed understanding of discharge instructions. Results of work up d/w patient and copies of all reports given.  Patient continues to look well, no respiratory distress, no wheezing. O2 sat % She has an appointment with her pulmonologist in 2 weeks.  Will d/c with steroids, patient has inhaler.  Return precautions discussed.  Patient expressed understanding of discharge instructions. Results of work up d/w patient and copies of all reports given.  Patient continues to look well, no respiratory distress, no wheezing. O2 sat % She has an appointment with her pulmonologist in 2 weeks, 8/27.  Will d/c with steroids, patient has inhaler.  Return precautions discussed.  Patient expressed understanding of discharge instructions.

## 2024-08-13 NOTE — ED ADULT NURSE NOTE - EXTENSIONS OF SELF_ADULT
Problem: Skin Injury Risk Increased  Goal: Skin Health and Integrity  Outcome: Ongoing, Progressing     Problem: Pain Acute  Goal: Acceptable Pain Control and Functional Ability  Outcome: Ongoing, Progressing     Problem: Adult Inpatient Plan of Care  Goal: Plan of Care Review  Outcome: Ongoing, Progressing  Goal: Patient-Specific Goal (Individualized)  Outcome: Ongoing, Progressing  Goal: Absence of Hospital-Acquired Illness or Injury  Outcome: Ongoing, Progressing  Goal: Readiness for Transition of Care  Outcome: Ongoing, Progressing     Problem: Cardiac Output Decreased (Heart Failure)  Goal: Optimal Cardiac Output  Outcome: Ongoing, Progressing     Problem: Dysrhythmia (Heart Failure)  Goal: Stable Heart Rate and Rhythm  Outcome: Ongoing, Progressing     Problem: Fluid Imbalance (Heart Failure)  Goal: Fluid Balance  Outcome: Ongoing, Progressing      None

## 2024-08-13 NOTE — ED PROVIDER NOTE - CARE PROVIDERS DIRECT ADDRESSES
,DirectAddress_Unknown,[email or web address]

## 2024-08-13 NOTE — ED PROVIDER NOTE - RESPIRATORY, MLM
Breath sounds clear and equal bilaterally. No wheezing, no respiratory distress, speaking in full sentences, no accessory muscle use

## 2024-08-13 NOTE — ED ADULT NURSE REASSESSMENT NOTE - NS ED NURSE REASSESS COMMENT FT1
received pt in rm 10b, aox4, maex4, ambulatory w/  @ bedside. iv lock 20 g noted to rac, patent and flushing. no s/s redness, swelling or infiltration. medicated and tolerated well. pending dispo.

## 2024-08-13 NOTE — ED PROVIDER NOTE - NSFOLLOWUPINSTRUCTIONS_ED_ALL_ED_FT
Please take the medication as prescribed and follow up with your primary care doctor and pulmonologist.  Return to the ER for persistent shortness of breath, wheezing, fever, chest pain, respiratory distress, or any other concerns.     Asthma, Adult       Asthma is a long-term (chronic) condition that causes recurrent episodes in which the airways become tight and narrow. The airways are the passages that lead from the nose and mouth down into the lungs. Asthma episodes, also called asthma attacks, can cause coughing, wheezing, shortness of breath, and chest pain. The airways can also fill with mucus. During an attack, it can be difficult to breathe. Asthma attacks can range from minor to life threatening.    Asthma cannot be cured, but medicines and lifestyle changes can help control it and treat acute attacks.    What are the causes?  This condition is believed to be caused by inherited (genetic) and environmental factors, but its exact cause is not known.    There are many things that can bring on an asthma attack or make asthma symptoms worse (triggers). Asthma triggers are different for each person. Common triggers include:    Mold.  Dust.  Cigarette smoke.  Cockroaches.  Things that can cause allergy symptoms (allergens), such as animal dander or pollen from trees or grass.  Air pollutants such as household , wood smoke, smog, or chemical odors.  Cold air, weather changes, and winds (which increase molds and pollen in the air).  Strong emotional expressions such as crying or laughing hard.  Stress.  Certain medicines (such as aspirin) or types of medicines (such as beta-blockers).  Sulfites in foods and drinks. Foods and drinks that may contain sulfites include dried fruit, potato chips, and sparkling grape juice.  Infections or inflammatory conditions such as the flu, a cold, or inflammation of the nasal membranes (rhinitis).  Gastroesophageal reflux disease (GERD).  Exercise or strenuous activity.    What are the signs or symptoms?  Symptoms of this condition may occur right after asthma is triggered or many hours later. Symptoms include:    Wheezing. This can sound like whistling when you breathe.  Excessive nighttime or early morning coughing.  Frequent or severe coughing with a common cold.  Chest tightness.  Shortness of breath.  Tiredness (fatigue) with minimal activity.    How is this diagnosed?  This condition is diagnosed based on:    Your medical history.  A physical exam.  Tests, which may include:    Lung function studies and pulmonary studies (spirometry). These tests can evaluate the flow of air in your lungs.  Allergy tests.  Imaging tests, such as X-rays.    How is this treated?  There is no cure for this condition, but treatment can help control your symptoms. Treatment for asthma usually involves:    Identifying and avoiding your asthma triggers.  Using medicines to control your symptoms. Generally, two types of medicines are used to treat asthma:    Controller medicines. These help prevent asthma symptoms from occurring. They are usually taken every day.  Fast-acting reliever or rescue medicines. These quickly relieve asthma symptoms by widening the narrow and tight airways. They are used as needed and provide short-term relief.  Using supplemental oxygen. This may be needed during a severe episode.  Using other medicines, such as:    Allergy medicines, such as antihistamines, if your asthma attacks are triggered by allergens.  Immune medicines (immunomodulators). These are medicines that help control the immune system.  Creating an asthma action plan. An asthma action plan is a written plan for managing and treating your asthma attacks. This plan includes:    A list of your asthma triggers and how to avoid them.  Information about when medicines should be taken and when their dosage should be changed.  Instructions about using a device called a peak flow meter. A peak flow meter measures how well the lungs are working and the severity of your asthma. It helps you monitor your condition.    Follow these instructions at home:      Controlling your home environment    Control your home environment in the following ways to help avoid triggers and prevent asthma attacks:    Change your heating and air conditioning filter regularly.  Limit your use of fireplaces and wood stoves.  Get rid of pests (such as roaches and mice) and their droppings.  Throw away plants if you see mold on them.  Clean floors and dust surfaces regularly. Use unscented cleaning products.  Try to have someone else vacuum for you regularly. Stay out of rooms while they are being vacuumed and for a short while afterward. If you vacuum, use a dust mask from a hardware store, a double-layered or microfilter vacuum  bag, or a vacuum  with a HEPA filter.  Replace carpet with wood, tile, or vinyl rachel. Carpet can trap dander and dust.  Use allergy-proof pillows, mattress covers, and box spring covers.  Keep your bedroom a trigger-free room.   Avoid pets and keep windows closed when allergens are in the air.  Wash beddings every week in hot water and dry them in a dryer.  Use blankets that are made of polyester or cotton.  Clean bathrooms and alfredo with bleach. If possible, have someone repaint the walls in these rooms with mold-resistant paint. Stay out of the rooms that are being cleaned and painted.  Wash your hands often with soap and water. If soap and water are not available, use hand .  Do not allow anyone to smoke in your home.        General instructions    Take over-the-counter and prescription medicines only as told by your health care provider.    Speak with your health care provider if you have questions about how or when to take the medicines.  Make note if you are requiring more frequent dosages.  Do not use any products that contain nicotine or tobacco, such as cigarettes and e-cigarettes. If you need help quitting, ask your health care provider. Also, avoid being exposed to secondhand smoke.  Use a peak flow meter as told by your health care provider. Record and keep track of the readings.  Understand and use the asthma action plan to help minimize, or stop an asthma attack, without needing to seek medical care.  Make sure you stay up to date on your yearly vaccinations as told by your health care provider. This may include vaccines for the flu and pneumonia.  Avoid outdoor activities when allergen counts are high and when air quality is low.  Wear a ski mask that covers your nose and mouth during outdoor winter activities. Exercise indoors on cold days if you can.  Warm up before exercising, and take time for a cool-down period after exercise.  Keep all follow-up visits as told by your health care provider. This is important.    Where to find more information  For information about asthma, turn to the Centers for Disease Control and Prevention at www.cdc.gov/asthma/faqs.htm  For air quality information, turn to AirNow at https://airnow.gov/    Contact a health care provider if:  You have wheezing, shortness of breath, or a cough even while you are taking medicine to prevent attacks.  The mucus you cough up (sputum) is thicker than usual.  Your sputum changes from clear or white to yellow, green, gray, or bloody.  Your medicines are causing side effects, such as a rash, itching, swelling, or trouble breathing.  You need to use a reliever medicine more than 2–3 times a week.  Your peak flow reading is still at 50–79% of your personal best after following your action plan for 1 hour.  You have a fever.    Get help right away if:  You are getting worse and do not respond to treatment during an asthma attack.  You are short of breath when at rest or when doing very little physical activity.  You have difficulty eating, drinking, or talking.  You have chest pain or tightness.  You develop a fast heartbeat or palpitations.  You have a bluish color to your lips or fingernails.  You are light-headed or dizzy, or you faint.  Your peak flow reading is less than 50% of your personal best.  You feel too tired to breathe normally.    Summary  Asthma is a long-term (chronic) condition that causes recurrent episodes in which the airways become tight and narrow. These episodes can cause coughing, wheezing, shortness of breath, and chest pain.  Asthma cannot be cured, but medicines and lifestyle changes can help control it and treat acute attacks.  Make sure you understand how to avoid triggers and how and when to use your medicines.  Asthma attacks can range from minor to life threatening. Get help right away if you have an asthma attack and do not respond to treatment with your usual rescue medicines.    ADDITIONAL NOTES AND INSTRUCTIONS    Please follow up with your Primary MD in 24-48 hr.  Seek immediate medical care for any new/worsening signs or symptoms.

## 2024-08-27 ENCOUNTER — APPOINTMENT (OUTPATIENT)
Dept: PULMONOLOGY | Facility: CLINIC | Age: 54
End: 2024-08-27
Payer: COMMERCIAL

## 2024-08-27 VITALS
WEIGHT: 135 LBS | DIASTOLIC BLOOD PRESSURE: 81 MMHG | OXYGEN SATURATION: 96 % | BODY MASS INDEX: 23.92 KG/M2 | SYSTOLIC BLOOD PRESSURE: 122 MMHG | HEIGHT: 63 IN | TEMPERATURE: 97.5 F | HEART RATE: 80 BPM | RESPIRATION RATE: 16 BRPM

## 2024-08-27 DIAGNOSIS — R63.4 ABNORMAL WEIGHT LOSS: ICD-10-CM

## 2024-08-27 DIAGNOSIS — J47.9 BRONCHIECTASIS, UNCOMPLICATED: ICD-10-CM

## 2024-08-27 DIAGNOSIS — J45.40 MODERATE PERSISTENT ASTHMA, UNCOMPLICATED: ICD-10-CM

## 2024-08-27 DIAGNOSIS — J20.9 ACUTE BRONCHITIS, UNSPECIFIED: ICD-10-CM

## 2024-08-27 PROBLEM — K29.70 GASTRITIS, UNSPECIFIED, WITHOUT BLEEDING: Chronic | Status: ACTIVE | Noted: 2024-08-13

## 2024-08-27 PROCEDURE — 99214 OFFICE O/P EST MOD 30 MIN: CPT

## 2024-08-27 RX ORDER — DOXYCYCLINE HYCLATE 100 MG/1
100 TABLET ORAL
Qty: 14 | Refills: 0 | Status: ACTIVE | COMMUNITY
Start: 2024-08-27 | End: 1900-01-01

## 2024-08-27 RX ORDER — ALBUTEROL SULFATE 2.5 MG/3ML
(2.5 MG/3ML) SOLUTION RESPIRATORY (INHALATION)
Qty: 180 | Refills: 3 | Status: ACTIVE | COMMUNITY
Start: 2024-08-27 | End: 1900-01-01

## 2024-08-27 RX ORDER — PREDNISONE 10 MG/1
10 TABLET ORAL DAILY
Qty: 15 | Refills: 1 | Status: ACTIVE | COMMUNITY
Start: 2024-08-27 | End: 1900-01-01

## 2024-08-27 RX ORDER — MONTELUKAST 10 MG/1
10 TABLET, FILM COATED ORAL
Qty: 90 | Refills: 3 | Status: ACTIVE | COMMUNITY
Start: 2024-08-27 | End: 1900-01-01

## 2024-08-27 NOTE — HISTORY OF PRESENT ILLNESS
[TextBox_4] : Current HPI:  Presents today with complaint of increased cough for last few weeks, sputum production is greenish.  Presented to ED on 8/13/24 and rcvd Methylpred 125mg x 1 and was sent home on Pred 40mg x 3days, noted some improvement in cough temporarily but never resolved. O2Sat ~2 weeks ago was 90-91% although more recently 94-95%.  No SOB.  No fever or chills.  Has had continued weight loss, unintentional , down 22lbas since April!  Appetite has been good however.  Reports last Mammo in May, also PAP smear earlier this year which was fine and completed EGD with C-scope with GI also earlier this year and only finding per patient was that of severe gastritis.  GERD symptoms have improved considerably, is now on Protonix once daily instead of twice daily, has breakthrough symptoms rarely.  No N/V or abd pain.  No other complaints apart from uncontrolled cough.  Off Singulair x 1 months due to insurance issues and Alb/Hypersal as well.   (4/18/24): Ms. Castaneda returns for visit, reports being seen by GI and is s/p EGD/C-scope findings c/w severe gastritis. Started on PPI once daily but still with GERD symptoms AM through PM, now on BID with improved control of symptoms along with dietary discretion which has also led to weight loss. Using Advair 500/50 twice daily but still with chest tightness and avoids taking deep breaths for fear of coughing fit and chest tightness. Used course of Prednisone after last visit with improvement. Having allergy symptoms with change of season, using Flonase for PND which has helped a lot. Not on antihistamine as makes her far too sleepy and can't tolerate. Has never been on Singulair. No fever, chills, minimal to no sputum production. Not using rescue inhaler unless absolutely necessary as makes her very "jittery".   (2/8/24): Ms. Castaneda is a 54 yo F with PMHx Breast cancer (infiltrating DCIS, s/p chemo/radiation, remains on Anastrazole/Lupron), osteoporosis, Hashimoto's thyroiditis, seasonal allergies ("year round"), seasonal depression, asthma (dx 10-15yrs ago) presenting for evaluation for increased dyspnea, chest tightness and cough over past 2-3 months. She reports symptoms may have been mildly progressive over past year but have been more pronounced over these recent months. No associated chest pain per se, chest tightness does increase on exertion but leads to wheezing and subsequent cough. Sensitive to extremes of air temperature, cold dry air will worsen symptoms as does humid, hot air. She is on Advair 250/50 once daily prescribed by her PCP (has never seen Pulmonologist) who had recently added Advair 500/50 once daily to her inhaler regimen? Currently on the Advair 250 once daily. Has symptoms of PND, also suffers from chronic GERD for which takes Pepcid prn (2-3 times weekly) and monitors diet but is still not well controlled. No nighttime symptoms of cough/SOB. For chronic rhinitis she uses Flonase from time to time but not regularly. NO fever, chills, weight loss. Does have night sweats which attributes to menopausal symptoms from breast ca treatment. Review of C-spine from 1/13/24 shows significant GGOs and interlobular septal thickening in upper lobes. Also cardiac CT from 11/27/23 showing some bronchial wall thickening and mild bronchiectasis in RML/lingula along with atelectasis; calcium score was 0. Patient unaware of having any recent echo.  No respiratory issues as a child. Has been hospitalized x 2 over past 10 years requiring steroids for asthma but never intubated.

## 2024-08-27 NOTE — REASON FOR VISIT
[Follow-Up] : a follow-up visit [Asthma] : asthma [Cough] : cough [Bronchiectasis] : bronchiectasis No

## 2024-08-27 NOTE — REVIEW OF SYSTEMS
[Fever] : no fever [Fatigue] : no fatigue [Chills] : no chills [Recent Wt Loss (___ Lbs)] : ~T recent [unfilled] lb weight loss [Sore Throat] : no sore throat [Nasal Congestion] : no nasal congestion [Postnasal Drip] : no postnasal drip [Cough] : cough [Hemoptysis] : no hemoptysis [Chest Tightness] : chest tightness [Sputum] : sputum [Dyspnea] : no dyspnea [Pleuritic Pain] : no pleuritic pain [Wheezing] : no wheezing [SOB on Exertion] : no sob on exertion [Edema] : no edema [Orthopnea] : no orthopnea [Palpitations] : no palpitations [Syncope] : no syncope [Seasonal Allergies] : no seasonal allergies [GERD] : no gerd [Abdominal Pain] : no abdominal pain [Nausea] : no nausea [Vomiting] : no vomiting [Dysuria] : no dysuria [Back Pain] : no back pain [Rash] : no rash [Easy Bruising] : no easy bruising [Clotting Disorder/ Frequent bleeding] : no clotting disorder/ frequent bleeding [Headache] : no headache [Dizziness] : no dizziness [Obesity] : no obesity

## 2024-08-27 NOTE — ASSESSMENT
[FreeTextEntry1] : Ms. Castaneda is a 53 yo F with PMHx Breast cancer (infiltrating DCIS, s/p chemo/radiation, remains on Anastrazole/Lupron), osteoporosis, Hashimoto's thyroiditis, seasonal allergies ("year round"), seasonal depression, asthma (dx 10-15yrs ago) presenting for evaluation for increased dyspnea, chest tightness and cough over past 2-3 months. Suspect chronic rhinitis, uncontrolled GERD contributing to asthma symptoms. HRCT Chest 2/9/24 demonstrating RUL/RML bronchiectasis (mild) with associated post-radiation changes. Peak flow 280 / 240 / 250 from 04/2024. Has persistent productive cough, no wheezing on exam.  Also very concerned about her continued weight loss.  Appears to be UTD on HCM and breast ca monitoring (B/L mammo + US, BI-RADS2) showing no e/o malignancy, just performed last month.  Possibility of NTM lung disease not out of the question.  PFTs (4/18/24): FEV1/FVC = 74, FEV1 (2.02) 76% postBD 2.47 (93%), FVC (2.73) 81% postBD (3.22) 95%, TLC 92%, DLCO 81% ---- no obstruction but significant postBD response, normal lung volumes, normal diffusion capacity.  Recommend: #Acute bronchitis - start Doxycycline BID x 7 days - restart ACT with Alb/hypersal at least TWICE daily for next 7 days - CXR from 8/13 with no new infiltrates  #Asthma, moderate persistent - continue Advair 500/50 1 puff BID (rinse mouth after use) (generic Rx ordered) - reordered Singulair 10mg qhs through [facility] mail order - Albuterol neb or inhaler q6h prn SOB/wheezing/cough - Prednisone 30mg x 5 day given as part of asthma action plan (w/ 1 refill), no need to use at present  #Chronic rhinitis/Seasonal allergies - Flonase 1-2 sprays prn (not taking at present) - unable to tolerate antihistamine as too sedating - start Singulair qhs  #GERD - seen by GI who completed EGD/C-scope - findings c/w severe gastritis - currently on PPI once daily, GI follow-up in coming weeks  #Unintentional weight loss #Bronchiectasis - start Albuterol (can use 1/2 vial if not tolerating full vial - alternatively will switch to Xopenex if needed) + Hypersal 3.5% twice daily for next 7d and at least ONCE daily going forward - concern for NTM infection given unintentional weight loss as no clear alternative cause at present - sputum for bacterial culture and AFB x 3 ASAP  RTC in 2 months for follow-up, sooner if any change / worsening symptoms.  Xena Rainey MD, MSCR

## 2024-08-27 NOTE — PHYSICAL EXAM
[Well Nourished] : well nourished [Well Groomed] : well groomed [Well Developed] : well developed [Normal Oropharynx] : normal oropharynx [Normal Appearance] : normal appearance [No Neck Mass] : no neck mass [Normal Rate/Rhythm] : normal rate/rhythm [Normal S1, S2] : normal s1, s2 [No Murmurs] : no murmurs [No Resp Distress] : no resp distress [No Acc Muscle Use] : no acc muscle use [Normal Rhythm and Effort] : normal rhythm and effort [No Abnormalities] : no abnormalities [Benign] : benign [Normal Gait] : normal gait [No Clubbing] : no clubbing [No Cyanosis] : no cyanosis [No Edema] : no edema [Normal Color/ Pigmentation] : normal color/ pigmentation [No Focal Deficits] : no focal deficits [Oriented x3] : oriented x3 [Normal Affect] : normal affect [TextBox_2] : Actively coughing [TextBox_68] : no wheezing; scattered distant rhonchi

## 2024-09-03 ENCOUNTER — APPOINTMENT (OUTPATIENT)
Dept: INFUSION THERAPY | Facility: HOSPITAL | Age: 54
End: 2024-09-03

## 2024-09-03 LAB — BACTERIA SPT CULT: NORMAL

## 2024-09-05 LAB — ACID FAST STN SPT: NORMAL

## 2024-09-22 ENCOUNTER — OUTPATIENT (OUTPATIENT)
Dept: OUTPATIENT SERVICES | Facility: HOSPITAL | Age: 54
LOS: 1 days | Discharge: HOME | End: 2024-09-22

## 2024-09-22 DIAGNOSIS — Z30.430 ENCOUNTER FOR INSERTION OF INTRAUTERINE CONTRACEPTIVE DEVICE: Chronic | ICD-10-CM

## 2024-09-22 DIAGNOSIS — M67.432 GANGLION, LEFT WRIST: Chronic | ICD-10-CM

## 2024-09-22 DIAGNOSIS — C50.919 MALIGNANT NEOPLASM OF UNSPECIFIED SITE OF UNSPECIFIED FEMALE BREAST: ICD-10-CM

## 2024-09-22 DIAGNOSIS — Z98.890 OTHER SPECIFIED POSTPROCEDURAL STATES: Chronic | ICD-10-CM

## 2024-09-24 ENCOUNTER — RX RENEWAL (OUTPATIENT)
Age: 54
End: 2024-09-24

## 2024-10-01 ENCOUNTER — APPOINTMENT (OUTPATIENT)
Dept: INFUSION THERAPY | Facility: HOSPITAL | Age: 54
End: 2024-10-01

## 2024-10-01 ENCOUNTER — APPOINTMENT (OUTPATIENT)
Dept: HEMATOLOGY ONCOLOGY | Facility: CLINIC | Age: 54
End: 2024-10-01
Payer: COMMERCIAL

## 2024-10-01 VITALS
SYSTOLIC BLOOD PRESSURE: 110 MMHG | TEMPERATURE: 97.3 F | BODY MASS INDEX: 24.02 KG/M2 | HEART RATE: 80 BPM | OXYGEN SATURATION: 98 % | DIASTOLIC BLOOD PRESSURE: 73 MMHG | WEIGHT: 135.58 LBS | RESPIRATION RATE: 16 BRPM

## 2024-10-01 DIAGNOSIS — C50.911 MALIGNANT NEOPLASM OF UNSPECIFIED SITE OF RIGHT FEMALE BREAST: ICD-10-CM

## 2024-10-01 PROCEDURE — G2211 COMPLEX E/M VISIT ADD ON: CPT

## 2024-10-01 PROCEDURE — 99214 OFFICE O/P EST MOD 30 MIN: CPT

## 2024-10-01 NOTE — ASSESSMENT
[FreeTextEntry1] : 55 yo woman with history of right breast cancer; C1oY4P7 ER+MI-, HER2-, high Oncotype score 29; s/p adjuvant TC chemotherapy; course complicated by seizure due to hyponatremia after cycle #2 of TC; completed therapy without incident; s/p radiation to the right breast and has been on OS/AI since 2/2019 & 4/2019 respectively.  - discussed need for adjuvant endocrine therapy for a period of 7 years (to complete in 4/2026) - to get Lupron today but would stop at this point and in 3 months (prior to next visit), will repeat labs at [facility] to evaluate menopausal status; if deemed post-menopausal and with no vaginal bleeding, can stay off Lupron - bone density 8/2023 and 9/2024 with osteoporosis ; being followed by Endocrine (Dr. Anita Cui); remains on Alendronate, calcium/vitamin D - mammo/sono from 7/18/24-BIRADS2, reviewed; repeat annual imaging due in 7/2025. - Patient and spouse had the opportunity to have all their questions answered to their satisfaction - f/u in 3 months after labs to monitor for menopausal status

## 2024-10-01 NOTE — HISTORY OF PRESENT ILLNESS
[Disease: _____________________] : Disease: [unfilled] [T: ___] : T[unfilled] [N: ___] : N[unfilled] [M: ___] : M[unfilled] [AJCC Stage: ____] : AJCC Stage: [unfilled] [Treatment Protocol] : Treatment Protocol [Therapy: ___] : Therapy: [unfilled] [100: Normal, no complaints, no evidence of disease.] : 100: Normal, no complaints, no evidence of disease. [ECOG Performance Status: 0 - Fully active, able to carry on all pre-disease performance without restriction] : Performance Status: 0 - Fully active, able to carry on all pre-disease performance without restriction [de-identified] : Pt seen for an initial visit by me on 4/2/24 in order to transfer her care to me from that of Dr Escobar and before that Dr Shipman and before that Dr. Horn. The history below is as per review of the AEHR notes of these oncologists and review / confirmation with the pt.   5/22/18 screening mammo with 1.5cm R breast mass; diagnostic mammo and US confirmed this mass RUOQ 1.6cm at 10:00, hypoechoic.   US guided core biopsy of the mass was performed 6/1/18, which revealed 0.5 cm invasive ductal carcinoma, [place] score 7/9, with nuclear grade 2 cribriform type DCIS with central necrosis and calcifications, estrogen receptor 95% strong, progesterone receptor 3-5% strong, HER-2 IHC 2+ negative by [facility].   MRI of the bilateral breasts on 06/25/2018 showed a 1.7 X 1.6 X 1.8 cm focal area of non-mass enhancement with washout kinetics and biopsy marker at the upper outer posterior 10:00 position of the right breast. There were no other suspicious lesions in the right breast and no suspicious lesions in the left breast. There were no axillary or internal mammary lymphadenopathy.   She saw Dr. Heller who performed CXR, nuclear bone scan, pelvic and abdominal sonograms, which were negative.   On 8/15/18 she underwent a right lumpectomy with SLNB - pathology with 1.1 cm invasive ductal carcinoma, Lynette 7-8/9 and no LVI. 0/2 LN involved.  There was also a 1.5 cm nuclear grade 2 - 3 ductal carcinoma in situ, cribriform and solid type with comedonecrosis and calcifications. Regarding the margins, the invasive tumor was 0.15 mm from the deep margin. Ductal carcinoma in situ was noted within microns of the deep margin and less than 0.1 mm from the anterior and superior margins. Two sentinel nodes were examined and negative.   Oncotype score 29 (20% risk of distant recurrence with tamoxifen alone). TC x 4 recommende which she completed with the use of cold cap therapy to prevent alopecia.  She saw radiation oncology in consultation and was recommended for adjuvant radiation pending medical oncology recommendations.   10/24/18-10/26/18 admitted to [facility]. ~9pm just after driving home from getting food,  witness LOC with seizure activity x 20 seconds followed by lethargy - EMS called. She was taken to [facility] ER and admitted to ICU; sodium 119 with evidence of aspiration on CXR/CTA chest, O2 sat 86% briefly requiring BIPAP support quickly weaned. Neurology, Pulmonary, Renal consults following. Sodium corrected over the next 24h with normal saline infusion, antibiotics discontinued as no further evidence of infection. Neulasta onpro administered as scheduled 10/25 afternoon. She had a CTH and MRI Brain w/wo contrast which showed no acute findings. Zoloft was held, and she was discharged home. Incidental note of possible compression deformity of thoracic spine on imaging while inpatient. She admited to drinking ~4L water on day of chemotherapy as advised by cold cap literature. Her  recalled in retrospect that she seemed overly sleepy and slightly confused (forgot she had chinese food for lunch, eyes closing while sitting up) on the afternoon/evening after treatment prior to seizure. CK and LFTs elevated post discharge, continued to downtrend over the next few days to normal.  She underwent cycle 3 of TC followed by 24 hours of observation at [facility] inpatient with neuro checks and q4h BMP monitoring. Her sodium remained normal and she was asymptomatic. She completed her final cycle of TC on 12/5/18 without event.  Adjuvant radiation initiated 2/4/2019 and completed 3/2/19.  Lupron for ovarian suppression initiated 2/2019. anastrozole initiated April 2019.  My Risk panel on 6/13/2018 was negative.  FHx significant for father with prostate cancer in 60s, paternal cousin with leukemia age 13 and breast cancer age 45. [de-identified] : T1cN0 [de-identified] : ER+DC-HER2- [de-identified] : Oncotype 29 [FreeTextEntry1] : TC x 4 cycles, with Penguin cold cap Ovarian Suppression 2/2019; Aromatase Inhibotor 4/2019 [de-identified] : 10/1/24 Patient returns today to rule out progression or recurrence of breast cancer and to assess treatment toxicity.  She remains on Lupron since 2/19 and anastrozole since 4/19. She temporarily changed to every 3-month Lupron injections during the height of the COVID pandemic, however, didn't tolerate well and switched back to monthly dosing as is tolerating well.  She continues to tolerate anastrozole well.   She has history of Hashimoto thyroiditis followed by endocrinology and asthma followed by pulmonary. She recently was diagnosed with gastritis and has had to change her diet and start on PPI Following with Endocrinology (Dr. Anita Cui) and has repeat bone density in 9/2024 that showed persistent osteoporosis but improved since prior Started on oral bisphosphonate    RECENT IMAGING ================ Mammo/sono 7/18/2024 - BIRADS2 Mammo/sono, 6/22/23- BIRADS 2 DEXA, 6/22- Osteoporosis, on alendronate; 9/2024 Osteoporosis (but improved)  ===================

## 2024-10-01 NOTE — PHYSICAL EXAM
[Fully active, able to carry on all pre-disease performance without restriction] : Status 0 - Fully active, able to carry on all pre-disease performance without restriction [Normal] : affect appropriate [de-identified] : Right breast with well healed surgical scar at the axillary tail with some architectural distortion; no discrete palpable masses, no palpable axillary nodes.   Left breast with no discrete palpable masses, no palpable axillary nodes.

## 2024-10-10 ENCOUNTER — OUTPATIENT (OUTPATIENT)
Dept: OUTPATIENT SERVICES | Facility: HOSPITAL | Age: 54
LOS: 1 days | End: 2024-10-10
Payer: COMMERCIAL

## 2024-10-10 VITALS
HEART RATE: 80 BPM | SYSTOLIC BLOOD PRESSURE: 101 MMHG | WEIGHT: 136.69 LBS | OXYGEN SATURATION: 97 % | HEIGHT: 65 IN | RESPIRATION RATE: 16 BRPM | TEMPERATURE: 98 F | DIASTOLIC BLOOD PRESSURE: 69 MMHG

## 2024-10-10 DIAGNOSIS — M24.574 CONTRACTURE, RIGHT FOOT: ICD-10-CM

## 2024-10-10 DIAGNOSIS — Z98.890 OTHER SPECIFIED POSTPROCEDURAL STATES: Chronic | ICD-10-CM

## 2024-10-10 DIAGNOSIS — Z01.818 ENCOUNTER FOR OTHER PREPROCEDURAL EXAMINATION: ICD-10-CM

## 2024-10-10 DIAGNOSIS — M20.11 HALLUX VALGUS (ACQUIRED), RIGHT FOOT: ICD-10-CM

## 2024-10-10 DIAGNOSIS — F41.9 ANXIETY DISORDER, UNSPECIFIED: ICD-10-CM

## 2024-10-10 DIAGNOSIS — M20.5X1 OTHER DEFORMITIES OF TOE(S) (ACQUIRED), RIGHT FOOT: ICD-10-CM

## 2024-10-10 DIAGNOSIS — Z30.430 ENCOUNTER FOR INSERTION OF INTRAUTERINE CONTRACEPTIVE DEVICE: Chronic | ICD-10-CM

## 2024-10-10 DIAGNOSIS — M67.432 GANGLION, LEFT WRIST: Chronic | ICD-10-CM

## 2024-10-10 DIAGNOSIS — M20.41 OTHER HAMMER TOE(S) (ACQUIRED), RIGHT FOOT: ICD-10-CM

## 2024-10-10 DIAGNOSIS — E03.9 HYPOTHYROIDISM, UNSPECIFIED: ICD-10-CM

## 2024-10-10 DIAGNOSIS — J45.909 UNSPECIFIED ASTHMA, UNCOMPLICATED: ICD-10-CM

## 2024-10-10 DIAGNOSIS — M25.774 OSTEOPHYTE, RIGHT FOOT: ICD-10-CM

## 2024-10-10 LAB
ANION GAP SERPL CALC-SCNC: 11 MMOL/L — SIGNIFICANT CHANGE UP (ref 5–17)
BUN SERPL-MCNC: 10 MG/DL — SIGNIFICANT CHANGE UP (ref 7–23)
CALCIUM SERPL-MCNC: 9.2 MG/DL — SIGNIFICANT CHANGE UP (ref 8.4–10.5)
CHLORIDE SERPL-SCNC: 108 MMOL/L — SIGNIFICANT CHANGE UP (ref 96–108)
CO2 SERPL-SCNC: 26 MMOL/L — SIGNIFICANT CHANGE UP (ref 22–31)
CREAT SERPL-MCNC: 1.16 MG/DL — SIGNIFICANT CHANGE UP (ref 0.5–1.3)
EGFR: 57 ML/MIN/1.73M2 — LOW
GLUCOSE SERPL-MCNC: 90 MG/DL — SIGNIFICANT CHANGE UP (ref 70–99)
HCT VFR BLD CALC: 38.6 % — SIGNIFICANT CHANGE UP (ref 34.5–45)
HGB BLD-MCNC: 13 G/DL — SIGNIFICANT CHANGE UP (ref 11.5–15.5)
MCHC RBC-ENTMCNC: 31.5 PG — SIGNIFICANT CHANGE UP (ref 27–34)
MCHC RBC-ENTMCNC: 33.7 GM/DL — SIGNIFICANT CHANGE UP (ref 32–36)
MCV RBC AUTO: 93.5 FL — SIGNIFICANT CHANGE UP (ref 80–100)
NRBC # BLD: 0 /100 WBCS — SIGNIFICANT CHANGE UP (ref 0–0)
PLATELET # BLD AUTO: 199 K/UL — SIGNIFICANT CHANGE UP (ref 150–400)
POTASSIUM SERPL-MCNC: 4.8 MMOL/L — SIGNIFICANT CHANGE UP (ref 3.5–5.3)
POTASSIUM SERPL-SCNC: 4.8 MMOL/L — SIGNIFICANT CHANGE UP (ref 3.5–5.3)
RBC # BLD: 4.13 M/UL — SIGNIFICANT CHANGE UP (ref 3.8–5.2)
RBC # FLD: 12.5 % — SIGNIFICANT CHANGE UP (ref 10.3–14.5)
SODIUM SERPL-SCNC: 145 MMOL/L — SIGNIFICANT CHANGE UP (ref 135–145)
WBC # BLD: 4.27 K/UL — SIGNIFICANT CHANGE UP (ref 3.8–10.5)
WBC # FLD AUTO: 4.27 K/UL — SIGNIFICANT CHANGE UP (ref 3.8–10.5)

## 2024-10-10 PROCEDURE — 93010 ELECTROCARDIOGRAM REPORT: CPT

## 2024-10-10 PROCEDURE — G0463: CPT

## 2024-10-10 PROCEDURE — 93005 ELECTROCARDIOGRAM TRACING: CPT

## 2024-10-10 PROCEDURE — 36415 COLL VENOUS BLD VENIPUNCTURE: CPT

## 2024-10-10 PROCEDURE — 85027 COMPLETE CBC AUTOMATED: CPT

## 2024-10-10 PROCEDURE — 80048 BASIC METABOLIC PNL TOTAL CA: CPT

## 2024-10-10 NOTE — H&P PST ADULT - COMMENTS
[facility]  [address]  [place] [address]  Dept: [phone]  Dept Fax: [phone]        [facility] Follow Up      Subjective:   Date of Surgery:   4/8/24: Left femur IMN. (3w, 3d post-op.)  -Left pathologic subtrochanteric femur fracture.   4/10/24: Right femur IMN. (3w, 1d post-op.)  -Right femoral shaft stress fracture     Dariana Juan is a 69 y.o. year old female who presents to the clinic today for routine 3 weeks and 3-day followup regarding her left pathological subtrochanteric femur fracture, and right femur stress fracture status post bilateral femur intramedullary nail fixation.  Patient initially presented to the emergency department on 4/1/2024 with a left pathologic subtrochanteric femur fracture she sustained while playing with her grandson.  Patient does have a history of osteoporosis and long-term bisphosphonate use.  While she was in the hospital contralateral femur films were taken which did demonstrate a right stress fracture to the femur and as a result her right femur was also subsequently prophylactically fixed with an intramedullary nail.  Patient tolerated procedures well and has been doing excellent in the postoperative period patient has no new numbness, tingling or weakness and states that her pain has been minimal.  Patient states that her Flexeril has gave her significant relief and she has been successful working with physical therapy.  Patient did follow-up with her primary care doctor for discussion of future osteoporosis treatment.  Other than mild swelling in her bilateral lower extremities patient has no other orthopedic complaints or concerns in the postoperative setting.      Review of Systems  Gen: no fever, chills, malaise  CV: no chest pain or palpitations  Resp: no cough or shortness of breath  GI: no nausea, vomiting, diarrhea, or constipation  Neuro: no numbness,  Denies h/o or family h/o DVT, PE  Denies bleeding or clotting disorders  Denies dentures/partials, loose teeth/caps 5/2018

## 2024-10-10 NOTE — H&P PST ADULT - HISTORY OF PRESENT ILLNESS
55 y/o female with PMH of hypothyroidism, asthma, depression anxiety and breast cancer ( s/p chemo and radiation completed 2019) presents for PST.  C/o right foot pain x1 year that is worsening over time.  Feels well at PST today with no c/o cough, fever, or recent illness.

## 2024-10-10 NOTE — H&P PST ADULT - NSICDXPASTSURGICALHX_GEN_ALL_CORE_FT
PAST SURGICAL HISTORY:  Encounter for IUD insertion 2021 last place -copper    Ganglion cyst of wrist, left 2013, 2015    S/P foot surgery, left     S/P lumpectomy, right breast with nodes 2018

## 2024-10-17 NOTE — ASU PATIENT PROFILE, ADULT - NSICDXPASTMEDICALHX_GEN_ALL_CORE_FT
PAST MEDICAL HISTORY:  Anxiety and depression     Asthma last major attack 2016, denies hx of intubation    Bipolar affective     Depression, unspecified depression type     Gastritis     H/O Hashimoto thyroiditis     History of 2019 novel coronavirus disease (COVID-19) 4/2020- not hospitalized, no resp. complications    Hypothyroidism, unspecified type     Malignant neoplasm of right female breast, unspecified estrogen receptor status, unspecified site of breast s/p lumpectomy 2018, completed chemo and radiation 3/2019

## 2024-10-17 NOTE — ASU PATIENT PROFILE, ADULT - FALL HARM RISK - UNIVERSAL INTERVENTIONS
Bed in lowest position, wheels locked, appropriate side rails in place/Call bell, personal items and telephone in reach/Instruct patient to call for assistance before getting out of bed or chair/Non-slip footwear when patient is out of bed/[place] to call system/Physically safe environment - no spills, clutter or unnecessary equipment/Purposeful Proactive Rounding/Room/bathroom lighting operational, light cord in reach

## 2024-10-17 NOTE — ASU PATIENT PROFILE, ADULT - NSICDXPASTSURGICALHX_GEN_ALL_CORE_FT
PAST SURGICAL HISTORY:  Encounter for IUD insertion 2021 last place -copper-removed    Ganglion cyst of wrist, left 2013, 2015    S/P foot surgery, left no hardware    S/P lumpectomy, right breast with nodes 2018

## 2024-10-18 ENCOUNTER — OUTPATIENT (OUTPATIENT)
Dept: OUTPATIENT SERVICES | Facility: HOSPITAL | Age: 54
LOS: 1 days | End: 2024-10-18
Payer: COMMERCIAL

## 2024-10-18 ENCOUNTER — TRANSCRIPTION ENCOUNTER (OUTPATIENT)
Age: 54
End: 2024-10-18

## 2024-10-18 VITALS
DIASTOLIC BLOOD PRESSURE: 77 MMHG | OXYGEN SATURATION: 96 % | WEIGHT: 136.69 LBS | TEMPERATURE: 99 F | HEART RATE: 75 BPM | RESPIRATION RATE: 15 BRPM | HEIGHT: 65 IN | SYSTOLIC BLOOD PRESSURE: 121 MMHG

## 2024-10-18 VITALS
OXYGEN SATURATION: 94 % | TEMPERATURE: 98 F | DIASTOLIC BLOOD PRESSURE: 59 MMHG | SYSTOLIC BLOOD PRESSURE: 95 MMHG | RESPIRATION RATE: 14 BRPM | HEART RATE: 76 BPM

## 2024-10-18 DIAGNOSIS — Z98.890 OTHER SPECIFIED POSTPROCEDURAL STATES: Chronic | ICD-10-CM

## 2024-10-18 DIAGNOSIS — M25.774 OSTEOPHYTE, RIGHT FOOT: ICD-10-CM

## 2024-10-18 DIAGNOSIS — M20.5X1 OTHER DEFORMITIES OF TOE(S) (ACQUIRED), RIGHT FOOT: ICD-10-CM

## 2024-10-18 DIAGNOSIS — M67.432 GANGLION, LEFT WRIST: Chronic | ICD-10-CM

## 2024-10-18 DIAGNOSIS — Z30.430 ENCOUNTER FOR INSERTION OF INTRAUTERINE CONTRACEPTIVE DEVICE: Chronic | ICD-10-CM

## 2024-10-18 DIAGNOSIS — M24.574 CONTRACTURE, RIGHT FOOT: ICD-10-CM

## 2024-10-18 DIAGNOSIS — M20.11 HALLUX VALGUS (ACQUIRED), RIGHT FOOT: ICD-10-CM

## 2024-10-18 DIAGNOSIS — M20.41 OTHER HAMMER TOE(S) (ACQUIRED), RIGHT FOOT: ICD-10-CM

## 2024-10-18 PROCEDURE — 28299 COR HLX VLGS DOUBLE OSTEOT: CPT | Mod: T5

## 2024-10-18 PROCEDURE — C1713: CPT

## 2024-10-18 PROCEDURE — 88304 TISSUE EXAM BY PATHOLOGIST: CPT

## 2024-10-18 PROCEDURE — 88300 SURGICAL PATH GROSS: CPT

## 2024-10-18 PROCEDURE — 28272 RELEASE OF TOE JOINT EACH: CPT | Mod: T9

## 2024-10-18 PROCEDURE — 88304 TISSUE EXAM BY PATHOLOGIST: CPT | Mod: 26

## 2024-10-18 PROCEDURE — 88300 SURGICAL PATH GROSS: CPT | Mod: 26

## 2024-10-18 PROCEDURE — 73620 X-RAY EXAM OF FOOT: CPT

## 2024-10-18 PROCEDURE — 73620 X-RAY EXAM OF FOOT: CPT | Mod: 26,RT

## 2024-10-18 DEVICE — K-WIRE MICROAIRE (SMOOTH) DOUBLE TROCAR 1.1MM X 4": Type: IMPLANTABLE DEVICE | Status: FUNCTIONAL

## 2024-10-18 DEVICE — IMPLANTABLE DEVICE: Type: IMPLANTABLE DEVICE | Status: FUNCTIONAL

## 2024-10-18 DEVICE — SCREW CORTEX S-T 2.7X18MM: Type: IMPLANTABLE DEVICE | Status: FUNCTIONAL

## 2024-10-18 RX ORDER — SODIUM CHLORIDE IRRIG SOLUTION 0.9 %
1000 SOLUTION, IRRIGATION IRRIGATION
Refills: 0 | Status: ACTIVE | OUTPATIENT
Start: 2024-10-18 | End: 2025-09-16

## 2024-10-18 RX ORDER — FENTANYL CITRATE-0.9 % NACL/PF 300MCG/30
25 PATIENT CONTROLLED ANALGESIA VIAL INJECTION
Refills: 0 | Status: DISCONTINUED | OUTPATIENT
Start: 2024-10-18 | End: 2024-10-18

## 2024-10-18 RX ORDER — ONDANSETRON HCL/PF 4 MG/2 ML
4 VIAL (ML) INJECTION ONCE
Refills: 0 | Status: DISCONTINUED | OUTPATIENT
Start: 2024-10-18 | End: 2024-10-18

## 2024-10-18 RX ORDER — FLUTICASONE PROPION/SALMETEROL 100-50 MCG
2 BLISTER, WITH INHALATION DEVICE INHALATION
Refills: 0 | DISCHARGE

## 2024-10-18 RX ORDER — SODIUM CHLORIDE IRRIG SOLUTION 0.9 %
1000 SOLUTION, IRRIGATION IRRIGATION
Refills: 0 | Status: DISCONTINUED | OUTPATIENT
Start: 2024-10-18 | End: 2024-10-18

## 2024-10-18 RX ORDER — MONTELUKAST SODIUM 10 MG/1
1 TABLET, FILM COATED ORAL
Refills: 0 | DISCHARGE

## 2024-10-18 NOTE — ASU DISCHARGE PLAN (ADULT/PEDIATRIC) - FINANCIAL ASSISTANCE
[facility] provides services at a reduced cost to those who are determined to be eligible through [facility]’s financial assistance program. Information regarding [facility]’s financial assistance program can be found by going to [email or web address] or by calling [phone].

## 2024-10-18 NOTE — ASU DISCHARGE PLAN (ADULT/PEDIATRIC) - PAIN MANAGEMENT
Prescription given to patient/guardian Prescription given to patient/guardian/Prescriptions electronically submitted to pharmacy from doctor's office

## 2024-10-18 NOTE — ASU DISCHARGE PLAN (ADULT/PEDIATRIC) - NURSING INSTRUCTIONS
drink plenty fluids, drink plenty fluids,  take medications as ordered,   keep surgical shoe on at all times,  keep operated foot elevated at all times when not walking or standing,  use ice packs per instructions

## 2024-10-18 NOTE — BRIEF OPERATIVE NOTE - NSICDXBRIEFPOSTOP_GEN_ALL_CORE_FT
POST-OP DIAGNOSIS:  Bunion of right foot 18-Oct-2024 12:21:23  Garnt Cheung  Hammertoe of right foot 18-Oct-2024 12:21:35  Grant Cheung  Soft tissue tumor of right foot 18-Oct-2024 12:21:46  Grant Cheung  Exostosis of right foot 18-Oct-2024 12:21:55  Grant Cheung

## 2024-10-18 NOTE — ASU PREOP CHECKLIST - SIDE RAILS UP
You will be notified of your COVID-19 and influenza results in 24 hours.  Please follow attached quarantine guidelines.   done

## 2024-10-18 NOTE — ASU DISCHARGE PLAN (ADULT/PEDIATRIC) - CARE PROVIDER_API CALL
Grant Cheung diony  Podiatric Medicine and Surgery  [address] [place] [address]  Phone: [phone]  Fax: [phone]  Follow Up Time: 1-3 days

## 2024-10-18 NOTE — BRIEF OPERATIVE NOTE - NSICDXBRIEFPREOP_GEN_ALL_CORE_FT
PRE-OP DIAGNOSIS:  Bunion of right foot 18-Oct-2024 12:20:32  Grant Cheung  Hammertoe of right foot 18-Oct-2024 12:20:42  Grant Cheung  Soft tissue tumor of right foot 18-Oct-2024 12:20:50  Grant Cheung  Exostosis of right foot 18-Oct-2024 12:21:04  Grant Cheung

## 2024-10-18 NOTE — ASU DISCHARGE PLAN (ADULT/PEDIATRIC) - PATIENT EDUCATION MATERIALS PROVIED
tylenol/Pre-printed instructions given for crutch/cane training/Pre-printed instructions given for other (specify)

## 2024-10-18 NOTE — ASU DISCHARGE PLAN (ADULT/PEDIATRIC) - NS MD DC FALL RISK RISK
For information on Fall & Injury Prevention, visit: [email or web address] OR  [email or web address] OR  https://www.cdc.gov/steadi/patient.html show

## 2024-10-28 LAB — SURGICAL PATHOLOGY STUDY: SIGNIFICANT CHANGE UP

## 2024-10-29 ENCOUNTER — APPOINTMENT (OUTPATIENT)
Age: 54
End: 2024-10-29

## 2024-11-14 NOTE — PROGRESS NOTE ADULT - PROBLEM SELECTOR PLAN 1
Management as per primary team  Fluid restriction  Monitor hyponatremia Prednisone Pregnancy And Lactation Text: This medication is Pregnancy Category C and it isn't know if it is safe during pregnancy. This medication is excreted in breast milk. Drysol Pregnancy And Lactation Text: This medication is considered safe during pregnancy and breast feeding. Albendazole Pregnancy And Lactation Text: This medication is Pregnancy Category C and it isn't known if it is safe during pregnancy. It is also excreted in breast milk. Minoxidil Counseling: Minoxidil is a topical medication which can increase blood flow where it is applied. It is uncertain how this medication increases hair growth. Side effects are uncommon and include stinging and allergic reactions. Spironolactone Counseling: Patient advised regarding risks of diarrhea, abdominal pain, hyperkalemia, birth defects (for female patients), liver toxicity and renal toxicity. The patient may need blood work to monitor liver and kidney function and potassium levels while on therapy. The patient verbalized understanding of the proper use and possible adverse effects of spironolactone.  All of the patient's questions and concerns were addressed. Topical Retinoid Pregnancy And Lactation Text: This medication is Pregnancy Category C. It is unknown if this medication is excreted in breast milk. Erivedge Counseling- I discussed with the patient the risks of Erivedge including but not limited to nausea, vomiting, diarrhea, constipation, weight loss, changes in the sense of taste, decreased appetite, muscle spasms, and hair loss.  The patient verbalized understanding of the proper use and possible adverse effects of Erivedge.  All of the patient's questions and concerns were addressed. Use Enhanced Medication Counseling?: No Fluconazole Pregnancy And Lactation Text: This medication is Pregnancy Category C and it isn't know if it is safe during pregnancy. It is also excreted in breast milk. Nsaids Pregnancy And Lactation Text: These medications are considered safe up to 30 weeks gestation. It is excreted in breast milk. Fluconazole Counseling:  Patient counseled regarding adverse effects of fluconazole including but not limited to headache, diarrhea, nausea, upset stomach, liver function test abnormalities, taste disturbance, and stomach pain.  There is a rare possibility of liver failure that can occur when taking fluconazole.  The patient understands that monitoring of LFTs and kidney function test may be required, especially at baseline. The patient verbalized understanding of the proper use and possible adverse effects of fluconazole.  All of the patient's questions and concerns were addressed. Quinolones Counseling:  I discussed with the patient the risks of fluoroquinolones including but not limited to GI upset, allergic reaction, drug rash, diarrhea, dizziness, photosensitivity, yeast infections, liver function test abnormalities, tendonitis/tendon rupture. Infliximab Counseling:  I discussed with the patient the risks of infliximab including but not limited to myelosuppression, immunosuppression, autoimmune hepatitis, demyelinating diseases, lymphoma, and serious infections.  The patient understands that monitoring is required including a PPD at baseline and must alert us or the primary physician if symptoms of infection or other concerning signs are noted. Cellcept Counseling:  I discussed with the patient the risks of mycophenolate mofetil including but not limited to infection/immunosuppression, GI upset, hypokalemia, hypercholesterolemia, bone marrow suppression, lymphoproliferative disorders, malignancy, GI ulceration/bleed/perforation, colitis, interstitial lung disease, kidney failure, progressive multifocal leukoencephalopathy, and birth defects.  The patient understands that monitoring is required including a baseline creatinine and regular CBC testing. In addition, patient must alert us immediately if symptoms of infection or other concerning signs are noted. Clindamycin Pregnancy And Lactation Text: This medication can be used in pregnancy if certain situations. Clindamycin is also present in breast milk. Tremfya Counseling: I discussed with the patient the risks of guselkumab including but not limited to immunosuppression, serious infections, worsening of inflammatory bowel disease and drug reactions.  The patient understands that monitoring is required including a PPD at baseline and must alert us or the primary physician if symptoms of infection or other concerning signs are noted. Cosentyx Pregnancy And Lactation Text: This medication is Pregnancy Category B and is considered safe during pregnancy. It is unknown if this medication is excreted in breast milk. High Dose Vitamin A Pregnancy And Lactation Text: High dose vitamin A therapy is contraindicated during pregnancy and breast feeding. Cimetidine Counseling:  I discussed with the patient the risks of Cimetidine including but not limited to gynecomastia, headache, diarrhea, nausea, drowsiness, arrhythmias, pancreatitis, skin rashes, psychosis, bone marrow suppression and kidney toxicity. Odomzo Counseling- I discussed with the patient the risks of Odomzo including but not limited to nausea, vomiting, diarrhea, constipation, weight loss, changes in the sense of taste, decreased appetite, muscle spasms, and hair loss.  The patient verbalized understanding of the proper use and possible adverse effects of Odomzo.  All of the patient's questions and concerns were addressed. Elidel Counseling: Patient may experience a mild burning sensation during topical application. Elidel is not approved in children less than 2 years of age. There have been case reports of hematologic and skin malignancies in patients using topical calcineurin inhibitors although causality is questionable. Tazorac Counseling:  Patient advised that medication is irritating and drying.  Patient may need to apply sparingly and wash off after an hour before eventually leaving it on overnight.  The patient verbalized understanding of the proper use and possible adverse effects of tazorac.  All of the patient's questions and concerns were addressed. Cellcept Pregnancy And Lactation Text: This medication is Pregnancy Category D and isn't considered safe during pregnancy. It is unknown if this medication is excreted in breast milk. Griseofulvin Counseling:  I discussed with the patient the risks of griseofulvin including but not limited to photosensitivity, cytopenia, liver damage, nausea/vomiting and severe allergy.  The patient understands that this medication is best absorbed when taken with a fatty meal (e.g., ice cream or french fries). Minoxidil Pregnancy And Lactation Text: This medication has not been assigned a Pregnancy Risk Category but animal studies failed to show danger with the topical medication. It is unknown if the medication is excreted in breast milk. Spironolactone Pregnancy And Lactation Text: This medication can cause feminization of the male fetus and should be avoided during pregnancy. The active metabolite is also found in breast milk. Arava Counseling:  Patient counseled regarding adverse effects of Arava including but not limited to nausea, vomiting, abnormalities in liver function tests. Patients may develop mouth sores, rash, diarrhea, and abnormalities in blood counts. The patient understands that monitoring is required including LFTs and blood counts.  There is a rare possibility of scarring of the liver and lung problems that can occur when taking methotrexate. Persistent nausea, loss of appetite, pale stools, dark urine, cough, and shortness of breath should be reported immediately. Patient advised to discontinue Arava treatment and consult with a physician prior to attempting conception. The patient will have to undergo a treatment to eliminate Arava from the body prior to conception. Erivedge Pregnancy And Lactation Text: This medication is Pregnancy Category X and is absolutely contraindicated during pregnancy. It is unknown if it is excreted in breast milk. Rifampin Counseling: I discussed with the patient the risks of rifampin including but not limited to liver damage, kidney damage, red-orange body fluids, nausea/vomiting and severe allergy. SSKI Counseling:  I discussed with the patient the risks of SSKI including but not limited to thyroid abnormalities, metallic taste, GI upset, fever, headache, acne, arthralgias, paraesthesias, lymphadenopathy, easy bleeding, arrhythmias, and allergic reaction. Gabapentin Counseling: I discussed with the patient the risks of gabapentin including but not limited to dizziness, somnolence, fatigue and ataxia. Doxycycline Counseling:  Patient counseled regarding possible photosensitivity and increased risk for sunburn.  Patient instructed to avoid sunlight, if possible.  When exposed to sunlight, patients should wear protective clothing, sunglasses, and sunscreen.  The patient was instructed to call the office immediately if the following severe adverse effects occur:  hearing changes, easy bruising/bleeding, severe headache, or vision changes.  The patient verbalized understanding of the proper use and possible adverse effects of doxycycline.  All of the patient's questions and concerns were addressed. Dupixent Counseling: I discussed with the patient the risks of dupilumab including but not limited to eye infection and irritation, cold sores, injection site reactions, worsening of asthma, allergic reactions and increased risk of parasitic infection.  Live vaccines should be avoided while taking dupilumab. Dupilumab will also interact with certain medications such as warfarin and cyclosporine. The patient understands that monitoring is required and they must alert us or the primary physician if symptoms of infection or other concerning signs are noted. Tremfya Pregnancy And Lactation Text: The risk during pregnancy and breastfeeding is uncertain with this medication. Detail Level: Detailed Ivermectin Counseling:  Patient instructed to take medication on an empty stomach with a full glass of water.  Patient informed of potential adverse effects including but not limited to nausea, diarrhea, dizziness, itching, and swelling of the extremities or lymph nodes.  The patient verbalized understanding of the proper use and possible adverse effects of ivermectin.  All of the patient's questions and concerns were addressed. Xeljanz Counseling: I discussed with the patient the risks of Xeljanz therapy including increased risk of infection, liver issues, headache, diarrhea, or cold symptoms. Live vaccines should be avoided. They were instructed to call if they have any problems. Dupixent Pregnancy And Lactation Text: This medication likely crosses the placenta but the risk for the fetus is uncertain. This medication is excreted in breast milk. Acitretin Counseling:  I discussed with the patient the risks of acitretin including but not limited to hair loss, dry lips/skin/eyes, liver damage, hyperlipidemia, depression/suicidal ideation, photosensitivity.  Serious rare side effects can include but are not limited to pancreatitis, pseudotumor cerebri, bony changes, clot formation/stroke/heart attack.  Patient understands that alcohol is contraindicated since it can result in liver toxicity and significantly prolong the elimination of the drug by many years. Benzoyl Peroxide Counseling: Patient counseled that medicine may cause skin irritation and bleach clothing.  In the event of skin irritation, the patient was advised to reduce the amount of the drug applied or use it less frequently.   The patient verbalized understanding of the proper use and possible adverse effects of benzoyl peroxide.  All of the patient's questions and concerns were addressed. Picato Counseling:  I discussed with the patient the risks of Picato including but not limited to erythema, scaling, itching, weeping, crusting, and pain. Rifampin Pregnancy And Lactation Text: This medication is Pregnancy Category C and it isn't know if it is safe during pregnancy. It is also excreted in breast milk and should not be used if you are breast feeding. Cyclophosphamide Counseling:  I discussed with the patient the risks of cyclophosphamide including but not limited to hair loss, hormonal abnormalities, decreased fertility, abdominal pain, diarrhea, nausea and vomiting, bone marrow suppression and infection. The patient understands that monitoring is required while taking this medication. Tazorac Pregnancy And Lactation Text: This medication is not safe during pregnancy. It is unknown if this medication is excreted in breast milk. Cimetidine Pregnancy And Lactation Text: This medication is Pregnancy Category B and is considered safe during pregnancy. It is also excreted in breast milk and breast feeding isn't recommended. Erythromycin Counseling:  I discussed with the patient the risks of erythromycin including but not limited to GI upset, allergic reaction, drug rash, diarrhea, increase in liver enzymes, and yeast infections. Griseofulvin Pregnancy And Lactation Text: This medication is Pregnancy Category X and is known to cause serious birth defects. It is unknown if this medication is excreted in breast milk but breast feeding should be avoided. Doxycycline Pregnancy And Lactation Text: This medication is Pregnancy Category D and not consider safe during pregnancy. It is also excreted in breast milk but is considered safe for shorter treatment courses. Otezla Counseling: The side effects of Otezla were discussed with the patient, including but not limited to worsening or new depression, weight loss, diarrhea, nausea, upper respiratory tract infection, and headache. Patient instructed to call the office should any adverse effect occur.  The patient verbalized understanding of the proper use and possible adverse effects of Otezla.  All the patient's questions and concerns were addressed. Clofazimine Counseling:  I discussed with the patient the risks of clofazimine including but not limited to skin and eye pigmentation, liver damage, nausea/vomiting, gastrointestinal bleeding and allergy. Sski Pregnancy And Lactation Text: This medication is Pregnancy Category D and isn't considered safe during pregnancy. It is excreted in breast milk. Azithromycin Counseling:  I discussed with the patient the risks of azithromycin including but not limited to GI upset, allergic reaction, drug rash, diarrhea, and yeast infections. Rituxan Counseling:  I discussed with the patient the risks of Rituxan infusions. Side effects can include infusion reactions, severe drug rashes including mucocutaneous reactions, reactivation of latent hepatitis and other infections and rarely progressive multifocal leukoencephalopathy.  All of the patient's questions and concerns were addressed. Gabapentin Pregnancy And Lactation Text: This medication is Pregnancy Category C and isn't considered safe during pregnancy. It is excreted in breast milk. Skyrizi Counseling: I discussed with the patient the risks of risankizumab-rzaa including but not limited to immunosuppression, and serious infections.  The patient understands that monitoring is required including a PPD at baseline and must alert us or the primary physician if symptoms of infection or other concerning signs are noted. Doxepin Counseling:  Patient advised that the medication is sedating and not to drive a car after taking this medication. Patient informed of potential adverse effects including but not limited to dry mouth, urinary retention, and blurry vision.  The patient verbalized understanding of the proper use and possible adverse effects of doxepin.  All of the patient's questions and concerns were addressed. Acitretin Pregnancy And Lactation Text: This medication is Pregnancy Category X and should not be given to women who are pregnant or may become pregnant in the future. This medication is excreted in breast milk. Eucrisa Counseling: Patient may experience a mild burning sensation during topical application. Eucrisa is not approved in children less than 2 years of age. Itraconazole Counseling:  I discussed with the patient the risks of itraconazole including but not limited to liver damage, nausea/vomiting, neuropathy, and severe allergy.  The patient understands that this medication is best absorbed when taken with acidic beverages such as non-diet cola or ginger ale.  The patient understands that monitoring is required including baseline LFTs and repeat LFTs at intervals.  The patient understands that they are to contact us or the primary physician if concerning signs are noted. Cyclophosphamide Pregnancy And Lactation Text: This medication is Pregnancy Category D and it isn't considered safe during pregnancy. This medication is excreted in breast milk. Topical Clindamycin Counseling: Patient counseled that this medication may cause skin irritation or allergic reactions.  In the event of skin irritation, the patient was advised to reduce the amount of the drug applied or use it less frequently.   The patient verbalized understanding of the proper use and possible adverse effects of clindamycin.  All of the patient's questions and concerns were addressed. Xelleahz Pregnancy And Lactation Text: This medication is Pregnancy Category D and is not considered safe during pregnancy.  The risk during breast feeding is also uncertain. Sarecycline Counseling: Patient advised regarding possible photosensitivity and discoloration of the teeth, skin, lips, tongue and gums.  Patient instructed to avoid sunlight, if possible.  When exposed to sunlight, patients should wear protective clothing, sunglasses, and sunscreen.  The patient was instructed to call the office immediately if the following severe adverse effects occur:  hearing changes, easy bruising/bleeding, severe headache, or vision changes.  The patient verbalized understanding of the proper use and possible adverse effects of sarecycline.  All of the patient's questions and concerns were addressed. Benzoyl Peroxide Pregnancy And Lactation Text: This medication is Pregnancy Category C. It is unknown if benzoyl peroxide is excreted in breast milk. Glycopyrrolate Counseling:  I discussed with the patient the risks of glycopyrrolate including but not limited to skin rash, drowsiness, dry mouth, difficulty urinating, and blurred vision. Azithromycin Pregnancy And Lactation Text: This medication is considered safe during pregnancy and is also secreted in breast milk. Otezla Pregnancy And Lactation Text: This medication is Pregnancy Category C and it isn't known if it is safe during pregnancy. It is unknown if it is excreted in breast milk. Topical Clindamycin Pregnancy And Lactation Text: This medication is Pregnancy Category B and is considered safe during pregnancy. It is unknown if it is excreted in breast milk. Cyclosporine Counseling:  I discussed with the patient the risks of cyclosporine including but not limited to hypertension, gingival hyperplasia,myelosuppression, immunosuppression, liver damage, kidney damage, neurotoxicity, lymphoma, and serious infections. The patient understands that monitoring is required including baseline blood pressure, CBC, CMP, lipid panel and uric acid, and then 1-2 times monthly CMP and blood pressure. Rituxan Pregnancy And Lactation Text: This medication is Pregnancy Category C and it isn't know if it is safe during pregnancy. It is unknown if this medication is excreted in breast milk but similar antibodies are known to be excreted. Enbrel Counseling:  I discussed with the patient the risks of etanercept including but not limited to myelosuppression, immunosuppression, autoimmune hepatitis, demyelinating diseases, lymphoma, and infections.  The patient understands that monitoring is required including a PPD at baseline and must alert us or the primary physician if symptoms of infection or other concerning signs are noted. Thalidomide Counseling: I discussed with the patient the risks of thalidomide including but not limited to birth defects, anxiety, weakness, chest pain, dizziness, cough and severe allergy. Carac Counseling:  I discussed with the patient the risks of Carac including but not limited to erythema, scaling, itching, weeping, crusting, and pain. Sarecycline Pregnancy And Lactation Text: This medication is Pregnancy Category D and not consider safe during pregnancy. It is also excreted in breast milk. Xolair Counseling:  Patient informed of potential adverse effects including but not limited to fever, muscle aches, rash and allergic reactions.  The patient verbalized understanding of the proper use and possible adverse effects of Xolair.  All of the patient's questions and concerns were addressed. Protopic Counseling: Patient may experience a mild burning sensation during topical application. Protopic is not approved in children less than 2 years of age. There have been case reports of hematologic and skin malignancies in patients using topical calcineurin inhibitors although causality is questionable. Metronidazole Counseling:  I discussed with the patient the risks of metronidazole including but not limited to seizures, nausea/vomiting, a metallic taste in the mouth, nausea/vomiting and severe allergy. Bexarotene Counseling:  I discussed with the patient the risks of bexarotene including but not limited to hair loss, dry lips/skin/eyes, liver abnormalities, hyperlipidemia, pancreatitis, depression/suicidal ideation, photosensitivity, drug rash/allergic reactions, hypothyroidism, anemia, leukopenia, infection, cataracts, and teratogenicity.  Patient understands that they will need regular blood tests to check lipid profile, liver function tests, white blood cell count, thyroid function tests and pregnancy test if applicable. Siliq Counseling:  I discussed with the patient the risks of Siliq including but not limited to new or worsening depression, suicidal thoughts and behavior, immunosuppression, malignancy, posterior leukoencephalopathy syndrome, and serious infections.  The patient understands that monitoring is required including a PPD at baseline and must alert us or the primary physician if symptoms of infection or other concerning signs are noted. There is also a special program designed to monitor depression which is required with Siliq. Xolair Pregnancy And Lactation Text: This medication is Pregnancy Category B and is considered safe during pregnancy. This medication is excreted in breast milk. Bexarotene Pregnancy And Lactation Text: This medication is Pregnancy Category X and should not be given to women who are pregnant or may become pregnant. This medication should not be used if you are breast feeding. Hydroquinone Counseling:  Patient advised that medication may result in skin irritation, lightening (hypopigmentation), dryness, and burning.  In the event of skin irritation, the patient was advised to reduce the amount of the drug applied or use it less frequently.  Rarely, spots that are treated with hydroquinone can become darker (pseudoochronosis).  Should this occur, patient instructed to stop medication and call the office. The patient verbalized understanding of the proper use and possible adverse effects of hydroquinone.  All of the patient's questions and concerns were addressed. Erythromycin Pregnancy And Lactation Text: This medication is Pregnancy Category B and is considered safe during pregnancy. It is also excreted in breast milk. Stelara Counseling:  I discussed with the patient the risks of ustekinumab including but not limited to immunosuppression, malignancy, posterior leukoencephalopathy syndrome, and serious infections.  The patient understands that monitoring is required including a PPD at baseline and must alert us or the primary physician if symptoms of infection or other concerning signs are noted. Glycopyrrolate Pregnancy And Lactation Text: This medication is Pregnancy Category B and is considered safe during pregnancy. It is unknown if it is excreted breast milk. Protopic Pregnancy And Lactation Text: This medication is Pregnancy Category C. It is unknown if this medication is excreted in breast milk when applied topically. Topical Sulfur Applications Counseling: Topical Sulfur Counseling: Patient counseled that this medication may cause skin irritation or allergic reactions.  In the event of skin irritation, the patient was advised to reduce the amount of the drug applied or use it less frequently.   The patient verbalized understanding of the proper use and possible adverse effects of topical sulfur application.  All of the patient's questions and concerns were addressed. Bactrim Counseling:  I discussed with the patient the risks of sulfa antibiotics including but not limited to GI upset, allergic reaction, drug rash, diarrhea, dizziness, photosensitivity, and yeast infections.  Rarely, more serious reactions can occur including but not limited to aplastic anemia, agranulocytosis, methemoglobinemia, blood dyscrasias, liver or kidney failure, lung infiltrates or desquamative/blistering drug rashes. Colchicine Counseling:  Patient counseled regarding adverse effects including but not limited to stomach upset (nausea, vomiting, stomach pain, or diarrhea).  Patient instructed to limit alcohol consumption while taking this medication.  Colchicine may reduce blood counts especially with prolonged use.  The patient understands that monitoring of kidney function and blood counts may be required, especially at baseline. The patient verbalized understanding of the proper use and possible adverse effects of colchicine.  All of the patient's questions and concerns were addressed. Doxepin Pregnancy And Lactation Text: This medication is Pregnancy Category C and it isn't known if it is safe during pregnancy. It is also excreted in breast milk and breast feeding isn't recommended. Oxybutynin Counseling:  I discussed with the patient the risks of oxybutynin including but not limited to skin rash, drowsiness, dry mouth, difficulty urinating, and blurred vision. Simponi Counseling:  I discussed with the patient the risks of golimumab including but not limited to myelosuppression, immunosuppression, autoimmune hepatitis, demyelinating diseases, lymphoma, and serious infections.  The patient understands that monitoring is required including a PPD at baseline and must alert us or the primary physician if symptoms of infection or other concerning signs are noted. Tetracycline Counseling: Patient counseled regarding possible photosensitivity and increased risk for sunburn.  Patient instructed to avoid sunlight, if possible.  When exposed to sunlight, patients should wear protective clothing, sunglasses, and sunscreen.  The patient was instructed to call the office immediately if the following severe adverse effects occur:  hearing changes, easy bruising/bleeding, severe headache, or vision changes.  The patient verbalized understanding of the proper use and possible adverse effects of tetracycline.  All of the patient's questions and concerns were addressed. Patient understands to avoid pregnancy while on therapy due to potential birth defects. Valtrex Counseling: I discussed with the patient the risks of valacyclovir including but not limited to kidney damage, nausea, vomiting and severe allergy.  The patient understands that if the infection seems to be worsening or is not improving, they are to call. Carac Pregnancy And Lactation Text: This medication is Pregnancy Category X and contraindicated in pregnancy and in women who may become pregnant. It is unknown if this medication is excreted in breast milk. Metronidazole Pregnancy And Lactation Text: This medication is Pregnancy Category B and considered safe during pregnancy.  It is also excreted in breast milk. Cephalexin Counseling: I counseled the patient regarding use of cephalexin as an antibiotic for prophylactic and/or therapeutic purposes. Cephalexin (commonly prescribed under brand name Keflex) is a cephalosporin antibiotic which is active against numerous classes of bacteria, including most skin bacteria. Side effects may include nausea, diarrhea, gastrointestinal upset, rash, hives, yeast infections, and in rare cases, hepatitis, kidney disease, seizures, fever, confusion, neurologic symptoms, and others. Patients with severe allergies to penicillin medications are cautioned that there is about a 10% incidence of cross-reactivity with cephalosporins. When possible, patients with penicillin allergies should use alternatives to cephalosporins for antibiotic therapy. Cimzia Counseling:  I discussed with the patient the risks of Cimzia including but not limited to immunosuppression, allergic reactions and infections.  The patient understands that monitoring is required including a PPD at baseline and must alert us or the primary physician if symptoms of infection or other concerning signs are noted. Methotrexate Counseling:  Patient counseled regarding adverse effects of methotrexate including but not limited to nausea, vomiting, abnormalities in liver function tests. Patients may develop mouth sores, rash, diarrhea, and abnormalities in blood counts. The patient understands that monitoring is required including LFT's and blood counts.  There is a rare possibility of scarring of the liver and lung problems that can occur when taking methotrexate. Persistent nausea, loss of appetite, pale stools, dark urine, cough, and shortness of breath should be reported immediately. Patient advised to discontinue methotrexate treatment at least three months before attempting to become pregnant.  I discussed the need for folate supplements while taking methotrexate.  These supplements can decrease side effects during methotrexate treatment. The patient verbalized understanding of the proper use and possible adverse effects of methotrexate.  All of the patient's questions and concerns were addressed. Humira Counseling:  I discussed with the patient the risks of adalimumab including but not limited to myelosuppression, immunosuppression, autoimmune hepatitis, demyelinating diseases, lymphoma, and serious infections.  The patient understands that monitoring is required including a PPD at baseline and must alert us or the primary physician if symptoms of infection or other concerning signs are noted. Bactrim Pregnancy And Lactation Text: This medication is Pregnancy Category D and is known to cause fetal risk.  It is also excreted in breast milk. 5-Fu Counseling: 5-Fluorouracil Counseling:  I discussed with the patient the risks of 5-fluorouracil including but not limited to erythema, scaling, itching, weeping, crusting, and pain. Solaraze Counseling:  I discussed with the patient the risks of Solaraze including but not limited to erythema, scaling, itching, weeping, crusting, and pain. Hydroxyzine Counseling: Patient advised that the medication is sedating and not to drive a car after taking this medication.  Patient informed of potential adverse effects including but not limited to dry mouth, urinary retention, and blurry vision.  The patient verbalized understanding of the proper use and possible adverse effects of hydroxyzine.  All of the patient's questions and concerns were addressed. Isotretinoin Counseling: Patient should get monthly blood tests, not donate blood, not drive at night if vision affected, not share medication, and not undergo elective surgery for 6 months after tx completed. Side effects reviewed, pt to contact office should one occur. Topical Sulfur Applications Pregnancy And Lactation Text: This medication is Pregnancy Category C and has an unknown safety profile during pregnancy. It is unknown if this topical medication is excreted in breast milk. Hydroxychloroquine Counseling:  I discussed with the patient that a baseline ophthalmologic exam is needed at the start of therapy and every year thereafter while on therapy. A CBC may also be warranted for monitoring.  The side effects of this medication were discussed with the patient, including but not limited to agranulocytosis, aplastic anemia, seizures, rashes, retinopathy, and liver toxicity. Patient instructed to call the office should any adverse effect occur.  The patient verbalized understanding of the proper use and possible adverse effects of Plaquenil.  All the patient's questions and concerns were addressed. Ketoconazole Counseling:   Patient counseled regarding improving absorption with orange juice.  Adverse effects include but are not limited to breast enlargement, headache, diarrhea, nausea, upset stomach, liver function test abnormalities, taste disturbance, and stomach pain.  There is a rare possibility of liver failure that can occur when taking ketoconazole. The patient understands that monitoring of LFTs may be required, especially at baseline. The patient verbalized understanding of the proper use and possible adverse effects of ketoconazole.  All of the patient's questions and concerns were addressed. Hydroxychloroquine Pregnancy And Lactation Text: This medication has been shown to cause fetal harm but it isn't assigned a Pregnancy Risk Category. There are small amounts excreted in breast milk. Valtrex Pregnancy And Lactation Text: this medication is Pregnancy Category B and is considered safe during pregnancy. This medication is not directly found in breast milk but it's metabolite acyclovir is present. Solaraze Pregnancy And Lactation Text: This medication is Pregnancy Category B and is considered safe. There is some data to suggest avoiding during the third trimester. It is unknown if this medication is excreted in breast milk. Minocycline Counseling: Patient advised regarding possible photosensitivity and discoloration of the teeth, skin, lips, tongue and gums.  Patient instructed to avoid sunlight, if possible.  When exposed to sunlight, patients should wear protective clothing, sunglasses, and sunscreen.  The patient was instructed to call the office immediately if the following severe adverse effects occur:  hearing changes, easy bruising/bleeding, severe headache, or vision changes.  The patient verbalized understanding of the proper use and possible adverse effects of minocycline.  All of the patient's questions and concerns were addressed. Ilumya Counseling: I discussed with the patient the risks of tildrakizumab including but not limited to immunosuppression, malignancy, posterior leukoencephalopathy syndrome, and serious infections.  The patient understands that monitoring is required including a PPD at baseline and must alert us or the primary physician if symptoms of infection or other concerning signs are noted. Zyclara Counseling:  I discussed with the patient the risks of imiquimod including but not limited to erythema, scaling, itching, weeping, crusting, and pain.  Patient understands that the inflammatory response to imiquimod is variable from person to person and was educated regarded proper titration schedule.  If flu-like symptoms develop, patient knows to discontinue the medication and contact us. Dapsone Counseling: I discussed with the patient the risks of dapsone including but not limited to hemolytic anemia, agranulocytosis, rashes, methemoglobinemia, kidney failure, peripheral neuropathy, headaches, GI upset, and liver toxicity.  Patients who start dapsone require monitoring including baseline LFTs and weekly CBCs for the first month, then every month thereafter.  The patient verbalized understanding of the proper use and possible adverse effects of dapsone.  All of the patient's questions and concerns were addressed. Cephalexin Pregnancy And Lactation Text: This medication is Pregnancy Category B and considered safe during pregnancy.  It is also excreted in breast milk but can be used safely for shorter doses. Cimzia Pregnancy And Lactation Text: This medication crosses the placenta but can be considered safe in certain situations. Cimzia may be excreted in breast milk. Azathioprine Counseling:  I discussed with the patient the risks of azathioprine including but not limited to myelosuppression, immunosuppression, hepatotoxicity, lymphoma, and infections.  The patient understands that monitoring is required including baseline LFTs, Creatinine, possible TPMP genotyping and weekly CBCs for the first month and then every 2 weeks thereafter.  The patient verbalized understanding of the proper use and possible adverse effects of azathioprine.  All of the patient's questions and concerns were addressed. Hydroxyzine Pregnancy And Lactation Text: This medication is not safe during pregnancy and should not be taken. It is also excreted in breast milk and breast feeding isn't recommended. Imiquimod Counseling:  I discussed with the patient the risks of imiquimod including but not limited to erythema, scaling, itching, weeping, crusting, and pain.  Patient understands that the inflammatory response to imiquimod is variable from person to person and was educated regarded proper titration schedule.  If flu-like symptoms develop, patient knows to discontinue the medication and contact us. Ketoconazole Pregnancy And Lactation Text: This medication is Pregnancy Category C and it isn't know if it is safe during pregnancy. It is also excreted in breast milk and breast feeding isn't recommended. Birth Control Pills Counseling: Birth Control Pill Counseling: I discussed with the patient the potential side effects of OCPs including but not limited to increased risk of stroke, heart attack, thrombophlebitis, deep venous thrombosis, hepatic adenomas, breast changes, GI upset, headaches, and depression.  The patient verbalized understanding of the proper use and possible adverse effects of OCPs. All of the patient's questions and concerns were addressed. Methotrexate Pregnancy And Lactation Text: This medication is Pregnancy Category X and is known to cause fetal harm. This medication is excreted in breast milk. Isotretinoin Pregnancy And Lactation Text: This medication is Pregnancy Category X and is considered extremely dangerous during pregnancy. It is unknown if it is excreted in breast milk. Dapsone Pregnancy And Lactation Text: This medication is Pregnancy Category C and is not considered safe during pregnancy or breast feeding. Birth Control Pills Pregnancy And Lactation Text: This medication should be avoided if pregnant and for the first 30 days post-partum. Clindamycin Counseling: I counseled the patient regarding use of clindamycin as an antibiotic for prophylactic and/or therapeutic purposes. Clindamycin is active against numerous classes of bacteria, including skin bacteria. Side effects may include nausea, diarrhea, gastrointestinal upset, rash, hives, yeast infections, and in rare cases, colitis. Cosentyx Counseling:  I discussed with the patient the risks of Cosentyx including but not limited to worsening of Crohn's disease, immunosuppression, allergic reactions and infections.  The patient understands that monitoring is required including a PPD at baseline and must alert us or the primary physician if symptoms of infection or other concerning signs are noted. Topical Retinoid counseling:  Patient advised to apply a pea-sized amount only at bedtime and wait 30 minutes after washing their face before applying.  If too drying, patient may add a non-comedogenic moisturizer. The patient verbalized understanding of the proper use and possible adverse effects of retinoids.  All of the patient's questions and concerns were addressed. Albendazole Counseling:  I discussed with the patient the risks of albendazole including but not limited to cytopenia, kidney damage, nausea/vomiting and severe allergy.  The patient understands that this medication is being used in an off-label manner. Nsaids Counseling: NSAID Counseling: I discussed with the patient that NSAIDs should be taken with food. Prolonged use of NSAIDs can result in the development of stomach ulcers.  Patient advised to stop taking NSAIDs if abdominal pain occurs.  The patient verbalized understanding of the proper use and possible adverse effects of NSAIDs.  All of the patient's questions and concerns were addressed. Taltz Counseling: I discussed with the patient the risks of ixekizumab including but not limited to immunosuppression, serious infections, worsening of inflammatory bowel disease and drug reactions.  The patient understands that monitoring is required including a PPD at baseline and must alert us or the primary physician if symptoms of infection or other concerning signs are noted. Terbinafine Counseling: Patient counseling regarding adverse effects of terbinafine including but not limited to headache, diarrhea, rash, upset stomach, liver function test abnormalities, itching, taste/smell disturbance, nausea, abdominal pain, and flatulence.  There is a rare possibility of liver failure that can occur when taking terbinafine.  The patient understands that a baseline LFT and kidney function test may be required. The patient verbalized understanding of the proper use and possible adverse effects of terbinafine.  All of the patient's questions and concerns were addressed. High Dose Vitamin A Counseling: Side effects reviewed, pt to contact office should one occur. Drysol Counseling:  I discussed with the patient the risks of drysol/aluminum chloride including but not limited to skin rash, itching, irritation, burning. Prednisone Counseling:  I discussed with the patient the risks of prolonged use of prednisone including but not limited to weight gain, insomnia, osteoporosis, mood changes, diabetes, susceptibility to infection, glaucoma and high blood pressure.  In cases where prednisone use is prolonged, patients should be monitored with blood pressure checks, serum glucose levels and an eye exam.  Additionally, the patient may need to be placed on GI prophylaxis, PCP prophylaxis, and calcium and vitamin D supplementation and/or a bisphosphonate.  The patient verbalized understanding of the proper use and the possible adverse effects of prednisone.  All of the patient's questions and concerns were addressed. Bimzelx Pregnancy And Lactation Text: This medication crosses the placenta and the safety is uncertain during pregnancy. It is unknown if this medication is present in breast milk. Oral Minoxidil Counseling- I discussed with the patient the risks of oral minoxidil including but not limited to shortness of breath, swelling of the feet or ankles, dizziness, lightheadedness, unwanted hair growth and allergic reaction.  The patient verbalized understanding of the proper use and possible adverse effects of oral minoxidil.  All of the patient's questions and concerns were addressed. Litfulo Counseling: Litfulo (Ritlecitinib) Counseling: I discussed with the patient the risks of Litfulo therapy including but not limited to headache, upper respiratory infections, nausea, diarrhea, acne, and urticaria. Live vaccines should be avoided.  This medication has been linked to serious infections; higher rate of mortality; malignancy and lymphoproliferative disorders; major adverse cardiovascular events; thrombosis; decreases in lymphocytes and platelets; liver enzyme elevations; and CPK elevations. Litfulo Pregnancy And Lactation Text: There is insufficient data to evaluate whether or not Litfulo is safe to use during pregnancy.  Breastfeeding is not recommended during treatment. Oral Minoxidil Pregnancy And Lactation Text: This medication should only be used when clearly needed if you are pregnant, attempting to become pregnant or breast feeding. Rhofade Counseling: Rhofade is a topical medication which can decrease superficial blood flow where applied. Side effects are uncommon and include stinging, redness and allergic reactions. Low Dose Naltrexone Counseling- I discussed with the patient the potential risks and side effects of low dose naltrexone including but not limited to: more vivid dreams, headaches, nausea, vomiting, abdominal pain, fatigue, dizziness, and anxiety. Azelaic Acid Counseling: Patient counseled that medicine may cause skin irritation and to avoid applying near the eyes.  In the event of skin irritation, the patient was advised to reduce the amount of the drug applied or use it less frequently.   The patient verbalized understanding of the proper use and possible adverse effects of azelaic acid.  All of the patient's questions and concerns were addressed. Spevigo Counseling: I discussed with the patient the risks of Spevigo including but not limited to fatigue, nasuea, vomiting, headache, pruritus, urinary tract infection, an infusion related reactions.  The patient understands that monitoring is required including screening for tuberculosis at baseline and yearly screening thereafter while continuing Spevigo therapy. They should contact us if symptoms of infection or other concerning signs are noted. Topical Ketoconazole Counseling: Patient counseled that this medication may cause skin irritation or allergic reactions.  In the event of skin irritation, the patient was advised to reduce the amount of the drug applied or use it less frequently.   The patient verbalized understanding of the proper use and possible adverse effects of ketoconazole.  All of the patient's questions and concerns were addressed. Low Dose Naltrexone Pregnancy And Lactation Text: Naltrexone is pregnancy category C.  There have been no adequate and well-controlled studies in pregnant women.  It should be used in pregnancy only if the potential benefit justifies the potential risk to the fetus.   Limited data indicates that naltrexone is minimally excreted into breastmilk. Rhofade Pregnancy And Lactation Text: This medication has not been assigned a Pregnancy Risk Category. It is unknown if the medication is excreted in breast milk. Hyrimoz Counseling:  I discussed with the patient the risks of adalimumab including but not limited to myelosuppression, immunosuppression, autoimmune hepatitis, demyelinating diseases, lymphoma, and serious infections.  The patient understands that monitoring is required including a PPD at baseline and must alert us or the primary physician if symptoms of infection or other concerning signs are noted. Olumiant Counseling: I discussed with the patient the risks of Olumiant therapy including but not limited to upper respiratory tract infections, shingles, cold sores, and nausea. Live vaccines should be avoided.  This medication has been linked to serious infections; higher rate of mortality; malignancy and lymphoproliferative disorders; major adverse cardiovascular events; thrombosis; gastrointestinal perforations; neutropenia; lymphopenia; anemia; liver enzyme elevations; and lipid elevations. Mirvaso Counseling: Mirvaso is a topical medication which can decrease superficial blood flow where applied. Side effects are uncommon and include stinging, redness and allergic reactions. Niacinamide Counseling: I recommended taking niacin or niacinamide, also know as vitamin B3, twice daily. Recent evidence suggests that taking vitamin B3 (500 mg twice daily) can reduce the risk of actinic keratoses and non-melanoma skin cancers. Side effects of vitamin B3 include flushing and headache. Dutasteride Male Counseling: Dutasteride Counseling:  I discussed with the patient the risks of use of dutasteride including but not limited to decreased libido, decreased ejaculate volume, and gynecomastia. Women who can become pregnant should not handle medication.  All of the patient's questions and concerns were addressed. Opioid Counseling: I discussed with the patient the potential side effects of opioids including but not limited to addiction, altered mental status, and depression. I stressed avoiding alcohol, benzodiazepines, muscle relaxants and sleep aids unless specifically okayed by a physician. The patient verbalized understanding of the proper use and possible adverse effects of opioids. All of the patient's questions and concerns were addressed. They were instructed to flush the remaining pills down the toilet if they did not need them for pain. Spevigo Pregnancy And Lactation Text: The risk during pregnancy and breastfeeding is uncertain with this medication. This medication does cross the placenta. It is unknown if this medication is found in breast milk. Olumiant Pregnancy And Lactation Text: Based on animal studies, Olumiant may cause embryo-fetal harm when administered to pregnant women.  The medication should not be used in pregnancy.  Breastfeeding is not recommended during treatment. Opzelura Counseling:  I discussed with the patient the risks of Opzelura including but not limited to nasopharngitis, bronchitis, ear infection, eosinophila, hives, diarrhea, folliculitis, tonsillitis, and rhinorrhea.  Taken orally, this medication has been linked to serious infections; higher rate of mortality; malignancy and lymphoproliferative disorders; major adverse cardiovascular events; thrombosis; thrombocytopenia, anemia, and neutropenia; and lipid elevations. Niacinamide Pregnancy And Lactation Text: These medications are considered safe during pregnancy. Dutasteride Female Counseling: Dutasteride Counseling:  I discussed with the patient the risks of use of dutasteride including but not limited to decreased libido and sexual dysfunction. Explained the teratogenic nature of the medication and stressed the importance of not getting pregnant during treatment. All of the patient's questions and concerns were addressed. Opioid Pregnancy And Lactation Text: These medications can lead to premature delivery and should be avoided during pregnancy. These medications are also present in breast milk in small amounts. Topical Sulfur Applications Pregnancy And Lactation Text: This medication is considered safe during pregnancy and breast feeding secondary to limited systemic absorption. Simlandi Counseling:  I discussed with the patient the risks of adalimumab including but not limited to myelosuppression, immunosuppression, autoimmune hepatitis, demyelinating diseases, lymphoma, and serious infections.  The patient understands that monitoring is required including a PPD at baseline and must alert us or the primary physician if symptoms of infection or other concerning signs are noted. Dutasteride Pregnancy And Lactation Text: This medication is absolutely contraindicated in women during pregnancy and breast feeding. Feminization of male fetuses is possible if taking while pregnant. Rinvoq Counseling: I discussed with the patient the risks of Rinvoq therapy including but not limited to upper respiratory tract infections, shingles, cold sores, bronchitis, nausea, cough, fever, acne, and headache. Live vaccines should be avoided.  This medication has been linked to serious infections; higher rate of mortality; malignancy and lymphoproliferative disorders; major adverse cardiovascular events; thrombosis; thrombocytopenia, anemia, and neutropenia; lipid elevations; liver enzyme elevations; and gastrointestinal perforations. Libtayo Counseling- I discussed with the patient the risks of Libtayo including but not limited to nausea, vomiting, diarrhea, and bone or muscle pain.  The patient verbalized understanding of the proper use and possible adverse effects of Libtayo.  All of the patient's questions and concerns were addressed. Opzelura Pregnancy And Lactation Text: There is insufficient data to evaluate drug-associated risk for major birth defects, miscarriage, or other adverse maternal or fetal outcomes.  There is a pregnancy registry that monitors pregnancy outcomes in pregnant persons exposed to the medication during pregnancy.  It is unknown if this medication is excreted in breast milk.  Do not breastfeed during treatment and for about 4 weeks after the last dose. Rinvoq Pregnancy And Lactation Text: Based on animal studies, Rinvoq may cause embryo-fetal harm when administered to pregnant women.  The medication should not be used in pregnancy.  Breastfeeding is not recommended during treatment and for 6 days after the last dose. Dupixent Counseling: I discussed with the patient the risks of dupilumab including but not limited to eye inflammation and irritation, cold sores, injection site reactions, allergic reactions and increased risk of parasitic infection. The patient understands that monitoring is required and they must alert us or the primary physician if symptoms of infection or other concerning signs are noted. Finasteride Male Counseling: Finasteride Counseling:  I discussed with the patient the risks of use of finasteride including but not limited to decreased libido, decreased ejaculate volume, gynecomastia, and depression. Women should not handle medication.  All of the patient's questions and concerns were addressed. Eucrisa Counseling: Patient may experience a mild burning sensation during topical application. Eucrisa is not approved in children less than 3 months of age. Wartpeel Counseling:  I discussed with the patient the risks of Wartpeel including but not limited to erythema, scaling, itching, weeping, crusting, and pain. Tranexamic Acid Counseling:  Patient advised of the small risk of bleeding problems with tranexamic acid. They were also instructed to call if they developed any nausea, vomiting or diarrhea. All of the patient's questions and concerns were addressed. Sotyktu Counseling:  I discussed the most common side effects of Sotyktu including: common cold, sore throat, sinus infections, cold sores, canker sores, folliculitis, and acne.  I also discussed more serious side effects of Sotyktu including but not limited to: serious allergic reactions; increased risk for infections such as TB; cancers such as lymphomas; rhabdomyolysis and elevated CPK; and elevated triglycerides and liver enzymes.  Tranexamic Acid Pregnancy And Lactation Text: It is unknown if this medication is safe during pregnancy or breast feeding. Libtayo Pregnancy And Lactation Text: This medication is contraindicated in pregnancy and when breast feeding. Adbry Counseling: I discussed with the patient the risks of tralokinumab including but not limited to eye infection and irritation, cold sores, injection site reactions, worsening of asthma, allergic reactions and increased risk of parasitic infection.  Live vaccines should be avoided while taking tralokinumab. The patient understands that monitoring is required and they must alert us or the primary physician if symptoms of infection or other concerning signs are noted. Propranolol Counseling:  I discussed with the patient the risks of propranolol including but not limited to low heart rate, low blood pressure, low blood sugar, restlessness and increased cold sensitivity. They should call the office if they experience any of these side effects. Olanzapine Counseling- I discussed with the patient the common side effects of olanzapine including but are not limited to: lack of energy, dry mouth, increased appetite, sleepiness, tremor, constipation, dizziness, changes in behavior, or restlessness.  Explained that teenagers are more likely to experience headaches, abdominal pain, pain in the arms or legs, tiredness, and sleepiness.  Serious side effects include but are not limited: increased risk of death in elderly patients who are confused, have memory loss, or dementia-related psychosis; hyperglycemia; increased cholesterol and triglycerides; and weight gain. Calcipotriene Counseling:  I discussed with the patient the risks of calcipotriene including but not limited to erythema, scaling, itching, and irritation. Finasteride Female Counseling: Finasteride Counseling:  I discussed with the patient the risks of use of finasteride including but not limited to decreased libido and sexual dysfunction. Explained the teratogenic nature of the medication and stressed the importance of not getting pregnant during treatment. All of the patient's questions and concerns were addressed. Cibinqo Counseling: I discussed with the patient the risks of Cibinqo therapy including but not limited to common cold, nausea, headache, cold sores, increased blood CPK levels, dizziness, UTIs, fatigue, acne, and vomitting. Live vaccines should be avoided.  This medication has been linked to serious infections; higher rate of mortality; malignancy and lymphoproliferative disorders; major adverse cardiovascular events; thrombosis; thrombocytopenia and lymphopenia; lipid elevations; and retinal detachment. Winlevi Counseling:  I discussed with the patient the risks of topical clascoterone including but not limited to erythema, scaling, itching, and stinging. Patient voiced their understanding. Nemluvio Counseling: I discussed with the patient the risks of nemolizumab including but not limited to headache, gastrointestinal complaints, nasopharyngitis, musculoskeletal complaints, injection site reactions, and allergic reactions. The patient understands that monitoring is required and they must alert us or the primary physician if any side effects are noted. Sotyktu Pregnancy And Lactation Text: There is insufficient data to evaluate whether or not Sotyktu is safe to use during pregnancy.   It is not known if Sotyktu passes into breast milk and whether or not it is safe to use when breastfeeding.   Propranolol Pregnancy And Lactation Text: This medication is Pregnancy Category C and it isn't known if it is safe during pregnancy. It is excreted in breast milk. Adbry Pregnancy And Lactation Text: It is unknown if this medication will adversely affect pregnancy or breast feeding. Olanzapine Pregnancy And Lactation Text: This medication is pregnancy category C.   There are no adequate and well controlled trials with olanzapine in pregnant females.  Olanzapine should be used during pregnancy only if the potential benefit justifies the potential risk to the fetus.   In a study in lactating healthy women, olanzapine was excreted in breast milk.  It is recommended that women taking olanzapine should not breast feed. Bimzelx Counseling:  I discussed with the patient the risks of Bimzelx including but not limited to depression, immunosuppression, allergic reactions and infections.  The patient understands that monitoring is required including a PPD at baseline and must alert us or the primary physician if symptoms of infection or other concerning signs are noted. Finasteride Pregnancy And Lactation Text: This medication is absolutely contraindicated during pregnancy. It is unknown if it is excreted in breast milk. Tremfya Counseling: I discussed with the patient the risks of guselkumab including but not limited to immunosuppression, serious infections, and drug reactions.  The patient understands that monitoring is required including a PPD at baseline and must alert us or the primary physician if symptoms of infection or other concerning signs are noted. Calcipotriene Pregnancy And Lactation Text: This medication has not been proven safe during pregnancy. It is unknown if this medication is excreted in breast milk. Cibinqo Pregnancy And Lactation Text: It is unknown if this medication will adversely affect pregnancy or breast feeding.  You should not take this medication if you are currently pregnant or planning a pregnancy or while breastfeeding. Winlevi Pregnancy And Lactation Text: This medication is considered safe during pregnancy and breastfeeding. Nemluvio Pregnancy And Lactation Text: It is not known if Nemluvio causes fetal harm or is present in breast milk. Please proceed with caution if patients who are pregnant or breastfeeding.

## 2024-12-20 ENCOUNTER — INPATIENT (INPATIENT)
Facility: HOSPITAL | Age: 54
LOS: 2 days | Discharge: HOME | DRG: 189 | End: 2024-12-23
Attending: HOSPITALIST | Admitting: INTERNAL MEDICINE
Payer: COMMERCIAL

## 2024-12-20 VITALS
RESPIRATION RATE: 20 BRPM | DIASTOLIC BLOOD PRESSURE: 90 MMHG | HEIGHT: 63 IN | HEART RATE: 104 BPM | SYSTOLIC BLOOD PRESSURE: 141 MMHG | WEIGHT: 134.92 LBS | OXYGEN SATURATION: 89 % | TEMPERATURE: 98 F

## 2024-12-20 DIAGNOSIS — Z98.890 OTHER SPECIFIED POSTPROCEDURAL STATES: Chronic | ICD-10-CM

## 2024-12-20 DIAGNOSIS — Z30.430 ENCOUNTER FOR INSERTION OF INTRAUTERINE CONTRACEPTIVE DEVICE: Chronic | ICD-10-CM

## 2024-12-20 DIAGNOSIS — M67.432 GANGLION, LEFT WRIST: Chronic | ICD-10-CM

## 2024-12-20 LAB
ALBUMIN SERPL ELPH-MCNC: 3.6 G/DL — SIGNIFICANT CHANGE UP (ref 3.3–5)
ALP SERPL-CCNC: 83 U/L — SIGNIFICANT CHANGE UP (ref 40–120)
ALT FLD-CCNC: 30 U/L — SIGNIFICANT CHANGE UP (ref 12–78)
ANION GAP SERPL CALC-SCNC: 7 MMOL/L — SIGNIFICANT CHANGE UP (ref 5–17)
AST SERPL-CCNC: 23 U/L — SIGNIFICANT CHANGE UP (ref 15–37)
BASE EXCESS BLDV CALC-SCNC: 2.1 MMOL/L — SIGNIFICANT CHANGE UP (ref -2–3)
BASOPHILS # BLD AUTO: 0.04 K/UL — SIGNIFICANT CHANGE UP (ref 0–0.2)
BASOPHILS NFR BLD AUTO: 0.4 % — SIGNIFICANT CHANGE UP (ref 0–2)
BILIRUB SERPL-MCNC: 0.2 MG/DL — SIGNIFICANT CHANGE UP (ref 0.2–1.2)
BLOOD GAS COMMENTS, VENOUS: SIGNIFICANT CHANGE UP
BUN SERPL-MCNC: 14 MG/DL — SIGNIFICANT CHANGE UP (ref 7–23)
CALCIUM SERPL-MCNC: 9.1 MG/DL — SIGNIFICANT CHANGE UP (ref 8.5–10.1)
CHLORIDE SERPL-SCNC: 106 MMOL/L — SIGNIFICANT CHANGE UP (ref 96–108)
CO2 SERPL-SCNC: 26 MMOL/L — SIGNIFICANT CHANGE UP (ref 22–31)
CREAT SERPL-MCNC: 0.87 MG/DL — SIGNIFICANT CHANGE UP (ref 0.5–1.3)
EGFR: 80 ML/MIN/1.73M2 — SIGNIFICANT CHANGE UP
EOSINOPHIL # BLD AUTO: 0.29 K/UL — SIGNIFICANT CHANGE UP (ref 0–0.5)
EOSINOPHIL NFR BLD AUTO: 3 % — SIGNIFICANT CHANGE UP (ref 0–6)
GAS PNL BLDV: SIGNIFICANT CHANGE UP
GLUCOSE SERPL-MCNC: 112 MG/DL — HIGH (ref 70–99)
HCG SERPL-ACNC: <1 MIU/ML — SIGNIFICANT CHANGE UP
HCO3 BLDV-SCNC: 27 MMOL/L — SIGNIFICANT CHANGE UP (ref 22–29)
HCT VFR BLD CALC: 36.4 % — SIGNIFICANT CHANGE UP (ref 34.5–45)
HGB BLD-MCNC: 12.6 G/DL — SIGNIFICANT CHANGE UP (ref 11.5–15.5)
IMM GRANULOCYTES NFR BLD AUTO: 0.3 % — SIGNIFICANT CHANGE UP (ref 0–0.9)
LYMPHOCYTES # BLD AUTO: 0.82 K/UL — LOW (ref 1–3.3)
LYMPHOCYTES # BLD AUTO: 8.4 % — LOW (ref 13–44)
MAGNESIUM SERPL-MCNC: 2.1 MG/DL — SIGNIFICANT CHANGE UP (ref 1.6–2.6)
MCHC RBC-ENTMCNC: 30.9 PG — SIGNIFICANT CHANGE UP (ref 27–34)
MCHC RBC-ENTMCNC: 34.6 G/DL — SIGNIFICANT CHANGE UP (ref 32–36)
MCV RBC AUTO: 89.2 FL — SIGNIFICANT CHANGE UP (ref 80–100)
MONOCYTES # BLD AUTO: 0.51 K/UL — SIGNIFICANT CHANGE UP (ref 0–0.9)
MONOCYTES NFR BLD AUTO: 5.2 % — SIGNIFICANT CHANGE UP (ref 2–14)
NEUTROPHILS # BLD AUTO: 8.12 K/UL — HIGH (ref 1.8–7.4)
NEUTROPHILS NFR BLD AUTO: 82.7 % — HIGH (ref 43–77)
NRBC # BLD: 0 /100 WBCS — SIGNIFICANT CHANGE UP (ref 0–0)
NT-PROBNP SERPL-SCNC: 116 PG/ML — SIGNIFICANT CHANGE UP (ref 0–125)
PCO2 BLDV: 46 MMHG — HIGH (ref 39–42)
PH BLDV: 7.38 — SIGNIFICANT CHANGE UP (ref 7.32–7.43)
PLATELET # BLD AUTO: 199 K/UL — SIGNIFICANT CHANGE UP (ref 150–400)
PO2 BLDV: 39 MMHG — SIGNIFICANT CHANGE UP (ref 25–45)
POTASSIUM SERPL-MCNC: 4.5 MMOL/L — SIGNIFICANT CHANGE UP (ref 3.5–5.3)
POTASSIUM SERPL-SCNC: 4.5 MMOL/L — SIGNIFICANT CHANGE UP (ref 3.5–5.3)
PROT SERPL-MCNC: 7.5 G/DL — SIGNIFICANT CHANGE UP (ref 6–8.3)
RBC # BLD: 4.08 M/UL — SIGNIFICANT CHANGE UP (ref 3.8–5.2)
RBC # FLD: 11.9 % — SIGNIFICANT CHANGE UP (ref 10.3–14.5)
SAO2 % BLDV: 68.4 % — SIGNIFICANT CHANGE UP (ref 67–88)
SODIUM SERPL-SCNC: 139 MMOL/L — SIGNIFICANT CHANGE UP (ref 135–145)
TROPONIN I, HIGH SENSITIVITY RESULT: 3.4 NG/L — SIGNIFICANT CHANGE UP
WBC # BLD: 9.81 K/UL — SIGNIFICANT CHANGE UP (ref 3.8–10.5)
WBC # FLD AUTO: 9.81 K/UL — SIGNIFICANT CHANGE UP (ref 3.8–10.5)

## 2024-12-20 PROCEDURE — 71045 X-RAY EXAM CHEST 1 VIEW: CPT | Mod: 26

## 2024-12-20 PROCEDURE — 99285 EMERGENCY DEPT VISIT HI MDM: CPT

## 2024-12-20 PROCEDURE — 93010 ELECTROCARDIOGRAM REPORT: CPT

## 2024-12-20 RX ORDER — IPRATROPIUM BROMIDE AND ALBUTEROL SULFATE .5; 2.5 MG/3ML; MG/3ML
3 SOLUTION RESPIRATORY (INHALATION) ONCE
Refills: 0 | Status: COMPLETED | OUTPATIENT
Start: 2024-12-20 | End: 2024-12-20

## 2024-12-20 RX ORDER — METHYLPREDNISOLONE 4 MG/1
125 TABLET ORAL ONCE
Refills: 0 | Status: COMPLETED | OUTPATIENT
Start: 2024-12-20 | End: 2024-12-20

## 2024-12-20 RX ORDER — MAGNESIUM SULFATE 500 MG/ML
2 INJECTION, SOLUTION INTRAMUSCULAR; INTRAVENOUS ONCE
Refills: 0 | Status: COMPLETED | OUTPATIENT
Start: 2024-12-20 | End: 2024-12-20

## 2024-12-20 RX ADMIN — IPRATROPIUM BROMIDE AND ALBUTEROL SULFATE 3 MILLILITER(S): .5; 2.5 SOLUTION RESPIRATORY (INHALATION) at 23:22

## 2024-12-20 RX ADMIN — MAGNESIUM SULFATE 150 GRAM(S): 500 INJECTION, SOLUTION INTRAMUSCULAR; INTRAVENOUS at 23:21

## 2024-12-20 RX ADMIN — METHYLPREDNISOLONE 125 MILLIGRAM(S): 4 TABLET ORAL at 23:22

## 2024-12-20 NOTE — ED PROVIDER NOTE - OBJECTIVE STATEMENT
54F PMH asthma p/w SOB and cough that started a few hrs ago. O2 sat was 89% RA in triage. No recent immobilization, leg swelling, personal/family hx of clots, fevers, rhinorrhea, sick contacts    pulm Dr. Woodard

## 2024-12-20 NOTE — ED PROVIDER NOTE - PHYSICAL EXAMINATION
[facility] follow up:  Call placed, [facility] left for Juju to return call    Anjana Lopez RN [facility]  [phone]      Gen: awake alert   HEENT: normal conjunctiva, oral mucosa moist  Lung: wheezes B/L, poor airway movement, on NC B/L, speaking in full sentences  CV: RRR  Abd: soft, NT, ND, no guarding, no rigidity, no rebound tenderness  MSK: no visible deformities  Skin: Warm, well perfused, no rash, no leg swelling

## 2024-12-20 NOTE — ED ADULT TRIAGE NOTE - CHIEF COMPLAINT QUOTE
I was going to relax and get ready for bed when all of a sudden I felt my heart racing.  my device said 113, which I know is not horrible but I am always in the 70's.  I have also had a bit of a cough lately.  oxygenation is 89% room air.  taken to T2 for EKG and place on 2L NC   denies any fevers / nauseas / vomiting or diarrhea.  no recent travel  no car rides, no plane rides.  patient taken to T2 for EKG

## 2024-12-20 NOTE — ED PROVIDER NOTE - CARE PLAN
1 Principal Discharge DX:	Acute hypoxic respiratory failure  Secondary Diagnosis:	Bilateral pneumonia  Secondary Diagnosis:	Acute asthma exacerbation

## 2024-12-20 NOTE — ED PROVIDER NOTE - PROGRESS NOTE DETAILS
pt treated for pna and remains on 2L. updated about results. endorsed to dr. domínguez for tele and cont pulse ox

## 2024-12-20 NOTE — ED PROVIDER NOTE - CLINICAL SUMMARY MEDICAL DECISION MAKING FREE TEXT BOX
54F p/w SOB. VS and exam as above. ECG nondiagnostic  DDx includes but not limited to: likely asthma exacerbation. Will also eval for ACS, PE, viral URI, pna  Plan: cardiac labs, neb/steroids/Mg, CTA chest, reassess symptoms    See Progress Notes for further updates on ED Course

## 2024-12-21 DIAGNOSIS — F41.9 ANXIETY DISORDER, UNSPECIFIED: ICD-10-CM

## 2024-12-21 DIAGNOSIS — K21.9 GASTRO-ESOPHAGEAL REFLUX DISEASE WITHOUT ESOPHAGITIS: ICD-10-CM

## 2024-12-21 DIAGNOSIS — J96.01 ACUTE RESPIRATORY FAILURE WITH HYPOXIA: ICD-10-CM

## 2024-12-21 DIAGNOSIS — C50.919 MALIGNANT NEOPLASM OF UNSPECIFIED SITE OF UNSPECIFIED FEMALE BREAST: ICD-10-CM

## 2024-12-21 DIAGNOSIS — E06.3 AUTOIMMUNE THYROIDITIS: ICD-10-CM

## 2024-12-21 DIAGNOSIS — J18.9 PNEUMONIA, UNSPECIFIED ORGANISM: ICD-10-CM

## 2024-12-21 DIAGNOSIS — Z29.9 ENCOUNTER FOR PROPHYLACTIC MEASURES, UNSPECIFIED: ICD-10-CM

## 2024-12-21 LAB
ALBUMIN SERPL ELPH-MCNC: 3.8 G/DL — SIGNIFICANT CHANGE UP (ref 3.3–5)
ALP SERPL-CCNC: 89 U/L — SIGNIFICANT CHANGE UP (ref 40–120)
ALT FLD-CCNC: 28 U/L — SIGNIFICANT CHANGE UP (ref 12–78)
ANION GAP SERPL CALC-SCNC: 9 MMOL/L — SIGNIFICANT CHANGE UP (ref 5–17)
APTT BLD: 32.1 SEC — SIGNIFICANT CHANGE UP (ref 24.5–35.6)
AST SERPL-CCNC: 20 U/L — SIGNIFICANT CHANGE UP (ref 15–37)
BASOPHILS # BLD AUTO: 0 K/UL — SIGNIFICANT CHANGE UP (ref 0–0.2)
BASOPHILS NFR BLD AUTO: 0 % — SIGNIFICANT CHANGE UP (ref 0–2)
BILIRUB SERPL-MCNC: 0.3 MG/DL — SIGNIFICANT CHANGE UP (ref 0.2–1.2)
BUN SERPL-MCNC: 9 MG/DL — SIGNIFICANT CHANGE UP (ref 7–23)
CALCIUM SERPL-MCNC: 8.7 MG/DL — SIGNIFICANT CHANGE UP (ref 8.5–10.1)
CHLORIDE SERPL-SCNC: 105 MMOL/L — SIGNIFICANT CHANGE UP (ref 96–108)
CO2 SERPL-SCNC: 22 MMOL/L — SIGNIFICANT CHANGE UP (ref 22–31)
CREAT SERPL-MCNC: 0.81 MG/DL — SIGNIFICANT CHANGE UP (ref 0.5–1.3)
EGFR: 87 ML/MIN/1.73M2 — SIGNIFICANT CHANGE UP
EOSINOPHIL # BLD AUTO: 0 K/UL — SIGNIFICANT CHANGE UP (ref 0–0.5)
EOSINOPHIL NFR BLD AUTO: 0 % — SIGNIFICANT CHANGE UP (ref 0–6)
GLUCOSE SERPL-MCNC: 149 MG/DL — HIGH (ref 70–99)
HCT VFR BLD CALC: 36.6 % — SIGNIFICANT CHANGE UP (ref 34.5–45)
HGB BLD-MCNC: 12.9 G/DL — SIGNIFICANT CHANGE UP (ref 11.5–15.5)
INR BLD: 0.91 RATIO — SIGNIFICANT CHANGE UP (ref 0.85–1.16)
LYMPHOCYTES # BLD AUTO: 0.48 K/UL — LOW (ref 1–3.3)
LYMPHOCYTES # BLD AUTO: 5 % — LOW (ref 13–44)
MCHC RBC-ENTMCNC: 31 PG — SIGNIFICANT CHANGE UP (ref 27–34)
MCHC RBC-ENTMCNC: 35.2 G/DL — SIGNIFICANT CHANGE UP (ref 32–36)
MCV RBC AUTO: 88 FL — SIGNIFICANT CHANGE UP (ref 80–100)
MONOCYTES # BLD AUTO: 0 K/UL — SIGNIFICANT CHANGE UP (ref 0–0.9)
MONOCYTES NFR BLD AUTO: 0 % — LOW (ref 2–14)
MRSA PCR RESULT.: SIGNIFICANT CHANGE UP
NEUTROPHILS # BLD AUTO: 9.08 K/UL — HIGH (ref 1.8–7.4)
NEUTROPHILS NFR BLD AUTO: 92 % — HIGH (ref 43–77)
NRBC # BLD: SIGNIFICANT CHANGE UP /100 WBCS (ref 0–0)
PLATELET # BLD AUTO: 204 K/UL — SIGNIFICANT CHANGE UP (ref 150–400)
POTASSIUM SERPL-MCNC: 4.3 MMOL/L — SIGNIFICANT CHANGE UP (ref 3.5–5.3)
POTASSIUM SERPL-SCNC: 4.3 MMOL/L — SIGNIFICANT CHANGE UP (ref 3.5–5.3)
PROCALCITONIN SERPL-MCNC: 0.06 NG/ML — SIGNIFICANT CHANGE UP
PROT SERPL-MCNC: 8.1 G/DL — SIGNIFICANT CHANGE UP (ref 6–8.3)
PROTHROM AB SERPL-ACNC: 10.7 SEC — SIGNIFICANT CHANGE UP (ref 9.9–13.4)
RAPID RVP RESULT: SIGNIFICANT CHANGE UP
RBC # BLD: 4.16 M/UL — SIGNIFICANT CHANGE UP (ref 3.8–5.2)
RBC # FLD: 11.9 % — SIGNIFICANT CHANGE UP (ref 10.3–14.5)
S AUREUS DNA NOSE QL NAA+PROBE: DETECTED
SARS-COV-2 RNA SPEC QL NAA+PROBE: SIGNIFICANT CHANGE UP
SODIUM SERPL-SCNC: 136 MMOL/L — SIGNIFICANT CHANGE UP (ref 135–145)
T4 AB SER-ACNC: 9.2 UG/DL — SIGNIFICANT CHANGE UP (ref 4.6–12)
TSH SERPL-MCNC: 1.76 UIU/ML — SIGNIFICANT CHANGE UP (ref 0.36–3.74)
WBC # BLD: 9.56 K/UL — SIGNIFICANT CHANGE UP (ref 3.8–10.5)
WBC # FLD AUTO: 9.56 K/UL — SIGNIFICANT CHANGE UP (ref 3.8–10.5)

## 2024-12-21 PROCEDURE — 99222 1ST HOSP IP/OBS MODERATE 55: CPT | Mod: GC

## 2024-12-21 PROCEDURE — 71275 CT ANGIOGRAPHY CHEST: CPT | Mod: 26,MC

## 2024-12-21 RX ORDER — FLUTICASONE PROPIONATE AND SALMETEROL 50; 500 UG/1; UG/1
1 POWDER ORAL; RESPIRATORY (INHALATION)
Refills: 0 | Status: DISCONTINUED | OUTPATIENT
Start: 2024-12-21 | End: 2024-12-21

## 2024-12-21 RX ORDER — ANASTROZOLE 1 MG/1
1 TABLET ORAL DAILY
Refills: 0 | Status: DISCONTINUED | OUTPATIENT
Start: 2024-12-21 | End: 2024-12-23

## 2024-12-21 RX ORDER — MONTELUKAST SODIUM 10 MG/1
10 TABLET, FILM COATED ORAL DAILY
Refills: 0 | Status: DISCONTINUED | OUTPATIENT
Start: 2024-12-21 | End: 2024-12-23

## 2024-12-21 RX ORDER — BUPROPION HYDROCHLORIDE 150 MG/1
1 TABLET, EXTENDED RELEASE ORAL
Refills: 0 | DISCHARGE

## 2024-12-21 RX ORDER — FLUTICASONE PROPIONATE AND SALMETEROL 50; 500 UG/1; UG/1
1 POWDER ORAL; RESPIRATORY (INHALATION)
Refills: 0 | DISCHARGE

## 2024-12-21 RX ORDER — LEVOTHYROXINE SODIUM 175 UG/1
88 TABLET ORAL DAILY
Refills: 0 | Status: DISCONTINUED | OUTPATIENT
Start: 2024-12-21 | End: 2024-12-23

## 2024-12-21 RX ORDER — CEFTRIAXONE SODIUM 1 G/1
1000 INJECTION, POWDER, FOR SOLUTION INTRAMUSCULAR; INTRAVENOUS EVERY 24 HOURS
Refills: 0 | Status: DISCONTINUED | OUTPATIENT
Start: 2024-12-22 | End: 2024-12-23

## 2024-12-21 RX ORDER — AZITHROMYCIN MONOHYDRATE 200 MG/5ML
500 POWDER, FOR SUSPENSION ORAL EVERY 24 HOURS
Refills: 0 | Status: DISCONTINUED | OUTPATIENT
Start: 2024-12-22 | End: 2024-12-23

## 2024-12-21 RX ORDER — BUDESONIDE 0.5 MG/2ML
0.5 INHALANT ORAL
Refills: 0 | Status: DISCONTINUED | OUTPATIENT
Start: 2024-12-21 | End: 2024-12-22

## 2024-12-21 RX ORDER — SERTRALINE HYDROCHLORIDE 25 MG/1
200 TABLET ORAL DAILY
Refills: 0 | Status: DISCONTINUED | OUTPATIENT
Start: 2024-12-21 | End: 2024-12-23

## 2024-12-21 RX ORDER — ALBUTEROL SULFATE 90 UG/1
2.5 INHALANT RESPIRATORY (INHALATION) EVERY 6 HOURS
Refills: 0 | Status: DISCONTINUED | OUTPATIENT
Start: 2024-12-21 | End: 2024-12-22

## 2024-12-21 RX ORDER — METHYLPREDNISOLONE 4 MG/1
40 TABLET ORAL DAILY
Refills: 0 | Status: DISCONTINUED | OUTPATIENT
Start: 2024-12-21 | End: 2024-12-23

## 2024-12-21 RX ORDER — IPRATROPIUM BROMIDE AND ALBUTEROL SULFATE .5; 2.5 MG/3ML; MG/3ML
3 SOLUTION RESPIRATORY (INHALATION) EVERY 6 HOURS
Refills: 0 | Status: DISCONTINUED | OUTPATIENT
Start: 2024-12-21 | End: 2024-12-21

## 2024-12-21 RX ORDER — AZITHROMYCIN MONOHYDRATE 200 MG/5ML
500 POWDER, FOR SUSPENSION ORAL ONCE
Refills: 0 | Status: COMPLETED | OUTPATIENT
Start: 2024-12-21 | End: 2024-12-21

## 2024-12-21 RX ORDER — ENOXAPARIN SODIUM 60 MG/.6ML
40 INJECTION INTRAVENOUS; SUBCUTANEOUS EVERY 24 HOURS
Refills: 0 | Status: DISCONTINUED | OUTPATIENT
Start: 2024-12-21 | End: 2024-12-23

## 2024-12-21 RX ORDER — ACETAMINOPHEN 80 MG/.8ML
650 SOLUTION/ DROPS ORAL EVERY 6 HOURS
Refills: 0 | Status: DISCONTINUED | OUTPATIENT
Start: 2024-12-21 | End: 2024-12-23

## 2024-12-21 RX ORDER — PANTOPRAZOLE 40 MG/1
40 TABLET, DELAYED RELEASE ORAL
Refills: 0 | Status: DISCONTINUED | OUTPATIENT
Start: 2024-12-21 | End: 2024-12-23

## 2024-12-21 RX ORDER — CEFTRIAXONE SODIUM 1 G/1
1000 INJECTION, POWDER, FOR SOLUTION INTRAMUSCULAR; INTRAVENOUS ONCE
Refills: 0 | Status: COMPLETED | OUTPATIENT
Start: 2024-12-21 | End: 2024-12-21

## 2024-12-21 RX ORDER — ACETAMINOPHEN 80 MG/.8ML
1000 SOLUTION/ DROPS ORAL ONCE
Refills: 0 | Status: COMPLETED | OUTPATIENT
Start: 2024-12-21 | End: 2024-12-21

## 2024-12-21 RX ORDER — BUPROPION HYDROCHLORIDE 150 MG/1
300 TABLET, EXTENDED RELEASE ORAL DAILY
Refills: 0 | Status: DISCONTINUED | OUTPATIENT
Start: 2024-12-21 | End: 2024-12-23

## 2024-12-21 RX ADMIN — BUDESONIDE 0.5 MILLIGRAM(S): 0.5 INHALANT ORAL at 07:31

## 2024-12-21 RX ADMIN — CEFTRIAXONE SODIUM 100 MILLIGRAM(S): 1 INJECTION, POWDER, FOR SOLUTION INTRAMUSCULAR; INTRAVENOUS at 01:54

## 2024-12-21 RX ADMIN — MONTELUKAST SODIUM 10 MILLIGRAM(S): 10 TABLET, FILM COATED ORAL at 11:02

## 2024-12-21 RX ADMIN — BUPROPION HYDROCHLORIDE 300 MILLIGRAM(S): 150 TABLET, EXTENDED RELEASE ORAL at 11:02

## 2024-12-21 RX ADMIN — ACETAMINOPHEN 400 MILLIGRAM(S): 80 SOLUTION/ DROPS ORAL at 01:53

## 2024-12-21 RX ADMIN — ACETAMINOPHEN 650 MILLIGRAM(S): 80 SOLUTION/ DROPS ORAL at 10:15

## 2024-12-21 RX ADMIN — ACETAMINOPHEN 650 MILLIGRAM(S): 80 SOLUTION/ DROPS ORAL at 17:03

## 2024-12-21 RX ADMIN — METHYLPREDNISOLONE 40 MILLIGRAM(S): 4 TABLET ORAL at 06:56

## 2024-12-21 RX ADMIN — BUDESONIDE 0.5 MILLIGRAM(S): 0.5 INHALANT ORAL at 21:00

## 2024-12-21 RX ADMIN — ACETAMINOPHEN 650 MILLIGRAM(S): 80 SOLUTION/ DROPS ORAL at 18:30

## 2024-12-21 RX ADMIN — SERTRALINE HYDROCHLORIDE 200 MILLIGRAM(S): 25 TABLET ORAL at 11:02

## 2024-12-21 RX ADMIN — ENOXAPARIN SODIUM 40 MILLIGRAM(S): 60 INJECTION INTRAVENOUS; SUBCUTANEOUS at 06:55

## 2024-12-21 RX ADMIN — LEVOTHYROXINE SODIUM 88 MICROGRAM(S): 175 TABLET ORAL at 06:56

## 2024-12-21 RX ADMIN — ALBUTEROL SULFATE 2.5 MILLIGRAM(S): 90 INHALANT RESPIRATORY (INHALATION) at 21:00

## 2024-12-21 RX ADMIN — ACETAMINOPHEN 650 MILLIGRAM(S): 80 SOLUTION/ DROPS ORAL at 09:24

## 2024-12-21 RX ADMIN — ANASTROZOLE 1 MILLIGRAM(S): 1 TABLET ORAL at 11:02

## 2024-12-21 RX ADMIN — AZITHROMYCIN MONOHYDRATE 255 MILLIGRAM(S): 200 POWDER, FOR SUSPENSION ORAL at 04:13

## 2024-12-21 RX ADMIN — PANTOPRAZOLE 40 MILLIGRAM(S): 40 TABLET, DELAYED RELEASE ORAL at 06:56

## 2024-12-21 NOTE — H&P ADULT - PROBLEM SELECTOR PLAN 2
Pt also with cough and ?green-yellow sputum past 2-3 days in the morning.   CTA Chest: No pulmonary embolism.Mild subtle small patchy ground glass opacities in the bilateral lung apices and left lower lobe, nonspecific.   -s/p rocephin and azithromycin, and ofirmev x1 in the ED  - Continue Ceftriaxone 1g IVPB q24h, Azithromycin 500 mg IVPB q24h   - Tylenol 650 mg PO q6h PRN mild pain and/or fever   - Follow up blood and urine cultures, lactate, legionella urine antigen, strep pneumo urine antigen  - Day team to consult ID in the AM Pt also with cough and ?green-yellow sputum past 2-3 days in the morning.   CTA Chest: No pulmonary embolism.Mild subtle small patchy ground glass opacities in the bilateral lung apices and left lower lobe, nonspecific.   -s/p rocephin and azithromycin, and ofirmev x1 in the ED  - Continue Ceftriaxone 1g IVPB q24h, Azithromycin 500 mg IVPB q24h   - Tylenol 650 mg PO q6h PRN mild pain and/or fever   - Follow up blood and urine cultures, lactate, legionella urine antigen, strep pneumo urine antigen  - sputum culture   - mycoplasma IgM ordered  - Day team to consult ID in the AM

## 2024-12-21 NOTE — CONSULT NOTE ADULT - SUBJECTIVE AND OBJECTIVE BOX
[facility], Division of Infectious Diseases   SHERIE Shankar S. Shah, Y. Patel, G. Casimir  [phone]   weekends and after hours [phone]    SHANNAN CHRISTENSEN  54y, Female  [phone]    HPI--  HPI:  54F with asthma, hashimoto thyroiditis, gerd, depression, breast cancer p/w SOB and cough that started around 7pm earlier in the day upon presentation to the ED.  pt states she worked all day and when she got home felt winded and had palpitations  decided to check her pulse ox was 91% and hr was 112 so came to ed  she has cough, no fever, no diarrhea, no dysuria, no known sick contacts  no recent antibx         PMH/PSH--  Bipolar affective  Asthma  Hypothyroidism, unspecified type  Depression, unspecified depression type  Malignant neoplasm of right female breast, unspecified estrogen receptor status, unspecified site of breast  H/O Hashimoto thyroiditis  Anxiety and depression  History of 2019 novel coronavirus disease (COVID-19)  Gastritis  Ganglion cyst of wrist, left  Encounter for IUD insertion  S/P lumpectomy, right breast  S/P foot surgery, left        Allergies-- sulfa-rash      Medications--  Antibiotics: azithromycin  IVPB 500 milliGRAM(s) IV Intermittent every 24 hours  cefTRIAXone   IVPB 1000 milliGRAM(s) IV Intermittent every 24 hours    Immunologic:   Other: acetaminophen     Tablet .. PRN  albuterol    0.083% PRN  anastrozole  buDESOnide    Inhalation Suspension  buPROPion XL (24-Hour) .  enoxaparin Injectable  levothyroxine  methylPREDNISolone sodium succinate Injectable  montelukast  pantoprazole    Tablet  sertraline      Social History--  EtOH: denies ***  Tobacco: denies ***  Drug Use: denies ***    Family/Marital History--  Family history of prostate cancer (Father)       Travel/Environmental/Occupational History:  works at [employer]    Review of Systems:  REVIEW OF SYSTEMS  General: no fever, no chills, no wt loss	  Ophthalmologic: no blurry vision  Respiratory and Thorax: + cough, + dyspnea  Cardiovascular: no chest pain, + palpitations  Gastrointestinal:  no nausea, no vomiting, diarrhea  Genitourinary: no dysuria, no urgency, no frequency	  Musculoskeletal: no myalgias	  Neurological:  no headache	    Physical Exam--  Vital Signs: T(F): 98.2 (12-21-24 @ 12:30), Max: 98.2 (12-21-24 @ 12:30)  HR: 74 (12-21-24 @ 12:30)  BP: 108/76 (12-21-24 @ 12:30)  RR: 18 (12-21-24 @ 12:30)  SpO2: 98% (12-21-24 @ 12:30)  Wt(kg): --  General: Nontoxic-appearing Female in no acute distress.  HEENT: AT/NC. .  Neck: Not rigid. No sense of mass.  Nodes: None palpable.  Lungs:decreased bs   Heart: Regular rate and rhythm.   Abdomen: Bowel sounds present and normoactive. Soft. Nondistended.   Back: No spinal tenderness. No costovertebral angle tenderness.   Extremities: No cyanosis or clubbing. No edema.   Skin: Warm. Dry. Good turgor. No rash. No vasculitic stigmata.  Psychiatric: Appropriate affect and mood for situation.         Laboratory & Imaging Data--  CBC                        12.9   9.56  )-----------( 204      ( 21 Dec 2024 07:47 )             36.6       Chemistries  12-21    136  |  105  |  9   ----------------------------<  149[H]  4.3   |  22  |  0.81    Ca    8.7      21 Dec 2024 07:47  Mg     2.1     12-20    TPro  8.1  /  Alb  3.8  /  TBili  0.3  /  DBili  x   /  AST  20  /  ALT  28  /  AlkPhos  89  12-21      Culture Data    < from: CT Angio Chest PE Protocol w/ IV Cont (12.21.24 @ 00:16) >    ACC: [id number] EXAM:  CT ANGIO CHEST PULM [facility]   ORDERED BY: TRAY BARRAZA     PROCEDURE DATE:  12/21/2024          INTERPRETATION:  CLINICAL INFORMATION: Shortness of breath, tachycardia   and hypoxia.    COMPARISON: 2/8/2024 chest CT    CONTRAST/COMPLICATIONS:  IV Contrast: Omnipaque 350  70 cc administered   30 cc discarded  Oral Contrast: NONE  .    PROCEDURE:  CT Angiography of the Chest.  Sagittal and coronal reformats were performed as well as 3D (MIP)   reconstructions.    FINDINGS:    LUNGS AND LARGE AIRWAYS: Patent central airways. Mild anterior right   upper lobe subpleural reticulation similar to prior. Mild subtle small   patchy ground glass opacities in the bilateral lung apices and left lower   lobe (example 301, 19 and 301, 78). Stable 4 mm right lower lobe nodule   (301, 83). Mild bibasilar atelectasis.  PLEURA: No pleural effusion.  VESSELS: No pulmonary embolism.  HEART: Heart size is normal. No pericardial effusion.  MEDIASTINUM AND JEFF: No lymphadenopathy.  CHEST WALL AND LOWER NECK: Within normal limits.  VISUALIZED UPPER ABDOMEN: Small hiatal hernia.  BONES: Degenerative changes. Stable mild anterior wedge compression   deformity of T6. Stable mild superior endplate compression deformities of   T7 and T10.    IMPRESSION:  No pulmonary embolism.    Mild subtle small patchy ground glass opacities in the bilateral lung   apices and left lower lobe, nonspecific. Findings could be related to   atelectasis, edema or infectious/inflammatory process. Clinical  correlation recommended.    < end of copied text >

## 2024-12-21 NOTE — H&P ADULT - PROBLEM SELECTOR PLAN 1
Pt present with SOB w/ cough x1 day with SpO2 89% in triage. Hx of asthma   ED T 98  /90 RR 20 89% RA   - s/p solumedrol 125 IVP x1, duonebs x1 in the ED - now HR 95 improved 99% on 2L NC  - Continue home montelukast, advair(home meds)  - albuterol q6h  - Can downtitrate O2 as tolerated as pt is saturating 99% on O2. Document room air sat on ambulation  - Pulm Dr. salazar consulted, f/u recs. Pt present with SOB w/ cough x1 day with SpO2 89% in triage. Hx of asthma   ED T 98  /90 RR 20 89% RA   - s/p solumedrol 125 IVP x1, duonebs x1 in the ED - now HR 95 improved 99% on 2L NC  - Continue home montelukast, advair(home meds)  - albuterol q6h  - mycoplasma IgM ordered  - Can downtitrate O2 as tolerated as pt is saturating 99% on O2. Document room air sat on ambulation  - Pulm Dr. salazar consulted, f/u recs. Pt present with SOB w/ cough x1 day with SpO2 89% in triage. Hx of asthma, second hand   ED T 98  /90 RR 20 89% RA   - s/p solumedrol 125 IVP x1, duonebs x1 in the ED - now HR 95 improved 99% on 2L NC   -solumedrol 40mg IV daily - can eventually taper with prednisone   - pt on advair at home - switch to pulmicort 0.5 BID  - Continue home montelukast, (home meds)  - albuterol q6h PRN nebs   - Chest PT/ incentive [place]  - Can downtitrate O2 as tolerated as pt is saturating 99% on O2.   - Pulm Dr. salazar consulted, f/u recs. Pt present with SOB w/ cough x1 day with SpO2 89% in triage. Hx of asthma, second hand smoke hx  ED T 98  /90 RR 20 89% RA   - s/p solumedrol 125 IVP x1, duonebs x1 in the ED - now HR 95 improved 99% on 2L NC   -solumedrol 40mg IV daily - can eventually taper with prednisone   - pt on advair at home - switch to pulmicort 0.5 BID  - Continue home montelukast, (home meds)  - albuterol q6h PRN nebs   - Chest PT/ incentive [place]  - Can downtitrate O2 as tolerated as pt is saturating 99% on O2.   - Pulm Dr. salazar consulted, f/u recs.

## 2024-12-21 NOTE — H&P ADULT - HISTORY OF PRESENT ILLNESS
54F PMH asthma, hashimoto thyroiditis, gerd, depression, breast cancer p/w SOB and cough that started around 7pm earlier in the day upon presentation to the ED. Patient was at home, suddenly felt tired and that she wanted to take a nap, began having sob worse on laying flat. Patient also developed cough then - put on pulse ox and saw HR was 110-112 but her baseline HR in 70s , prior to that did have yellow-green sputum within the past 2-3 days she noticed in the morning. Denies recent travel, immobilization, no known sick contacts, but works at for[MD]. Patient's O2 sat was 89% RA in triage. No personal family hx of clots. Denies any fever, n/v, CP, abd pain, urinary or bowel symptoms or leg swelling.    In the ED T 98  /90 RR 20 89% RA  Given: Ofirmev x1, rocephin IVPB, azithromycin IVPB, 2g IV Mg x1, solumedrol 125 IVP x1, duonebs x1.  Labs: Glu 112  VBG PCO2 46  RVP -   CTA Chest: No pulmonary embolism.    Mild subtle small patchy ground glass opacities in the bilateral lung   apices and left lower lobe, nonspecific. Findings could be related to   atelectasis, edema or infectious/inflammatory process. Clinical   correlation recommended.    EKG NSR 94bpm qtc 460

## 2024-12-21 NOTE — ED ADULT NURSE NOTE - OBJECTIVE STATEMENT
pt a/o x 4 with a calm affect c/o palpitations this evening with an O2 sat mid to high 80's today.  patient denies chest pains but wanted to get checked out.  EKG completed upon arrival, pending RVP and CT results.   pt to remain on cont cardiac monitoring

## 2024-12-21 NOTE — H&P ADULT - ATTENDING COMMENTS
pt seen and examined at bedside  54 Y.O.F presented to ED for feeling more tired and was tachycardiac  as she has monitor also for last few days has noticed having yellowish green sputum   patient found to be 89 % on RA   h/o asthma , 2nd hand smoker for many years   not on home oxygen   CTA chest ruled out PE, patchy opacities b/l could be bronchitis vs pneumonia , less likely edema clinical not volume overload  , questionable atelectasis   f/u RVP  check mycoplasma IgM  f/u procal   empiric Abx with ceftriaxone and Azithromycin   monitor QTC while on Azithromycin   taper oxygen requirement as needed     asthma exacerbation possible sec to URI >>  a/p solumedrol 125 mg  given in ED  steroids IV solumedrol 40 mg daily for now   pulmicort bid   duo nebs   rest per resident note

## 2024-12-21 NOTE — CONSULT NOTE ADULT - SUBJECTIVE AND OBJECTIVE BOX
Date/Time Patient Seen:  		  Referring MD:   Data Reviewed	       Patient is a 54y old  Female who presents with a chief complaint of AHRF, INES (21 Dec 2024 04:43)      Subjective/HPI   54F PMH asthma, hashimoto thyroiditis, gerd, depression, breast cancer p/w SOB and cough that started around 7pm earlier in the day upon presentation to the ED. Patient was at home, suddenly felt tired and that she wanted to take a nap, began having sob worse on laying flat. Patient also developed cough then - put on pulse ox and saw HR was 110-112 but her baseline HR in 70s , prior to that did have yellow-green sputum within the past 2-3 days she noticed in the morning. Denies recent travel, immobilization, no known sick contacts, but works at [employer]. Patient's O2 sat was 89% RA in triage. No personal family hx of clots. Denies any fever, n/v, CP, abd pain, urinary or bowel symptoms or leg swelling.    In the ED T 98  /90 RR 20 89% RA  Given: Ofirmev x1, rocephin IVPB, azithromycin IVPB, 2g IV Mg x1, solumedrol 125 IVP x1, duonebs x1.  Labs: Glu 112  VBG PCO2 46  RVP -   CTA Chest: No pulmonary embolism.  PAST MEDICAL & SURGICAL HISTORY:  Bipolar affective    Asthma  last major attack 2016, denies hx of intubation    Hypothyroidism, unspecified type    Depression, unspecified depression type    Malignant neoplasm of right female breast, unspecified estrogen receptor status, unspecified site of breast  s/p lumpectomy 2018, completed chemo and radiation 3/2019    H/O Hashimoto thyroiditis    Anxiety and depression    History of 2019 novel coronavirus disease (COVID-19)  4/2020- not hospitalized, no resp. complications    Gastritis    Ganglion cyst of wrist, left  2013, 2015    Encounter for IUD insertion  2021 last place -copper-removed    S/P lumpectomy, right breast  with nodes 2018    S/P foot surgery, left  no hardware  Mild subtle small patchy ground glass opacities in the bilateral lung   apices and left lower lobe, nonspecific. Findings could be related to   atelectasis, edema or infectious/inflammatory process. Clinical   correlation recommended.    EKG NSR 94bpm qtc 460       Review of Systems:  Review of Systems: per above HPI      Allergies and Intolerances:        Allergies:  	Sulfur: Drug, Hives    Home Medications:   * Patient Currently Takes Medications as of 21-Dec-2024 04:52 documented in Structured Notes  · 	fluticasone-salmeterol 500 mcg-50 mcg inhalation powder: Last Dose Taken:  , 1 puff(s) inhaled once a day  · 	Protonix 40 mg oral delayed release tablet: Last Dose Taken:  , 1 tab(s) orally once a day  · 	montelukast 10 mg oral tablet: Last Dose Taken:  , 1 tab(s) orally once a day  · 	anastrozole 1 mg oral tablet: Last Dose Taken:  , 1 tab(s) orally once a day  · 	alendronate weekly: Last Dose Taken:  , 1 cap(s) orally once a week 70 mg  · 	Synthroid 88 mcg (0.088 mg) oral tablet: Last Dose Taken:  , 1 tab(s) orally once a day  · 	Budeprion  mg/24 hours oral tablet, extended release: Last Dose Taken:  , 1 tab(s) orally once a day  · 	Zoloft 100 mg oral tablet: Last Dose Taken:  , 2 tab(s) orally once a day    .    Patient History:    Past Medical, Past Surgical, and Family History:  PAST MEDICAL HISTORY:  Anxiety and depression     Asthma last major attack 2016, denies hx of intubation    Bipolar affective     Depression, unspecified depression type     Gastritis     H/O Hashimoto thyroiditis     History of 2019 novel coronavirus disease (COVID-19) 4/2020- not hospitalized, no resp. complications    Hypothyroidism, unspecified type     Malignant neoplasm of right female breast, unspecified estrogen receptor status, unspecified site of breast s/p lumpectomy 2018, completed chemo and radiation 3/2019.     PAST SURGICAL HISTORY:  Encounter for IUD insertion 2021 last place -copper-removed    Ganglion cyst of wrist, left 2013, 2015    S/P foot surgery, left no hardware    S/P lumpectomy, right breast with nodes 2018.     FAMILY HISTORY:  Father  Still living? Unknown  Family history of prostate cancer, Age at diagnosis: Age Unknown.     Social History:  · Substance use	No  · Social History (marital status, living situation, occupation, and sexual history)	Tobacco: denies  EtOH: denies  Recreational drug use: denies  Lives with:    Ambulates: independent  ADLs: independnet     Tobacco Screening:  · Core Measure Site	Yes  · Has the patient used tobacco in the past 30 days?	No    Risk Assessment:    Present on Admission:  Deep Venous Thrombosis	no  Pulmonary Embolus	no     HIV Screening:  · In accordance with [place] State law, we offer every patient who comes to our ED an HIV test. Would you like to be tested today?	Opt out     Hepatitis C Test Questions:  · In accordance with [place] State Law, we offer every patient a Hepatitis C test. Would you like to be tested today?	Opt out          Medication list         MEDICATIONS  (STANDING):  anastrozole 1 milliGRAM(s) Oral daily  azithromycin  IVPB 500 milliGRAM(s) IV Intermittent every 24 hours  buDESOnide    Inhalation Suspension 0.5 milliGRAM(s) Inhalation two times a day  buPROPion XL (24-Hour) . 300 milliGRAM(s) Oral daily  cefTRIAXone   IVPB 1000 milliGRAM(s) IV Intermittent every 24 hours  enoxaparin Injectable 40 milliGRAM(s) SubCutaneous every 24 hours  levothyroxine 88 MICROGram(s) Oral daily  methylPREDNISolone sodium succinate Injectable 40 milliGRAM(s) IV Push daily  montelukast 10 milliGRAM(s) Oral daily  pantoprazole    Tablet 40 milliGRAM(s) Oral before breakfast  sertraline 200 milliGRAM(s) Oral daily    MEDICATIONS  (PRN):  acetaminophen     Tablet .. 650 milliGRAM(s) Oral every 6 hours PRN Temp greater or equal to 38C (100.4F), Mild Pain (1 - 3)  albuterol    0.083% 2.5 milliGRAM(s) Nebulizer every 6 hours PRN Shortness of Breath and/or Wheezing         Vitals log        ICU Vital Signs Last 24 Hrs  T(C): 36.7 (21 Dec 2024 08:20), Max: 36.7 (20 Dec 2024 21:54)  T(F): 98 (21 Dec 2024 08:20), Max: 98.1 (21 Dec 2024 04:26)  HR: 74 (21 Dec 2024 08:20) (74 - 104)  BP: 103/74 (21 Dec 2024 08:20) (95/59 - 141/90)  BP(mean): --  ABP: --  ABP(mean): --  RR: 16 (21 Dec 2024 08:20) (16 - 20)  SpO2: 98% (21 Dec 2024 08:20) (89% - 99%)    O2 Parameters below as of 21 Dec 2024 04:26  Patient On (Oxygen Delivery Method): nasal cannula  O2 Flow (L/min): 2               Input and Output:  I&O's Detail      Lab Data                        12.9   9.56  )-----------( 204      ( 21 Dec 2024 07:47 )             36.6     12-21    136  |  105  |  9   ----------------------------<  149[H]  4.3   |  22  |  0.81    Ca    8.7      21 Dec 2024 07:47  Mg     2.1     12-20    TPro  8.1  /  Alb  3.8  /  TBili  0.3  /  DBili  x   /  AST  20  /  ALT  28  /  AlkPhos  89  12-21            Review of Systems	  sob  schulte  weakness  cough  malaise  alert  o2 support      Objective     Physical Examination    heart s1s2  lung dc BS  head nc  head at  abd soft  cn grossly int      Pertinent Lab findings & Imaging      Duncan:  NO   Adequate UO     I&O's Detail           Discussed with:     Cultures:	        Radiology      ACC: [id number] EXAM:  CT ANGIO CHEST PULM ART [facility]   ORDERED BY: TRAY BARRAZA     PROCEDURE DATE:  12/21/2024          INTERPRETATION:  CLINICAL INFORMATION: Shortness of breath, tachycardia   and hypoxia.    COMPARISON: 2/8/2024 chest CT    CONTRAST/COMPLICATIONS:  IV Contrast: Omnipaque 350  70 cc administered   30 cc discarded  Oral Contrast: NONE  .    PROCEDURE:  CT Angiography of the Chest.  Sagittal and coronal reformats were performed as well as 3D (MIP)   reconstructions.    FINDINGS:    LUNGS AND LARGE AIRWAYS: Patent central airways. Mild anterior right   upper lobe subpleural reticulation similar to prior. Mild subtle small   patchy ground glass opacities in the bilateral lung apices and left lower   lobe (example 301, 19 and 301, 78). Stable 4 mm right lower lobe nodule   (301, 83). Mild bibasilar atelectasis.  PLEURA: No pleural effusion.  VESSELS: No pulmonary embolism.  HEART: Heart size is normal. No pericardial effusion.  MEDIASTINUM AND JEFF: No lymphadenopathy.  CHEST WALL AND LOWER NECK: Within normal limits.  VISUALIZED UPPER ABDOMEN: Small hiatal hernia.  BONES: Degenerative changes. Stable mild anterior wedge compression   deformity of T6. Stable mild superior endplate compression deformities of   T7 and T10.    IMPRESSION:  No pulmonary embolism.    Mild subtle small patchy ground glass opacities in the bilateral lung   apices and left lower lobe, nonspecific. Findings could be related to   atelectasis, edema or infectious/inflammatory process. Clinical   correlation recommended.      --- End of Report ---            ANITRA WAGNER MD; Attending Radiologist  This document has been electronically signed. Dec 21 2024 12:52AM

## 2024-12-21 NOTE — CONSULT NOTE ADULT - ASSESSMENT
54F PMH asthma, hashimoto thyroiditis, gerd, depression, breast cancer p/w SOB and cough that started around 7pm earlier in the day upon presentation to the ED. Patient was at home, suddenly felt tired and that she wanted to take a nap, began having sob worse on laying flat    asthma  pna eval  gerd  thyroiditis hx  malaise    emp ABX  bronchodilators  systemic steroids  cough rx regimen  fio2 wean  blood gas noted  ct neg for pe  ct with changes - ggo - atelectasis - ? inflammation - pt has hx of abnormal chest imaging as outpatient - [facility] - will need follow up for repeat imaging and investigation  fio2 titration - goal sat > 88 pct  seasonal vaccination education  dvt p  nutrition  emotional support  
54F with asthma, hashimoto thyroiditis, gerd, depression, breast cancer p/w palpitations SOB and cough   hypoxia  asthma exacerbation +/- pna    chest ct no pe and no focal consolidation  rvp neg    plan  no fever no leukocytosis  check procalcitonin    cont supportive nebs/inhaler/steroids  empiric antibx - ceftriaxone/ azithromcyin

## 2024-12-21 NOTE — H&P ADULT - NSHPPHYSICALEXAM_GEN_ALL_CORE
T(C): 36.7 (12-21-24 @ 04:26), Max: 36.7 (12-20-24 @ 21:54)  HR: 85 (12-21-24 @ 04:26) (85 - 104)  BP: 95/59 (12-21-24 @ 04:26) (95/59 - 141/90)  RR: 18 (12-21-24 @ 04:26) (18 - 20)  SpO2: 99% (12-21-24 @ 04:26) (89% - 99%)    GENERAL: NAD  EYES: sclera clear  ENMT:  moist mucous membranes  LUNGS: +mildly diminished breath sounds in the lower lobes. no wheezing or rhonchi appreciated  HEART: soft S1/S2, regular rate and rhythm, no murmurs noted, no lower extremity edema  GASTROINTESTINAL: abdomen is soft, nontender, nondistended, normoactive bowel sounds  INTEGUMENT:  warm skin, appears well perfused  MUSCULOSKELETAL: no clubbing or cyanosis, no obvious deformity  NEUROLOGIC: awake, alert, oriented x3  HEME/LYMPH: no obvious ecchymosis or petechiae

## 2024-12-21 NOTE — H&P ADULT - NSHPSOCIALHISTORY_GEN_ALL_CORE
Tobacco: denies  EtOH: denies  Recreational drug use: denies  Lives with:    Ambulates: independent  ADLs: independnet

## 2024-12-21 NOTE — H&P ADULT - PROBLEM/PLAN-6
GIB (gastrointestinal bleeding) GIB (gastrointestinal bleeding) GIB (gastrointestinal bleeding) GIB (gastrointestinal bleeding) GIB (gastrointestinal bleeding) DISPLAY PLAN FREE TEXT

## 2024-12-21 NOTE — H&P ADULT - ASSESSMENT
54F PMH asthma, hashimoto thyroiditis, gerd, depression, breast cancer  p/w SOB and cough x1 day admitted for AHRF, PNA

## 2024-12-22 LAB — PROCALCITONIN SERPL-MCNC: 0.06 NG/ML — SIGNIFICANT CHANGE UP

## 2024-12-22 RX ORDER — BENZONATATE 100 MG
100 CAPSULE ORAL EVERY 8 HOURS
Refills: 0 | Status: DISCONTINUED | OUTPATIENT
Start: 2024-12-22 | End: 2024-12-23

## 2024-12-22 RX ORDER — ALBUTEROL SULFATE 90 UG/1
2.5 INHALANT RESPIRATORY (INHALATION) EVERY 6 HOURS
Refills: 0 | Status: DISCONTINUED | OUTPATIENT
Start: 2024-12-22 | End: 2024-12-23

## 2024-12-22 RX ORDER — FLUTICASONE PROPIONATE AND SALMETEROL 50; 500 UG/1; UG/1
1 POWDER ORAL; RESPIRATORY (INHALATION)
Refills: 0 | Status: DISCONTINUED | OUTPATIENT
Start: 2024-12-22 | End: 2024-12-23

## 2024-12-22 RX ORDER — TIOTROPIUM BROMIDE MONOHYDRATE 18 UG/1
2 CAPSULE ORAL; RESPIRATORY (INHALATION) DAILY
Refills: 0 | Status: DISCONTINUED | OUTPATIENT
Start: 2024-12-22 | End: 2024-12-23

## 2024-12-22 RX ADMIN — ALBUTEROL SULFATE 2.5 MILLIGRAM(S): 90 INHALANT RESPIRATORY (INHALATION) at 19:26

## 2024-12-22 RX ADMIN — LEVOTHYROXINE SODIUM 88 MICROGRAM(S): 175 TABLET ORAL at 06:19

## 2024-12-22 RX ADMIN — ENOXAPARIN SODIUM 40 MILLIGRAM(S): 60 INJECTION INTRAVENOUS; SUBCUTANEOUS at 06:19

## 2024-12-22 RX ADMIN — ANASTROZOLE 1 MILLIGRAM(S): 1 TABLET ORAL at 11:22

## 2024-12-22 RX ADMIN — BUPROPION HYDROCHLORIDE 300 MILLIGRAM(S): 150 TABLET, EXTENDED RELEASE ORAL at 11:22

## 2024-12-22 RX ADMIN — FLUTICASONE PROPIONATE AND SALMETEROL 1 DOSE(S): 50; 500 POWDER ORAL; RESPIRATORY (INHALATION) at 21:06

## 2024-12-22 RX ADMIN — MONTELUKAST SODIUM 10 MILLIGRAM(S): 10 TABLET, FILM COATED ORAL at 21:07

## 2024-12-22 RX ADMIN — BUDESONIDE 0.5 MILLIGRAM(S): 0.5 INHALANT ORAL at 07:49

## 2024-12-22 RX ADMIN — ALBUTEROL SULFATE 2.5 MILLIGRAM(S): 90 INHALANT RESPIRATORY (INHALATION) at 13:27

## 2024-12-22 RX ADMIN — Medication 100 MILLIGRAM(S): at 21:07

## 2024-12-22 RX ADMIN — SERTRALINE HYDROCHLORIDE 200 MILLIGRAM(S): 25 TABLET ORAL at 11:22

## 2024-12-22 RX ADMIN — METHYLPREDNISOLONE 40 MILLIGRAM(S): 4 TABLET ORAL at 06:18

## 2024-12-22 RX ADMIN — Medication 100 MILLIGRAM(S): at 11:21

## 2024-12-22 RX ADMIN — CEFTRIAXONE SODIUM 100 MILLIGRAM(S): 1 INJECTION, POWDER, FOR SOLUTION INTRAMUSCULAR; INTRAVENOUS at 02:03

## 2024-12-22 RX ADMIN — PANTOPRAZOLE 40 MILLIGRAM(S): 40 TABLET, DELAYED RELEASE ORAL at 06:18

## 2024-12-22 RX ADMIN — AZITHROMYCIN MONOHYDRATE 255 MILLIGRAM(S): 200 POWDER, FOR SUSPENSION ORAL at 04:51

## 2024-12-23 ENCOUNTER — TRANSCRIPTION ENCOUNTER (OUTPATIENT)
Age: 54
End: 2024-12-23

## 2024-12-23 LAB
ANION GAP SERPL CALC-SCNC: 5 MMOL/L — SIGNIFICANT CHANGE UP (ref 5–17)
BUN SERPL-MCNC: 15 MG/DL — SIGNIFICANT CHANGE UP (ref 7–23)
CALCIUM SERPL-MCNC: 9.3 MG/DL — SIGNIFICANT CHANGE UP (ref 8.5–10.1)
CHLORIDE SERPL-SCNC: 106 MMOL/L — SIGNIFICANT CHANGE UP (ref 96–108)
CO2 SERPL-SCNC: 27 MMOL/L — SIGNIFICANT CHANGE UP (ref 22–31)
CREAT SERPL-MCNC: 0.94 MG/DL — SIGNIFICANT CHANGE UP (ref 0.5–1.3)
EGFR: 73 ML/MIN/1.73M2 — SIGNIFICANT CHANGE UP
GLUCOSE SERPL-MCNC: 130 MG/DL — HIGH (ref 70–99)
HCT VFR BLD CALC: 39.1 % — SIGNIFICANT CHANGE UP (ref 34.5–45)
HGB BLD-MCNC: 13.3 G/DL — SIGNIFICANT CHANGE UP (ref 11.5–15.5)
MCHC RBC-ENTMCNC: 31.4 PG — SIGNIFICANT CHANGE UP (ref 27–34)
MCHC RBC-ENTMCNC: 34 G/DL — SIGNIFICANT CHANGE UP (ref 32–36)
MCV RBC AUTO: 92.4 FL — SIGNIFICANT CHANGE UP (ref 80–100)
NRBC # BLD: 0 /100 WBCS — SIGNIFICANT CHANGE UP (ref 0–0)
PLATELET # BLD AUTO: 218 K/UL — SIGNIFICANT CHANGE UP (ref 150–400)
POTASSIUM SERPL-MCNC: 4.2 MMOL/L — SIGNIFICANT CHANGE UP (ref 3.5–5.3)
POTASSIUM SERPL-SCNC: 4.2 MMOL/L — SIGNIFICANT CHANGE UP (ref 3.5–5.3)
RBC # BLD: 4.23 M/UL — SIGNIFICANT CHANGE UP (ref 3.8–5.2)
RBC # FLD: 11.9 % — SIGNIFICANT CHANGE UP (ref 10.3–14.5)
SODIUM SERPL-SCNC: 138 MMOL/L — SIGNIFICANT CHANGE UP (ref 135–145)
WBC # BLD: 4.28 K/UL — SIGNIFICANT CHANGE UP (ref 3.8–10.5)
WBC # FLD AUTO: 4.28 K/UL — SIGNIFICANT CHANGE UP (ref 3.8–10.5)

## 2024-12-23 PROCEDURE — 85610 PROTHROMBIN TIME: CPT

## 2024-12-23 PROCEDURE — 86738 MYCOPLASMA ANTIBODY: CPT

## 2024-12-23 PROCEDURE — 94640 AIRWAY INHALATION TREATMENT: CPT

## 2024-12-23 PROCEDURE — 82803 BLOOD GASES ANY COMBINATION: CPT

## 2024-12-23 PROCEDURE — 71275 CT ANGIOGRAPHY CHEST: CPT | Mod: MC

## 2024-12-23 PROCEDURE — 96374 THER/PROPH/DIAG INJ IV PUSH: CPT

## 2024-12-23 PROCEDURE — 99285 EMERGENCY DEPT VISIT HI MDM: CPT

## 2024-12-23 PROCEDURE — 96375 TX/PRO/DX INJ NEW DRUG ADDON: CPT

## 2024-12-23 PROCEDURE — 94760 N-INVAS EAR/PLS OXIMETRY 1: CPT

## 2024-12-23 PROCEDURE — 84443 ASSAY THYROID STIM HORMONE: CPT

## 2024-12-23 PROCEDURE — 80048 BASIC METABOLIC PNL TOTAL CA: CPT

## 2024-12-23 PROCEDURE — 85730 THROMBOPLASTIN TIME PARTIAL: CPT

## 2024-12-23 PROCEDURE — 71045 X-RAY EXAM CHEST 1 VIEW: CPT

## 2024-12-23 PROCEDURE — 80053 COMPREHEN METABOLIC PANEL: CPT

## 2024-12-23 PROCEDURE — 84484 ASSAY OF TROPONIN QUANT: CPT

## 2024-12-23 PROCEDURE — 0225U NFCT DS DNA&RNA 21 SARSCOV2: CPT

## 2024-12-23 PROCEDURE — 85025 COMPLETE CBC W/AUTO DIFF WBC: CPT

## 2024-12-23 PROCEDURE — 87040 BLOOD CULTURE FOR BACTERIA: CPT

## 2024-12-23 PROCEDURE — 93005 ELECTROCARDIOGRAM TRACING: CPT

## 2024-12-23 PROCEDURE — 84436 ASSAY OF TOTAL THYROXINE: CPT

## 2024-12-23 PROCEDURE — 87640 STAPH A DNA AMP PROBE: CPT

## 2024-12-23 PROCEDURE — 83880 ASSAY OF NATRIURETIC PEPTIDE: CPT

## 2024-12-23 PROCEDURE — 36415 COLL VENOUS BLD VENIPUNCTURE: CPT

## 2024-12-23 PROCEDURE — 87641 MR-STAPH DNA AMP PROBE: CPT

## 2024-12-23 PROCEDURE — 83735 ASSAY OF MAGNESIUM: CPT

## 2024-12-23 PROCEDURE — 84702 CHORIONIC GONADOTROPIN TEST: CPT

## 2024-12-23 PROCEDURE — 84145 PROCALCITONIN (PCT): CPT

## 2024-12-23 PROCEDURE — 85027 COMPLETE CBC AUTOMATED: CPT

## 2024-12-23 RX ORDER — TIOTROPIUM BROMIDE MONOHYDRATE 18 UG/1
2 CAPSULE ORAL; RESPIRATORY (INHALATION)
Qty: 1 | Refills: 0
Start: 2024-12-23

## 2024-12-23 RX ORDER — CEFUROXIME AXETIL 250 MG
1 TABLET ORAL
Qty: 5 | Refills: 0
Start: 2024-12-23

## 2024-12-23 RX ORDER — PREDNISONE 5 MG
3 TABLET ORAL
Qty: 24 | Refills: 0
Start: 2024-12-23

## 2024-12-23 RX ORDER — BENZONATATE 100 MG
1 CAPSULE ORAL
Qty: 12 | Refills: 0
Start: 2024-12-23

## 2024-12-23 RX ORDER — CEFUROXIME AXETIL 250 MG
500 TABLET ORAL EVERY 12 HOURS
Refills: 0 | Status: DISCONTINUED | OUTPATIENT
Start: 2024-12-23 | End: 2024-12-23

## 2024-12-23 RX ADMIN — TIOTROPIUM BROMIDE MONOHYDRATE 2 PUFF(S): 18 CAPSULE ORAL; RESPIRATORY (INHALATION) at 05:21

## 2024-12-23 RX ADMIN — METHYLPREDNISOLONE 40 MILLIGRAM(S): 4 TABLET ORAL at 05:22

## 2024-12-23 RX ADMIN — PANTOPRAZOLE 40 MILLIGRAM(S): 40 TABLET, DELAYED RELEASE ORAL at 05:21

## 2024-12-23 RX ADMIN — ALBUTEROL SULFATE 2.5 MILLIGRAM(S): 90 INHALANT RESPIRATORY (INHALATION) at 15:21

## 2024-12-23 RX ADMIN — LEVOTHYROXINE SODIUM 88 MICROGRAM(S): 175 TABLET ORAL at 05:22

## 2024-12-23 RX ADMIN — Medication 500 MILLIGRAM(S): at 11:27

## 2024-12-23 RX ADMIN — ANASTROZOLE 1 MILLIGRAM(S): 1 TABLET ORAL at 11:27

## 2024-12-23 RX ADMIN — FLUTICASONE PROPIONATE AND SALMETEROL 1 DOSE(S): 50; 500 POWDER ORAL; RESPIRATORY (INHALATION) at 05:20

## 2024-12-23 RX ADMIN — BUPROPION HYDROCHLORIDE 300 MILLIGRAM(S): 150 TABLET, EXTENDED RELEASE ORAL at 11:27

## 2024-12-23 RX ADMIN — ACETAMINOPHEN 650 MILLIGRAM(S): 80 SOLUTION/ DROPS ORAL at 04:11

## 2024-12-23 RX ADMIN — ENOXAPARIN SODIUM 40 MILLIGRAM(S): 60 INJECTION INTRAVENOUS; SUBCUTANEOUS at 06:38

## 2024-12-23 RX ADMIN — AZITHROMYCIN MONOHYDRATE 255 MILLIGRAM(S): 200 POWDER, FOR SUSPENSION ORAL at 01:49

## 2024-12-23 RX ADMIN — MONTELUKAST SODIUM 10 MILLIGRAM(S): 10 TABLET, FILM COATED ORAL at 11:27

## 2024-12-23 RX ADMIN — CEFTRIAXONE SODIUM 100 MILLIGRAM(S): 1 INJECTION, POWDER, FOR SOLUTION INTRAMUSCULAR; INTRAVENOUS at 01:13

## 2024-12-23 RX ADMIN — SERTRALINE HYDROCHLORIDE 200 MILLIGRAM(S): 25 TABLET ORAL at 11:27

## 2024-12-23 RX ADMIN — ACETAMINOPHEN 650 MILLIGRAM(S): 80 SOLUTION/ DROPS ORAL at 03:11

## 2024-12-23 RX ADMIN — ALBUTEROL SULFATE 2.5 MILLIGRAM(S): 90 INHALANT RESPIRATORY (INHALATION) at 08:09

## 2024-12-23 NOTE — DISCHARGE NOTE NURSING/CASE MANAGEMENT/SOCIAL WORK - NSDCPEFALRISK_GEN_ALL_CORE
Chronic issue  Saw neurology 11/26 for this   No medicine was recommended at this time  Comfort focused care may be recommended  Daughter would like  orders   For information on Fall & Injury Prevention, visit: [email or web address]  [email or web address] OR  https://www.cdc.gov/steadi/patient.html

## 2024-12-23 NOTE — DIETITIAN INITIAL EVALUATION ADULT - OTHER INFO
"54F PMH asthma, hashimoto thyroiditis, gerd, depression, breast cancer p/w SOB and cough that started around 7pm earlier in the day upon presentation to the ED. Patient was at home, suddenly felt tired and that she wanted to take a nap, began having sob worse on laying flat. Patient also developed cough then - put on pulse ox and saw HR was 110-112 but her baseline HR in 70s , prior to that did have yellow-green sputum within the past 2-3 days she noticed in the morning." (Per H&P 12/21)    Met with patient at bedside. Patient reports "good" appetite and adequate intake at meals. Denies difficulty chewing and swallowing. Sulfur allergy. Denies N/V/C/D, but reports hx of gastritis dx in June 2024. Patient reports significant wt loss from January to June 2024 prior to dx. Patient reports UW of 175 lb and wt loss of 30 lbs in that time frame, however, questionable accuracy. Per Kedar SINGLETON, 159.5 lb (72.6 kg) in January, and 141 lb (64.5kg) in August. Since dx states weight loss has plateaued. Reports  lb (61.4kg) which aligns with documented wt of 136 lb (61.9kg). Ht 5'3'' (160cm). 16% wt loss since January which is clinically significant. Discussed importance of HBV protein sources to maintain muscle stores. Reiterated importance of hydration for gastritis as well as not laying down directly after meals and sleeping slightly elevated.  "54F PMH asthma, hashimoto thyroiditis, gerd, depression, breast cancer p/w SOB and cough that started around 7pm earlier in the day upon presentation to the ED. Patient was at home, suddenly felt tired and that she wanted to take a nap, began having sob worse on laying flat. Patient also developed cough then - put on pulse ox and saw HR was 110-112 but her baseline HR in 70s , prior to that did have yellow-green sputum within the past 2-3 days she noticed in the morning." (Per H&P 12/21)    Met with patient at bedside. Patient reports "good" appetite and adequate intake at meals. Denies difficulty chewing and swallowing. Sulfur allergy. Denies N/V/C/D, but reports hx of gastritis dx in June 2024. Patient reports significant wt loss from January to June 2024 prior to dx. Patient reports UW of 175 lb and wt loss of 30 lbs in that time frame, however, questionable accuracy. Per Kedar SINGLETON, 159.5 lb (72.6 kg) in January, and 141 lb (64.5kg) in August. Since dx states weight loss has plateaued. Reports  lb (61.4kg) which aligns with documented wt of 136 lb (61.9kg). Ht 5'3'' (160cm). 16% wt loss since January which is clinically significant. Discussed importance of HBV protein sources to maintain muscle stores. Discussed importance of hydration for gastritis as well as not laying down directly after meals and sleeping slightly elevated.

## 2024-12-23 NOTE — DISCHARGE NOTE NURSING/CASE MANAGEMENT/SOCIAL WORK - PATIENT PORTAL LINK FT
You can access the FollowMyHealth Patient Portal offered by [facility] by registering at the following website: [email or web address]. By joining [employer]’s FollowMyHealth portal, you will also be able to view your health information using other applications (apps) compatible with our system.

## 2024-12-23 NOTE — DIETITIAN INITIAL EVALUATION ADULT - NUTRITIONGOAL OUTCOME1
Patient to consume >75% estimated nutrient needs with emphasis on HBV protein sources to support muscle and fat stores.

## 2024-12-23 NOTE — PROGRESS NOTE ADULT - SUBJECTIVE AND OBJECTIVE BOX
Date/Time Patient Seen:  		  Referring MD:   Data Reviewed	       Patient is a 54y old  Female who presents with a chief complaint of AHRF, PNA (21 Dec 2024 23:16)      Subjective/HPI     PAST MEDICAL & SURGICAL HISTORY:  Bipolar affective    Asthma  last major attack 2016, denies hx of intubation    Hypothyroidism, unspecified type    Depression, unspecified depression type    Malignant neoplasm of right female breast, unspecified estrogen receptor status, unspecified site of breast  s/p lumpectomy 2018, completed chemo and radiation 3/2019    H/O Hashimoto thyroiditis    Anxiety and depression    History of 2019 novel coronavirus disease (COVID-19)  4/2020- not hospitalized, no resp. complications    Gastritis    Ganglion cyst of wrist, left  2013, 2015    Encounter for IUD insertion  2021 last place -copper-removed    S/P lumpectomy, right breast  with nodes 2018    S/P foot surgery, left  no hardware          Medication list         MEDICATIONS  (STANDING):  anastrozole 1 milliGRAM(s) Oral daily  azithromycin  IVPB 500 milliGRAM(s) IV Intermittent every 24 hours  buDESOnide    Inhalation Suspension 0.5 milliGRAM(s) Inhalation two times a day  buPROPion XL (24-Hour) . 300 milliGRAM(s) Oral daily  cefTRIAXone   IVPB 1000 milliGRAM(s) IV Intermittent every 24 hours  enoxaparin Injectable 40 milliGRAM(s) SubCutaneous every 24 hours  levothyroxine 88 MICROGram(s) Oral daily  methylPREDNISolone sodium succinate Injectable 40 milliGRAM(s) IV Push daily  montelukast 10 milliGRAM(s) Oral daily  pantoprazole    Tablet 40 milliGRAM(s) Oral before breakfast  sertraline 200 milliGRAM(s) Oral daily    MEDICATIONS  (PRN):  acetaminophen     Tablet .. 650 milliGRAM(s) Oral every 6 hours PRN Temp greater or equal to 38C (100.4F), Mild Pain (1 - 3)  albuterol    0.083% 2.5 milliGRAM(s) Nebulizer every 6 hours PRN Shortness of Breath and/or Wheezing         Vitals log        ICU Vital Signs Last 24 Hrs  T(C): 36.6 (22 Dec 2024 04:28), Max: 36.8 (21 Dec 2024 12:30)  T(F): 97.8 (22 Dec 2024 04:28), Max: 98.2 (21 Dec 2024 12:30)  HR: 66 (22 Dec 2024 04:28) (66 - 75)  BP: 101/66 (22 Dec 2024 04:28) (101/66 - 108/76)  BP(mean): --  ABP: --  ABP(mean): --  RR: 18 (22 Dec 2024 04:28) (16 - 18)  SpO2: 94% (22 Dec 2024 04:28) (94% - 100%)    O2 Parameters below as of 22 Dec 2024 04:28  Patient On (Oxygen Delivery Method): nasal cannula  O2 Flow (L/min): 2               Input and Output:  I&O's Detail      Lab Data                        12.9   9.56  )-----------( 204      ( 21 Dec 2024 07:47 )             36.6     12-21    136  |  105  |  9   ----------------------------<  149[H]  4.3   |  22  |  0.81    Ca    8.7      21 Dec 2024 07:47  Mg     2.1     12-20    TPro  8.1  /  Alb  3.8  /  TBili  0.3  /  DBili  x   /  AST  20  /  ALT  28  /  AlkPhos  89  12-21            Review of Systems	      Objective     Physical Examination    on o2 support  heart s1s2  lung dc bS  head nc  head at  abd soft  verbal  alert      Pertinent Lab findings & Imaging      Dakota:  NO   Adequate UO     I&O's Detail           Discussed with:     Cultures:	        Radiology                            
[facility] DIV[facility] of INFECTIOUS DISEASE  Sky Craft MD PhD, Sherlyn Woodall MD, Charito Francis MD, Lavonne Jones MD, Sidney Hernández MD  and providing coverage with Kaylie Perla MD  Providing Infectious Disease Consultations at [facility], [facility], [place], [facility], [facility]'s    Office# [phone] to schedule follow up appointments  Answering Service for urgent calls or New Consults [phone]  Cell# to text for urgent issues Sky Craft [phone]     infectious diseases progress note:    SHANNAN CHRISTENSEN is a 54y y. o. Female patient    Overnight and events of the last 24hrs reviewed    Allergies    Sulfur (Hives)    Intolerances        ANTIBIOTICS/RELEVANT:  antimicrobials  azithromycin  IVPB 500 milliGRAM(s) IV Intermittent every 24 hours  cefTRIAXone   IVPB 1000 milliGRAM(s) IV Intermittent every 24 hours    immunologic:    OTHER:  acetaminophen     Tablet .. 650 milliGRAM(s) Oral every 6 hours PRN  albuterol    0.083% 2.5 milliGRAM(s) Nebulizer every 6 hours  anastrozole 1 milliGRAM(s) Oral daily  benzonatate 100 milliGRAM(s) Oral every 8 hours PRN  buPROPion XL (24-Hour) . 300 milliGRAM(s) Oral daily  enoxaparin Injectable 40 milliGRAM(s) SubCutaneous every 24 hours  fluticasone propionate/ salmeterol 250-50 MICROgram(s) Diskus 1 Dose(s) Inhalation two times a day  levothyroxine 88 MICROGram(s) Oral daily  methylPREDNISolone sodium succinate Injectable 40 milliGRAM(s) IV Push daily  montelukast 10 milliGRAM(s) Oral daily  pantoprazole    Tablet 40 milliGRAM(s) Oral before breakfast  sertraline 200 milliGRAM(s) Oral daily  tiotropium 2.5 MICROgram(s) Inhaler 2 Puff(s) Inhalation daily      Objective:  Vital Signs Last 24 Hrs  T(C): 36.8 (23 Dec 2024 04:35), Max: 36.9 (22 Dec 2024 11:41)  T(F): 98.2 (23 Dec 2024 04:35), Max: 98.4 (22 Dec 2024 11:41)  HR: 78 (23 Dec 2024 08:09) (76 - 82)  BP: 100/63 (23 Dec 2024 04:35) (100/63 - 113/69)  BP(mean): --  RR: 18 (23 Dec 2024 04:35) (18 - 18)  SpO2: 97% (23 Dec 2024 08:09) (94% - 98%)    Parameters below as of 23 Dec 2024 08:09  Patient On (Oxygen Delivery Method): room air        T(C): 36.8 (12-23-24 @ 04:35), Max: 36.9 (12-22-24 @ 11:41)  T(C): 36.8 (12-23-24 @ 04:35), Max: 36.9 (12-22-24 @ 11:41)  T(C): 36.8 (12-23-24 @ 04:35), Max: 36.9 (12-22-24 @ 11:41)    PHYSICAL EXAM:  HEENT: NC atraumatic  Neck: supple  Respiratory: no accessory muscle use, breathing comfortably  Cardiovascular: distant  Gastrointestinal: normal appearing, nondistended  Extremities: no clubbing, no cyanosis,        LABS:                          13.3   4.28  )-----------( 218      ( 23 Dec 2024 08:19 )             39.1       WBC  4.28 12-23 @ 08:19  9.56 12-21 @ 07:47  9.81 12-20 @ 23:31      12-23    138  |  106  |  15  ----------------------------<  130[H]  4.2   |  27  |  0.94    Ca    9.3      23 Dec 2024 08:19        Creatinine: 0.94 mg/dL (12-23-24 @ 08:19)  Creatinine: 0.81 mg/dL (12-21-24 @ 07:47)  Creatinine: 0.87 mg/dL (12-20-24 @ 23:31)        Urinalysis Basic - ( 23 Dec 2024 08:19 )    Color: x / Appearance: x / SG: x / pH: x  Gluc: 130 mg/dL / Ketone: x  / Bili: x / Urobili: x   Blood: x / Protein: x / Nitrite: x   Leuk Esterase: x / RBC: x / WBC x   Sq Epi: x / Non Sq Epi: x / Bacteria: x            INFLAMMATORY MARKERS      MICROBIOLOGY:              RADIOLOGY & ADDITIONAL STUDIES:  
Date/Time Patient Seen:  		  Referring MD:   Data Reviewed	       Patient is a 54y old  Female who presents with a chief complaint of AHRF, INES (22 Dec 2024 22:34)      Subjective/HPI     PAST MEDICAL & SURGICAL HISTORY:  Bipolar affective    Asthma  last major attack 2016, denies hx of intubation    Hypothyroidism, unspecified type    Depression, unspecified depression type    Malignant neoplasm of right female breast, unspecified estrogen receptor status, unspecified site of breast  s/p lumpectomy 2018, completed chemo and radiation 3/2019    H/O Hashimoto thyroiditis    Anxiety and depression    History of 2019 novel coronavirus disease (COVID-19)  4/2020- not hospitalized, no resp. complications    Gastritis    Ganglion cyst of wrist, left  2013, 2015    Encounter for IUD insertion  2021 last place -copper-removed    S/P lumpectomy, right breast  with nodes 2018    S/P foot surgery, left  no hardware          Medication list         MEDICATIONS  (STANDING):  albuterol    0.083% 2.5 milliGRAM(s) Nebulizer every 6 hours  anastrozole 1 milliGRAM(s) Oral daily  azithromycin  IVPB 500 milliGRAM(s) IV Intermittent every 24 hours  buPROPion XL (24-Hour) . 300 milliGRAM(s) Oral daily  cefTRIAXone   IVPB 1000 milliGRAM(s) IV Intermittent every 24 hours  enoxaparin Injectable 40 milliGRAM(s) SubCutaneous every 24 hours  fluticasone propionate/ salmeterol 250-50 MICROgram(s) Diskus 1 Dose(s) Inhalation two times a day  levothyroxine 88 MICROGram(s) Oral daily  methylPREDNISolone sodium succinate Injectable 40 milliGRAM(s) IV Push daily  montelukast 10 milliGRAM(s) Oral daily  pantoprazole    Tablet 40 milliGRAM(s) Oral before breakfast  sertraline 200 milliGRAM(s) Oral daily  tiotropium 2.5 MICROgram(s) Inhaler 2 Puff(s) Inhalation daily    MEDICATIONS  (PRN):  acetaminophen     Tablet .. 650 milliGRAM(s) Oral every 6 hours PRN Temp greater or equal to 38C (100.4F), Mild Pain (1 - 3)  benzonatate 100 milliGRAM(s) Oral every 8 hours PRN Cough         Vitals log        ICU Vital Signs Last 24 Hrs  T(C): 36.8 (22 Dec 2024 19:59), Max: 36.9 (22 Dec 2024 11:41)  T(F): 98.2 (22 Dec 2024 19:59), Max: 98.4 (22 Dec 2024 11:41)  HR: 76 (22 Dec 2024 19:59) (76 - 81)  BP: 106/68 (22 Dec 2024 19:59) (106/68 - 113/69)  BP(mean): --  ABP: --  ABP(mean): --  RR: 18 (22 Dec 2024 19:59) (18 - 18)  SpO2: 96% (22 Dec 2024 19:59) (96% - 98%)    O2 Parameters below as of 22 Dec 2024 19:59  Patient On (Oxygen Delivery Method): nasal cannula  O2 Flow (L/min): 2               Input and Output:  I&O's Detail      Lab Data                        12.9   9.56  )-----------( 204      ( 21 Dec 2024 07:47 )             36.6     12-21    136  |  105  |  9   ----------------------------<  149[H]  4.3   |  22  |  0.81    Ca    8.7      21 Dec 2024 07:47    TPro  8.1  /  Alb  3.8  /  TBili  0.3  /  DBili  x   /  AST  20  /  ALT  28  /  AlkPhos  89  12-21            Review of Systems	      Objective     Physical Examination    heart s1s2  lung dc bS  head nc  head at  abd soft  verbal      Pertinent Lab findings & Imaging      Dakota:  NO   Adequate UO     I&O's Detail           Discussed with:     Cultures:	        Radiology                            
[facility], Division of Infectious   Dr Craft, Dr Woodall, Dr Francis, CHANDA Qureshi Edgar  [phone]  after hours and weekends [phone]    Name: SHANNAN CHRISTENSEN  Age: 54y  Gender: Female  MRN: [id number]    Interval History--  Notes reviewed  much better  mild congestion      Allergies    Sulfur (Hives)    Intolerances        Medications--  Antibiotics:  azithromycin  IVPB 500 milliGRAM(s) IV Intermittent every 24 hours  cefTRIAXone   IVPB 1000 milliGRAM(s) IV Intermittent every 24 hours    Immunologic:    Other:  acetaminophen     Tablet .. PRN  albuterol    0.083%  anastrozole  benzonatate PRN  buPROPion XL (24-Hour) .  enoxaparin Injectable  fluticasone propionate/ salmeterol 250-50 MICROgram(s) Diskus  levothyroxine  methylPREDNISolone sodium succinate Injectable  montelukast  pantoprazole    Tablet  sertraline  tiotropium 2.5 MICROgram(s) Inhaler      Review of Systems--  A 10-point review of systems was obtained.     Pertinent positives and negatives--  Constitutional: No fevers. No Chills. No Rigors.   Cardiovascular: No chest pain. No palpitations.  Respiratory: No shortness of breath. No cough.  Gastrointestinal: No nausea or vomiting. No diarrhea or constipation.   Psychiatric: Pleasant. Appropriate affect.    Review of systems otherwise negative except as previously noted.    Physical Examination--  Vital Signs: T(F): 98.2 (12-22-24 @ 19:59), Max: 98.4 (12-22-24 @ 11:41)  HR: 76 (12-22-24 @ 19:59)  BP: 106/68 (12-22-24 @ 19:59)  RR: 18 (12-22-24 @ 19:59)  SpO2: 96% (12-22-24 @ 19:59)  heent anicteric   Neck: Not rigid. No sense of mass.  Nodes: None palpable.  Lungs: Clear bilaterally without rales, wheezing or rhonchi  Heart: Regular rate and rhythm. No Murmur. No rub. No gallop. No palpable thrill.  Abdomen: Bowel sounds present and normoactive. Soft. Nondistended. Nontender.  Extremities: No cyanosis or clubbing. No edema.   Skin: Warm. Dry. Good turgor. No rash. No vasculitic stigmata.  Psychiatric: Appropriate affect and mood for situation.         Laboratory Studies--  CBC                        12.9   9.56  )-----------( 204      ( 21 Dec 2024 07:47 )             36.6       Chemistries  12-21    136  |  105  |  9   ----------------------------<  149[H]  4.3   |  22  |  0.81    Ca    8.7      21 Dec 2024 07:47  Mg     2.1     12-20    TPro  8.1  /  Alb  3.8  /  TBili  0.3  /  DBili  x   /  AST  20  /  ALT  28  /  AlkPhos  89  12-21      Culture Data    Culture - Blood (collected 21 Dec 2024 07:47)  Source: .Blood BLOOD  Preliminary Report (22 Dec 2024 16:01):    No growth at 24 hours    Culture - Blood (collected 21 Dec 2024 07:47)  Source: .Blood BLOOD  Preliminary Report (22 Dec 2024 16:01):    No growth at 24 hours            
Patient is a 54y old  Female who presents with a chief complaint of AHRF, PNA (21 Dec 2024 20:15)        INTERVAL HPI/OVERNIGHT EVENTS:   no complaints  pt seen and examined         Vital Signs Last 24 Hrs  T(C): 36.8 (21 Dec 2024 12:30), Max: 36.8 (21 Dec 2024 12:30)  T(F): 98.2 (21 Dec 2024 12:30), Max: 98.2 (21 Dec 2024 12:30)  HR: 75 (21 Dec 2024 21:04) (74 - 95)  BP: 108/76 (21 Dec 2024 12:30) (95/59 - 111/75)  BP(mean): --  RR: 18 (21 Dec 2024 12:30) (16 - 18)  SpO2: 100% (21 Dec 2024 21:04) (98% - 100%)    Parameters below as of 21 Dec 2024 21:04  Patient On (Oxygen Delivery Method): nasal cannula, 3        acetaminophen     Tablet .. 650 milliGRAM(s) Oral every 6 hours PRN  albuterol    0.083% 2.5 milliGRAM(s) Nebulizer every 6 hours PRN  anastrozole 1 milliGRAM(s) Oral daily  azithromycin  IVPB 500 milliGRAM(s) IV Intermittent every 24 hours  buDESOnide    Inhalation Suspension 0.5 milliGRAM(s) Inhalation two times a day  buPROPion XL (24-Hour) . 300 milliGRAM(s) Oral daily  cefTRIAXone   IVPB 1000 milliGRAM(s) IV Intermittent every 24 hours  enoxaparin Injectable 40 milliGRAM(s) SubCutaneous every 24 hours  levothyroxine 88 MICROGram(s) Oral daily  methylPREDNISolone sodium succinate Injectable 40 milliGRAM(s) IV Push daily  montelukast 10 milliGRAM(s) Oral daily  pantoprazole    Tablet 40 milliGRAM(s) Oral before breakfast  sertraline 200 milliGRAM(s) Oral daily      PHYSICAL EXAM:  GENERAL: NAD   EYES: conjunctiva and sclera clear  ENMT: Moist mucous membranes  NECK: Supple, No JVD, Normal thyroid  CHEST/LUNG: non labored, cta b/l  HEART: Regular rate and rhythm; No murmurs, rubs, or gallops  ABDOMEN: Soft, Nontender, Nondistended; Bowel sounds present  EXTREMITIES:  No clubbing, no cyanosis, no edema  LYMPH: No lymphadenopathy noted  SKIN: No rashes or lesions  NEURO: no new focal deficits    Consultant(s) Notes Reviewed:  [x ] YES  [ ] NO  Care Discussed with Consultants/Other Providers [ x] YES  [ ] NO    LABS:                        12.9   9.56  )-----------( 204      ( 21 Dec 2024 07:47 )             36.6     12-21    136  |  105  |  9   ----------------------------<  149[H]  4.3   |  22  |  0.81    Ca    8.7      21 Dec 2024 07:47  Mg     2.1     12-20    TPro  8.1  /  Alb  3.8  /  TBili  0.3  /  DBili  x   /  AST  20  /  ALT  28  /  AlkPhos  89  12-21    PT/INR - ( 20 Dec 2024 23:31 )   PT: 10.7 sec;   INR: 0.91 ratio         PTT - ( 20 Dec 2024 23:31 )  PTT:32.1 sec  Urinalysis Basic - ( 21 Dec 2024 07:47 )    Color: x / Appearance: x / SG: x / pH: x  Gluc: 149 mg/dL / Ketone: x  / Bili: x / Urobili: x   Blood: x / Protein: x / Nitrite: x   Leuk Esterase: x / RBC: x / WBC x   Sq Epi: x / Non Sq Epi: x / Bacteria: x      CAPILLARY BLOOD GLUCOSE            Urinalysis Basic - ( 21 Dec 2024 07:47 )    Color: x / Appearance: x / SG: x / pH: x  Gluc: 149 mg/dL / Ketone: x  / Bili: x / Urobili: x   Blood: x / Protein: x / Nitrite: x   Leuk Esterase: x / RBC: x / WBC x   Sq Epi: x / Non Sq Epi: x / Bacteria: x          RADIOLOGY & ADDITIONAL TESTS:    Imaging Personally Reviewed  Reviewed consultants input
Patient is a 54y old  Female who presents with a chief complaint of AHRF, PNA (22 Dec 2024 07:41)        INTERVAL HPI/OVERNIGHT EVENTS:   no complaints  pt seen and examined         Vital Signs Last 24 Hrs  T(C): 36.8 (22 Dec 2024 19:59), Max: 36.9 (22 Dec 2024 11:41)  T(F): 98.2 (22 Dec 2024 19:59), Max: 98.4 (22 Dec 2024 11:41)  HR: 76 (22 Dec 2024 19:59) (66 - 81)  BP: 106/68 (22 Dec 2024 19:59) (101/66 - 113/69)  BP(mean): --  RR: 18 (22 Dec 2024 19:59) (18 - 18)  SpO2: 96% (22 Dec 2024 19:59) (94% - 100%)    Parameters below as of 22 Dec 2024 19:59  Patient On (Oxygen Delivery Method): nasal cannula  O2 Flow (L/min): 2      acetaminophen     Tablet .. 650 milliGRAM(s) Oral every 6 hours PRN  albuterol    0.083% 2.5 milliGRAM(s) Nebulizer every 6 hours  anastrozole 1 milliGRAM(s) Oral daily  azithromycin  IVPB 500 milliGRAM(s) IV Intermittent every 24 hours  benzonatate 100 milliGRAM(s) Oral every 8 hours PRN  buPROPion XL (24-Hour) . 300 milliGRAM(s) Oral daily  cefTRIAXone   IVPB 1000 milliGRAM(s) IV Intermittent every 24 hours  enoxaparin Injectable 40 milliGRAM(s) SubCutaneous every 24 hours  fluticasone propionate/ salmeterol 250-50 MICROgram(s) Diskus 1 Dose(s) Inhalation two times a day  levothyroxine 88 MICROGram(s) Oral daily  methylPREDNISolone sodium succinate Injectable 40 milliGRAM(s) IV Push daily  montelukast 10 milliGRAM(s) Oral daily  pantoprazole    Tablet 40 milliGRAM(s) Oral before breakfast  sertraline 200 milliGRAM(s) Oral daily  tiotropium 2.5 MICROgram(s) Inhaler 2 Puff(s) Inhalation daily      PHYSICAL EXAM:  GENERAL: NAD   EYES: conjunctiva and sclera clear  ENMT: Moist mucous membranes  NECK: Supple, No JVD, Normal thyroid  CHEST/LUNG: non labored, cta b/l  HEART: Regular rate and rhythm; No murmurs, rubs, or gallops  ABDOMEN: Soft, Nontender, Nondistended; Bowel sounds present  EXTREMITIES:  No clubbing, no cyanosis, no edema  LYMPH: No lymphadenopathy noted  SKIN: No rashes or lesions  NEURO: no new focal deficits    Consultant(s) Notes Reviewed:  [x ] YES  [ ] NO  Care Discussed with Consultants/Other Providers [ x] YES  [ ] NO    LABS:                        12.9   9.56  )-----------( 204      ( 21 Dec 2024 07:47 )             36.6     12-21    136  |  105  |  9   ----------------------------<  149[H]  4.3   |  22  |  0.81    Ca    8.7      21 Dec 2024 07:47  Mg     2.1     12-20    TPro  8.1  /  Alb  3.8  /  TBili  0.3  /  DBili  x   /  AST  20  /  ALT  28  /  AlkPhos  89  12-21    PT/INR - ( 20 Dec 2024 23:31 )   PT: 10.7 sec;   INR: 0.91 ratio         PTT - ( 20 Dec 2024 23:31 )  PTT:32.1 sec  Urinalysis Basic - ( 21 Dec 2024 07:47 )    Color: x / Appearance: x / SG: x / pH: x  Gluc: 149 mg/dL / Ketone: x  / Bili: x / Urobili: x   Blood: x / Protein: x / Nitrite: x   Leuk Esterase: x / RBC: x / WBC x   Sq Epi: x / Non Sq Epi: x / Bacteria: x      CAPILLARY BLOOD GLUCOSE            Urinalysis Basic - ( 21 Dec 2024 07:47 )    Color: x / Appearance: x / SG: x / pH: x  Gluc: 149 mg/dL / Ketone: x  / Bili: x / Urobili: x   Blood: x / Protein: x / Nitrite: x   Leuk Esterase: x / RBC: x / WBC x   Sq Epi: x / Non Sq Epi: x / Bacteria: x        Culture - Blood (collected 21 Dec 2024 07:47)  Source: .Blood BLOOD  Preliminary Report (22 Dec 2024 16:01):    No growth at 24 hours    Culture - Blood (collected 21 Dec 2024 07:47)  Source: .Blood BLOOD  Preliminary Report (22 Dec 2024 16:01):    No growth at 24 hours        RADIOLOGY & ADDITIONAL TESTS:    Imaging Personally Reviewed  Reviewed consultants input

## 2024-12-23 NOTE — DISCHARGE NOTE PROVIDER - NSDCFUSCHEDAPPT_GEN_ALL_CORE_FT
Xena Rainey  [facility]  [facility] [address] [place] [address] R  Scheduled Appointment: 01/07/2025    [facility]  [facility]  Scheduled Appointment: 01/17/2025    
Walk in

## 2024-12-23 NOTE — DISCHARGE NOTE PROVIDER - NSDCCPCAREPLAN_GEN_ALL_CORE_FT
PRINCIPAL DISCHARGE DIAGNOSIS  Diagnosis: Acute hypoxic respiratory failure  Assessment and Plan of Treatment:       SECONDARY DISCHARGE DIAGNOSES  Diagnosis: Bilateral pneumonia  Assessment and Plan of Treatment:     Diagnosis: Acute asthma exacerbation  Assessment and Plan of Treatment:

## 2024-12-23 NOTE — CARE COORDINATION ASSESSMENT. - NSPASTMEDSURGHISTORY_GEN_ALL_CORE_FT
PAST MEDICAL & SURGICAL HISTORY:  Asthma  last major attack 2016, denies hx of intubation      Bipolar affective      Ganglion cyst of wrist, left  2013, 2015      Malignant neoplasm of right female breast, unspecified estrogen receptor status, unspecified site of breast  s/p lumpectomy 2018, completed chemo and radiation 3/2019      Depression, unspecified depression type      Hypothyroidism, unspecified type      Encounter for IUD insertion  2021 last place -copper-removed      S/P lumpectomy, right breast  with nodes 2018      History of 2019 novel coronavirus disease (COVID-19)  4/2020- not hospitalized, no resp. complications      Anxiety and depression      H/O Hashimoto thyroiditis      Gastritis      S/P foot surgery, left  no hardware

## 2024-12-23 NOTE — DISCHARGE NOTE PROVIDER - DETAILS OF MALNUTRITION DIAGNOSIS/DIAGNOSES
This patient has been assessed with a concern for Malnutrition and was treated during this hospitalization for the following Nutrition diagnosis/diagnoses:     -  12/23/2024: Moderate protein-calorie malnutrition

## 2024-12-23 NOTE — DISCHARGE NOTE NURSING/CASE MANAGEMENT/SOCIAL WORK - NSDCVIVACCINE_GEN_ALL_CORE_FT
Tdap; 13-Jan-2024 14:50; Matt Sanchez (RN); [employer]; [id number] (Exp. Date: 20-Jul-2025); IntraMuscular; Deltoid Right.; 0.5 milliLiter(s); VIS (VIS Published: 20-Jul-2025, VIS Presented: 13-Jan-2024);

## 2024-12-23 NOTE — CARE COORDINATION ASSESSMENT. - NSCAREPROVIDERS_GEN_ALL_CORE_FT
CARE PROVIDERS:  Accepting Physician: Haresh Blanchard  Administration: Matt Mendoza  Admitting: Haresh Blanchard  Attending: Tom Enamorado  Case Management: Charla Sanford  Consultant: Sky Craft  Consultant: Jesús Domingo  Consultant: Sherlyn Woodall  Covering Team: Dinesh Mckeon ED Attending: Adrian Land  ED Nurse: Ruthie Jackson  HIM/Billing & Coding: Britt Kearns  Nurse: Nicole Quintana  Nurse: Moreno Palacios  Oncology: Rajeevian, Nodina  Ordered: Doctor, Unknown  Outpatient Provider: Mahogany Vargas  Outpatient Provider: Quentin Montana  Outpatient Provider: Elian Giang  Outpatient Provider: Tom Enamorado  Override: Lola Vázquez  Override: Bello Hopkins  PCA/Nursing Assistant: Alley Kaur  PCA/Nursing Assistant: Angélica Andino  Registered Dietitian: Anay Alvarez  Respiratory Therapy: Veronica Fofana  : [address]

## 2024-12-23 NOTE — CARE COORDINATION ASSESSMENT. - NSPTRESPFORCHILD_GEN_ALL_CORE
No Straightforward: Basic instructions, no meds, no home treatment/Simple: Patient demonstrates quick and easy understanding/Verbalized Understanding

## 2024-12-23 NOTE — DIETITIAN INITIAL EVALUATION ADULT - PERTINENT MEDS FT
MEDICATIONS  (STANDING):  albuterol    0.083% 2.5 milliGRAM(s) Nebulizer every 6 hours  anastrozole 1 milliGRAM(s) Oral daily  buPROPion XL (24-Hour) . 300 milliGRAM(s) Oral daily  cefuroxime   Tablet 500 milliGRAM(s) Oral every 12 hours  enoxaparin Injectable 40 milliGRAM(s) SubCutaneous every 24 hours  fluticasone propionate/ salmeterol 250-50 MICROgram(s) Diskus 1 Dose(s) Inhalation two times a day  levothyroxine 88 MICROGram(s) Oral daily  methylPREDNISolone sodium succinate Injectable 40 milliGRAM(s) IV Push daily  montelukast 10 milliGRAM(s) Oral daily  pantoprazole    Tablet 40 milliGRAM(s) Oral before breakfast  sertraline 200 milliGRAM(s) Oral daily  tiotropium 2.5 MICROgram(s) Inhaler 2 Puff(s) Inhalation daily    MEDICATIONS  (PRN):  acetaminophen     Tablet .. 650 milliGRAM(s) Oral every 6 hours PRN Temp greater or equal to 38C (100.4F), Mild Pain (1 - 3)  benzonatate 100 milliGRAM(s) Oral every 8 hours PRN Cough

## 2024-12-23 NOTE — DISCHARGE NOTE PROVIDER - NSDCMRMEDTOKEN_GEN_ALL_CORE_FT
alendronate weekly: 1 cap(s) orally once a week 70 mg  anastrozole 1 mg oral tablet: 1 tab(s) orally once a day  benzonatate 100 mg oral capsule: 1 cap(s) orally every 8 hours as needed for Cough  Budeprion  mg/24 hours oral tablet, extended release: 1 tab(s) orally once a day  cefuroxime 500 mg oral tablet: 1 tab(s) orally every 12 hours  fluticasone-salmeterol 500 mcg-50 mcg inhalation powder: 1 puff(s) inhaled once a day  montelukast 10 mg oral tablet: 1 tab(s) orally once a day  predniSONE 10 mg oral tablet: 3 tab(s) orally once a day start 3 tabs daily, taper down by 1 tab every 4 days  Protonix 40 mg oral delayed release tablet: 1 tab(s) orally once a day  Spiriva Respimat 60 ACT 2.5 mcg/inh inhalation aerosol: 2 puff(s) inhaled once a day  Synthroid 88 mcg (0.088 mg) oral tablet: 1 tab(s) orally once a day  Zoloft 100 mg oral tablet: 2 tab(s) orally once a day

## 2024-12-23 NOTE — DISCHARGE NOTE PROVIDER - HOSPITAL COURSE
54F PMH asthma, hashimoto thyroiditis, gerd, depression, breast cancer p/w SOB and cough that started around 7pm earlier in the day upon presentation to the ED. Patient was at home, suddenly felt tired and that she wanted to take a nap, began having sob worse on laying flat. Patient also developed cough then - put on pulse ox and saw HR was 110-112 but her baseline HR in 70s , prior to that did have yellow-green sputum within the past 2-3 days she noticed in the morning. Denies recent travel, immobilization, no known sick contacts, but works at [employer]. Patient's O2 sat was 89% RA in triage. No personal family hx of clots. Denies any fever, n/v, CP, abd pain, urinary or bowel symptoms or leg swelling. Pt was seen by pulm and ID. She was treated with bronchodilators, steroids, and abx with improvement  LABS:                        13.3   4.28  )-----------( 218      ( 23 Dec 2024 08:19 )             39.1     12-23    138  |  106  |  15  ----------------------------<  130[H]  4.2   |  27  |  0.94    Ca    9.3      23 Dec 2024 08:19    RADIOLOGY & ADDITIONAL TESTS:  < from: CT Angio Chest PE Protocol w/ IV Cont (12.21.24 @ 00:16) >    FINDINGS:    LUNGS AND LARGE AIRWAYS: Patent central airways. Mild anterior right   upper lobe subpleural reticulation similar to prior. Mild subtle small   patchy ground glass opacities in the bilateral lung apices and left lower   lobe (example 301, 19 and 301, 78). Stable 4 mm right lower lobe nodule   (301, 83). Mild bibasilar atelectasis.  PLEURA: No pleural effusion.  VESSELS: No pulmonary embolism.  HEART: Heart size is normal. No pericardial effusion.  MEDIASTINUM AND JEFF: No lymphadenopathy.  CHEST WALL AND LOWER NECK: Within normal limits.  VISUALIZED UPPER ABDOMEN: Small hiatal hernia.  BONES: Degenerative changes. Stable mild anterior wedge compression   deformity of T6. Stable mild superior endplate compression deformities of   T7 and T10.    IMPRESSION:  No pulmonary embolism.    Mild subtle small patchy ground glass opacities in the bilateral lung   apices and left lower lobe, nonspecific. Findings could be related to   atelectasis, edema or infectious/inflammatory process. Clinical  correlation recommended.    < end of copied text >

## 2024-12-23 NOTE — PROVIDER CONTACT NOTE (OTHER) - SITUATION
Pt admitted for acute respiratory failure with hypoxia. Pt c/o bilateral arm pain after administration of IV azithromycin abx.

## 2024-12-23 NOTE — DIETITIAN INITIAL EVALUATION ADULT - FACTORS AFF FOOD INTAKE
[place] tylenol 650 mg o motrin 400-800 mg según sea necesario cada 4-6 horas para el dolor.   DESCANSO: descanse la articulación o extremidad dolorida o lesionada para disminuir el dolor y la hinchazón nina 24 a 48 horas.    HIELO: aplique hielo en el área del dolor para disminuir la inflamación y el dolor, coloque pauline toalla/eli entre el hielo y la piel. Puede dejar el hielo puesto nina 20 minutos seguidos, de 4 a 8 veces al día.   COMPRESIÓN: use pauline venda o aparato ortopédico spencer soporte para reducir la hinchazón.  Asegúrese de no envolver demasiado, afloje si la piel se siente entumecida/hormigueante o si la piel se pone clark.   ELEVACIÓN: Eleve el área lesionada/dolida 6 pulgadas o más alrededor del nivel del corazón para disminuir la hinchazón/inflamación.  Utilice pauline almohada debajo de la articulación para elevar el área    Llame para concertar un seguimiento con moreira médico de atención primaria dentro de pauline semana.    Puede llamar a nuestro coordinador de referencias al [phone] de lunpaty a viernes de 11 a. m. a 7 p. m. para obtener ayuda para programar pauline hiwot.
none

## 2024-12-23 NOTE — DISCHARGE NOTE NURSING/CASE MANAGEMENT/SOCIAL WORK - FINANCIAL ASSISTANCE
[facility] provides services at a reduced cost to those who are determined to be eligible through [facility]’s financial assistance program. Information regarding [facility]’s financial assistance program can be found by going to [email or web address] or by calling [phone].

## 2024-12-23 NOTE — DIETITIAN INITIAL EVALUATION ADULT - PROBLEM SELECTOR PLAN 1
Pt present with SOB w/ cough x1 day with SpO2 89% in triage. Hx of asthma, second hand smoke hx  ED T 98  /90 RR 20 89% RA   - s/p solumedrol 125 IVP x1, duonebs x1 in the ED - now HR 95 improved 99% on 2L NC   -solumedrol 40mg IV daily - can eventually taper with prednisone   - pt on advair at home - switch to pulmicort 0.5 BID  - Continue home montelukast, (home meds)  - albuterol q6h PRN nebs   - Chest PT/ incentive Abraham  - Can downtitrate O2 as tolerated as pt is saturating 99% on O2.   - Pulm Dr. salazar consulted, f/u recs.

## 2024-12-23 NOTE — PROVIDER CONTACT NOTE (OTHER) - ASSESSMENT
VSS. No c/o chest pain, sob, trouble breathing. After IV removed and flushed with normal saline, pt reporting relief of pain. Pt requesting PO tylenol.

## 2024-12-23 NOTE — DISCHARGE NOTE PROVIDER - CARE PROVIDER_API CALL
Elian Giang  Family Medicine  [address] [place] [address], [address]  [place] [address]  Phone: [phone]  Fax: [phone]  Follow Up Time:

## 2024-12-23 NOTE — DIETITIAN INITIAL EVALUATION ADULT - ADD RECOMMEND
1. Recommend Strawberry Banana Smoothie for additional 227 kcal and 18g Pro.   2. Obtain and honor food preferences as able.   3. Encourage intake of HBV protein sources to support muscle stores.

## 2024-12-23 NOTE — DIETITIAN INITIAL EVALUATION ADULT - PERTINENT LABORATORY DATA
12-23    138  |  106  |  15  ----------------------------<  130[H]  4.2   |  27  |  0.94    Ca    9.3      23 Dec 2024 08:19

## 2024-12-23 NOTE — DIETITIAN INITIAL EVALUATION ADULT - ORAL NUTRITION SUPPLEMENTS
Strawberry Banana Smoothie for additional  Strawberry Banana Smoothie for additional 227 kcal and 18g Pro.

## 2024-12-23 NOTE — CARE COORDINATION ASSESSMENT. - OTHER PERTINENT DISCHARGE PLANNING INFORMATION:
Met patient at bedside.  Explained role of CM, verbalized understanding. Pt was made aware a CM will remain available through hospitalization.  Contact information given in discharge/ transitions resource folder. Patient lives in a private home with her spouse and adult daughter. There are 3 CHETNA and no steps once inside. Patient is independent with ambulation and ADLs. She has a cane that she no longer uses (from a previous bunion surgery) and no other DME. She has no history of homecare/aide services. Patient states her spouse will transport her home. Pharmacy is [facility] in [place]. PCP is Dr. Elian Olivas [phone]. No anticipated discharge needs as patient is declining HA.

## 2024-12-23 NOTE — DIETITIAN INITIAL EVALUATION ADULT - ORAL INTAKE PTA/DIET HISTORY
Patient reports 100% intake at meals.   B: Belvita biscuit  L: Soup, salad, sandwich  D: Chickpea, ham, potatoes, pizza  Snack: Pretzels, cookies but states she isn't a big snacker  Beverages: tea, water  Diet recall reveals low fiber intake

## 2024-12-24 VITALS
OXYGEN SATURATION: 96 % | RESPIRATION RATE: 18 BRPM | TEMPERATURE: 99 F | SYSTOLIC BLOOD PRESSURE: 106 MMHG | HEART RATE: 72 BPM | DIASTOLIC BLOOD PRESSURE: 67 MMHG | WEIGHT: 130.95 LBS

## 2024-12-26 LAB
CULTURE RESULTS: SIGNIFICANT CHANGE UP
CULTURE RESULTS: SIGNIFICANT CHANGE UP
M PNEUMO IGM SER-ACNC: 1.46 INDEX — HIGH (ref 0–0.9)
MYCOPLASMA AG SPEC QL: POSITIVE
SPECIMEN SOURCE: SIGNIFICANT CHANGE UP
SPECIMEN SOURCE: SIGNIFICANT CHANGE UP

## 2025-01-07 ENCOUNTER — APPOINTMENT (OUTPATIENT)
Dept: PULMONOLOGY | Facility: CLINIC | Age: 55
End: 2025-01-07

## 2025-01-15 ENCOUNTER — OUTPATIENT (OUTPATIENT)
Dept: OUTPATIENT SERVICES | Facility: HOSPITAL | Age: 55
LOS: 1 days | Discharge: HOME | End: 2025-01-15

## 2025-01-15 DIAGNOSIS — C50.919 MALIGNANT NEOPLASM OF UNSPECIFIED SITE OF UNSPECIFIED FEMALE BREAST: ICD-10-CM

## 2025-01-15 DIAGNOSIS — Z98.890 OTHER SPECIFIED POSTPROCEDURAL STATES: Chronic | ICD-10-CM

## 2025-01-15 DIAGNOSIS — M67.432 GANGLION, LEFT WRIST: Chronic | ICD-10-CM

## 2025-01-15 DIAGNOSIS — Z30.430 ENCOUNTER FOR INSERTION OF INTRAUTERINE CONTRACEPTIVE DEVICE: Chronic | ICD-10-CM

## 2025-01-17 ENCOUNTER — APPOINTMENT (OUTPATIENT)
Dept: HEMATOLOGY ONCOLOGY | Facility: CLINIC | Age: 55
End: 2025-01-17
Payer: COMMERCIAL

## 2025-01-17 ENCOUNTER — NON-APPOINTMENT (OUTPATIENT)
Age: 55
End: 2025-01-17

## 2025-01-17 VITALS
SYSTOLIC BLOOD PRESSURE: 142 MMHG | WEIGHT: 142.2 LBS | DIASTOLIC BLOOD PRESSURE: 83 MMHG | BODY MASS INDEX: 25.19 KG/M2 | RESPIRATION RATE: 16 BRPM | HEART RATE: 92 BPM | TEMPERATURE: 97.3 F | OXYGEN SATURATION: 99 %

## 2025-01-17 DIAGNOSIS — C50.911 MALIGNANT NEOPLASM OF UNSPECIFIED SITE OF RIGHT FEMALE BREAST: ICD-10-CM

## 2025-01-17 DIAGNOSIS — Z79.811 LONG TERM (CURRENT) USE OF AROMATASE INHIBITORS: ICD-10-CM

## 2025-01-17 PROCEDURE — 99214 OFFICE O/P EST MOD 30 MIN: CPT

## 2025-01-17 PROCEDURE — G2211 COMPLEX E/M VISIT ADD ON: CPT

## 2025-01-20 NOTE — PATIENT PROFILE ADULT - NSPROGENANESREACTION_GEN_A_NUR
Dr cope no previous reaction

## 2025-01-30 NOTE — ED PROVIDER NOTE - CHPI ED SYMPTOMS NEG
Please forward to ordering provider
no body aches/no edema/no fever/no headache/no hemoptysis/no chills/no diaphoresis

## 2025-02-18 ENCOUNTER — APPOINTMENT (OUTPATIENT)
Dept: PULMONOLOGY | Facility: CLINIC | Age: 55
End: 2025-02-18
Payer: COMMERCIAL

## 2025-02-18 VITALS
HEIGHT: 63 IN | WEIGHT: 144 LBS | OXYGEN SATURATION: 98 % | RESPIRATION RATE: 17 BRPM | DIASTOLIC BLOOD PRESSURE: 76 MMHG | SYSTOLIC BLOOD PRESSURE: 108 MMHG | BODY MASS INDEX: 25.52 KG/M2 | HEART RATE: 97 BPM

## 2025-02-18 DIAGNOSIS — J45.40 MODERATE PERSISTENT ASTHMA, UNCOMPLICATED: ICD-10-CM

## 2025-02-18 DIAGNOSIS — K21.9 GASTRO-ESOPHAGEAL REFLUX DISEASE W/OUT ESOPHAGITIS: ICD-10-CM

## 2025-02-18 DIAGNOSIS — R25.1 TREMOR, UNSPECIFIED: ICD-10-CM

## 2025-02-18 DIAGNOSIS — J47.9 BRONCHIECTASIS, UNCOMPLICATED: ICD-10-CM

## 2025-02-18 PROCEDURE — 99214 OFFICE O/P EST MOD 30 MIN: CPT

## 2025-02-18 RX ORDER — LEVALBUTEROL TARTRATE 45 UG/1
45 AEROSOL, METERED ORAL
Qty: 1 | Refills: 5 | Status: ACTIVE | COMMUNITY
Start: 2025-02-18 | End: 1900-01-01

## 2025-03-14 ENCOUNTER — APPOINTMENT (OUTPATIENT)
Dept: RADIOLOGY | Facility: HOSPITAL | Age: 55
End: 2025-03-14

## 2025-03-14 ENCOUNTER — OUTPATIENT (OUTPATIENT)
Dept: OUTPATIENT SERVICES | Facility: HOSPITAL | Age: 55
LOS: 1 days | End: 2025-03-14
Payer: COMMERCIAL

## 2025-03-14 ENCOUNTER — RESULT REVIEW (OUTPATIENT)
Age: 55
End: 2025-03-14

## 2025-03-14 DIAGNOSIS — M67.432 GANGLION, LEFT WRIST: Chronic | ICD-10-CM

## 2025-03-14 DIAGNOSIS — Z30.430 ENCOUNTER FOR INSERTION OF INTRAUTERINE CONTRACEPTIVE DEVICE: Chronic | ICD-10-CM

## 2025-03-14 DIAGNOSIS — K44.9 DIAPHRAGMATIC HERNIA WITHOUT OBSTRUCTION OR GANGRENE: ICD-10-CM

## 2025-03-14 DIAGNOSIS — Z98.890 OTHER SPECIFIED POSTPROCEDURAL STATES: Chronic | ICD-10-CM

## 2025-03-14 PROCEDURE — 74220 X-RAY XM ESOPHAGUS 1CNTRST: CPT | Mod: 26

## 2025-03-17 ENCOUNTER — RX RENEWAL (OUTPATIENT)
Age: 55
End: 2025-03-17

## 2025-04-14 ENCOUNTER — OUTPATIENT (OUTPATIENT)
Dept: OUTPATIENT SERVICES | Facility: HOSPITAL | Age: 55
LOS: 1 days | Discharge: HOME | End: 2025-04-14

## 2025-04-14 DIAGNOSIS — C50.919 MALIGNANT NEOPLASM OF UNSPECIFIED SITE OF UNSPECIFIED FEMALE BREAST: ICD-10-CM

## 2025-04-14 DIAGNOSIS — Z98.890 OTHER SPECIFIED POSTPROCEDURAL STATES: Chronic | ICD-10-CM

## 2025-04-14 DIAGNOSIS — Z30.430 ENCOUNTER FOR INSERTION OF INTRAUTERINE CONTRACEPTIVE DEVICE: Chronic | ICD-10-CM

## 2025-04-14 DIAGNOSIS — M67.432 GANGLION, LEFT WRIST: Chronic | ICD-10-CM

## 2025-04-14 RX ORDER — CETIRIZINE HYDROCHLORIDE 10 MG/1
10 TABLET, FILM COATED ORAL
Qty: 30 | Refills: 2 | Status: ACTIVE | COMMUNITY
Start: 2025-04-14 | End: 1900-01-01

## 2025-04-14 RX ORDER — FLUTICASONE PROPIONATE 50 UG/1
50 SPRAY, METERED NASAL TWICE DAILY
Qty: 1 | Refills: 3 | Status: ACTIVE | COMMUNITY
Start: 2025-04-14 | End: 1900-01-01

## 2025-04-18 ENCOUNTER — APPOINTMENT (OUTPATIENT)
Dept: HEMATOLOGY ONCOLOGY | Facility: CLINIC | Age: 55
End: 2025-04-18
Payer: COMMERCIAL

## 2025-04-18 VITALS
OXYGEN SATURATION: 97 % | WEIGHT: 153.22 LBS | DIASTOLIC BLOOD PRESSURE: 73 MMHG | RESPIRATION RATE: 16 BRPM | HEART RATE: 86 BPM | SYSTOLIC BLOOD PRESSURE: 111 MMHG | BODY MASS INDEX: 27.14 KG/M2 | TEMPERATURE: 97.7 F

## 2025-04-18 DIAGNOSIS — C50.911 MALIGNANT NEOPLASM OF UNSPECIFIED SITE OF RIGHT FEMALE BREAST: ICD-10-CM

## 2025-04-18 DIAGNOSIS — Z79.811 LONG TERM (CURRENT) USE OF AROMATASE INHIBITORS: ICD-10-CM

## 2025-04-18 PROCEDURE — 99214 OFFICE O/P EST MOD 30 MIN: CPT

## 2025-04-26 LAB — ESTRADIOL ULTRASENSITIVE: <2.5 PG/ML

## 2025-05-27 ENCOUNTER — NON-APPOINTMENT (OUTPATIENT)
Age: 55
End: 2025-05-27

## 2025-06-10 ENCOUNTER — RX RENEWAL (OUTPATIENT)
Age: 55
End: 2025-06-10

## 2025-06-24 ENCOUNTER — APPOINTMENT (OUTPATIENT)
Age: 55
End: 2025-06-24
Payer: COMMERCIAL

## 2025-06-24 VITALS
RESPIRATION RATE: 17 BRPM | OXYGEN SATURATION: 98 % | HEART RATE: 84 BPM | HEIGHT: 63 IN | SYSTOLIC BLOOD PRESSURE: 117 MMHG | WEIGHT: 150 LBS | BODY MASS INDEX: 26.58 KG/M2 | DIASTOLIC BLOOD PRESSURE: 80 MMHG

## 2025-06-24 PROCEDURE — 99214 OFFICE O/P EST MOD 30 MIN: CPT

## 2025-06-24 RX ORDER — FLUTICASONE PROPIONATE AND SALMETEROL 250; 50 UG/1; UG/1
250-50 POWDER RESPIRATORY (INHALATION)
Qty: 3 | Refills: 3 | Status: ACTIVE | COMMUNITY
Start: 2025-06-24 | End: 1900-01-01

## 2025-07-23 ENCOUNTER — OUTPATIENT (OUTPATIENT)
Dept: OUTPATIENT SERVICES | Facility: HOSPITAL | Age: 55
LOS: 1 days | End: 2025-07-23
Payer: COMMERCIAL

## 2025-07-23 ENCOUNTER — APPOINTMENT (OUTPATIENT)
Dept: ULTRASOUND IMAGING | Facility: CLINIC | Age: 55
End: 2025-07-23
Payer: COMMERCIAL

## 2025-07-23 ENCOUNTER — RESULT REVIEW (OUTPATIENT)
Age: 55
End: 2025-07-23

## 2025-07-23 ENCOUNTER — APPOINTMENT (OUTPATIENT)
Dept: MAMMOGRAPHY | Facility: CLINIC | Age: 55
End: 2025-07-23
Payer: COMMERCIAL

## 2025-07-23 DIAGNOSIS — M67.432 GANGLION, LEFT WRIST: Chronic | ICD-10-CM

## 2025-07-23 DIAGNOSIS — C50.911 MALIGNANT NEOPLASM OF UNSPECIFIED SITE OF RIGHT FEMALE BREAST: ICD-10-CM

## 2025-07-23 DIAGNOSIS — Z98.890 OTHER SPECIFIED POSTPROCEDURAL STATES: Chronic | ICD-10-CM

## 2025-07-23 DIAGNOSIS — Z30.430 ENCOUNTER FOR INSERTION OF INTRAUTERINE CONTRACEPTIVE DEVICE: Chronic | ICD-10-CM

## 2025-07-23 PROCEDURE — 76641 ULTRASOUND BREAST COMPLETE: CPT | Mod: 26,50

## 2025-07-23 PROCEDURE — 77063 BREAST TOMOSYNTHESIS BI: CPT | Mod: 26

## 2025-07-23 PROCEDURE — 77067 SCR MAMMO BI INCL CAD: CPT | Mod: 26

## 2025-07-23 PROCEDURE — 77067 SCR MAMMO BI INCL CAD: CPT

## 2025-07-23 PROCEDURE — 76641 ULTRASOUND BREAST COMPLETE: CPT

## 2025-07-23 PROCEDURE — 77063 BREAST TOMOSYNTHESIS BI: CPT

## 2025-07-30 ENCOUNTER — APPOINTMENT (OUTPATIENT)
Dept: HEMATOLOGY ONCOLOGY | Facility: CLINIC | Age: 55
End: 2025-07-30
Payer: COMMERCIAL

## 2025-07-30 VITALS
BODY MASS INDEX: 27.53 KG/M2 | TEMPERATURE: 97 F | HEART RATE: 79 BPM | SYSTOLIC BLOOD PRESSURE: 118 MMHG | WEIGHT: 155.4 LBS | RESPIRATION RATE: 16 BRPM | DIASTOLIC BLOOD PRESSURE: 78 MMHG | OXYGEN SATURATION: 97 %

## 2025-07-30 DIAGNOSIS — C50.911 MALIGNANT NEOPLASM OF UNSPECIFIED SITE OF RIGHT FEMALE BREAST: ICD-10-CM

## 2025-07-30 DIAGNOSIS — Z79.811 LONG TERM (CURRENT) USE OF AROMATASE INHIBITORS: ICD-10-CM

## 2025-07-30 PROCEDURE — 99214 OFFICE O/P EST MOD 30 MIN: CPT

## 2025-09-02 ENCOUNTER — APPOINTMENT (OUTPATIENT)
Age: 55
End: 2025-09-02

## 2025-09-02 ENCOUNTER — RX RENEWAL (OUTPATIENT)
Age: 55
End: 2025-09-02

## 2025-09-02 VITALS
SYSTOLIC BLOOD PRESSURE: 104 MMHG | HEIGHT: 63 IN | WEIGHT: 152 LBS | BODY MASS INDEX: 26.93 KG/M2 | DIASTOLIC BLOOD PRESSURE: 71 MMHG | HEART RATE: 89 BPM | OXYGEN SATURATION: 97 %

## 2025-09-02 PROCEDURE — 99213 OFFICE O/P EST LOW 20 MIN: CPT

## (undated) DEVICE — STRYKER INTERPULSE HANDPIECE W IRR SUCTION TUBE

## (undated) DEVICE — BLADE SCALPEL SAFETYLOCK #15

## (undated) DEVICE — DRSG WEBRIL 4"

## (undated) DEVICE — SOLIDIFIER CANN EXPRESS 3K

## (undated) DEVICE — POSITIONER FOAM HEAD CRADLE (PINK)

## (undated) DEVICE — GLV 6.5 PROTEXIS

## (undated) DEVICE — PREP CHLORAPREP ORANGE 2PCT 26ML

## (undated) DEVICE — DRAPE TOWEL BLUE 17" X 24"

## (undated) DEVICE — SHOE  POSTOP SOFTIE DARCO M-M

## (undated) DEVICE — DRAPE INSTRUMENT POUCH 6.75" X 11"

## (undated) DEVICE — PACK LOWER EXTREMITY

## (undated) DEVICE — ELCTR GROUNDING PAD ADULT COVIDIEN

## (undated) DEVICE — DRSG STOCKINETTE IMPERVIOUS XL

## (undated) DEVICE — SHOE  POSTOP SOFTIE DARCO W-LG

## (undated) DEVICE — GLV 6 PROTEXIS

## (undated) DEVICE — POSITIONER FOAM HEAD CRADLE

## (undated) DEVICE — SUT POLYSORB 4-0 30" C-13 UNDYED

## (undated) DEVICE — GLV 7.5 PROTEXIS (BLUE)

## (undated) DEVICE — GLV 6 PROTEXIS (WHITE)

## (undated) DEVICE — GLV 7 PROTEXIS (WHITE)

## (undated) DEVICE — BLADE SAFETY LOCK #15

## (undated) DEVICE — GOWN TRIMAX XXL

## (undated) DEVICE — WRAP COMPRESSION CALF MED

## (undated) DEVICE — SAW BLADE MICROAIRE SAGITTAL 9.4MMX25.4MMX0.6MM

## (undated) DEVICE — DRILL BIT SYNTHES ORTHO QC 2X100MM

## (undated) DEVICE — NDL HYPO REGULAR BEVEL 25G X 1.5" (BLUE)

## (undated) DEVICE — CUFF TOURNIQUET 18" DUAL PORT W PLC

## (undated) DEVICE — GLV 7.5 PROTEXIS

## (undated) DEVICE — DRSG ADAPTIC 3X8"

## (undated) DEVICE — GLV 7 PROTEXIS

## (undated) DEVICE — GOWN LG W TOWEL

## (undated) DEVICE — SUT POLYSORB 2-0 30" V-20 UNDYED

## (undated) DEVICE — MARKER SKIN WRITE SITE PLUS

## (undated) DEVICE — PREP CHLORAPREP HI-LITE ORANGE 26ML

## (undated) DEVICE — BLADE RECIP CROSS CUT SM

## (undated) DEVICE — ELCTR BOVIE TIP BLADE INSULATED 2.75" EDGE

## (undated) DEVICE — ARTHREX MULTIFIRE SCORPION NEEDLE

## (undated) DEVICE — SYR LUER LOK 10CC

## (undated) DEVICE — BLANKET WARMER UPPER ADULT

## (undated) DEVICE — SOL IRR POUR NS 0.9% 500ML

## (undated) DEVICE — BLADE FLAT  26 X  6

## (undated) DEVICE — VENODYNE/SCD SLEEVE CALF MEDIUM

## (undated) DEVICE — SUT POLYSORB 3-0 18" C-13 UNDYED

## (undated) DEVICE — DRILL BIT SYNTHES ORTHO QC 2.7X100MM

## (undated) DEVICE — TOURNIQUET CUFF 18" DUAL PORT SINGLE BLADDER W PLC  (BLACK)

## (undated) DEVICE — GLV 6.5 PROTEXIS (WHITE)

## (undated) DEVICE — CANISTER SUCTION LID GUARD 3000CC

## (undated) DEVICE — SHOE  POSTOP SOFTIE DARCO M-LG

## (undated) DEVICE — SHOE  POSTOP SOFTIE DARCO M-SM

## (undated) DEVICE — DRAPE INSTRUMENT POUCH

## (undated) DEVICE — NDL HYPO REGULAR BEVEL 25G X 1.5"

## (undated) DEVICE — MARKING PEN WRITESITE PLUS

## (undated) DEVICE — DRSG ADAPTIC 3 X 8"

## (undated) DEVICE — WARMING BLANKET UPPER ADULT

## (undated) DEVICE — SUT MONOSOF 5-0 18" C-13

## (undated) DEVICE — GLV 7.5 PROTEXIS (WHITE)

## (undated) DEVICE — GLV 7.5 ESTEEM BLUE

## (undated) DEVICE — ELCTR EDGE BOVIE INSULATED BLADE TIP 2.75"

## (undated) DEVICE — SAW BLADE CONMED LINVATEC RECIPROCATING CROSS CUT SMALL 5.5 X 9.5MM

## (undated) DEVICE — DRILL BIT SYNTHES ORTHO QC 1.5X85MM